# Patient Record
Sex: MALE | Race: WHITE
[De-identification: names, ages, dates, MRNs, and addresses within clinical notes are randomized per-mention and may not be internally consistent; named-entity substitution may affect disease eponyms.]

---

## 2017-01-21 ENCOUNTER — TRANSCRIPTION ENCOUNTER (OUTPATIENT)
Age: 77
End: 2017-01-21

## 2018-04-10 ENCOUNTER — TRANSCRIPTION ENCOUNTER (OUTPATIENT)
Age: 78
End: 2018-04-10

## 2018-08-22 ENCOUNTER — TRANSCRIPTION ENCOUNTER (OUTPATIENT)
Age: 78
End: 2018-08-22

## 2019-04-11 ENCOUNTER — OUTPATIENT (OUTPATIENT)
Dept: OUTPATIENT SERVICES | Facility: HOSPITAL | Age: 79
LOS: 1 days | Discharge: HOME | End: 2019-04-11
Payer: MEDICARE

## 2019-04-11 DIAGNOSIS — I67.1 CEREBRAL ANEURYSM, NONRUPTURED: ICD-10-CM

## 2019-04-11 PROCEDURE — 70544 MR ANGIOGRAPHY HEAD W/O DYE: CPT | Mod: 26

## 2020-09-07 ENCOUNTER — EMERGENCY (EMERGENCY)
Facility: HOSPITAL | Age: 80
LOS: 0 days | Discharge: LEFT BEFORE DISCHARGE | End: 2020-09-07
Attending: EMERGENCY MEDICINE | Admitting: EMERGENCY MEDICINE
Payer: MEDICARE

## 2020-09-07 VITALS
RESPIRATION RATE: 20 BRPM | DIASTOLIC BLOOD PRESSURE: 93 MMHG | HEART RATE: 82 BPM | OXYGEN SATURATION: 100 % | SYSTOLIC BLOOD PRESSURE: 216 MMHG | TEMPERATURE: 97 F

## 2020-09-07 VITALS
DIASTOLIC BLOOD PRESSURE: 94 MMHG | OXYGEN SATURATION: 100 % | TEMPERATURE: 97 F | RESPIRATION RATE: 20 BRPM | HEART RATE: 64 BPM | SYSTOLIC BLOOD PRESSURE: 186 MMHG

## 2020-09-07 DIAGNOSIS — S09.90XA UNSPECIFIED INJURY OF HEAD, INITIAL ENCOUNTER: ICD-10-CM

## 2020-09-07 DIAGNOSIS — I48.91 UNSPECIFIED ATRIAL FIBRILLATION: ICD-10-CM

## 2020-09-07 DIAGNOSIS — S60.419A ABRASION OF UNSPECIFIED FINGER, INITIAL ENCOUNTER: ICD-10-CM
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
## 2020-09-07 DIAGNOSIS — I10 ESSENTIAL (PRIMARY) HYPERTENSION: ICD-10-CM

## 2020-09-07 DIAGNOSIS — S81.812A LACERATION WITHOUT FOREIGN BODY, LEFT LOWER LEG, INITIAL ENCOUNTER: ICD-10-CM

## 2020-09-07 DIAGNOSIS — M79.662 PAIN IN LEFT LOWER LEG: ICD-10-CM

## 2020-09-07 DIAGNOSIS — S80.212A ABRASION, LEFT KNEE, INITIAL ENCOUNTER: ICD-10-CM

## 2020-09-07 DIAGNOSIS — S50.312A ABRASION OF LEFT ELBOW, INITIAL ENCOUNTER: ICD-10-CM

## 2020-09-07 DIAGNOSIS — S80.211A ABRASION, RIGHT KNEE, INITIAL ENCOUNTER: ICD-10-CM

## 2020-09-07 DIAGNOSIS — Z95.5 PRESENCE OF CORONARY ANGIOPLASTY IMPLANT AND GRAFT: Chronic | ICD-10-CM
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
## 2020-09-07 DIAGNOSIS — S22.42XA MULTIPLE FRACTURES OF RIBS, LEFT SIDE, INITIAL ENCOUNTER FOR CLOSED FRACTURE: ICD-10-CM
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
## 2020-09-07 LAB
ALBUMIN SERPL ELPH-MCNC: 4.6 G/DL — SIGNIFICANT CHANGE UP (ref 3.5–5.2)
ALP SERPL-CCNC: 35 U/L — SIGNIFICANT CHANGE UP (ref 30–115)
ALT FLD-CCNC: 15 U/L — SIGNIFICANT CHANGE UP (ref 0–41)
ANION GAP SERPL CALC-SCNC: 13 MMOL/L — SIGNIFICANT CHANGE UP (ref 7–14)
APTT BLD: 41.2 SEC — HIGH (ref 27–39.2)
AST SERPL-CCNC: 26 U/L — SIGNIFICANT CHANGE UP (ref 0–41)
BASOPHILS # BLD AUTO: 0.06 K/UL — SIGNIFICANT CHANGE UP (ref 0–0.2)
BASOPHILS NFR BLD AUTO: 0.9 % — SIGNIFICANT CHANGE UP (ref 0–1)
BILIRUB SERPL-MCNC: 0.8 MG/DL — SIGNIFICANT CHANGE UP (ref 0.2–1.2)
BLD GP AB SCN SERPL QL: SIGNIFICANT CHANGE UP
BUN SERPL-MCNC: 18 MG/DL — SIGNIFICANT CHANGE UP (ref 10–20)
CALCIUM SERPL-MCNC: 9.7 MG/DL — SIGNIFICANT CHANGE UP (ref 8.5–10.1)
CHLORIDE SERPL-SCNC: 103 MMOL/L — SIGNIFICANT CHANGE UP (ref 98–110)
CO2 SERPL-SCNC: 23 MMOL/L — SIGNIFICANT CHANGE UP (ref 17–32)
CREAT SERPL-MCNC: 1.4 MG/DL — SIGNIFICANT CHANGE UP (ref 0.7–1.5)
EOSINOPHIL # BLD AUTO: 0.15 K/UL — SIGNIFICANT CHANGE UP (ref 0–0.7)
EOSINOPHIL NFR BLD AUTO: 2.1 % — SIGNIFICANT CHANGE UP (ref 0–8)
ETHANOL SERPL-MCNC: <10 MG/DL — SIGNIFICANT CHANGE UP
GLUCOSE SERPL-MCNC: 151 MG/DL — HIGH (ref 70–99)
HCT VFR BLD CALC: 44.6 % — SIGNIFICANT CHANGE UP (ref 42–52)
HGB BLD-MCNC: 14.7 G/DL — SIGNIFICANT CHANGE UP (ref 14–18)
IMM GRANULOCYTES NFR BLD AUTO: 0.6 % — HIGH (ref 0.1–0.3)
INR BLD: 1.72 RATIO — HIGH (ref 0.65–1.3)
LACTATE SERPL-SCNC: 2 MMOL/L — SIGNIFICANT CHANGE UP (ref 0.7–2)
LIDOCAIN IGE QN: 34 U/L — SIGNIFICANT CHANGE UP (ref 7–60)
LYMPHOCYTES # BLD AUTO: 1.96 K/UL — SIGNIFICANT CHANGE UP (ref 1.2–3.4)
LYMPHOCYTES # BLD AUTO: 27.8 % — SIGNIFICANT CHANGE UP (ref 20.5–51.1)
MCHC RBC-ENTMCNC: 32 PG — HIGH (ref 27–31)
MCHC RBC-ENTMCNC: 33 G/DL — SIGNIFICANT CHANGE UP (ref 32–37)
MCV RBC AUTO: 97 FL — HIGH (ref 80–94)
MONOCYTES # BLD AUTO: 0.6 K/UL — SIGNIFICANT CHANGE UP (ref 0.1–0.6)
MONOCYTES NFR BLD AUTO: 8.5 % — SIGNIFICANT CHANGE UP (ref 1.7–9.3)
NEUTROPHILS # BLD AUTO: 4.24 K/UL — SIGNIFICANT CHANGE UP (ref 1.4–6.5)
NEUTROPHILS NFR BLD AUTO: 60.1 % — SIGNIFICANT CHANGE UP (ref 42.2–75.2)
NRBC # BLD: 0 /100 WBCS — SIGNIFICANT CHANGE UP (ref 0–0)
PLATELET # BLD AUTO: 236 K/UL — SIGNIFICANT CHANGE UP (ref 130–400)
POTASSIUM SERPL-MCNC: 4 MMOL/L — SIGNIFICANT CHANGE UP (ref 3.5–5)
POTASSIUM SERPL-SCNC: 4 MMOL/L — SIGNIFICANT CHANGE UP (ref 3.5–5)
PROT SERPL-MCNC: 7.2 G/DL — SIGNIFICANT CHANGE UP (ref 6–8)
PROTHROM AB SERPL-ACNC: 19.8 SEC — HIGH (ref 9.95–12.87)
RBC # BLD: 4.6 M/UL — LOW (ref 4.7–6.1)
RBC # FLD: 14.5 % — SIGNIFICANT CHANGE UP (ref 11.5–14.5)
SODIUM SERPL-SCNC: 139 MMOL/L — SIGNIFICANT CHANGE UP (ref 135–146)
WBC # BLD: 7.05 K/UL — SIGNIFICANT CHANGE UP (ref 4.8–10.8)
WBC # FLD AUTO: 7.05 K/UL — SIGNIFICANT CHANGE UP (ref 4.8–10.8)

## 2020-09-07 PROCEDURE — 73590 X-RAY EXAM OF LOWER LEG: CPT | Mod: 26,50

## 2020-09-07 PROCEDURE — 99285 EMERGENCY DEPT VISIT HI MDM: CPT | Mod: 25

## 2020-09-07 PROCEDURE — 70450 CT HEAD/BRAIN W/O DYE: CPT | Mod: 26

## 2020-09-07 PROCEDURE — 72125 CT NECK SPINE W/O DYE: CPT | Mod: 26

## 2020-09-07 PROCEDURE — 12035 INTMD RPR S/A/T/EXT 12.6-20: CPT

## 2020-09-07 PROCEDURE — 71260 CT THORAX DX C+: CPT | Mod: 26

## 2020-09-07 PROCEDURE — 71045 X-RAY EXAM CHEST 1 VIEW: CPT | Mod: 26

## 2020-09-07 PROCEDURE — 72170 X-RAY EXAM OF PELVIS: CPT | Mod: 26

## 2020-09-07 PROCEDURE — 93010 ELECTROCARDIOGRAM REPORT: CPT

## 2020-09-07 PROCEDURE — 74177 CT ABD & PELVIS W/CONTRAST: CPT | Mod: 26

## 2020-09-07 PROCEDURE — 73562 X-RAY EXAM OF KNEE 3: CPT | Mod: 26,50

## 2020-09-07 RX ORDER — CEFAZOLIN SODIUM 1 G
1000 VIAL (EA) INJECTION ONCE
Refills: 0 | Status: COMPLETED | OUTPATIENT
Start: 2020-09-07 | End: 2020-09-07

## 2020-09-07 RX ADMIN — Medication 100 MILLIGRAM(S): at 14:22

## 2020-09-07 NOTE — ED PROVIDER NOTE - PATIENT PORTAL LINK FT
You can access the FollowMyHealth Patient Portal offered by [facility] by registering at the following website: [email or web address]. By joining [facility]’s FollowMyHealth portal, you will also be able to view your health information using other applications (apps) compatible with our system.

## 2020-09-07 NOTE — ED PROVIDER NOTE - CLINICAL SUMMARY MEDICAL DECISION MAKING FREE TEXT BOX
evaluated for fall on xarelto. suffered large laceration to left leg. ct scan was done of head neck chest abd and pelvis which found age indeterminate fractures concerning for possible fracture due to most recent fall. trauma team was consulted and recommendation was for patient to stay in hospital however patient refused. The patient wishes to leave against medical advice.  I have discussed the risks, benefits and alternatives (including the possibility of worsening of disease, pain, permanent disability, and/or death) with the patient and his/her family (if available).  The patient voices understanding of these risks, benefits, and alternatives and still wishes to sign out against medical advice.  The patient is awake, alert, oriented  x 3 and has demonstrated capacity to refuse/direct care.  I have advised the patient that they can and should return immediately should they develop any worse/different/additional symptoms, or if they change their mind and want to continue their care.

## 2020-09-07 NOTE — CONSULT NOTE ADULT - SUBJECTIVE AND OBJECTIVE BOX
TRAUMA SERVICE CONSULT NOTE  --------------------------------------------------------------------------------------------    TRAUMA ACTIVATION LEVEL: ALERT    MECHANISM OF INJURY:      [x] Blunt  	[] MVC	[x] Fall	[] Pedestrian Struck	[] Motorcycle accident      [] Penetrating  	[] Gun Shot Wound 		[] Stab Wound    GCS: 15	E: 4	V: 5	M: 6    HPI: 79M PMH HTN, DM, HLD, gout, CAD s/p PCI on ASA, currently on xarelto presenting to the ED s/p mechanical trip and fall, +HT on cement, -LOC, on xarelto. Per the patient he had been working in his garden pulling weeds when he tripped, falling forward and hitting his head. External signs of trauma significant for an approximately 10 x 6 cm skin avulsion exposing fat on his left lateral calf, b/l knee abrasions, L elbow abrasion, b/l hand superficial abrasion. He only reports L lateral calf pain.    Primary Survey:    A - airway intact  B - bilateral breath sounds and good chest rise  C - initial BP  BP: 186/94 (09-07-20 @ 14:13) , HR HR: 64 (09-07-20 @ 14:13), palpable pulses in all extremities  D - GCS 15 on arrival  Exposure obtained      General: NAD  HEENT: Normocephalic, atraumatic, EOMI, PEERLA.  Neck: Soft, midline trachea.  Chest: No chest wall tenderness.   Cardiac: S1, S2, RRR  Respiratory: Bilateral breath sounds, clear and equal bilaterally  Abdomen: Soft, non-distended, non-tender, no rebound, no guarding, no masses palpated  Groin: Normal appearing  Ext: palp radial b/l UE, b/l DP palp in Lower Extrem, motor and sensory grossly intact in all 4 extremities, approximately 10 x 6cm skin avulsion exposing fat, no active bleed from skin, but with some bleed from fat, b/l knee abrasion, b/l hand abrasion, L elbow abrasion  Back: no TTP, no palpable runoff/stepoff/deformity      Patient denies fevers/chills, denies lightheadedness/dizziness, denies SOB/chest pain, denies nausea/vomiting, denies constipation/diarrhea.   ROS: 10-system review is otherwise negative except HPI above.      PAST MEDICAL & SURGICAL HISTORY:    FAMILY HISTORY:    [] Family history not pertinent as reviewed with the patient and family    SOCIAL HISTORY:  ***    ALLERGIES: No Known Allergies      HOME MEDICATIONS: ***    CURRENT MEDICATIONS  MEDICATIONS (STANDING):   MEDICATIONS (PRN):  --------------------------------------------------------------------------------------------    Vitals:   T(C): 36.3 (09-07-20 @ 14:13), Max: 36.3 (09-07-20 @ 13:50)  HR: 64 (09-07-20 @ 14:13) (64 - 91)  BP: 186/94 (09-07-20 @ 14:13) (182/89 - 219/76)  RR: 20 (09-07-20 @ 14:13) (20 - 20)  SpO2: 100% (09-07-20 @ 14:13) (100% - 100%)  CAPILLARY BLOOD GLUCOSE      POCT Blood Glucose.: 144 mg/dL (07 Sep 2020 13:52)    CAPILLARY BLOOD GLUCOSE      POCT Blood Glucose.: 144 mg/dL (07 Sep 2020 13:52)        Weight (kg): 119 (09-07 @ 14:00)        --------------------------------------------------------------------------------------------    LABS  CBC (09-07 @ 14:10)                              14.7                           7.05    )----------------(  236        60.1  % Neutrophils, 27.8  % Lymphocytes, ANC: 4.24                                44.6      BMP (09-07 @ 14:10)             139     |  103     |  18    		Ca++ --      Ca 9.7                ---------------------------------( 151<H>		Mg --                 4.0     |  23      |  1.4   			Ph --        LFTs (09-07 @ 14:10)      TPro 7.2 / Alb 4.6 / TBili 0.8 / DBili -- / AST 26 / ALT 15 / AlkPhos 35    Coags (09-07 @ 14:10)  aPTT 41.2<H> / INR 1.72<H> / PT 19.80<H>      ABG (09-07 @ 14:10)      /  /  /  /  / %     Lactate:  2.0        --------------------------------------------------------------------------------------------    IMAGING  PENDING    -------------------------------------------------------------------------------------------- TRAUMA SERVICE CONSULT NOTE  --------------------------------------------------------------------------------------------    TRAUMA ACTIVATION LEVEL: ALERT    MECHANISM OF INJURY:      [x] Blunt  	[] MVC	[x] Fall	[] Pedestrian Struck	[] Motorcycle accident      [] Penetrating  	[] Gun Shot Wound 		[] Stab Wound    GCS: 15	E: 4	V: 5	M: 6    HPI: 79M PMH HTN, DM, HLD, gout, CAD s/p PCI on ASA, currently on xarelto presenting to the ED s/p mechanical trip and fall, +HT on cement, -LOC, on xarelto. Per the patient he had been working in his garden pulling weeds when he tripped, falling forward and hitting his head. External signs of trauma significant for an approximately 10 x 6 cm skin avulsion exposing fat on his left lateral calf, b/l knee abrasions, L elbow abrasion, b/l hand superficial abrasion. He only reports L lateral calf pain.    Primary Survey:    A - airway intact  B - bilateral breath sounds and good chest rise  C - initial BP  BP: 186/94 (09-07-20 @ 14:13) , HR HR: 64 (09-07-20 @ 14:13), palpable pulses in all extremities  D - GCS 15 on arrival  Exposure obtained      General: NAD  HEENT: Normocephalic, atraumatic, EOMI, PEERLA.  Neck: Soft, midline trachea.  Chest: Minimal left chest tenderness  Cardiac: S1, S2, RRR  Respiratory: Bilateral breath sounds, clear and equal bilaterally  Abdomen: Soft, non-distended, non-tender, no rebound, no guarding, no masses palpated  Groin: Normal appearing  Ext: palp radial b/l UE, b/l DP palp in Lower Extrem, motor and sensory grossly intact in all 4 extremities, approximately 10 x 6cm skin avulsion exposing fat, no active bleed from skin, but with some bleed from fat, b/l knee abrasion, b/l hand abrasion, L elbow abrasion  Back: no TTP, no palpable runoff/stepoff/deformity      Patient denies fevers/chills, denies lightheadedness/dizziness, denies SOB/chest pain, denies nausea/vomiting, denies constipation/diarrhea.   ROS: 10-system review is otherwise negative except HPI above.      PAST MEDICAL & SURGICAL HISTORY:    FAMILY HISTORY:    [] Family history not pertinent as reviewed with the patient and family    SOCIAL HISTORY:  ***    ALLERGIES: No Known Allergies      HOME MEDICATIONS: ***    CURRENT MEDICATIONS  MEDICATIONS (STANDING):   MEDICATIONS (PRN):  --------------------------------------------------------------------------------------------    Vitals:   T(C): 36.3 (09-07-20 @ 14:13), Max: 36.3 (09-07-20 @ 13:50)  HR: 64 (09-07-20 @ 14:13) (64 - 91)  BP: 186/94 (09-07-20 @ 14:13) (182/89 - 219/76)  RR: 20 (09-07-20 @ 14:13) (20 - 20)  SpO2: 100% (09-07-20 @ 14:13) (100% - 100%)  CAPILLARY BLOOD GLUCOSE      POCT Blood Glucose.: 144 mg/dL (07 Sep 2020 13:52)    CAPILLARY BLOOD GLUCOSE      POCT Blood Glucose.: 144 mg/dL (07 Sep 2020 13:52)        Weight (kg): 119 (09-07 @ 14:00)        --------------------------------------------------------------------------------------------    LABS  CBC (09-07 @ 14:10)                              14.7                           7.05    )----------------(  236        60.1  % Neutrophils, 27.8  % Lymphocytes, ANC: 4.24                                44.6      BMP (09-07 @ 14:10)             139     |  103     |  18    		Ca++ --      Ca 9.7                ---------------------------------( 151<H>		Mg --                 4.0     |  23      |  1.4   			Ph --        LFTs (09-07 @ 14:10)      TPro 7.2 / Alb 4.6 / TBili 0.8 / DBili -- / AST 26 / ALT 15 / AlkPhos 35    Coags (09-07 @ 14:10)  aPTT 41.2<H> / INR 1.72<H> / PT 19.80<H>      ABG (09-07 @ 14:10)      /  /  /  /  / %     Lactate:  2.0        --------------------------------------------------------------------------------------------    IMAGING  < from: CT Head No Cont (09.07.20 @ 15:32) >  IMPRESSION:    No acute intracranial hemorrhage, midline shift, or mass effect.    < end of copied text >  < from: CT Cervical Spine No Cont (09.07.20 @ 15:32) >  IMPRESSION:    No acute cervical spine fracture.    < end of copied text >  < from: CT Chest w/ IV Cont (09.07.20 @ 20:37) >  IMPRESSION:    1. Subcutaneous soft tissue stranding along the superior aspect of left chest wall with suspicion of an acute nondisplaced fracture involving the left 6th anterolateral rib; several additional age-indeterminate left-sided rib fractures, specifically involving the left 3rd, 4th, and 7th anterolateral ribs.    2. Otherwise, no definite evidence of acute traumatic injury within the chest, abdomen or pelvis.    3. Indeterminate 2.4 cm cystic lesion along the pancreatic uncinate process; correlation with prior imaging, if available, may be of use. If prior imaging is not available, and further evaluation is clinically warranted, a contrast-enhanced pancreatic protocol MRI may be of use.    < end of copied text >      --------------------------------------------------------------------------------------------

## 2020-09-07 NOTE — ED PROCEDURE NOTE - PROCEDURE ADDITIONAL DETAILS
Total of 34 sutures applied (4.0 Vicryl subcutaneous and 4.0 Ethicon to skin). Deep wound into subcutaneous tissue. 2 skin flaps aligned.

## 2020-09-07 NOTE — ED PROVIDER NOTE - NSFOLLOWUPCLINICS_GEN_ALL_ED_FT
[facility]  Trauma Surgery  [address] [place] [address]  Phone: [phone]  Fax:   Follow Up Time:

## 2020-09-07 NOTE — ED PROVIDER NOTE - NSFOLLOWUPINSTRUCTIONS_ED_ALL_ED_FT
Sutured Wound Care    Sutures are stitches that can be used to close wounds. Taking care of your wound properly can help to prevent pain and infection. It can also help your wound to heal more quickly. Follow instructions from your health care provider about how to care for your sutured wound.    Supplies needed:    - Soap and water.  - A clean bandage (dressing), if needed.  - Antibiotic ointment.  - A clean towel.    How to care for your sutured wound    - Keep the wound completely dry for the first 24 hours, or for as long as directed by your health care provider. After 24–48 hours, you may shower or bathe as directed by your health care provider. Do not soak or submerge the wound in water until the sutures have been removed.  - After the first 24 hours, clean the wound once a day, or as often as directed by your health care provider, using the following steps:    - Wash the wound with soap and water.    - Rinse the wound with water to remove all soap.    - Pat the wound dry with a clean towel. Do not rub the wound.  - After cleaning the wound, apply a thin layer of antibiotic ointment as directed by your health care provider. This will prevent infection and keep the dressing from sticking to the wound.    Follow instructions from your health care provider about how to change your dressing:    - Wash your hands with soap and water. If soap and water are not available, use hand .  - Change your dressing at least once a day, or as often as told by your health care provider. If your dressing gets wet or dirty, change it.  - Leave sutures and other skin closures, such as adhesive tape or skin glue, in place. These skin closures may need to stay in place for 2 weeks or longer. If adhesive strip edges start to loosen and curl up, you may trim the loose edges. Do not remove adhesive strips completely unless your health care provider tells you to do that.    Check your wound every day for signs of infection. Watch for:    - Redness, swelling, or pain.  - Fluid or blood.  - Warmth.  - Pus or a bad smell.    Have the sutures removed as directed by your health care provider.    Follow these instructions at home:    Medicines     - Take or apply over-the-counter and prescription medicines only as told by your health care provider.  - If you were prescribed an antibiotic medicine or ointment, take or apply it as told by your health care provider. Do not stop using the antibiotic even if your condition improves.    General instructions     - To help reduce scarring after your wound heals, cover your wound with clothing or apply sunscreen of at least 30 SPF whenever you are outside.  - Do not scratch or pick at your wound.  - Avoid stretching your wound.  - Raise (elevate) the injured area above the level of your heart while you are sitting or lying down, if possible.  - Drink enough fluids to keep your urine clear or pale yellow.  - Keep all follow-up visits as told by your health care provider. This is important.    Contact a health care provider if:    - You received a tetanus shot and you have swelling, severe pain, redness, or bleeding at the injection site.  - Your wound breaks open.  - You have redness, swelling, or pain around your wound.  - You have fluid or blood coming from your wound.  - Your wound feels warm to the touch.  - You have a fever.  - You notice something coming out of your wound, such as wood or glass.  - You have pain that does not get better with medicine.  - The skin near your wound changes color.  - You need to change your dressing very frequently due to a lot of fluid, blood, or pus draining from the wound.  - You develop a new rash.  - You develop numbness around the wound.    Get help right away if:    - You develop severe swelling around your wound.  - You have pus or a bad smell coming from your wound.  - Your pain suddenly gets worse and is severe.  - You develop painful lumps near your wound or anywhere on your body.  - You have a red streak going away from your wound.  - The wound is on your hand or foot and:    - You cannot properly move a finger or toe.    - Your fingers or toes look pale or bluish.    - You have numbness that is spreading down your hand, foot, fingers, or toes.    Summary    - Sutures are stitches that can be used to close wounds.  - Taking care of your wound properly can help to prevent pain and infection.  - Keep the wound completely dry for the first 24 hours, or for as long as directed by your health care provider. After 24–48 hours, you may shower or bathe as directed by your health care provider.    This information is not intended to replace advice given to you by your health care provider. Make sure you discuss any questions you have with your health care provider.    Closed Head Injury    A closed head injury is an injury to your head that may or may not involve a traumatic brain injury (TBI). Symptoms of TBI can be short or long lasting and include headache, dizziness, interference with memory or speech, fatigue, confusion, changes in sleep, mood changes, nausea, depression/anxiety, and dulling of senses. Make sure to obtain proper rest which includes getting plenty of sleep, avoiding excessive visual stimulation, and avoiding activities that may cause physical or mental stress. Avoid any situation where there is potential for another head injury, including sports.    SEEK IMMEDIATE MEDICAL CARE IF YOU HAVE ANY OF THE FOLLOWING SYMPTOMS: unusual drowsiness, vomiting, severe dizziness, seizures, lightheadedness, muscular weakness, different pupil sizes, visual changes, or clear or bloody discharge from your ears or nose. Sutured Wound Care    Sutures are stitches that can be used to close wounds. Taking care of your wound properly can help to prevent pain and infection. It can also help your wound to heal more quickly. Follow instructions from your health care provider about how to care for your sutured wound.    Supplies needed:    - Soap and water.  - A clean bandage (dressing), if needed.  - Antibiotic ointment.  - A clean towel.    How to care for your sutured wound    - Keep the wound completely dry for the first 24 hours, or for as long as directed by your health care provider. After 24–48 hours, you may shower or bathe as directed by your health care provider. Do not soak or submerge the wound in water until the sutures have been removed.  - After the first 24 hours, clean the wound once a day, or as often as directed by your health care provider, using the following steps:    - Wash the wound with soap and water.    - Rinse the wound with water to remove all soap.    - Pat the wound dry with a clean towel. Do not rub the wound.  - After cleaning the wound, apply a thin layer of antibiotic ointment as directed by your health care provider. This will prevent infection and keep the dressing from sticking to the wound.    Follow instructions from your health care provider about how to change your dressing:    - Wash your hands with soap and water. If soap and water are not available, use hand .  - Change your dressing at least once a day, or as often as told by your health care provider. If your dressing gets wet or dirty, change it.  - Leave sutures and other skin closures, such as adhesive tape or skin glue, in place. These skin closures may need to stay in place for 2 weeks or longer. If adhesive strip edges start to loosen and curl up, you may trim the loose edges. Do not remove adhesive strips completely unless your health care provider tells you to do that.    Check your wound every day for signs of infection. Watch for:    - Redness, swelling, or pain.  - Fluid or blood.  - Warmth.  - Pus or a bad smell.    Have the sutures removed as directed by your health care provider.    Follow these instructions at home:    Medicines     - Take or apply over-the-counter and prescription medicines only as told by your health care provider.  - If you were prescribed an antibiotic medicine or ointment, take or apply it as told by your health care provider. Do not stop using the antibiotic even if your condition improves.    General instructions     - To help reduce scarring after your wound heals, cover your wound with clothing or apply sunscreen of at least 30 SPF whenever you are outside.  - Do not scratch or pick at your wound.  - Avoid stretching your wound.  - Raise (elevate) the injured area above the level of your heart while you are sitting or lying down, if possible.  - Drink enough fluids to keep your urine clear or pale yellow.  - Keep all follow-up visits as told by your health care provider. This is important.    Contact a health care provider if:    - You received a tetanus shot and you have swelling, severe pain, redness, or bleeding at the injection site.  - Your wound breaks open.  - You have redness, swelling, or pain around your wound.  - You have fluid or blood coming from your wound.  - Your wound feels warm to the touch.  - You have a fever.  - You notice something coming out of your wound, such as wood or glass.  - You have pain that does not get better with medicine.  - The skin near your wound changes color.  - You need to change your dressing very frequently due to a lot of fluid, blood, or pus draining from the wound.  - You develop a new rash.  - You develop numbness around the wound.    Get help right away if:    - You develop severe swelling around your wound.  - You have pus or a bad smell coming from your wound.  - Your pain suddenly gets worse and is severe.  - You develop painful lumps near your wound or anywhere on your body.  - You have a red streak going away from your wound.  - The wound is on your hand or foot and:    - You cannot properly move a finger or toe.    - Your fingers or toes look pale or bluish.    - You have numbness that is spreading down your hand, foot, fingers, or toes.    Summary    - Sutures are stitches that can be used to close wounds.  - Taking care of your wound properly can help to prevent pain and infection.  - Keep the wound completely dry for the first 24 hours, or for as long as directed by your health care provider. After 24–48 hours, you may shower or bathe as directed by your health care provider.    This information is not intended to replace advice given to you by your health care provider. Make sure you discuss any questions you have with your health care provider.    Closed Head Injury    A closed head injury is an injury to your head that may or may not involve a traumatic brain injury (TBI). Symptoms of TBI can be short or long lasting and include headache, dizziness, interference with memory or speech, fatigue, confusion, changes in sleep, mood changes, nausea, depression/anxiety, and dulling of senses. Make sure to obtain proper rest which includes getting plenty of sleep, avoiding excessive visual stimulation, and avoiding activities that may cause physical or mental stress. Avoid any situation where there is potential for another head injury, including sports.    SEEK IMMEDIATE MEDICAL CARE IF YOU HAVE ANY OF THE FOLLOWING SYMPTOMS: unusual drowsiness, vomiting, severe dizziness, seizures, lightheadedness, muscular weakness, different pupil sizes, visual changes, or clear or bloody discharge from your ears or nose.    Rib Fracture(s)    A rib fracture is a break or crack in one of the bones of the ribs. The ribs are a group of long, curved bones that wrap around your chest and attach to your spine. A broken or cracked rib is often painful, but most do not cause other problems and heal on their own over time. Symptoms include pain when you breath or cough or pain when pressing over the area. Breathing exercises will help keep your lungs inflated and prevent lung collapse or infection.    SEEK IMMEDIATE MEDICAL CARE IF YOU HAVE ANY OF THE FOLLOWING SYMPTOMS: fever, shortness of breath, coughing up blood, abdominal pain, nausea or vomiting, pain not controlled with medications.

## 2020-09-07 NOTE — ED PROVIDER NOTE - CARE PROVIDER_API CALL
[id number] [place] [address]  Phone: [phone]  Fax: [phone]  Follow Up Time:

## 2020-09-07 NOTE — ED PROVIDER NOTE - PMH
Atrial fibrillation    Coronary artery disease    Diabetes mellitus    Gout    Hyperlipidemia    Hypertension

## 2020-09-07 NOTE — CONSULT NOTE ADULT - ASSESSMENT
ASSESSMENT: 79M PMH HTN, DM, HLD, gout, CAD s/p PCI on ASA, currently on xarelto presenting to the ED s/p mechanical trip and fall, +HT on cement, -LOC, on xarelto    PLAN:  ***  - panscan  - L tibfib, ankle, b/l knee, b/le elbow, b/l hand XR  - will follow  - plan d/w Dr. Hinkle, ED, patient and wife ASSESSMENT: 79M PMH HTN, DM, HLD, gout, CAD s/p PCI on ASA, currently on xarelto presenting to the ED s/p mechanical trip and fall, +HT on cement, -LOC, on xarelto    PLAN:    - panscan performed demonstrating subcutaneous soft tissue stranding along the superior aspect of left chest wall with suspicion of an acute nondisplaced fracture involving the left 6th anterolateral rib; several additional age-indeterminate left-sided rib fractures, specifically involving the left 3rd, 4th, and 7th anterolateral ribs  - recommended patient admission for respiratory monitoring, pain control  - patient currently refusing admission, denies significant chest pain, discussed w/ patient risk of respiratory compromise in first 24h after sustaining rib fractures, patient and wife report understanding risks and will leave AMA  - plan d/w Dr. Hinkle, ED, patient and wife

## 2020-09-07 NOTE — ED ADULT NURSE NOTE - NSIMPLEMENTINTERV_GEN_ALL_ED
Implemented All Fall with Harm Risk Interventions:  [place] to call system. Call bell, personal items and telephone within reach. Instruct patient to call for assistance. Room bathroom lighting operational. Non-slip footwear when patient is off stretcher. Physically safe environment: no spills, clutter or unnecessary equipment. Stretcher in lowest position, wheels locked, appropriate side rails in place. Provide visual cue, wrist band, yellow gown, etc. Monitor gait and stability. Monitor for mental status changes and reorient to person, place, and time. Review medications for side effects contributing to fall risk. Reinforce activity limits and safety measures with patient and family. Provide visual clues: red socks.

## 2020-09-07 NOTE — ED PROVIDER NOTE - PROGRESS NOTE DETAILS
TC: Pt signed out to me by Dr. Easton. 80 yo M with PMHx of afib on xarelto, CAD s/p PCI on ASA, gout, HLD, HTN, DM who presents with mechanical fall while gardening. +head trauma, no preceding cardiac events. Ambulatory after and able to bear weight on BLE. Here in ED, vitals wnl. Trauma alert activated. ABC intact. GCS 15. LLE skin avulsion repaired. Labs wnl. No acute fx or dislocation seen on xrays. CT head/c-spine negative. Pt taken back to CT for CT chest/abd. Pt declined any pain meds at this time. Tdap utd 2 mo ago. TC: CT chest/abd shows acute L 6th rib fx with age indeterminate L rib fx's of 3,4,7th ribs. Pt has minimal tenderness on exam. Also noted to have pancreatic cystic lesion which was discussed with pt and wife and need for nonemergent f/u. Pt would like to leave AMA instead of stay for multiple rib fx's. Trauma team aware. Provided with incentive spirometer. Pt is awake, alert, interactive with normal mental status and neuro function. Pt denies SI/HI, demonstrates normal thought processes without evidence of intoxication, delirium, delusions, hallucinations. Pt requesting to leave against medical advice. Advised pt of potential risks of leaving AMA, including potential disability or death. Attempted to convince pt to stay and continue work up/tx and pt refused. Pt has capacity to make medical decisions at this time and will be signed out AMA. Instructed to f/u with PMD and trauma clinic as outpt and is aware can return to the ED at any time for eval.

## 2020-09-07 NOTE — ED PROVIDER NOTE - ATTENDING CONTRIBUTION TO CARE
I personally evaluated patient. I agree with the findings and plan with all documentation on chart except as documented  in my note.    80 y/o M PMH HTN, DM, HLD, gout, CAD s/p PCI on ASA, currently on Xarelto presenting to the ED s/p mechanical trip and fall, +HT on cement, -LOC, on Xarelto. Trauma Alert called upon arrival. Per the patient he had been working in his garden pulling weeds when he tripped, falling forward and hitting his head. + large laceration to shin with active bleeding and was bandaged by EMS.    Primary Survey:    A - airway intact  B - bilateral breath sounds and good chest rise  C - initial BP  BP: 186/94 (09-07-20 @ 14:13) , HR HR: 64 (09-07-20 @ 14:13), palpable pulses in all extremities  D - GCS 15 on arrival  Exposure obtained. External signs of trauma significant for an approximately 10 x 6 cm skin avulsion exposing fat on his left lateral calf, b/l knee abrasions, L elbow abrasion, b/l hand superficial abrasion. He only reports L lateral calf pain.    Patient seen and evaluated.  Primary survey intact. GCS 15. VS reviewed. Bedside FAST exam done and negative.  Large bore IV placed. Portable CXR and pelvis x-rays ordered with leg films.  Will send appropriate labs and getting appropriate trauma imaging.  Will follow up work up and recommendations from trauma.

## 2020-09-07 NOTE — ED ADULT TRIAGE NOTE - CHIEF COMPLAINT QUOTE
patient was pulling weeds when he tripped and fell over trailer hitch hitting head on xaralto no loc. full thickness skin flap laceration to left leg

## 2020-09-07 NOTE — ED PROVIDER NOTE - PHYSICAL EXAMINATION
Vital Signs: I have reviewed the initial vital signs.  Constitutional: well-nourished, appears stated age, no acute distress.  HEENT: Airway patent, protected.   CV: regular rate, regular rhythm, well-perfused extremities, 2+ b/l DP and radial pulses equal.  Lungs: BCTA, no increased WOB.  ABD: soft, NTND, no guarding or rebound, no pulsatile mass, no hernias.   MSK: Neck supple, nontender, nl ROM, no stepoff. Chest nontender. Back nontender in TLS spine or to b/l bony structures or flanks. Left tibia demonstrates 10 x 4 cm square skin flap without pulsatile bleed.   INTEG: Skin warm, dry, no rash.  NEURO: A&Ox3, moving all extremities, normal speech  PSYCH: Calm, cooperative, normal affect and interaction. Airway: Intact, Protecting airway  Breathing: Present b/l  Circulation: Strong pulses present in all four extremities    VITAL SIGNS: I have reviewed nursing notes and confirm.  CONSTITUTIONAL: no acute distress, GCS 15  SKIN: Laceration to Left shin - 10 x 6 cm square skin flap without pulsatile bleed into deep subcutaneous tissue. + abrasions to b/l knees   HEAD/Face: NCAT, no tenderness to mandible, skull bones, or zygoma  EYES: EOMI, PERRLA, no scleral icterus. No orbital rim tenderness or proptosis  ENT: TM's normal b/l with no hemotympanum, no sinus tenderness to percussion, no dental or tongue injury, normal pharynx. No signs of Basilar skull fracture.   NECK: Supple; no midline cervical tenderness  Spine: normal appearing spine with no step-off  CARD: RRR, no murmurs, rubs or gallops  RESP: clear to ausculation b/l.  No rales, rhonchi, or wheezing.  Chest: goods chest rise with breathing, no rib tenderness or crepitus  ABD: soft, + BS, non-tender, non-distended, no rebound or guarding. No CVA tenderness  Pelvis: Pelvis stable, no tenderness to pelvic bones, sacrum  EXT: Full ROM, + b/l leg edema. + tenderness to left shin. Mild tenderness to b/l knees.  NEURO: normal motor. normal sensory. CN II-XII intact. Cerebellar testing normal. Normal gait.  PSYCH: Alert, cooperative, and appropriate.

## 2020-09-07 NOTE — ED ADULT NURSE NOTE - OBJECTIVE STATEMENT
patient tripped and fell over boat hitch. hitting head no loc. patient on xaralto - pupils 3mm b/l reactive. lung sounds clear chest rise equal abdomen soft non tender non distended no deformities to extremities + pulses and sensation to all extremities. abrasions noted to b/l knees right hand - left leg full thickness skin flap laceration. no other complaints at this time

## 2020-09-07 NOTE — ED PROVIDER NOTE - OBJECTIVE STATEMENT
79M hx afib on xarelto presents with fall. patient was gardening when he tripped and cut his leg on a boat hitch, denies LOC/head trauma, tetanus utd. Denies vomiting/chest pain/diarrhea. 79M hx afib on xarelto presents with fall. patient was gardening when he tripped and cut his leg on a boat hitch, denies LOC/head trauma, tetanus utd. Denies vomiting/chest pain/diarrhea. Patient has mild pain to left lower leg - 3/10, worse with movement, dull in nature, associated with laceration and bleeding.

## 2020-09-07 NOTE — ED PROVIDER NOTE - CARE PLAN
Principal Discharge DX:	[facility] (closed head injury)  Secondary Diagnosis:	Laceration of leg Principal Discharge DX:	Multiple rib fractures involving four or more ribs  Secondary Diagnosis:	Laceration of leg  Secondary Diagnosis:	[facility] (closed head injury)

## 2020-09-10 ENCOUNTER — APPOINTMENT (OUTPATIENT)
Dept: SURGERY | Facility: CLINIC | Age: 80
End: 2020-09-10
Payer: MEDICARE

## 2020-09-10 VITALS
HEIGHT: 71 IN | WEIGHT: 259 LBS | DIASTOLIC BLOOD PRESSURE: 70 MMHG | HEART RATE: 83 BPM | SYSTOLIC BLOOD PRESSURE: 130 MMHG | BODY MASS INDEX: 36.26 KG/M2 | TEMPERATURE: 95.8 F

## 2020-09-10 PROBLEM — E11.9 TYPE 2 DIABETES MELLITUS WITHOUT COMPLICATIONS: Chronic | Status: ACTIVE | Noted: 2020-09-07

## 2020-09-10 PROBLEM — I25.10 ATHEROSCLEROTIC HEART DISEASE OF NATIVE CORONARY ARTERY WITHOUT ANGINA PECTORIS: Chronic | Status: ACTIVE | Noted: 2020-09-07

## 2020-09-10 PROBLEM — I48.91 UNSPECIFIED ATRIAL FIBRILLATION: Chronic | Status: ACTIVE | Noted: 2020-09-07

## 2020-09-10 PROBLEM — M10.9 GOUT, UNSPECIFIED: Chronic | Status: ACTIVE | Noted: 2020-09-07

## 2020-09-10 PROBLEM — I10 ESSENTIAL (PRIMARY) HYPERTENSION: Chronic | Status: ACTIVE | Noted: 2020-09-07

## 2020-09-10 PROBLEM — E78.5 HYPERLIPIDEMIA, UNSPECIFIED: Chronic | Status: ACTIVE | Noted: 2020-09-07

## 2020-09-10 PROBLEM — Z00.00 ENCOUNTER FOR PREVENTIVE HEALTH EXAMINATION: Status: ACTIVE | Noted: 2020-09-10

## 2020-09-10 PROCEDURE — 99202 OFFICE O/P NEW SF 15 MIN: CPT

## 2020-09-10 NOTE — HISTORY OF PRESENT ILLNESS
[de-identified] : 79 m cut left lateral calf on boat trailer Monday (4d ago) and ended up with angular flap.  flap was sutured in ed.  pt asked to see today for concerns related to color and drainage.\par \par the patient has lle chronic lymphedema and also reports hx vein stripping on that side.  Wound is approx 15cm flap rhomboid closed with interrupted 3-0 nylon.  there is ss drainage active from the wound edges.  the flap is dusky but warm, threatened but will need to fully declare.  Flap may fail, and defect wound be large and difficult in setting of chronic lymphedema.\par \par Will refer pt to wound service Dr Guerra for possible debridement and chronic wound mgmt.

## 2020-09-22 ENCOUNTER — APPOINTMENT (OUTPATIENT)
Dept: BURN CARE | Facility: CLINIC | Age: 80
End: 2020-09-22
Payer: MEDICARE

## 2020-09-22 ENCOUNTER — OUTPATIENT (OUTPATIENT)
Dept: OUTPATIENT SERVICES | Facility: HOSPITAL | Age: 80
LOS: 1 days | Discharge: HOME | End: 2020-09-22

## 2020-09-22 ENCOUNTER — INPATIENT (INPATIENT)
Facility: HOSPITAL | Age: 80
LOS: 2 days | Discharge: HOME HEALTH | End: 2020-09-25
Attending: PLASTIC SURGERY | Admitting: PLASTIC SURGERY
Payer: MEDICARE

## 2020-09-22 VITALS
RESPIRATION RATE: 18 BRPM | DIASTOLIC BLOOD PRESSURE: 67 MMHG | OXYGEN SATURATION: 99 % | HEART RATE: 100 BPM | SYSTOLIC BLOOD PRESSURE: 134 MMHG | TEMPERATURE: 98 F

## 2020-09-22 DIAGNOSIS — Z95.5 PRESENCE OF CORONARY ANGIOPLASTY IMPLANT AND GRAFT: Chronic | ICD-10-CM
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
## 2020-09-22 LAB
ALBUMIN SERPL ELPH-MCNC: 3.9 G/DL — SIGNIFICANT CHANGE UP (ref 3.5–5.2)
ALP SERPL-CCNC: 64 U/L — SIGNIFICANT CHANGE UP (ref 30–115)
ALT FLD-CCNC: 28 U/L — SIGNIFICANT CHANGE UP (ref 0–41)
ANION GAP SERPL CALC-SCNC: 12 MMOL/L — SIGNIFICANT CHANGE UP (ref 7–14)
APTT BLD: 38.4 SEC — SIGNIFICANT CHANGE UP (ref 27–39.2)
AST SERPL-CCNC: 21 U/L — SIGNIFICANT CHANGE UP (ref 0–41)
BASOPHILS # BLD AUTO: 0.05 K/UL — SIGNIFICANT CHANGE UP (ref 0–0.2)
BASOPHILS NFR BLD AUTO: 0.6 % — SIGNIFICANT CHANGE UP (ref 0–1)
BILIRUB SERPL-MCNC: 0.5 MG/DL — SIGNIFICANT CHANGE UP (ref 0.2–1.2)
BLD GP AB SCN SERPL QL: SIGNIFICANT CHANGE UP
BUN SERPL-MCNC: 26 MG/DL — HIGH (ref 10–20)
CALCIUM SERPL-MCNC: 9.5 MG/DL — SIGNIFICANT CHANGE UP (ref 8.5–10.1)
CHLORIDE SERPL-SCNC: 100 MMOL/L — SIGNIFICANT CHANGE UP (ref 98–110)
CO2 SERPL-SCNC: 27 MMOL/L — SIGNIFICANT CHANGE UP (ref 17–32)
CREAT SERPL-MCNC: 1.6 MG/DL — HIGH (ref 0.7–1.5)
EOSINOPHIL # BLD AUTO: 0.19 K/UL — SIGNIFICANT CHANGE UP (ref 0–0.7)
EOSINOPHIL NFR BLD AUTO: 2.3 % — SIGNIFICANT CHANGE UP (ref 0–8)
GLUCOSE BLDC GLUCOMTR-MCNC: 263 MG/DL — HIGH (ref 70–99)
GLUCOSE SERPL-MCNC: 228 MG/DL — HIGH (ref 70–99)
HCT VFR BLD CALC: 39.3 % — LOW (ref 42–52)
HGB BLD-MCNC: 12.7 G/DL — LOW (ref 14–18)
IMM GRANULOCYTES NFR BLD AUTO: 0.8 % — HIGH (ref 0.1–0.3)
INR BLD: 1.37 RATIO — HIGH (ref 0.65–1.3)
LYMPHOCYTES # BLD AUTO: 1.48 K/UL — SIGNIFICANT CHANGE UP (ref 1.2–3.4)
LYMPHOCYTES # BLD AUTO: 17.7 % — LOW (ref 20.5–51.1)
MAGNESIUM SERPL-MCNC: 1.9 MG/DL — SIGNIFICANT CHANGE UP (ref 1.8–2.4)
MCHC RBC-ENTMCNC: 31.4 PG — HIGH (ref 27–31)
MCHC RBC-ENTMCNC: 32.3 G/DL — SIGNIFICANT CHANGE UP (ref 32–37)
MCV RBC AUTO: 97 FL — HIGH (ref 80–94)
MONOCYTES # BLD AUTO: 0.7 K/UL — HIGH (ref 0.1–0.6)
MONOCYTES NFR BLD AUTO: 8.4 % — SIGNIFICANT CHANGE UP (ref 1.7–9.3)
NEUTROPHILS # BLD AUTO: 5.86 K/UL — SIGNIFICANT CHANGE UP (ref 1.4–6.5)
NEUTROPHILS NFR BLD AUTO: 70.2 % — SIGNIFICANT CHANGE UP (ref 42.2–75.2)
NRBC # BLD: 0 /100 WBCS — SIGNIFICANT CHANGE UP (ref 0–0)
PHOSPHATE SERPL-MCNC: 3 MG/DL — SIGNIFICANT CHANGE UP (ref 2.1–4.9)
PLATELET # BLD AUTO: 585 K/UL — HIGH (ref 130–400)
POTASSIUM SERPL-MCNC: 4 MMOL/L — SIGNIFICANT CHANGE UP (ref 3.5–5)
POTASSIUM SERPL-SCNC: 4 MMOL/L — SIGNIFICANT CHANGE UP (ref 3.5–5)
PROT SERPL-MCNC: 6.7 G/DL — SIGNIFICANT CHANGE UP (ref 6–8)
PROTHROM AB SERPL-ACNC: 15.8 SEC — HIGH (ref 9.95–12.87)
RAPID RVP RESULT: SIGNIFICANT CHANGE UP
RBC # BLD: 4.05 M/UL — LOW (ref 4.7–6.1)
RBC # FLD: 14 % — SIGNIFICANT CHANGE UP (ref 11.5–14.5)
SARS-COV-2 RNA SPEC QL NAA+PROBE: SIGNIFICANT CHANGE UP
SODIUM SERPL-SCNC: 139 MMOL/L — SIGNIFICANT CHANGE UP (ref 135–146)
WBC # BLD: 8.35 K/UL — SIGNIFICANT CHANGE UP (ref 4.8–10.8)
WBC # FLD AUTO: 8.35 K/UL — SIGNIFICANT CHANGE UP (ref 4.8–10.8)

## 2020-09-22 PROCEDURE — 99202 OFFICE O/P NEW SF 15 MIN: CPT

## 2020-09-22 PROCEDURE — 16025 DRESS/DEBRID P-THICK BURN M: CPT

## 2020-09-22 PROCEDURE — 71046 X-RAY EXAM CHEST 2 VIEWS: CPT | Mod: 26

## 2020-09-22 PROCEDURE — 93010 ELECTROCARDIOGRAM REPORT: CPT

## 2020-09-22 PROCEDURE — 93970 EXTREMITY STUDY: CPT | Mod: 26

## 2020-09-22 PROCEDURE — 99285 EMERGENCY DEPT VISIT HI MDM: CPT

## 2020-09-22 PROCEDURE — 93925 LOWER EXTREMITY STUDY: CPT | Mod: 26

## 2020-09-22 RX ORDER — SODIUM CHLORIDE 9 MG/ML
1000 INJECTION, SOLUTION INTRAVENOUS
Refills: 0 | Status: DISCONTINUED | OUTPATIENT
Start: 2020-09-22 | End: 2020-09-23

## 2020-09-22 RX ORDER — ISOSORBIDE MONONITRATE 60 MG/1
120 TABLET, EXTENDED RELEASE ORAL DAILY
Refills: 0 | Status: DISCONTINUED | OUTPATIENT
Start: 2020-09-22 | End: 2020-09-23

## 2020-09-22 RX ORDER — CHLORHEXIDINE GLUCONATE 213 G/1000ML
1 SOLUTION TOPICAL
Refills: 0 | Status: DISCONTINUED | OUTPATIENT
Start: 2020-09-22 | End: 2020-09-23

## 2020-09-22 RX ORDER — DEXTROSE 50 % IN WATER 50 %
25 SYRINGE (ML) INTRAVENOUS ONCE
Refills: 0 | Status: DISCONTINUED | OUTPATIENT
Start: 2020-09-22 | End: 2020-09-23

## 2020-09-22 RX ORDER — CIPROFLOXACIN LACTATE 400MG/40ML
400 VIAL (ML) INTRAVENOUS ONCE
Refills: 0 | Status: COMPLETED | OUTPATIENT
Start: 2020-09-22 | End: 2020-09-22

## 2020-09-22 RX ORDER — CIPROFLOXACIN LACTATE 400MG/40ML
400 VIAL (ML) INTRAVENOUS EVERY 12 HOURS
Refills: 0 | Status: DISCONTINUED | OUTPATIENT
Start: 2020-09-22 | End: 2020-09-23

## 2020-09-22 RX ORDER — DEXTROSE 50 % IN WATER 50 %
15 SYRINGE (ML) INTRAVENOUS ONCE
Refills: 0 | Status: DISCONTINUED | OUTPATIENT
Start: 2020-09-22 | End: 2020-09-23

## 2020-09-22 RX ORDER — ASPIRIN/CALCIUM CARB/MAGNESIUM 324 MG
81 TABLET ORAL DAILY
Refills: 0 | Status: DISCONTINUED | OUTPATIENT
Start: 2020-09-22 | End: 2020-09-23

## 2020-09-22 RX ORDER — ATORVASTATIN CALCIUM 80 MG/1
40 TABLET, FILM COATED ORAL DAILY
Refills: 0 | Status: DISCONTINUED | OUTPATIENT
Start: 2020-09-22 | End: 2020-09-23

## 2020-09-22 RX ORDER — INSULIN LISPRO 100/ML
VIAL (ML) SUBCUTANEOUS
Refills: 0 | Status: DISCONTINUED | OUTPATIENT
Start: 2020-09-22 | End: 2020-09-23

## 2020-09-22 RX ORDER — OXYCODONE AND ACETAMINOPHEN 5; 325 MG/1; MG/1
1 TABLET ORAL EVERY 6 HOURS
Refills: 0 | Status: DISCONTINUED | OUTPATIENT
Start: 2020-09-22 | End: 2020-09-23

## 2020-09-22 RX ORDER — SODIUM CHLORIDE 9 MG/ML
1000 INJECTION INTRAMUSCULAR; INTRAVENOUS; SUBCUTANEOUS
Refills: 0 | Status: DISCONTINUED | OUTPATIENT
Start: 2020-09-22 | End: 2020-09-23

## 2020-09-22 RX ORDER — DEXTROSE 50 % IN WATER 50 %
12.5 SYRINGE (ML) INTRAVENOUS ONCE
Refills: 0 | Status: DISCONTINUED | OUTPATIENT
Start: 2020-09-22 | End: 2020-09-23

## 2020-09-22 RX ORDER — AMLODIPINE BESYLATE 2.5 MG/1
10 TABLET ORAL DAILY
Refills: 0 | Status: DISCONTINUED | OUTPATIENT
Start: 2020-09-22 | End: 2020-09-23

## 2020-09-22 RX ORDER — HEPARIN SODIUM 5000 [USP'U]/ML
5000 INJECTION INTRAVENOUS; SUBCUTANEOUS EVERY 8 HOURS
Refills: 0 | Status: DISCONTINUED | OUTPATIENT
Start: 2020-09-22 | End: 2020-09-23

## 2020-09-22 RX ORDER — ALLOPURINOL 300 MG
150 TABLET ORAL DAILY
Refills: 0 | Status: DISCONTINUED | OUTPATIENT
Start: 2020-09-22 | End: 2020-09-23

## 2020-09-22 RX ORDER — AMPICILLIN SODIUM AND SULBACTAM SODIUM 250; 125 MG/ML; MG/ML
3 INJECTION, POWDER, FOR SUSPENSION INTRAMUSCULAR; INTRAVENOUS EVERY 6 HOURS
Refills: 0 | Status: DISCONTINUED | OUTPATIENT
Start: 2020-09-22 | End: 2020-09-23

## 2020-09-22 RX ORDER — ALLOPURINOL 300 MG
1 TABLET ORAL
Qty: 0 | Refills: 0 | DISCHARGE

## 2020-09-22 RX ORDER — PANTOPRAZOLE SODIUM 20 MG/1
40 TABLET, DELAYED RELEASE ORAL
Refills: 0 | Status: DISCONTINUED | OUTPATIENT
Start: 2020-09-22 | End: 2020-09-23

## 2020-09-22 RX ORDER — GLUCAGON INJECTION, SOLUTION 0.5 MG/.1ML
1 INJECTION, SOLUTION SUBCUTANEOUS ONCE
Refills: 0 | Status: DISCONTINUED | OUTPATIENT
Start: 2020-09-22 | End: 2020-09-23

## 2020-09-22 RX ORDER — LISINOPRIL 2.5 MG/1
40 TABLET ORAL DAILY
Refills: 0 | Status: DISCONTINUED | OUTPATIENT
Start: 2020-09-22 | End: 2020-09-23

## 2020-09-22 RX ORDER — FENOFIBRATE,MICRONIZED 130 MG
145 CAPSULE ORAL DAILY
Refills: 0 | Status: DISCONTINUED | OUTPATIENT
Start: 2020-09-22 | End: 2020-09-23

## 2020-09-22 RX ORDER — ACETAMINOPHEN 500 MG
650 TABLET ORAL EVERY 6 HOURS
Refills: 0 | Status: DISCONTINUED | OUTPATIENT
Start: 2020-09-22 | End: 2020-09-23

## 2020-09-22 RX ORDER — AMPICILLIN SODIUM AND SULBACTAM SODIUM 250; 125 MG/ML; MG/ML
3 INJECTION, POWDER, FOR SUSPENSION INTRAMUSCULAR; INTRAVENOUS ONCE
Refills: 0 | Status: COMPLETED | OUTPATIENT
Start: 2020-09-22 | End: 2020-09-22

## 2020-09-22 RX ADMIN — HEPARIN SODIUM 5000 UNIT(S): 5000 INJECTION INTRAVENOUS; SUBCUTANEOUS at 22:33

## 2020-09-22 RX ADMIN — AMPICILLIN SODIUM AND SULBACTAM SODIUM 200 GRAM(S): 250; 125 INJECTION, POWDER, FOR SUSPENSION INTRAMUSCULAR; INTRAVENOUS at 18:41

## 2020-09-22 RX ADMIN — SODIUM CHLORIDE 50 MILLILITER(S): 9 INJECTION INTRAMUSCULAR; INTRAVENOUS; SUBCUTANEOUS at 20:31

## 2020-09-22 RX ADMIN — Medication 200 MILLIGRAM(S): at 18:42

## 2020-09-22 NOTE — H&P ADULT - NSHPLABSRESULTS_GEN_ALL_CORE
78 y/o male with pmh of HTN, HLD, DM type 2, Afib (on xarelto), CAD with stent placement (2014), gout, VINI (wears a CPAP at night), presents to the ED for left lower extremity traumatic wound.     #Left lower extremity traumatic wound  -Admit to burn service  -wound care: please wash wound with soap and water, cover with xeroform, wrap with kerlix and ACE twice a day.  -Plan for OR tomorrow for debridement  -pre-op: CBC, BMP, Mg, Phos, T&S, PT/INR, PTT, CXR, EKG  -IVF  -IV abx  -Pain management  -PT consult  -GI/DVT ppx  -Vascular studies  -ambulate as tolerated with cane    #Cards  -patient has a hx of HTN, HLD, CAD with stents  -will continue his home meds-isosorbide, atorvastatin, amlodipine, HCTZ   -will hold xarelto for OR, and restart after surgery  -will need cards clearance, cardiologist is Dr. Erazo  -Monitor BP  -f/u EKG  -will order Echo  -DASH/TLC diet    #Resp  -Patient has a hx of VINI (wears CPAP at night)  -monitor pox as needed  -f/u CXR  -incentive spirometer     #Endo  -Patient has hx of DM type 2  -will hold home med (actos/metformin)  -will start insulin inpatient  -monitor FS, adjust insulin accordingly   -Carb consistency    -will obtain hgA1c    -MSK  -Hx of Gout  -Will continue home med allopurinol   -ambulate as horacio  -pain control 78 y/o male with pmh of HTN, HLD, DM type 2, Afib (on xarelto), CAD with stent placement (2014), gout, VINI (wears a CPAP at night), presents to the ED for left lower extremity traumatic wound.     #Left lower extremity traumatic wound  -Admit to burn service  -wound care: please wash wound with soap and water, cover with xeroform, wrap with Kerlix and ACE twice a day.  -Plan for OR tomorrow for debridement  -pre-op: CBC, BMP, Mg, Phos, T&S, PT/INR, PTT, CXR, EKG  -IVF  -IV abx  -Pain management  -PT consult  -GI/DVT ppx  -Vascular studies  -ambulate as tolerated with cane    #Cards  -patient has a hx of HTN, HLD, CAD with stents  -will continue his home meds-isosorbide, atorvastatin, amlodipine, HCTZ   -will hold xarelto for OR, and restart after surgery  -will need cards clearance, cardiologist is Dr. Erazo  -Monitor BP  -f/u EKG  -will order Echo  -DASH/TLC diet    #Resp  -Patient has a hx of VINI (wears CPAP at night)  -monitor pox as needed  -f/u CXR  -incentive spirometer     #Endo  -Patient has hx of DM type 2  -will hold home med (actos/metformin)  -will start insulin inpatient  -monitor FS, adjust insulin accordingly   -Carb consistency    -will obtain hgA1c    -MSK  -Hx of Gout  -Will continue home med allopurinol   -ambulate as horacio  -pain control

## 2020-09-22 NOTE — H&P ADULT - HISTORY OF PRESENT ILLNESS
Patient is a 78 y/o male with pmh of HTN, HLD, DM type 2, Afib (on xarelto), CAD with stent placement (2014), gout, VINI (wears a CPAP at night), presents to the ED for left lower extremity wound. Patient reports that on 9/7/20, he was gardening in his backyard when he tripped, fell, and cut his left leg on a boat hitch that was in his backyard. Patient states immediately after the incident, he noticed a lot of bleeding from his left leg. he placed a towel to leg and called 911. Patient was brought to [facility] ED where he was a trauma work up and had a panscan performed. Patient had a CT of the head, chest, cervical spine, abdomen and pelvis which showed subcutaneous soft tissue stranding along the superior aspect of left chest wall with suspicion of an acute nondisplaced fracture involving the left 6th anterolateral rib; several additional age-indeterminate left-sided rib fractures, specifically involving the left 3rd, 4th, and 7th anterolateral ribs. Trauma service recommended at the time, admission to hospital for respiratory monitoring, pain control but patient refused.  Patient also had 34 sutures placed to LLE laceration while in the ED. Patient signed AMA and was told to follow up with PMD and trauma clinic. Patient states he followed up with trauma clinic and saw Dr. Singh whom recommenced to Dr. Guerra for LLE wound. Patient was seen in burn clinic by Dr. Guerra and recommended admission to the hospital for debridement of LLE. Patient states that his spouse has been doing daily dressing changes to LLE wound with hydrogen peroxide and applying bacitracin. Patient c/o of moderate pain, describes as throbbing pain, 5/10 on pain scale, worse with ambulation and relieved with rest. Patient denies any fever, chills, chest pain, shortness or breath, nausea, vomiting paresthesia abdominal pain or sick contacts. Patient is a 78 y/o male with pmh of HTN, HLD, DM type 2, Afib (on xarelto), CAD with stent placement (2014), gout, VINI (wears a CPAP at night), presents to the ED for left lower extremity wound. Patient reports that on 9/7/20, he was gardening in his backyard when he tripped, fell, and cut his left leg on a boat hitch that was in his backyard. Patient states immediately after the incident, he noticed a lot of bleeding from his left leg. he placed a towel to leg and called 911. Patient was brought to [facility] ED where he was a trauma work up and had a panscan performed. Patient had a CT of the head, chest, cervical spine, abdomen and pelvis which showed subcutaneous soft tissue stranding along the superior aspect of left chest wall with suspicion of an acute nondisplaced fracture involving the left 6th anterolateral rib; several additional age-indeterminate left-sided rib fractures, specifically involving the left 3rd, 4th, and 7th anterolateral ribs. Trauma service recommended at the time, admission to hospital for respiratory monitoring, pain control but patient refused.  Patient also had 34 sutures placed to LLE laceration while in the ED. Patient signed AMA and was told to follow up with PMD and trauma clinic. Patient states he followed up with trauma clinic and saw Dr. Singh whom recommened Dr. Guerra for LLE wound. Patient was seen in burn clinic by Dr. Guerra and recommended admission to the hospital for debridement of LLE. Patient states that his spouse has been doing daily dressing changes to LLE wound with hydrogen peroxide and applying bacitracin. Patient c/o of moderate pain to LLE wound, describes as throbbing pain, 5/10 on pain scale, worse with ambulation and relieved with rest. Patient denies any fever, chills, chest pain, shortness or breath, nausea, vomiting paresthesia abdominal pain or sick contacts. Patient is a 78 y/o male with pmh of HTN, HLD, DM type 2, Afib (on xarelto), CAD with stent placement (2014), gout, VINI (wears a CPAP at night), presents to the ED for left lower extremity wound. Patient reports that on 9/7/20, he was gardening in his backyard when he tripped, fell, and cut his left leg on a boat hitch that was in his backyard. Patient states immediately after the incident, he noticed a lot of bleeding from his left leg. he placed a towel to leg and called 911. Patient was brought to [facility] ED where he was a trauma work up and had a panscan performed. Patient had a CT of the head, chest, cervical spine, abdomen and pelvis which showed subcutaneous soft tissue stranding along the superior aspect of left chest wall with suspicion of an acute nondisplaced fracture involving the left 6th anterolateral rib; several additional age-indeterminate left-sided rib fractures, specifically involving the left 3rd, 4th, and 7th anterolateral ribs. X-ray of bilateral tib/fib showed No acute osseous abnormality. Diffuse soft tissue swelling bilateral casts .Left lateral mid calf 10 cm soft tissue defect. Trauma service recommended at the time, admission to hospital for respiratory monitoring, pain control but patient refused.  Patient also had 34 sutures placed to LLE laceration while in the ED. Patient signed AMA and was told to follow up with PMD and trauma clinic. Patient states he followed up with trauma clinic and saw Dr. Singh whom recommened Dr. Guerra for LLE wound. Patient was seen in burn clinic by Dr. Guerra and recommended admission to the hospital for debridement of LLE. Patient states that his spouse has been doing daily dressing changes to LLE wound with hydrogen peroxide and applying bacitracin. Patient c/o of moderate pain to LLE wound, describes as throbbing pain, 5/10 on pain scale, worse with ambulation and relieved with rest. Patient denies any fever, chills, chest pain, shortness or breath, nausea, vomiting paresthesia abdominal pain or sick contacts.

## 2020-09-22 NOTE — ED ADULT NURSE NOTE - OBJECTIVE STATEMENT
78 y/o male sent in by Dr. Guerra for Debridement of Left Leg wound. pt denies any pain at this time.

## 2020-09-22 NOTE — ED PROVIDER NOTE - ATTENDING CONTRIBUTION TO CARE
80yo man h/o HTN, DM, HLD, afib on xarelto, CAD s/p stent, gout, sent in by Dr Guerra (burn) for admission due to nonhealing laceration to LLE which will require OR for debridement. Pt otherwise feeling well, exam as noted. Labs, burn eval, admit.

## 2020-09-22 NOTE — ED PROVIDER NOTE - NS ED ROS FT
Review of Systems:  	•	CONSTITUTIONAL - no fever, no diaphoresis, no chills  	•	SKIN - +LLE wound.   	•	HEMATOLOGIC - no bleeding, no bruising  	•	EYES - no eye pain, no blurry vision  	•	ENT - no congestion  	•	RESPIRATORY - no shortness of breath, no cough  	•	CARDIAC - no chest pain, no palpitations  	•	GI - no abd pain, no nausea, no vomiting, no diarrhea, no constipation  	•	GENITO-URINARY - no dysuria; no hematuria, no increased urinary frequency  	•	MUSCULOSKELETAL - no joint paint, no swelling, no redness  	•	NEUROLOGIC - no weakness, no headache, no paresthesias, no LOC  	•	PSYCH - no anxiety, no depression  	All other ROS are negative except as documented in HPI.

## 2020-09-22 NOTE — H&P ADULT - NSICDXPASTMEDICALHX_GEN_ALL_CORE_FT
PAST MEDICAL HISTORY:  Atrial fibrillation     Coronary artery disease     Diabetes mellitus     Gout     Hyperlipidemia     Hypertension     VINI on CPAP

## 2020-09-22 NOTE — H&P ADULT - NSHPPHYSICALEXAM_GEN_ALL_CORE
PHYSICAL EXAM:  GENERAL: NAD, sitting on Stretcher comfortably   HEAD:  Atraumatic, Normocephalic  CHEST/LUNG: speaking in full sentences, equal chest rise and fall, breathing on room air comfortably   HEART: in no acute cardiopulmonary distress.   EXTREMITIES:  2+ Peripheral Pulses, No clubbing, cyanosis.  PSYCH: AAOx3, pleasant, cooperative   SKIN: LLE: lateral aspect with ~10cm x 5cm hematoma with adherent black necrotic tissue with surrounding erythema to wound edges. mild serosanguineous bleeding noted. +1 pitting edema noted. tenderness to palpation, no purulent drainage noted. no malodor noted.

## 2020-09-22 NOTE — ED PROVIDER NOTE - OBJECTIVE STATEMENT
78 yo M with pmhx of HTN, HLD, DM type 2, Afib (on xarelto), CAD with stent placement (2014), gout, VINI sent in by Dr. Guerra for admission for LLE debridement. Patient had a fall a few weeks ago and sustained a laceration to lle that has been non-healing.

## 2020-09-23 ENCOUNTER — RESULT REVIEW (OUTPATIENT)
Age: 80
End: 2020-09-23

## 2020-09-23 DIAGNOSIS — Z02.9 ENCOUNTER FOR ADMINISTRATIVE EXAMINATIONS, UNSPECIFIED: ICD-10-CM

## 2020-09-23 LAB
A1C WITH ESTIMATED AVERAGE GLUCOSE RESULT: 7 % — HIGH (ref 4–5.6)
ANION GAP SERPL CALC-SCNC: 11 MMOL/L — SIGNIFICANT CHANGE UP (ref 7–14)
BASOPHILS # BLD AUTO: 0.01 K/UL — SIGNIFICANT CHANGE UP (ref 0–0.2)
BASOPHILS NFR BLD AUTO: 0.1 % — SIGNIFICANT CHANGE UP (ref 0–1)
BUN SERPL-MCNC: 29 MG/DL — HIGH (ref 10–20)
CALCIUM SERPL-MCNC: 8.6 MG/DL — SIGNIFICANT CHANGE UP (ref 8.5–10.1)
CHLORIDE SERPL-SCNC: 101 MMOL/L — SIGNIFICANT CHANGE UP (ref 98–110)
CO2 SERPL-SCNC: 24 MMOL/L — SIGNIFICANT CHANGE UP (ref 17–32)
CREAT SERPL-MCNC: 1.8 MG/DL — HIGH (ref 0.7–1.5)
EOSINOPHIL # BLD AUTO: 0 K/UL — SIGNIFICANT CHANGE UP (ref 0–0.7)
EOSINOPHIL NFR BLD AUTO: 0 % — SIGNIFICANT CHANGE UP (ref 0–8)
ESTIMATED AVERAGE GLUCOSE: 154 MG/DL — HIGH (ref 68–114)
GLUCOSE BLDC GLUCOMTR-MCNC: 179 MG/DL — HIGH (ref 70–99)
GLUCOSE BLDC GLUCOMTR-MCNC: 184 MG/DL — HIGH (ref 70–99)
GLUCOSE BLDC GLUCOMTR-MCNC: 259 MG/DL — HIGH (ref 70–99)
GLUCOSE BLDC GLUCOMTR-MCNC: 262 MG/DL — HIGH (ref 70–99)
GLUCOSE SERPL-MCNC: 254 MG/DL — HIGH (ref 70–99)
HCT VFR BLD CALC: 32.5 % — LOW (ref 42–52)
HGB BLD-MCNC: 10.3 G/DL — LOW (ref 14–18)
IMM GRANULOCYTES NFR BLD AUTO: 0.6 % — HIGH (ref 0.1–0.3)
LYMPHOCYTES # BLD AUTO: 0.6 K/UL — LOW (ref 1.2–3.4)
LYMPHOCYTES # BLD AUTO: 6.3 % — LOW (ref 20.5–51.1)
MAGNESIUM SERPL-MCNC: 1.6 MG/DL — LOW (ref 1.8–2.4)
MCHC RBC-ENTMCNC: 30.6 PG — SIGNIFICANT CHANGE UP (ref 27–31)
MCHC RBC-ENTMCNC: 31.7 G/DL — LOW (ref 32–37)
MCV RBC AUTO: 96.4 FL — HIGH (ref 80–94)
MONOCYTES # BLD AUTO: 0.41 K/UL — SIGNIFICANT CHANGE UP (ref 0.1–0.6)
MONOCYTES NFR BLD AUTO: 4.3 % — SIGNIFICANT CHANGE UP (ref 1.7–9.3)
NEUTROPHILS # BLD AUTO: 8.51 K/UL — HIGH (ref 1.4–6.5)
NEUTROPHILS NFR BLD AUTO: 88.7 % — HIGH (ref 42.2–75.2)
NRBC # BLD: 0 /100 WBCS — SIGNIFICANT CHANGE UP (ref 0–0)
PHOSPHATE SERPL-MCNC: 3 MG/DL — SIGNIFICANT CHANGE UP (ref 2.1–4.9)
PLATELET # BLD AUTO: 517 K/UL — HIGH (ref 130–400)
POTASSIUM SERPL-MCNC: 4.3 MMOL/L — SIGNIFICANT CHANGE UP (ref 3.5–5)
POTASSIUM SERPL-SCNC: 4.3 MMOL/L — SIGNIFICANT CHANGE UP (ref 3.5–5)
RBC # BLD: 3.37 M/UL — LOW (ref 4.7–6.1)
RBC # FLD: 13.9 % — SIGNIFICANT CHANGE UP (ref 11.5–14.5)
SODIUM SERPL-SCNC: 136 MMOL/L — SIGNIFICANT CHANGE UP (ref 135–146)
WBC # BLD: 9.59 K/UL — SIGNIFICANT CHANGE UP (ref 4.8–10.8)
WBC # FLD AUTO: 9.59 K/UL — SIGNIFICANT CHANGE UP (ref 4.8–10.8)

## 2020-09-23 PROCEDURE — 93306 TTE W/DOPPLER COMPLETE: CPT | Mod: 26

## 2020-09-23 PROCEDURE — 99223 1ST HOSP IP/OBS HIGH 75: CPT

## 2020-09-23 PROCEDURE — 88304 TISSUE EXAM BY PATHOLOGIST: CPT | Mod: 26

## 2020-09-23 RX ORDER — ALLOPURINOL 300 MG
150 TABLET ORAL DAILY
Refills: 0 | Status: DISCONTINUED | OUTPATIENT
Start: 2020-09-23 | End: 2020-09-24

## 2020-09-23 RX ORDER — FENOFIBRATE,MICRONIZED 130 MG
145 CAPSULE ORAL DAILY
Refills: 0 | Status: DISCONTINUED | OUTPATIENT
Start: 2020-09-23 | End: 2020-09-24

## 2020-09-23 RX ORDER — LISINOPRIL 2.5 MG/1
40 TABLET ORAL DAILY
Refills: 0 | Status: DISCONTINUED | OUTPATIENT
Start: 2020-09-23 | End: 2020-09-24

## 2020-09-23 RX ORDER — SODIUM CHLORIDE 9 MG/ML
1000 INJECTION, SOLUTION INTRAVENOUS
Refills: 0 | Status: DISCONTINUED | OUTPATIENT
Start: 2020-09-23 | End: 2020-09-24

## 2020-09-23 RX ORDER — ASPIRIN/CALCIUM CARB/MAGNESIUM 324 MG
81 TABLET ORAL DAILY
Refills: 0 | Status: DISCONTINUED | OUTPATIENT
Start: 2020-09-23 | End: 2020-09-24

## 2020-09-23 RX ORDER — INSULIN LISPRO 100/ML
VIAL (ML) SUBCUTANEOUS
Refills: 0 | Status: DISCONTINUED | OUTPATIENT
Start: 2020-09-23 | End: 2020-09-24

## 2020-09-23 RX ORDER — ISOSORBIDE MONONITRATE 60 MG/1
120 TABLET, EXTENDED RELEASE ORAL DAILY
Refills: 0 | Status: DISCONTINUED | OUTPATIENT
Start: 2020-09-23 | End: 2020-09-24

## 2020-09-23 RX ORDER — GLUCAGON INJECTION, SOLUTION 0.5 MG/.1ML
1 INJECTION, SOLUTION SUBCUTANEOUS ONCE
Refills: 0 | Status: DISCONTINUED | OUTPATIENT
Start: 2020-09-23 | End: 2020-09-24

## 2020-09-23 RX ORDER — PANTOPRAZOLE SODIUM 20 MG/1
40 TABLET, DELAYED RELEASE ORAL
Refills: 0 | Status: DISCONTINUED | OUTPATIENT
Start: 2020-09-23 | End: 2020-09-24

## 2020-09-23 RX ORDER — ATORVASTATIN CALCIUM 80 MG/1
40 TABLET, FILM COATED ORAL DAILY
Refills: 0 | Status: DISCONTINUED | OUTPATIENT
Start: 2020-09-23 | End: 2020-09-24

## 2020-09-23 RX ORDER — OXYCODONE AND ACETAMINOPHEN 5; 325 MG/1; MG/1
1 TABLET ORAL EVERY 6 HOURS
Refills: 0 | Status: DISCONTINUED | OUTPATIENT
Start: 2020-09-23 | End: 2020-09-24

## 2020-09-23 RX ORDER — SODIUM CHLORIDE 9 MG/ML
1000 INJECTION, SOLUTION INTRAVENOUS
Refills: 0 | Status: DISCONTINUED | OUTPATIENT
Start: 2020-09-23 | End: 2020-09-23

## 2020-09-23 RX ORDER — AMLODIPINE BESYLATE 2.5 MG/1
10 TABLET ORAL DAILY
Refills: 0 | Status: DISCONTINUED | OUTPATIENT
Start: 2020-09-23 | End: 2020-09-24

## 2020-09-23 RX ORDER — MAGNESIUM SULFATE 500 MG/ML
2 VIAL (ML) INJECTION ONCE
Refills: 0 | Status: COMPLETED | OUTPATIENT
Start: 2020-09-23 | End: 2020-09-24

## 2020-09-23 RX ORDER — HYDROMORPHONE HYDROCHLORIDE 2 MG/ML
0.5 INJECTION INTRAMUSCULAR; INTRAVENOUS; SUBCUTANEOUS
Refills: 0 | Status: DISCONTINUED | OUTPATIENT
Start: 2020-09-23 | End: 2020-09-23

## 2020-09-23 RX ORDER — ONDANSETRON 8 MG/1
4 TABLET, FILM COATED ORAL ONCE
Refills: 0 | Status: DISCONTINUED | OUTPATIENT
Start: 2020-09-23 | End: 2020-09-23

## 2020-09-23 RX ORDER — CIPROFLOXACIN LACTATE 400MG/40ML
400 VIAL (ML) INTRAVENOUS EVERY 12 HOURS
Refills: 0 | Status: DISCONTINUED | OUTPATIENT
Start: 2020-09-23 | End: 2020-09-24

## 2020-09-23 RX ORDER — DEXTROSE 50 % IN WATER 50 %
25 SYRINGE (ML) INTRAVENOUS ONCE
Refills: 0 | Status: DISCONTINUED | OUTPATIENT
Start: 2020-09-23 | End: 2020-09-24

## 2020-09-23 RX ORDER — DEXTROSE 50 % IN WATER 50 %
15 SYRINGE (ML) INTRAVENOUS ONCE
Refills: 0 | Status: DISCONTINUED | OUTPATIENT
Start: 2020-09-23 | End: 2020-09-24

## 2020-09-23 RX ORDER — CHLORHEXIDINE GLUCONATE 213 G/1000ML
1 SOLUTION TOPICAL
Refills: 0 | Status: DISCONTINUED | OUTPATIENT
Start: 2020-09-23 | End: 2020-09-24

## 2020-09-23 RX ORDER — DEXTROSE 50 % IN WATER 50 %
12.5 SYRINGE (ML) INTRAVENOUS ONCE
Refills: 0 | Status: DISCONTINUED | OUTPATIENT
Start: 2020-09-23 | End: 2020-09-24

## 2020-09-23 RX ORDER — SODIUM CHLORIDE 9 MG/ML
1000 INJECTION INTRAMUSCULAR; INTRAVENOUS; SUBCUTANEOUS
Refills: 0 | Status: DISCONTINUED | OUTPATIENT
Start: 2020-09-23 | End: 2020-09-23

## 2020-09-23 RX ORDER — HYDROMORPHONE HYDROCHLORIDE 2 MG/ML
1 INJECTION INTRAMUSCULAR; INTRAVENOUS; SUBCUTANEOUS
Refills: 0 | Status: DISCONTINUED | OUTPATIENT
Start: 2020-09-23 | End: 2020-09-23

## 2020-09-23 RX ORDER — HEPARIN SODIUM 5000 [USP'U]/ML
5000 INJECTION INTRAVENOUS; SUBCUTANEOUS EVERY 8 HOURS
Refills: 0 | Status: DISCONTINUED | OUTPATIENT
Start: 2020-09-23 | End: 2020-09-23

## 2020-09-23 RX ORDER — AMPICILLIN SODIUM AND SULBACTAM SODIUM 250; 125 MG/ML; MG/ML
3 INJECTION, POWDER, FOR SUSPENSION INTRAMUSCULAR; INTRAVENOUS EVERY 6 HOURS
Refills: 0 | Status: DISCONTINUED | OUTPATIENT
Start: 2020-09-23 | End: 2020-09-24

## 2020-09-23 RX ORDER — HEPARIN SODIUM 5000 [USP'U]/ML
1900 INJECTION INTRAVENOUS; SUBCUTANEOUS
Qty: 25000 | Refills: 0 | Status: DISCONTINUED | OUTPATIENT
Start: 2020-09-23 | End: 2020-09-23

## 2020-09-23 RX ORDER — ACETAMINOPHEN 500 MG
650 TABLET ORAL EVERY 6 HOURS
Refills: 0 | Status: DISCONTINUED | OUTPATIENT
Start: 2020-09-23 | End: 2020-09-24

## 2020-09-23 RX ORDER — MAGNESIUM SULFATE 500 MG/ML
2 VIAL (ML) INJECTION ONCE
Refills: 0 | Status: COMPLETED | OUTPATIENT
Start: 2020-09-23 | End: 2020-09-23

## 2020-09-23 RX ORDER — HEPARIN SODIUM 5000 [USP'U]/ML
5000 INJECTION INTRAVENOUS; SUBCUTANEOUS EVERY 8 HOURS
Refills: 0 | Status: DISCONTINUED | OUTPATIENT
Start: 2020-09-23 | End: 2020-09-24

## 2020-09-23 RX ORDER — SODIUM CHLORIDE 9 MG/ML
1000 INJECTION INTRAMUSCULAR; INTRAVENOUS; SUBCUTANEOUS
Refills: 0 | Status: DISCONTINUED | OUTPATIENT
Start: 2020-09-23 | End: 2020-09-24

## 2020-09-23 RX ADMIN — LISINOPRIL 40 MILLIGRAM(S): 2.5 TABLET ORAL at 06:30

## 2020-09-23 RX ADMIN — SODIUM CHLORIDE 50 MILLILITER(S): 9 INJECTION INTRAMUSCULAR; INTRAVENOUS; SUBCUTANEOUS at 23:56

## 2020-09-23 RX ADMIN — Medication 200 MILLIGRAM(S): at 06:30

## 2020-09-23 RX ADMIN — Medication 81 MILLIGRAM(S): at 13:20

## 2020-09-23 RX ADMIN — AMPICILLIN SODIUM AND SULBACTAM SODIUM 200 GRAM(S): 250; 125 INJECTION, POWDER, FOR SUSPENSION INTRAMUSCULAR; INTRAVENOUS at 23:03

## 2020-09-23 RX ADMIN — HYDROMORPHONE HYDROCHLORIDE 0.5 MILLIGRAM(S): 2 INJECTION INTRAMUSCULAR; INTRAVENOUS; SUBCUTANEOUS at 11:15

## 2020-09-23 RX ADMIN — AMPICILLIN SODIUM AND SULBACTAM SODIUM 200 GRAM(S): 250; 125 INJECTION, POWDER, FOR SUSPENSION INTRAMUSCULAR; INTRAVENOUS at 01:34

## 2020-09-23 RX ADMIN — CHLORHEXIDINE GLUCONATE 1 APPLICATION(S): 213 SOLUTION TOPICAL at 06:58

## 2020-09-23 RX ADMIN — ATORVASTATIN CALCIUM 40 MILLIGRAM(S): 80 TABLET, FILM COATED ORAL at 13:20

## 2020-09-23 RX ADMIN — AMPICILLIN SODIUM AND SULBACTAM SODIUM 200 GRAM(S): 250; 125 INJECTION, POWDER, FOR SUSPENSION INTRAMUSCULAR; INTRAVENOUS at 17:06

## 2020-09-23 RX ADMIN — Medication 650 MILLIGRAM(S): at 17:40

## 2020-09-23 RX ADMIN — HEPARIN SODIUM 5000 UNIT(S): 5000 INJECTION INTRAVENOUS; SUBCUTANEOUS at 23:03

## 2020-09-23 RX ADMIN — ISOSORBIDE MONONITRATE 120 MILLIGRAM(S): 60 TABLET, EXTENDED RELEASE ORAL at 13:20

## 2020-09-23 RX ADMIN — SODIUM CHLORIDE 50 MILLILITER(S): 9 INJECTION INTRAMUSCULAR; INTRAVENOUS; SUBCUTANEOUS at 01:35

## 2020-09-23 RX ADMIN — SODIUM CHLORIDE 50 MILLILITER(S): 9 INJECTION INTRAMUSCULAR; INTRAVENOUS; SUBCUTANEOUS at 11:31

## 2020-09-23 RX ADMIN — Medication 50 GRAM(S): at 23:37

## 2020-09-23 RX ADMIN — AMPICILLIN SODIUM AND SULBACTAM SODIUM 200 GRAM(S): 250; 125 INJECTION, POWDER, FOR SUSPENSION INTRAMUSCULAR; INTRAVENOUS at 08:16

## 2020-09-23 RX ADMIN — AMLODIPINE BESYLATE 10 MILLIGRAM(S): 2.5 TABLET ORAL at 06:28

## 2020-09-23 RX ADMIN — Medication 650 MILLIGRAM(S): at 17:08

## 2020-09-23 RX ADMIN — Medication 1: at 08:15

## 2020-09-23 RX ADMIN — HYDROMORPHONE HYDROCHLORIDE 0.5 MILLIGRAM(S): 2 INJECTION INTRAMUSCULAR; INTRAVENOUS; SUBCUTANEOUS at 10:55

## 2020-09-23 RX ADMIN — Medication 200 MILLIGRAM(S): at 17:03

## 2020-09-23 RX ADMIN — Medication 3: at 17:04

## 2020-09-23 RX ADMIN — AMPICILLIN SODIUM AND SULBACTAM SODIUM 200 GRAM(S): 250; 125 INJECTION, POWDER, FOR SUSPENSION INTRAMUSCULAR; INTRAVENOUS at 13:22

## 2020-09-23 RX ADMIN — SODIUM CHLORIDE 75 MILLILITER(S): 9 INJECTION, SOLUTION INTRAVENOUS at 10:18

## 2020-09-23 RX ADMIN — Medication 150 MILLIGRAM(S): at 13:21

## 2020-09-23 RX ADMIN — Medication 145 MILLIGRAM(S): at 13:21

## 2020-09-23 RX ADMIN — HEPARIN SODIUM 5000 UNIT(S): 5000 INJECTION INTRAVENOUS; SUBCUTANEOUS at 06:29

## 2020-09-23 RX ADMIN — Medication 1: at 11:20

## 2020-09-23 NOTE — CHART NOTE - NSCHARTNOTEFT_GEN_A_CORE
PACU ANESTHESIA ADMISSION NOTE      Procedure: left lower extremity debridement  Post op diagnosis:  traumatic hematoma of left lower extremity      __x__  Patent Airway    __x__  Full return of protective reflexes    __x__  Full recovery from anesthesia / back to baseline status    Vitals:  T(C): 98.3 F  HR: 98  BP: 136/65  RR: 22  SpO2: 94%    Mental Status:  __x__ Awake   __x___ Alert   _____ Drowsy   _____ Sedated    Nausea/Vomiting:  __x__ NO  ______Yes,   See Post - Op Orders          Pain Scale (0-10):  _____    Treatment: ____ None    _x___ See Post - Op/PCA Orders    Post - Operative Fluids:   ____ Oral   __x__ See Post - Op Orders    Plan: Discharge:   ____Home       __x___Floor     _____Critical Care    _____  Other:_________________    Comments: uneventful anesthesia course no complications. Vitals stable. Pt transferred to PACU. Transfer to the floor when criteria is met.

## 2020-09-23 NOTE — CHART NOTE - NSCHARTNOTEFT_GEN_A_CORE
I came by this morning around 0930 HR to see patient for cardiology consult.  Patient was in Main OR    Murphy Erazo MD

## 2020-09-23 NOTE — CHART NOTE - NSCHARTNOTEFT_GEN_A_CORE
R1 Event Note    Patient seen and examined at bedside for POD 0 saturated sanguinous dressing    Dressing taken down and examined, no arterial bleeding although there were several spots of venous oozing. Silver nitrate and surgicel as well as pressure dressing were used to achieve hemostasis.    T(C): 36.5 (09-23-20 @ 13:07), Max: 37.1 (09-23-20 @ 06:20)  HR: 78 (09-23-20 @ 13:07) (71 - 100)  BP: 146/71 (09-23-20 @ 13:07) (130/69 - 171/74)  RR: 18 (09-23-20 @ 13:07) (11 - 21)  SpO2: 100% (09-23-20 @ 11:30) (98% - 100%)    A/P:   Will continue to follow, please alert burn team if dressing is saturated again.  FU pm CBC  Heparin drip held for now      Thomas, PGY-1

## 2020-09-23 NOTE — CONSULT NOTE ADULT - ASSESSMENT
Patient is a 80 y/o male with pmh of HTN, HLD, DM type 2, Afib (on xarelto), CAD with stent placement (2014), gout, VINI (wears a CPAP at night), initially few weeks ago had trauma s/p fall admitted for Left Lower extremity wound started on Abx s/p debridement today. GI consulted for Pancreatic lesion seen on CT abd done on september 7 2020. Pt denies prior h/o pancreatic lesion, pancreatitis.  No family h/o pancreatic malignancy, no weight loss. Pt reports significant alcohol use.   Pt denies abd pain, nausea, vomiting, diarrhea .     #) Incidental pancreatic cystic lesion - indeterminate , 2.4 cm size at uncinate process   No prior imaging available to compare   No prior h/o pancreatitis , family h/o pancreatic malignancy, weight loss   Liver enzymes and Lipase - unremarkable   Rec   MRI pancreatic protocol with MRCP when possible , non urgent   Based on MRI results, if any worrisome features will plan for elective outpatient EUS with possible biopsy   Follow up with  as an outpatient  in 2-4 weeks   Rec Alcohol cessation

## 2020-09-23 NOTE — PHYSICAL THERAPY INITIAL EVALUATION ADULT - SPECIFY REASON(S)
Pt is currently without activity orders. PT IE on hold pending OOB orders as appropriate and WB status if applicable. Will follow up.

## 2020-09-23 NOTE — PATIENT PROFILE ADULT - NSPROMUTPARTICIPCAREFT_GEN_A_NUR
"Date of Service: 08/25/2019    SUBSEQUENT HOSPITAL CARE:  Level 2. CODE:  76100. The patient is seen for psychosis, chart reviewed, case discussed. The patient denies any acute side effects to medication, though maybe just some constipation. The patient says that she is on these medications outpatient. Asked how she is feeling today, she said Shearon  and she proceeded to start back in 11/2012 to talk about her health. Sleep she said ""like crap,\"" talking about timing of medications. The patient is afebrile, pulse 93, blood pressure 112/80. MENTAL STATUS EXAMINATION:  The patient is alert, oriented to person, place and date, though did temporarily make a mistake talking about the [address] [place] [address] Drive is rapid and a bit pressured. Psychomotor activity free of psychomotor agitation. Mood, anxiety, affect:  Some degree of anxiety with a blunted element. Thought content appears to be delusional.  Thought process is quite tangential.  Concentration quite difficult through the course of conversation. DISCUSSION:  Lesley Simeon is a 80-year-old woman who is acutely psychotic and continues to require and benefit from inpatient hospitalization. PLAN:  Continue inpatient hospitalization. Continue psychotropics as noted including antipsychotics. The patient's delusions continue in regards to things that have been done to her teeth and a everywhere in her body. Insight impaired. DIAGNOSIS:  Psychosis. TREATMENT PLAN:  Continue inpatient treatment. Dictated By: Demi Mckenzie MD  Signing Provider: Demi Khalil.  Jean Claude Mckenzie MD    RL/ps ([id number])  DD: 08/25/2019 13:39:22 TD: 08/25/2019 13:53:50    Copy Sent To:     " n/a

## 2020-09-23 NOTE — CHART NOTE - NSCHARTNOTEFT_GEN_A_CORE
I came by to see patient earlier this morning for cardiology consult.  Patient was in [facility] OR for surgery      Murphy Erazo MD

## 2020-09-23 NOTE — CONSULT NOTE ADULT - SUBJECTIVE AND OBJECTIVE BOX
GI HPI:  Patient is a 79y old  Male who presents with a chief complaint of LLE wound (22 Sep 2020 18:37)  Patient is a 78 y/o male with pmh of HTN, HLD, DM type 2, Afib (on xarelto), CAD with stent placement (2014), gout, VINI (wears a CPAP at night), initially few weeks ago had trauma s/p fall admitted for Left Lower extremity wound started on Abx s/p debridement today. Gi consulted for Pancreatic lesion seen on CT abd done on september 7 2020. Pt denies prior h/o pancreatic lesion, pancreatitis.  No family h/o pancreatic malignancy, no weight loss.   Pt denies abd pain, nausea, vomiting, diarrhea .     PAST MEDICAL & SURGICAL HISTORY  VINI on CPAP    Diabetes mellitus    Gout    Hypertension    Hyperlipidemia    Coronary artery disease    Atrial fibrillation    History of heart artery stent        FAMILY HISTORY:  FAMILY HISTORY: No family h/o pancreatic malignancy       SOCIAL HISTORY:  smoker: denies   Alcohol: + alcohol use   Drug: no drug abuse     ALLERGIES:  No Known Allergies      MEDICATIONS:  MEDICATIONS  (STANDING):  allopurinol 150 milliGRAM(s) Oral daily  amLODIPine   Tablet 10 milliGRAM(s) Oral daily  ampicillin/sulbactam  IVPB 3 Gram(s) IV Intermittent every 6 hours  aspirin  chewable 81 milliGRAM(s) Oral daily  atorvastatin Oral Tab/Cap - Peds 40 milliGRAM(s) Oral daily  chlorhexidine 4% Liquid 1 Application(s) Topical <User Schedule>  ciprofloxacin   IVPB 400 milliGRAM(s) IV Intermittent every 12 hours  dextrose 5%. 1000 milliLiter(s) (50 mL/Hr) IV Continuous <Continuous>  dextrose 50% Injectable 25 Gram(s) IV Push once  dextrose 50% Injectable 12.5 Gram(s) IV Push once  fenofibrate Tablet 145 milliGRAM(s) Oral daily  hydrochlorothiazide Oral Tab/Cap - Peds 12.5 milliGRAM(s) Oral daily  insulin lispro (HumaLOG) corrective regimen sliding scale   SubCutaneous three times a day before meals  isosorbide   mononitrate ER Tablet (IMDUR) 120 milliGRAM(s) Oral daily  lisinopril 40 milliGRAM(s) Oral daily  pantoprazole    Tablet 40 milliGRAM(s) Oral before breakfast  sodium chloride 0.9%. 1000 milliLiter(s) (50 mL/Hr) IV Continuous <Continuous>    MEDICATIONS  (PRN):  acetaminophen   Tablet .. 650 milliGRAM(s) Oral every 6 hours PRN Mild Pain (1 - 3)  dextrose 40% Gel 15 Gram(s) Oral once PRN Blood Glucose LESS THAN 70 milliGRAM(s)/deciliter  glucagon  Injectable 1 milliGRAM(s) IntraMuscular once PRN Glucose LESS THAN 70 milligrams/deciliter  oxycodone    5 mG/acetaminophen 325 mG 1 Tablet(s) Oral every 6 hours PRN Moderate Pain (4 - 6)      HOME MEDICATIONS:  Home Medications:  Actoplus Met 15 mg-500 mg oral tablet: 1 tab(s) orally 2 times a day (22 Sep 2020 19:18)  allopurinol: 150 milligram(s) orally once a day (22 Sep 2020 19:18)  amLODIPine 10 mg oral tablet: 1 tab(s) orally once a day (in the evening) (22 Sep 2020 19:18)  aspirin 81 mg oral tablet, chewable: 1 tab(s) orally once a day (22 Sep 2020 19:18)  atorvastatin 40 mg oral tablet: 1 tab(s) orally once a day (22 Sep 2020 19:18)  fenofibrate 160 mg oral tablet: 1 tab(s) orally once a day (22 Sep 2020 19:18)  fosinopril 40 mg oral tablet: 1 tab(s) orally once a day (in the evening) (22 Sep 2020 19:18)  fosinopril 40 mg oral tablet: 1 tab(s) orally once a day (22 Sep 2020 19:18)  hydroCHLOROthiazide 12.5 mg oral tablet: 1 tab(s) orally once a day (22 Sep 2020 19:18)  isosorbide: 1 tab(s) orally once a day (22 Sep 2020 19:18)  isosorbide mononitrate 120 mg oral tablet, extended release: 1 tab(s) orally once a day (in the morning) (22 Sep 2020 19:18)  Xarelto 20 mg oral tablet: 1 tab(s) orally once a day (in the evening) (22 Sep 2020 19:18)      ROS:     REVIEW OF SYSTEMS  General:  No fevers  Eyes:  No reported pain   ENT:  No sore throat   NECK: No stiffness   CV:  No chest pain   Resp:  No shortness of breath  GI:  See HPI  :  No dysuria  Muscle:  No weakness  Neuro:  No tingling  Endocrine:  No polyuria  Heme:  No ecchymosis          VITALS:   T(F): 97.7 (09-23 @ 13:07), Max: 98.7 (09-23 @ 06:20)  HR: 78 (09-23 @ 13:07) (71 - 100)  BP: 146/71 (09-23 @ 13:07) (130/69 - 171/74)  BP(mean): --  RR: 18 (09-23 @ 13:07) (11 - 21)  SpO2: 100% (09-23 @ 11:30) (98% - 100%)    I&O's Summary    23 Sep 2020 07:01  -  23 Sep 2020 15:08  --------------------------------------------------------  IN: 275 mL / OUT: 200 mL / NET: 75 mL        PHYSICAL EXAM:  Gen: comfortable   EYES: No scleral icterus   LUNG: Clear to auscultation bilaterally  HEART: s1 and S2 heard   ABDOMEN: Soft, +BS, No abd distension, no Abdominal Tenderness, No guarding, No Navarro Sign   Neuro: AAO x 3    LABS:                        12.7   8.35  )-----------( 585      ( 22 Sep 2020 22:20 )             39.3     PT/INR - ( 22 Sep 2020 23:35 )  INR: 1.37          PTT - ( 22 Sep 2020 23:35 )  PTT:38.4   LIVER FUNCTIONS - ( 22 Sep 2020 22:20 )  Alb: 3.9 g/dL / Pro: 6.7 g/dL / ALK PHOS: 64 U/L / ALT: 28 U/L / AST: 21 U/L / GGT: x           09-22    139  |  100  |  26<H>  ----------------------------<  228<H>  4.0   |  27  |  1.6<H>    Ca    9.5      22 Sep 2020 22:20  Phos  3.0     09-22  Mg     1.9     09-22      RADIOLOGY:    < from: CT Abdomen and Pelvis w/ IV Cont (09.07.20 @ 20:38) >  FINDINGS:    CHEST:    LUNGS/PLEURA/AIRWAYS: Mild bibasilar dependent atelectasis. No lobar consolidation, pleural effusion, or pneumothorax. Tracheobronchial tree is grossly patent.    MEDIASTINUM/THORACIC NODES: Multiple calcified mediastinal and hilar lymph nodes.    HEART/GREAT VESSELS: Heart size is within normal limits. Atherosclerotic calcification of the thoracic aorta and coronary arteries.    Pleural effusion.      ABDOMEN/PELVIS:    HEPATOBILIARY: Unremarkable.    SPLEEN: Unremarkable.    PANCREAS: Indeterminate 2.4 x 2.3 x 2.1 cm cystic lesion along the pancreatic uncinate process (5/326.)    ADRENAL GLANDS: Unremarkable.    KIDNEYS: A 2.5 cm exophytic right renal interpolar cyst is noted. Additional subcentimeter left renal lower pole hypodensities are too small to further characterize. No hydronephrosis.    ABDOMINOPELVIC NODES: Unremarkable.    PELVIC ORGANS: Unremarkable.    PERITONEUM/MESENTERY/BOWEL: Unremarkable.    BONES/SOFT TISSUES: Subcutaneous soft tissue stranding along the superior aspect of left chestwall, with suspicion of an acute nondisplaced fracture involving the left sixth anterolateral rib. Several additional age-indeterminate left-sided rib fractures, specifically involving the left 3rd, 4th, and 7th anterolateral ribs with suspicion of an acute fracture involving the left sixth anterolateral rib. A chronic right seventh anterolateral rib fracture is noted. A vertebral body hemangioma is noted at the T10 level. Subcutaneous soft tissue stranding along the superior aspect of the left chest wall (5/19.)    OTHER: Atherosclerotic vascular calcification.      IMPRESSION:    1. Subcutaneous soft tissue stranding along the superior aspect of left chest wall with suspicion of an acute nondisplaced fracture involving the left 6th anterolateral rib; several additional age-indeterminate left-sided rib fractures, specifically involving the left 3rd, 4th, and 7th anterolateral ribs.    2. Otherwise, no definite evidence of acute traumatic injury within the chest, abdomen or pelvis.    3. Indeterminate 2.4 cm cystic lesion along the pancreatic uncinate process; correlation with prior imaging, if available, may be of use. If prior imaging is not available, and further evaluation is clinically warranted, a contrast-enhanced pancreatic protocol MRI may be of use.        < end of copied text >

## 2020-09-24 LAB
ANION GAP SERPL CALC-SCNC: 9 MMOL/L — SIGNIFICANT CHANGE UP (ref 7–14)
BASOPHILS # BLD AUTO: 0.01 K/UL — SIGNIFICANT CHANGE UP (ref 0–0.2)
BASOPHILS NFR BLD AUTO: 0.1 % — SIGNIFICANT CHANGE UP (ref 0–1)
BLD GP AB SCN SERPL QL: SIGNIFICANT CHANGE UP
BUN SERPL-MCNC: 34 MG/DL — HIGH (ref 10–20)
CALCIUM SERPL-MCNC: 9.3 MG/DL — SIGNIFICANT CHANGE UP (ref 8.5–10.1)
CHLORIDE SERPL-SCNC: 99 MMOL/L — SIGNIFICANT CHANGE UP (ref 98–110)
CO2 SERPL-SCNC: 28 MMOL/L — SIGNIFICANT CHANGE UP (ref 17–32)
CREAT SERPL-MCNC: 1.9 MG/DL — HIGH (ref 0.7–1.5)
EOSINOPHIL # BLD AUTO: 0 K/UL — SIGNIFICANT CHANGE UP (ref 0–0.7)
EOSINOPHIL NFR BLD AUTO: 0 % — SIGNIFICANT CHANGE UP (ref 0–8)
GLUCOSE BLDC GLUCOMTR-MCNC: 186 MG/DL — HIGH (ref 70–99)
GLUCOSE BLDC GLUCOMTR-MCNC: 208 MG/DL — HIGH (ref 70–99)
GLUCOSE BLDC GLUCOMTR-MCNC: 217 MG/DL — HIGH (ref 70–99)
GLUCOSE BLDC GLUCOMTR-MCNC: 234 MG/DL — HIGH (ref 70–99)
GLUCOSE SERPL-MCNC: 241 MG/DL — HIGH (ref 70–99)
HCT VFR BLD CALC: 34.2 % — LOW (ref 42–52)
HGB BLD-MCNC: 11 G/DL — LOW (ref 14–18)
IMM GRANULOCYTES NFR BLD AUTO: 0.6 % — HIGH (ref 0.1–0.3)
LYMPHOCYTES # BLD AUTO: 0.63 K/UL — LOW (ref 1.2–3.4)
LYMPHOCYTES # BLD AUTO: 5.8 % — LOW (ref 20.5–51.1)
MAGNESIUM SERPL-MCNC: 2.2 MG/DL — SIGNIFICANT CHANGE UP (ref 1.8–2.4)
MCHC RBC-ENTMCNC: 31.2 PG — HIGH (ref 27–31)
MCHC RBC-ENTMCNC: 32.2 G/DL — SIGNIFICANT CHANGE UP (ref 32–37)
MCV RBC AUTO: 96.9 FL — HIGH (ref 80–94)
MONOCYTES # BLD AUTO: 0.52 K/UL — SIGNIFICANT CHANGE UP (ref 0.1–0.6)
MONOCYTES NFR BLD AUTO: 4.8 % — SIGNIFICANT CHANGE UP (ref 1.7–9.3)
NEUTROPHILS # BLD AUTO: 9.64 K/UL — HIGH (ref 1.4–6.5)
NEUTROPHILS NFR BLD AUTO: 88.7 % — HIGH (ref 42.2–75.2)
NRBC # BLD: 0 /100 WBCS — SIGNIFICANT CHANGE UP (ref 0–0)
PHOSPHATE SERPL-MCNC: 3.5 MG/DL — SIGNIFICANT CHANGE UP (ref 2.1–4.9)
PLATELET # BLD AUTO: 578 K/UL — HIGH (ref 130–400)
POTASSIUM SERPL-MCNC: 4.9 MMOL/L — SIGNIFICANT CHANGE UP (ref 3.5–5)
POTASSIUM SERPL-SCNC: 4.9 MMOL/L — SIGNIFICANT CHANGE UP (ref 3.5–5)
RBC # BLD: 3.53 M/UL — LOW (ref 4.7–6.1)
RBC # FLD: 14 % — SIGNIFICANT CHANGE UP (ref 11.5–14.5)
SARS-COV-2 IGG SERPL QL IA: NEGATIVE — SIGNIFICANT CHANGE UP
SARS-COV-2 IGM SERPL IA-ACNC: <0.1 INDEX — SIGNIFICANT CHANGE UP
SODIUM SERPL-SCNC: 136 MMOL/L — SIGNIFICANT CHANGE UP (ref 135–146)
WBC # BLD: 10.86 K/UL — HIGH (ref 4.8–10.8)
WBC # FLD AUTO: 10.86 K/UL — HIGH (ref 4.8–10.8)

## 2020-09-24 PROCEDURE — 11045 DBRDMT SUBQ TISS EACH ADDL: CPT

## 2020-09-24 PROCEDURE — 11042 DBRDMT SUBQ TIS 1ST 20SQCM/<: CPT

## 2020-09-24 RX ORDER — HEPARIN SODIUM 5000 [USP'U]/ML
5000 INJECTION INTRAVENOUS; SUBCUTANEOUS EVERY 8 HOURS
Refills: 0 | Status: DISCONTINUED | OUTPATIENT
Start: 2020-09-24 | End: 2020-09-25

## 2020-09-24 RX ORDER — ONDANSETRON 8 MG/1
4 TABLET, FILM COATED ORAL ONCE
Refills: 0 | Status: DISCONTINUED | OUTPATIENT
Start: 2020-09-24 | End: 2020-09-24

## 2020-09-24 RX ORDER — LISINOPRIL 2.5 MG/1
40 TABLET ORAL DAILY
Refills: 0 | Status: DISCONTINUED | OUTPATIENT
Start: 2020-09-24 | End: 2020-09-25

## 2020-09-24 RX ORDER — DEXTROSE 50 % IN WATER 50 %
12.5 SYRINGE (ML) INTRAVENOUS ONCE
Refills: 0 | Status: DISCONTINUED | OUTPATIENT
Start: 2020-09-24 | End: 2020-09-25

## 2020-09-24 RX ORDER — DEXTROSE 50 % IN WATER 50 %
25 SYRINGE (ML) INTRAVENOUS ONCE
Refills: 0 | Status: DISCONTINUED | OUTPATIENT
Start: 2020-09-24 | End: 2020-09-25

## 2020-09-24 RX ORDER — ATORVASTATIN CALCIUM 80 MG/1
40 TABLET, FILM COATED ORAL DAILY
Refills: 0 | Status: DISCONTINUED | OUTPATIENT
Start: 2020-09-24 | End: 2020-09-25

## 2020-09-24 RX ORDER — GLUCAGON INJECTION, SOLUTION 0.5 MG/.1ML
1 INJECTION, SOLUTION SUBCUTANEOUS ONCE
Refills: 0 | Status: DISCONTINUED | OUTPATIENT
Start: 2020-09-24 | End: 2020-09-25

## 2020-09-24 RX ORDER — PANTOPRAZOLE SODIUM 20 MG/1
40 TABLET, DELAYED RELEASE ORAL
Refills: 0 | Status: DISCONTINUED | OUTPATIENT
Start: 2020-09-24 | End: 2020-09-25

## 2020-09-24 RX ORDER — BACITRACIN ZINC 500 UNIT/G
1 OINTMENT IN PACKET (EA) TOPICAL
Refills: 0 | Status: DISCONTINUED | OUTPATIENT
Start: 2020-09-24 | End: 2020-09-25

## 2020-09-24 RX ORDER — FENOFIBRATE,MICRONIZED 130 MG
145 CAPSULE ORAL DAILY
Refills: 0 | Status: DISCONTINUED | OUTPATIENT
Start: 2020-09-24 | End: 2020-09-25

## 2020-09-24 RX ORDER — OXYCODONE AND ACETAMINOPHEN 5; 325 MG/1; MG/1
1 TABLET ORAL EVERY 6 HOURS
Refills: 0 | Status: DISCONTINUED | OUTPATIENT
Start: 2020-09-24 | End: 2020-09-25

## 2020-09-24 RX ORDER — AMLODIPINE BESYLATE 2.5 MG/1
10 TABLET ORAL DAILY
Refills: 0 | Status: DISCONTINUED | OUTPATIENT
Start: 2020-09-24 | End: 2020-09-25

## 2020-09-24 RX ORDER — ACETAMINOPHEN 500 MG
650 TABLET ORAL EVERY 6 HOURS
Refills: 0 | Status: DISCONTINUED | OUTPATIENT
Start: 2020-09-24 | End: 2020-09-25

## 2020-09-24 RX ORDER — HYDROMORPHONE HYDROCHLORIDE 2 MG/ML
1 INJECTION INTRAMUSCULAR; INTRAVENOUS; SUBCUTANEOUS
Refills: 0 | Status: DISCONTINUED | OUTPATIENT
Start: 2020-09-24 | End: 2020-09-24

## 2020-09-24 RX ORDER — DEXTROSE 50 % IN WATER 50 %
15 SYRINGE (ML) INTRAVENOUS ONCE
Refills: 0 | Status: DISCONTINUED | OUTPATIENT
Start: 2020-09-24 | End: 2020-09-25

## 2020-09-24 RX ORDER — SODIUM CHLORIDE 9 MG/ML
1000 INJECTION, SOLUTION INTRAVENOUS
Refills: 0 | Status: DISCONTINUED | OUTPATIENT
Start: 2020-09-24 | End: 2020-09-25

## 2020-09-24 RX ORDER — INSULIN GLARGINE 100 [IU]/ML
25 INJECTION, SOLUTION SUBCUTANEOUS AT BEDTIME
Refills: 0 | Status: DISCONTINUED | OUTPATIENT
Start: 2020-09-24 | End: 2020-09-25

## 2020-09-24 RX ORDER — CHLORHEXIDINE GLUCONATE 213 G/1000ML
1 SOLUTION TOPICAL
Refills: 0 | Status: DISCONTINUED | OUTPATIENT
Start: 2020-09-24 | End: 2020-09-25

## 2020-09-24 RX ORDER — SODIUM CHLORIDE 9 MG/ML
1000 INJECTION INTRAMUSCULAR; INTRAVENOUS; SUBCUTANEOUS
Refills: 0 | Status: DISCONTINUED | OUTPATIENT
Start: 2020-09-24 | End: 2020-09-25

## 2020-09-24 RX ORDER — INSULIN LISPRO 100/ML
VIAL (ML) SUBCUTANEOUS
Refills: 0 | Status: DISCONTINUED | OUTPATIENT
Start: 2020-09-24 | End: 2020-09-25

## 2020-09-24 RX ORDER — SODIUM CHLORIDE 9 MG/ML
1000 INJECTION, SOLUTION INTRAVENOUS
Refills: 0 | Status: DISCONTINUED | OUTPATIENT
Start: 2020-09-24 | End: 2020-09-24

## 2020-09-24 RX ORDER — ISOSORBIDE MONONITRATE 60 MG/1
120 TABLET, EXTENDED RELEASE ORAL DAILY
Refills: 0 | Status: DISCONTINUED | OUTPATIENT
Start: 2020-09-24 | End: 2020-09-25

## 2020-09-24 RX ORDER — INSULIN LISPRO 100/ML
8 VIAL (ML) SUBCUTANEOUS
Refills: 0 | Status: DISCONTINUED | OUTPATIENT
Start: 2020-09-24 | End: 2020-09-25

## 2020-09-24 RX ORDER — ASPIRIN/CALCIUM CARB/MAGNESIUM 324 MG
81 TABLET ORAL DAILY
Refills: 0 | Status: DISCONTINUED | OUTPATIENT
Start: 2020-09-24 | End: 2020-09-25

## 2020-09-24 RX ORDER — HYDROMORPHONE HYDROCHLORIDE 2 MG/ML
0.5 INJECTION INTRAMUSCULAR; INTRAVENOUS; SUBCUTANEOUS
Refills: 0 | Status: DISCONTINUED | OUTPATIENT
Start: 2020-09-24 | End: 2020-09-24

## 2020-09-24 RX ORDER — AMPICILLIN SODIUM AND SULBACTAM SODIUM 250; 125 MG/ML; MG/ML
3 INJECTION, POWDER, FOR SUSPENSION INTRAMUSCULAR; INTRAVENOUS EVERY 6 HOURS
Refills: 0 | Status: DISCONTINUED | OUTPATIENT
Start: 2020-09-24 | End: 2020-09-25

## 2020-09-24 RX ORDER — ALLOPURINOL 300 MG
150 TABLET ORAL DAILY
Refills: 0 | Status: DISCONTINUED | OUTPATIENT
Start: 2020-09-24 | End: 2020-09-25

## 2020-09-24 RX ORDER — CIPROFLOXACIN LACTATE 400MG/40ML
400 VIAL (ML) INTRAVENOUS EVERY 12 HOURS
Refills: 0 | Status: DISCONTINUED | OUTPATIENT
Start: 2020-09-24 | End: 2020-09-25

## 2020-09-24 RX ADMIN — CHLORHEXIDINE GLUCONATE 1 APPLICATION(S): 213 SOLUTION TOPICAL at 05:09

## 2020-09-24 RX ADMIN — Medication 2: at 12:00

## 2020-09-24 RX ADMIN — HEPARIN SODIUM 5000 UNIT(S): 5000 INJECTION INTRAVENOUS; SUBCUTANEOUS at 21:47

## 2020-09-24 RX ADMIN — HEPARIN SODIUM 5000 UNIT(S): 5000 INJECTION INTRAVENOUS; SUBCUTANEOUS at 05:08

## 2020-09-24 RX ADMIN — Medication 150 MILLIGRAM(S): at 12:01

## 2020-09-24 RX ADMIN — Medication 81 MILLIGRAM(S): at 12:00

## 2020-09-24 RX ADMIN — INSULIN GLARGINE 25 UNIT(S): 100 INJECTION, SOLUTION SUBCUTANEOUS at 21:47

## 2020-09-24 RX ADMIN — AMPICILLIN SODIUM AND SULBACTAM SODIUM 200 GRAM(S): 250; 125 INJECTION, POWDER, FOR SUSPENSION INTRAMUSCULAR; INTRAVENOUS at 12:01

## 2020-09-24 RX ADMIN — Medication 2: at 16:45

## 2020-09-24 RX ADMIN — SODIUM CHLORIDE 75 MILLILITER(S): 9 INJECTION, SOLUTION INTRAVENOUS at 08:55

## 2020-09-24 RX ADMIN — Medication 50 GRAM(S): at 00:34

## 2020-09-24 RX ADMIN — Medication 8 UNIT(S): at 16:45

## 2020-09-24 RX ADMIN — Medication 200 MILLIGRAM(S): at 05:08

## 2020-09-24 RX ADMIN — LISINOPRIL 40 MILLIGRAM(S): 2.5 TABLET ORAL at 05:08

## 2020-09-24 RX ADMIN — AMPICILLIN SODIUM AND SULBACTAM SODIUM 200 GRAM(S): 250; 125 INJECTION, POWDER, FOR SUSPENSION INTRAMUSCULAR; INTRAVENOUS at 18:29

## 2020-09-24 RX ADMIN — PANTOPRAZOLE SODIUM 40 MILLIGRAM(S): 20 TABLET, DELAYED RELEASE ORAL at 06:40

## 2020-09-24 RX ADMIN — ISOSORBIDE MONONITRATE 120 MILLIGRAM(S): 60 TABLET, EXTENDED RELEASE ORAL at 12:00

## 2020-09-24 RX ADMIN — AMLODIPINE BESYLATE 10 MILLIGRAM(S): 2.5 TABLET ORAL at 05:08

## 2020-09-24 RX ADMIN — ATORVASTATIN CALCIUM 40 MILLIGRAM(S): 80 TABLET, FILM COATED ORAL at 12:00

## 2020-09-24 RX ADMIN — Medication 200 MILLIGRAM(S): at 18:28

## 2020-09-24 RX ADMIN — Medication 8 UNIT(S): at 12:00

## 2020-09-24 RX ADMIN — HEPARIN SODIUM 5000 UNIT(S): 5000 INJECTION INTRAVENOUS; SUBCUTANEOUS at 14:09

## 2020-09-24 RX ADMIN — AMPICILLIN SODIUM AND SULBACTAM SODIUM 200 GRAM(S): 250; 125 INJECTION, POWDER, FOR SUSPENSION INTRAMUSCULAR; INTRAVENOUS at 05:07

## 2020-09-24 RX ADMIN — Medication 145 MILLIGRAM(S): at 12:00

## 2020-09-24 NOTE — BRIEF OPERATIVE NOTE - NSICDXBRIEFPOSTOP_GEN_ALL_CORE_FT
POST-OP DIAGNOSIS:  Traumatic hematoma 23-Sep-2020 10:29:59 left lower leg Murphy Guerra  
POST-OP DIAGNOSIS:  Traumatic hematoma 23-Sep-2020 10:29:59 left lower leg Murphy Guerra

## 2020-09-24 NOTE — PRE-OP CHECKLIST - SELECT TESTS ORDERED
CXR/EKG/POCT Blood Glucose/COVID/BMP/CBC/CMP/PT/PTT/INR/Type and Screen POCT Blood Glucose/217 mg/dl

## 2020-09-24 NOTE — BRIEF OPERATIVE NOTE - OPERATION/FINDINGS
necrotic tissue, hematoma
small areas of fat necrosis, minimal residual clot, cauterized tissue , suture material at skin

## 2020-09-24 NOTE — BRIEF OPERATIVE NOTE - NSICDXBRIEFPROCEDURE_GEN_ALL_CORE_FT
This patient  sees Dr David.   
PROCEDURES:  Selective debridement of wound 23-Sep-2020 10:26:14 left lower leg, evac hematoma Murphy Guerra  
PROCEDURES:  Negative pressure wound therapy, greater than 50 sq cm 24-Sep-2020 09:03:54  Andrey Castnaon  Debridement of soft tissue of lower extremity 24-Sep-2020 09:01:29 excisional debridement and pulsatile irrigation left lower extremity 12 x 7 cm to fascia Andrey Castanon

## 2020-09-24 NOTE — BRIEF OPERATIVE NOTE - NSICDXBRIEFPREOP_GEN_ALL_CORE_FT
PRE-OP DIAGNOSIS:  Traumatic hematoma 23-Sep-2020 10:29:32 left lower leg Murphy Guerra  
PRE-OP DIAGNOSIS:  Traumatic hematoma 23-Sep-2020 10:29:32 left lower leg Murphy Guerra

## 2020-09-24 NOTE — PHYSICAL THERAPY INITIAL EVALUATION ADULT - GENERAL OBSERVATIONS, REHAB EVAL
Patient seen from 14:05-14:50. Pt encountered in bed upon PT arrival in no apparent distress. + IV lock + LLE ace wrap + wound vac. Patient agreeable to evaluation. BP in supine 105/60, HR 72, SPO2 on RA 96%.

## 2020-09-24 NOTE — PHYSICAL THERAPY INITIAL EVALUATION ADULT - SPECIFY REASON(S)
Pt remains without activity orders or WB status. Pt is currently in OR. PT IE pending OOB orders and WB status as appropriate s/p OR. Will f/u.

## 2020-09-24 NOTE — CHART NOTE - NSCHARTNOTEFT_GEN_A_CORE
PACU ANESTHESIA ADMISSION NOTE      Procedure: Selective debridement of wound left lower leg, evac hematoma      Post op diagnosis:  Traumatic hematoma      __x__  Patent Airway    __x__  Full return of protective reflexes    _x___  Full recovery from anesthesia / back to baseline status    Vitals:  T(C): 98.4 F  HR: 92  BP: 150/63  RR: 20  SpO2: 97%    Mental Status:  __x__ Awake   ___x__ Alert   _____ Drowsy   _____ Sedated    Nausea/Vomiting:  __x__ NO  ______Yes,   See Post - Op Orders          Pain Scale (0-10):  _____    Treatment: ____ None    _x___ See Post - Op/PCA Orders    Post - Operative Fluids:   ____ Oral   __x__ See Post - Op Orders    Plan: Discharge:   ____Home       _x____Floor     _____Critical Care    _____  Other:_________________    Comments: uneventful anesthesia course no complications. Vitals stable. Pt transferred to PACU. Transfer to floor when criteria is met

## 2020-09-24 NOTE — PRE-ANESTHESIA EVALUATION ADULT - NSANTHADDINFOFT_GEN_ALL_CORE
risks, benefits, alternatives discussed with the patient and he is agreeable to the plan
risks, benefits, alternatives discussed with the patient and he is agreeable to proceed as planned

## 2020-09-24 NOTE — PHYSICAL THERAPY INITIAL EVALUATION ADULT - DID THE PATIENT HAVE SURGERY?
yes/Negative pressure wound therapy, greater than 50 sq cm; Debridement of soft tissue of lower extremity; excisional debridement and pulsatile irrigation left lower extremity

## 2020-09-24 NOTE — PHYSICAL THERAPY INITIAL EVALUATION ADULT - ADDITIONAL COMMENTS
Patient reports he ambulates independently at baseline. However, pt states  he has been using a straight cane recently since the injury on 9/7.

## 2020-09-24 NOTE — PHYSICAL THERAPY INITIAL EVALUATION ADULT - PERTINENT HX OF CURRENT PROBLEM, REHAB EVAL
Pt is a 79 year old male admitted for left leg wound. Patient reports that on 9/7/20, he was gardening in his backyard when he tripped, fell, and cut his left leg on a boat hitch that was in his backyard. Patient states immediately after the incident, he noticed a lot of bleeding from his left leg.

## 2020-09-24 NOTE — SBIRT NOTE ADULT - NSSBIRTALCPOSREINDET_GEN_A_CORE
SW provided positive reinforcement and psychoeducation was provided to Pt. regarding the impact of his current usage has on his overall health and functioning.  Pt. refused resources and stated that he only drinks beers on the weekends.  Pt. minimized his drinking.  Psychoeducation was provided to Pt.

## 2020-09-25 ENCOUNTER — TRANSCRIPTION ENCOUNTER (OUTPATIENT)
Age: 80
End: 2020-09-25

## 2020-09-25 VITALS
SYSTOLIC BLOOD PRESSURE: 114 MMHG | OXYGEN SATURATION: 98 % | DIASTOLIC BLOOD PRESSURE: 58 MMHG | RESPIRATION RATE: 18 BRPM | HEART RATE: 65 BPM | TEMPERATURE: 97 F

## 2020-09-25 LAB
GLUCOSE BLDC GLUCOMTR-MCNC: 131 MG/DL — HIGH (ref 70–99)
GLUCOSE BLDC GLUCOMTR-MCNC: 140 MG/DL — HIGH (ref 70–99)
SURGICAL PATHOLOGY STUDY: SIGNIFICANT CHANGE UP

## 2020-09-25 RX ORDER — ISOSORBIDE DINITRATE 5 MG/1
1 TABLET ORAL
Qty: 0 | Refills: 0 | DISCHARGE

## 2020-09-25 RX ORDER — RIVAROXABAN 15 MG-20MG
20 KIT ORAL
Refills: 0 | Status: DISCONTINUED | OUTPATIENT
Start: 2020-09-25 | End: 2020-09-25

## 2020-09-25 RX ORDER — GENTAMICIN SULFATE 0.1 %
1 OINTMENT (GRAM) TOPICAL
Qty: 2 | Refills: 2
Start: 2020-09-25 | End: 2020-10-24

## 2020-09-25 RX ORDER — OXYCODONE AND ACETAMINOPHEN 5; 325 MG/1; MG/1
1 TABLET ORAL
Qty: 21 | Refills: 0
Start: 2020-09-25 | End: 2020-10-01

## 2020-09-25 RX ORDER — ACETAMINOPHEN 500 MG
2 TABLET ORAL
Qty: 0 | Refills: 0 | DISCHARGE
Start: 2020-09-25

## 2020-09-25 RX ORDER — MORPHINE SULFATE 50 MG/1
2 CAPSULE, EXTENDED RELEASE ORAL ONCE
Refills: 0 | Status: DISCONTINUED | OUTPATIENT
Start: 2020-09-25 | End: 2020-09-25

## 2020-09-25 RX ORDER — PANTOPRAZOLE SODIUM 20 MG/1
1 TABLET, DELAYED RELEASE ORAL
Qty: 0 | Refills: 0 | DISCHARGE
Start: 2020-09-25

## 2020-09-25 RX ORDER — FOSINOPRIL SODIUM 10 MG/1
1 TABLET ORAL
Qty: 0 | Refills: 0 | DISCHARGE

## 2020-09-25 RX ADMIN — Medication 200 MILLIGRAM(S): at 05:03

## 2020-09-25 RX ADMIN — AMPICILLIN SODIUM AND SULBACTAM SODIUM 200 GRAM(S): 250; 125 INJECTION, POWDER, FOR SUSPENSION INTRAMUSCULAR; INTRAVENOUS at 05:02

## 2020-09-25 RX ADMIN — Medication 8 UNIT(S): at 12:11

## 2020-09-25 RX ADMIN — MORPHINE SULFATE 2 MILLIGRAM(S): 50 CAPSULE, EXTENDED RELEASE ORAL at 11:05

## 2020-09-25 RX ADMIN — AMPICILLIN SODIUM AND SULBACTAM SODIUM 200 GRAM(S): 250; 125 INJECTION, POWDER, FOR SUSPENSION INTRAMUSCULAR; INTRAVENOUS at 00:41

## 2020-09-25 RX ADMIN — Medication 150 MILLIGRAM(S): at 12:12

## 2020-09-25 RX ADMIN — Medication 8 UNIT(S): at 08:15

## 2020-09-25 RX ADMIN — AMPICILLIN SODIUM AND SULBACTAM SODIUM 200 GRAM(S): 250; 125 INJECTION, POWDER, FOR SUSPENSION INTRAMUSCULAR; INTRAVENOUS at 12:12

## 2020-09-25 RX ADMIN — ATORVASTATIN CALCIUM 40 MILLIGRAM(S): 80 TABLET, FILM COATED ORAL at 12:13

## 2020-09-25 RX ADMIN — SODIUM CHLORIDE 50 MILLILITER(S): 9 INJECTION INTRAMUSCULAR; INTRAVENOUS; SUBCUTANEOUS at 00:01

## 2020-09-25 RX ADMIN — CHLORHEXIDINE GLUCONATE 1 APPLICATION(S): 213 SOLUTION TOPICAL at 05:03

## 2020-09-25 RX ADMIN — AMLODIPINE BESYLATE 10 MILLIGRAM(S): 2.5 TABLET ORAL at 05:05

## 2020-09-25 RX ADMIN — Medication 145 MILLIGRAM(S): at 12:12

## 2020-09-25 RX ADMIN — PANTOPRAZOLE SODIUM 40 MILLIGRAM(S): 20 TABLET, DELAYED RELEASE ORAL at 08:15

## 2020-09-25 RX ADMIN — LISINOPRIL 40 MILLIGRAM(S): 2.5 TABLET ORAL at 05:05

## 2020-09-25 RX ADMIN — MORPHINE SULFATE 2 MILLIGRAM(S): 50 CAPSULE, EXTENDED RELEASE ORAL at 11:20

## 2020-09-25 RX ADMIN — Medication 81 MILLIGRAM(S): at 12:13

## 2020-09-25 RX ADMIN — HEPARIN SODIUM 5000 UNIT(S): 5000 INJECTION INTRAVENOUS; SUBCUTANEOUS at 05:03

## 2020-09-25 RX ADMIN — ISOSORBIDE MONONITRATE 120 MILLIGRAM(S): 60 TABLET, EXTENDED RELEASE ORAL at 12:12

## 2020-09-25 NOTE — PROGRESS NOTE ADULT - SUBJECTIVE AND OBJECTIVE BOX
80 y/o male with pmh of HTN, HLD, DM type 2, Afib (on xarelto), CAD with stent placement (2014), gout, VINI (wears a CPAP at night), presents with left lower extremity traumatic wound.     AM Rounds  INTERVAL HISTORY:  S/P     Vital Signs Last 24 Hrs  T(C): 36.2 (25 Sep 2020 13:00), Max: 36.7 (24 Sep 2020 16:41)  T(F): 97.1 (25 Sep 2020 13:00), Max: 98.1 (24 Sep 2020 16:41)  HR: 65 (25 Sep 2020 13:00) (65 - 81)  BP: 114/58 (25 Sep 2020 13:00) (109/57 - 138/70)  RR: 18 (25 Sep 2020 13:00) (18 - 18)  SpO2: 98% (25 Sep 2020 13:00) (98% - 98%)      I&O's Detail    24 Sep 2020 07:01  -  25 Sep 2020 07:00  --------------------------------------------------------  IN:  Total IN: 0 mL    OUT:    Voided (mL): 1620 mL  Total OUT: 1620 mL    Total NET: -1620 mL      25 Sep 2020 07:01  -  25 Sep 2020 16:05  --------------------------------------------------------  IN:  Total IN: 0 mL    OUT:    Voided (mL): 900 mL  Total OUT: 900 mL    Total NET: -900 mL            MEDICATIONS  (STANDING):  allopurinol 150 milliGRAM(s) Oral daily  amLODIPine   Tablet 10 milliGRAM(s) Oral daily  ampicillin/sulbactam  IVPB 3 Gram(s) IV Intermittent every 6 hours  aspirin  chewable 81 milliGRAM(s) Oral daily  atorvastatin Oral Tab/Cap - Peds 40 milliGRAM(s) Oral daily  BACItracin   Ointment 1 Application(s) Topical two times a day  chlorhexidine 4% Liquid 1 Application(s) Topical <User Schedule>  ciprofloxacin   IVPB 400 milliGRAM(s) IV Intermittent every 12 hours  dextrose 5%. 1000 milliLiter(s) (50 mL/Hr) IV Continuous <Continuous>  dextrose 50% Injectable 25 Gram(s) IV Push once  dextrose 50% Injectable 25 Gram(s) IV Push once  dextrose 50% Injectable 12.5 Gram(s) IV Push once  fenofibrate Tablet 145 milliGRAM(s) Oral daily  hydrochlorothiazide Oral Tab/Cap - Peds 12.5 milliGRAM(s) Oral daily  insulin glargine Injectable (LANTUS) 25 Unit(s) SubCutaneous at bedtime  insulin lispro (HumaLOG) corrective regimen sliding scale   SubCutaneous three times a day before meals  insulin lispro Injectable (HumaLOG) 8 Unit(s) SubCutaneous three times a day before meals  isosorbide   mononitrate ER Tablet (IMDUR) 120 milliGRAM(s) Oral daily  lisinopril 40 milliGRAM(s) Oral daily  pantoprazole    Tablet 40 milliGRAM(s) Oral before breakfast  rivaroxaban 20 milliGRAM(s) Oral with dinner  sodium chloride 0.9%. 1000 milliLiter(s) (50 mL/Hr) IV Continuous <Continuous>    MEDICATIONS  (PRN):  acetaminophen   Tablet .. 650 milliGRAM(s) Oral every 6 hours PRN Mild Pain (1 - 3)  dextrose 40% Gel 15 Gram(s) Oral once PRN Blood Glucose LESS THAN 70 milliGRAM(s)/deciliter  glucagon  Injectable 1 milliGRAM(s) IntraMuscular once PRN Glucose LESS THAN 70 milligrams/deciliter  oxycodone    5 mG/acetaminophen 325 mG 1 Tablet(s) Oral every 6 hours PRN Moderate Pain (4 - 6)    Allergies    No Known Allergies    Intolerances        Lab Results:                        11.0   10.86 )-----------( 578      ( 24 Sep 2020 16:55 )             34.2     09-24    136  |  99  |  34<H>  ----------------------------<  241<H>  4.9   |  28  |  1.9<H>    Ca    9.3      24 Sep 2020 16:55  Phos  3.5     09-24  Mg     2.2     09-24            CAPILLARY BLOOD GLUCOSE      POCT Blood Glucose.: 140 mg/dL (25 Sep 2020 11:51)  POCT Blood Glucose.: 131 mg/dL (25 Sep 2020 07:43)  POCT Blood Glucose.: 186 mg/dL (24 Sep 2020 21:35)  POCT Blood Glucose.: 234 mg/dL (24 Sep 2020 16:41)              IMAGING STUDIES:       EXAM:                     80 y/o male with pmh of HTN, HLD, DM type 2, Afib (on xarelto), CAD with stent placement (2014), gout, VINI (wears a CPAP at night), presents with left lower extremity traumatic wound.     AM Rounds  INTERVAL HISTORY:  POD #1: S/P debridement and wound vac placement to left lower extremity. no acute events overnight.     Vital Signs Last 24 Hrs  T(C): 36.2 (25 Sep 2020 13:00), Max: 36.7 (24 Sep 2020 16:41)  T(F): 97.1 (25 Sep 2020 13:00), Max: 98.1 (24 Sep 2020 16:41)  HR: 65 (25 Sep 2020 13:00) (65 - 81)  BP: 114/58 (25 Sep 2020 13:00) (109/57 - 138/70)  RR: 18 (25 Sep 2020 13:00) (18 - 18)  SpO2: 98% (25 Sep 2020 13:00) (98% - 98%)      I&O's Detail    24 Sep 2020 07:01  -  25 Sep 2020 07:00  --------------------------------------------------------  IN:  Total IN: 0 mL    OUT:    Voided (mL): 1620 mL  Total OUT: 1620 mL    Total NET: -1620 mL      25 Sep 2020 07:01  -  25 Sep 2020 16:05  --------------------------------------------------------  IN:  Total IN: 0 mL    OUT:    Voided (mL): 900 mL  Total OUT: 900 mL    Total NET: -900 mL            MEDICATIONS  (STANDING):  allopurinol 150 milliGRAM(s) Oral daily  amLODIPine   Tablet 10 milliGRAM(s) Oral daily  ampicillin/sulbactam  IVPB 3 Gram(s) IV Intermittent every 6 hours  aspirin  chewable 81 milliGRAM(s) Oral daily  atorvastatin Oral Tab/Cap - Peds 40 milliGRAM(s) Oral daily  BACItracin   Ointment 1 Application(s) Topical two times a day  chlorhexidine 4% Liquid 1 Application(s) Topical <User Schedule>  ciprofloxacin   IVPB 400 milliGRAM(s) IV Intermittent every 12 hours  dextrose 5%. 1000 milliLiter(s) (50 mL/Hr) IV Continuous <Continuous>  dextrose 50% Injectable 25 Gram(s) IV Push once  dextrose 50% Injectable 25 Gram(s) IV Push once  dextrose 50% Injectable 12.5 Gram(s) IV Push once  fenofibrate Tablet 145 milliGRAM(s) Oral daily  hydrochlorothiazide Oral Tab/Cap - Peds 12.5 milliGRAM(s) Oral daily  insulin glargine Injectable (LANTUS) 25 Unit(s) SubCutaneous at bedtime  insulin lispro (HumaLOG) corrective regimen sliding scale   SubCutaneous three times a day before meals  insulin lispro Injectable (HumaLOG) 8 Unit(s) SubCutaneous three times a day before meals  isosorbide   mononitrate ER Tablet (IMDUR) 120 milliGRAM(s) Oral daily  lisinopril 40 milliGRAM(s) Oral daily  pantoprazole    Tablet 40 milliGRAM(s) Oral before breakfast  rivaroxaban 20 milliGRAM(s) Oral with dinner  sodium chloride 0.9%. 1000 milliLiter(s) (50 mL/Hr) IV Continuous <Continuous>    MEDICATIONS  (PRN):  acetaminophen   Tablet .. 650 milliGRAM(s) Oral every 6 hours PRN Mild Pain (1 - 3)  dextrose 40% Gel 15 Gram(s) Oral once PRN Blood Glucose LESS THAN 70 milliGRAM(s)/deciliter  glucagon  Injectable 1 milliGRAM(s) IntraMuscular once PRN Glucose LESS THAN 70 milligrams/deciliter  oxycodone    5 mG/acetaminophen 325 mG 1 Tablet(s) Oral every 6 hours PRN Moderate Pain (4 - 6)    Allergies    No Known Allergies    Intolerances        Lab Results:                        11.0   10.86 )-----------( 578      ( 24 Sep 2020 16:55 )             34.2     09-24    136  |  99  |  34<H>  ----------------------------<  241<H>  4.9   |  28  |  1.9<H>    Ca    9.3      24 Sep 2020 16:55  Phos  3.5     09-24  Mg     2.2     09-24            CAPILLARY BLOOD GLUCOSE      POCT Blood Glucose.: 140 mg/dL (25 Sep 2020 11:51)  POCT Blood Glucose.: 131 mg/dL (25 Sep 2020 07:43)  POCT Blood Glucose.: 186 mg/dL (24 Sep 2020 21:35)  POCT Blood Glucose.: 234 mg/dL (24 Sep 2020 16:41)              IMAGING STUDIES:   < from: [facility] Duplex Lower Ext Vein Scan, Bilat (09.22.20 @ 21:43) >  Impression:    No evidence of deep venous thrombosis or superficial thrombophlebitis in the bilateral lower extremities.  Popliteal fossa Baker's cyst measuring 4.1 x 3.95 x 2.49 cm    < end of copied text >  < from: VA Duplex Lower Extrem Arterial, Bilat (09.22.20 @ 21:44) >  Impression:    Normal arterial flow in the bilateral lower extremities.    < end of copied text >      EXAM:  PHYSICAL EXAM:  GENERAL: NAD, lying comfortably in bed,   HEAD:  Atraumatic, Normocephalic  CHEST/LUNG: speaking in full sentences, equal chest rise and fall, breathing comfortably on room air  HEART: in no acute cardiopulmonary distress.   SKIN: LLE: ~12cm x 7cm full thickness wound, pink and moist post debridement. no active bleeding noted. no purulent drainage noted. no malodor noted.

## 2020-09-25 NOTE — DISCHARGE NOTE PROVIDER - CARE PROVIDER_API CALL
Murphy Guerra  PLASTIC SURGERY  [address] [place] [address]  Phone: [phone]  Fax: [phone]  Follow Up Time:     Nika Long)  Gastroenterology; Internal Medicine  [address] [place] [address]  Phone: [phone]  Fax: [phone]  Follow Up Time:

## 2020-09-25 NOTE — DISCHARGE NOTE PROVIDER - HOSPITAL COURSE
Patient is a 80 y/o male with PMHx of HTN, HLD, DM type 2, Afib (on xarelto), CAD s/p PCI with stent placement (2014), gout, VINI (on CPAP at night), presents to the ED for infected left lower extremity wound. Patient reports that on 9/7/20, he was gardening in his backyard when he tripped, fell, and cut his left leg on a boat hitch that was in his backyard.     Pt was seen in ED, had a CT of the head, chest, cervical spine, abdomen and pelvis which showed subcutaneous soft tissue stranding along the superior aspect of left chest wall with suspicion of an acute nondisplaced fracture involving the left 6th anterolateral rib; several additional age-indeterminate left-sided rib fractures, specifically involving the left 3rd, 4th, and 7th anterolateral ribs. X-ray of bilateral tib/fib showed no acute osseous abnormality. Diffuse soft tissue swelling bilateral casts .Left lateral mid calf 10 cm soft tissue defect. Trauma service recommended at the time, admission to hospital for respiratory monitoring, pain control but patient refused.  Patient also had 34 sutures placed to LLE laceration while in the ED. Patient signed AMA and was told to follow up with PMD and trauma clinic. Patient states he followed up with trauma clinic and saw Dr. Singh whom recommended to see Dr. Guerra for LLE wound. Patient was seen in burn clinic by Dr. Guerra and recommended admission to the hospital for debridement of LLE.    On admission pt started on IV fluids, IV antibiotics (Unasyn and Cipro IV), an pain medications. LE vascular Duplex done 9/22/2020 and was negative for DVT, c/w Popliteal fossa Baker's cyst measuring 4.1 x 3.95 x 2.49 cm.  Arterial Duplex 9/22: normal arterial flow. Echo 9/22: EF of 62 %.  COVID 9/22: negative. Pt known to have pancreatic cyst incidental find on last admission. Pt was supposed to get MRI outpatient, but never did. Pt consulted by GI on 9/23 and recommended to follow up with Dr. Long in 2-4weeks for MRI w/ MRCP. Pt underwent debridement of LLE on 9/23, and 9/24. Wound vac was placed in OR on 9/24.      Pt is stable to be discharge home with recommendations to continue wound vac therapy at home, continue Augmentin PO, and follow up in BURN Clinic in one week.

## 2020-09-25 NOTE — PROGRESS NOTE ADULT - ASSESSMENT
Labs, Radiology, Cardiology, and Other Results: 80 y/o male with pmh of HTN, HLD, DM type 2, Afib (on xarelto), CAD with stent placement (2014), gout, VINI (wears a CPAP at night), presents with left lower extremity traumatic wound.     #Left lower extremity traumatic wound  #POD #1 s/p debridement and wound vac placement to LLE  -wound care: wound vac to be remove today, apply gent/xeroform on discharge, wound vac to be reapplied at home once home wound vac is delivered.   -IVF  -IV abx  -Pain management  -PT consult  -GI/DVT ppx  -Venous duplex of BLE 9/22- negative for DVT, popiteal fossa baker's cyst  -Arterial duplex of BLE 9/22- showed normal flow  -ambulate as tolerated with cane    #Cards  -patient has a hx of HTN, HLD, CAD with stents  -continue his home meds-isosorbide, atorvastatin, amlodipine, HCTZ   - xarelto held for OR, and can restart today  -Monitor BP   Echo 9/23 LV Ejection Fraction by Yoo's Method with a biplane EF of 62 %.  -DASH/TLC diet    #Resp  -Patient has a hx of VINI (wears CPAP at night)  -monitor pox as needed  -incentive spirometer     #GI  Incidental pancreatic cystic lesion - indeterminate , 2.4 cm size at uncinate process on CT of A&P 9/7/20  GI- consulted: recs MRI pancreatic protocol with MRCP when possible , non urgent   Based on MRI results, if any worrisome features will plan for elective outpatient EUS with possible biopsy   Follow up with  as an outpatient  in 2-4 weeks       #Endo  - hgA1c 7.0-9/22  -Patient has hx of DM type 2  -home med (actos/metformin) held while inpatient  -continue 8,8,8,25 and ISS  -monitor FS, adjust insulin accordingly   -Carb consistency        -MSK  -Hx of Gout  -Will continue home med allopurinol   -ambulate as horacio  -pain control    D/C planning today  Discussed with patient plan of care and in agreement.

## 2020-09-25 NOTE — DISCHARGE NOTE PROVIDER - NSDCFUADDINST_GEN_ALL_CORE_FT
Please call [phone] to make a follow up appointment within 1 week with Dr. Guerra or Dr. Castanon. Clinic is located at [address] [place] [address] on Tuesdays (2-4pm) or Thursdays (9am-1pm).

## 2020-09-25 NOTE — DISCHARGE NOTE PROVIDER - NSDCMRMEDTOKEN_GEN_ALL_CORE_FT
acetaminophen 325 mg oral tablet: 2 tab(s) orally every 6 hours, As needed, Mild Pain (1 - 3)  Actoplus Met 15 mg-500 mg oral tablet: 1 tab(s) orally 2 times a day  allopurinol: 150 milligram(s) orally once a day  amLODIPine 10 mg oral tablet: 1 tab(s) orally once a day (in the evening)  aspirin 81 mg oral tablet, chewable: 1 tab(s) orally once a day  atorvastatin 40 mg oral tablet: 1 tab(s) orally once a day  Augmentin 875 mg-125 mg oral tablet: 1 tab(s) orally 2 times a day   fenofibrate 160 mg oral tablet: 1 tab(s) orally once a day  fosinopril 40 mg oral tablet: 1 tab(s) orally once a day (in the evening)  gentamicin 0.1% topical cream: Apply topically to affected area once a day   hydroCHLOROthiazide 12.5 mg oral tablet: 1 tab(s) orally once a day  isosorbide mononitrate 120 mg oral tablet, extended release: 1 tab(s) orally once a day (in the morning)  pantoprazole 40 mg oral delayed release tablet: 1 tab(s) orally once a day (before a meal)  Percocet 5 mg-325 mg oral tablet: 1 tab(s) orally 1 to 3 times a day, As Needed -Moderate Pain (4 - 6) MDD:3 tabs  Xarelto 20 mg oral tablet: 1 tab(s) orally once a day (in the evening)

## 2020-09-25 NOTE — DISCHARGE NOTE PROVIDER - NSDCCPCAREPLAN_GEN_ALL_CORE_FT
PRINCIPAL DISCHARGE DIAGNOSIS  Diagnosis: Wound of left leg  Assessment and Plan of Treatment: Pt had LE vascular Duplex done 9/22/2020 and was negative for DVT, c/w Popliteal fossa Baker's cyst measuring 4.1 x 3.95 x 2.49 cm.  Arterial Duplex 9/22/20 showed normal arterial flow. Pt underwent debridement of LLE on 9/23, and 9/24 with Wound vac placement. Please continue wound vac therapy at home. Wound Vac will be delivered to your home address, and Vositing nurse wiill come to place it. Currently, continue wound care daily: wash wound with soap and water, apply gantamicin ointment to wound, cover with xerfoorm, and wrap with kerlix daily.   Continue Augmentin PO, and follow up in BURN Clinic in one week.      SECONDARY DISCHARGE DIAGNOSES  Diagnosis: Pancreatic cyst  Assessment and Plan of Treatment: Known to have pancreatic cyst incidental find on last admission. Pt consulted by GI on 9/23 and recommended to follow up with Dr. Long in 2-4weeks for MRI w/ MRCP.    Diagnosis: Gout  Assessment and Plan of Treatment: continue home medications and follow up with PMD    Diagnosis: Diabetes mellitus  Assessment and Plan of Treatment: Hb A1C 9/22/2020: 7.0. Continue to monitor blood glucose level. Continue home medications, and follow up with Endocinologist and PMD as outpatient.    Diagnosis: CAD (coronary artery disease)  Assessment and Plan of Treatment: s/p stent placement in 2014. Continue home medciations and follow up with your cardiologist and PMD.    Diagnosis: Atrial fibrillation  Assessment and Plan of Treatment: Continue Xarelto 20mg daily, and follow up with cardiologist as outpatient.    Diagnosis: Hypertension  Assessment and Plan of Treatment: continue home medications. monitor your blood pressure, Follow up with PMD.

## 2020-09-25 NOTE — DISCHARGE NOTE PROVIDER - NSFOLLOWUPCLINICS_GEN_ALL_ED_FT
[facility]-[address]  Burn  [address], [address]  [place] [address]  Phone: [phone]  Fax:   Follow Up Time: 1 week

## 2020-09-25 NOTE — DISCHARGE NOTE NURSING/CASE MANAGEMENT/SOCIAL WORK - PATIENT PORTAL LINK FT
You can access the FollowMyHealth Patient Portal offered by [facility] by registering at the following website: [email or web address]. By joining [facility]’s FollowMyHealth portal, you will also be able to view your health information using other applications (apps) compatible with our system.

## 2020-09-25 NOTE — DISCHARGE NOTE PROVIDER - CARE PROVIDERS DIRECT ADDRESSES
[email or web address]

## 2020-09-27 NOTE — CDI QUERY NOTE - NSCDIOTHERTXTBX_GEN_ALL_CORE_HH
DOCUMENTATION CLARIFICATION FORM     Encounter #: [id number]                                        Patient’s Name: Panchito Bond  Medical Record #: [id number]                              Admit Date: 9-  CDI Specialist/: Temitope                              Contact #: [phone]    Dear Dr. Castanon,                       Date: 9-                   The Physician’s or Provider’s documentation of the patient’s presentation, evaluation and  medical management, as identified below, may support a diagnosis that is not documented in the medical record.  In order to accurately capture all diagnoses to the greatest degree of specificity reflecting the patient’s actual severity of illness, the documentation in this patient’s medical record requires additional clarification.  Please include more specific documentation, either known or suspected, of a corresponding diagnosis associated with the clinical information described below in your Progress Note and/or Discharge Summary.    Clinical Indicators  •	9-24 Operative Report states, “Open wound with exposed adipose tissue, patchy discoloration, areas of devitalized tissue…Excisional debridement of devitalized tissue using Metzbaum scissors was performed.”      Based on the clinical indicators and your professional judgment, please clarify the deepest level of debridement.  ( ) Level of debridement was down to and including the skin  ( ) Level of debridement was down to and including the subcutaneous tissue and fascia  ( ) Level of debridement was down to and including muscle  ( ) Other (please specify) ________                 Documentation clarification is required for compliance, accuracy in coding and billing, and reporting severity of illness, quality data and risk of mortality.  --------------------------------------------------------------------------------------------------------------------------------------------  DO NOT REMOVE THIS RECORD WITHOUT FIRST NOTIFYING THE CDI SPECIALIS  This form is NOT a part of the permanent Medical Record.

## 2020-09-28 PROBLEM — G47.33 OBSTRUCTIVE SLEEP APNEA (ADULT) (PEDIATRIC): Chronic | Status: ACTIVE | Noted: 2020-09-22

## 2020-10-06 ENCOUNTER — APPOINTMENT (OUTPATIENT)
Dept: BURN CARE | Facility: CLINIC | Age: 80
End: 2020-10-06
Payer: MEDICARE

## 2020-10-06 ENCOUNTER — OUTPATIENT (OUTPATIENT)
Dept: OUTPATIENT SERVICES | Facility: HOSPITAL | Age: 80
LOS: 1 days | Discharge: HOME | End: 2020-10-06

## 2020-10-06 DIAGNOSIS — Z95.5 PRESENCE OF CORONARY ANGIOPLASTY IMPLANT AND GRAFT: Chronic | ICD-10-CM

## 2020-10-06 DIAGNOSIS — S80.12XA CONTUSION OF LEFT LOWER LEG, INITIAL ENCOUNTER: ICD-10-CM
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
## 2020-10-06 PROCEDURE — 16030 DRESS/DEBRID P-THICK BURN L: CPT

## 2020-10-06 PROCEDURE — 99213 OFFICE O/P EST LOW 20 MIN: CPT | Mod: 25

## 2020-10-09 DIAGNOSIS — G47.33 OBSTRUCTIVE SLEEP APNEA (ADULT) (PEDIATRIC): ICD-10-CM

## 2020-10-09 DIAGNOSIS — M10.9 GOUT, UNSPECIFIED: ICD-10-CM

## 2020-10-09 DIAGNOSIS — E11.9 TYPE 2 DIABETES MELLITUS WITHOUT COMPLICATIONS: ICD-10-CM

## 2020-10-09 DIAGNOSIS — Z95.5 PRESENCE OF CORONARY ANGIOPLASTY IMPLANT AND GRAFT: ICD-10-CM

## 2020-10-09 DIAGNOSIS — Z99.89 DEPENDENCE ON OTHER ENABLING MACHINES AND DEVICES: ICD-10-CM

## 2020-10-09 DIAGNOSIS — I48.91 UNSPECIFIED ATRIAL FIBRILLATION: ICD-10-CM

## 2020-10-09 DIAGNOSIS — K86.2 CYST OF PANCREAS: ICD-10-CM

## 2020-10-09 DIAGNOSIS — I25.10 ATHEROSCLEROTIC HEART DISEASE OF NATIVE CORONARY ARTERY WITHOUT ANGINA PECTORIS: ICD-10-CM

## 2020-10-09 DIAGNOSIS — Z79.01 LONG TERM (CURRENT) USE OF ANTICOAGULANTS: ICD-10-CM

## 2020-10-09 DIAGNOSIS — I10 ESSENTIAL (PRIMARY) HYPERTENSION: ICD-10-CM

## 2020-10-09 DIAGNOSIS — S80.12XA CONTUSION OF LEFT LOWER LEG, INITIAL ENCOUNTER: ICD-10-CM
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
## 2020-10-09 DIAGNOSIS — E78.5 HYPERLIPIDEMIA, UNSPECIFIED: ICD-10-CM
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
## 2020-10-09 DIAGNOSIS — S81.812A LACERATION WITHOUT FOREIGN BODY, LEFT LOWER LEG, INITIAL ENCOUNTER: ICD-10-CM

## 2020-10-20 ENCOUNTER — OUTPATIENT (OUTPATIENT)
Dept: OUTPATIENT SERVICES | Facility: HOSPITAL | Age: 80
LOS: 1 days | Discharge: HOME | End: 2020-10-20

## 2020-10-20 ENCOUNTER — APPOINTMENT (OUTPATIENT)
Dept: BURN CARE | Facility: CLINIC | Age: 80
End: 2020-10-20
Payer: MEDICARE
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
## 2020-10-20 DIAGNOSIS — S80.11XD CONTUSION OF RIGHT LOWER LEG, SUBSEQUENT ENCOUNTER: ICD-10-CM

## 2020-10-20 DIAGNOSIS — Z95.5 PRESENCE OF CORONARY ANGIOPLASTY IMPLANT AND GRAFT: Chronic | ICD-10-CM

## 2020-10-20 PROCEDURE — 99213 OFFICE O/P EST LOW 20 MIN: CPT | Mod: 25

## 2020-10-20 PROCEDURE — 16030 DRESS/DEBRID P-THICK BURN L: CPT

## 2020-11-03 ENCOUNTER — APPOINTMENT (OUTPATIENT)
Dept: BURN CARE | Facility: CLINIC | Age: 80
End: 2020-11-03
Payer: MEDICARE

## 2020-11-03 ENCOUNTER — OUTPATIENT (OUTPATIENT)
Dept: OUTPATIENT SERVICES | Facility: HOSPITAL | Age: 80
LOS: 1 days | Discharge: HOME | End: 2020-11-03

## 2020-11-03 DIAGNOSIS — Z95.5 PRESENCE OF CORONARY ANGIOPLASTY IMPLANT AND GRAFT: Chronic | ICD-10-CM

## 2020-11-03 PROCEDURE — 99213 OFFICE O/P EST LOW 20 MIN: CPT | Mod: 25

## 2020-11-03 PROCEDURE — 16025 DRESS/DEBRID P-THICK BURN M: CPT
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
## 2020-11-11 DIAGNOSIS — S80.11XD CONTUSION OF RIGHT LOWER LEG, SUBSEQUENT ENCOUNTER: ICD-10-CM
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
## 2020-12-01 ENCOUNTER — APPOINTMENT (OUTPATIENT)
Dept: BURN CARE | Facility: CLINIC | Age: 80
End: 2020-12-01

## 2020-12-10 ENCOUNTER — OUTPATIENT (OUTPATIENT)
Dept: OUTPATIENT SERVICES | Facility: HOSPITAL | Age: 80
LOS: 1 days | Discharge: HOME | End: 2020-12-10

## 2020-12-10 ENCOUNTER — APPOINTMENT (OUTPATIENT)
Dept: BURN CARE | Facility: CLINIC | Age: 80
End: 2020-12-10
Payer: MEDICARE

## 2020-12-10 DIAGNOSIS — Z95.5 PRESENCE OF CORONARY ANGIOPLASTY IMPLANT AND GRAFT: Chronic | ICD-10-CM

## 2020-12-10 PROCEDURE — 97597 DBRDMT OPN WND 1ST 20 CM/<: CPT

## 2020-12-10 PROCEDURE — 99213 OFFICE O/P EST LOW 20 MIN: CPT | Mod: 25

## 2020-12-13 NOTE — PHYSICAL EXAM
[Healing] : healing [Size%: ______] : Size: [unfilled]% [Infected?] : Infected: No [3] : 3 out of 10 [Abnormal] : abnormal [Large] : medium [] : no [de-identified] : traumatic hematoma right lower leg--> healing --> local wound care --> rx jay [TWNoteComboBox1] : xeroform

## 2020-12-13 NOTE — HISTORY OF PRESENT ILLNESS
[Did you have an operation on your burn/wound injury?] : Did you have an operation on your burn/wound injury? No [Did this injury occur on the job?] : Did this injury occur on the job? No [de-identified] : traumatic wound and hematoma right leg --> blood thinners [de-identified] : traumatic wound and hematoma right leg

## 2020-12-13 NOTE — ASSESSMENT
[FreeTextEntry1] : traumatic hematoma right lower leg--> healing --> local wound care --> rx jay [Wound Care] : wound care

## 2020-12-13 NOTE — REASON FOR VISIT
[Revisit] : revisit [Were you seen in the Emergency Room?] : seen in the emergency room [Were you admitted to the burn center at [facility]?] : not admitted to the burn center at [facility] [Spouse] : spouse

## 2020-12-15 RX ORDER — DOXYCYCLINE HYCLATE 100 MG/1
100 CAPSULE ORAL DAILY
Qty: 7 | Refills: 0 | Status: ACTIVE | COMMUNITY
Start: 2020-12-15 | End: 1900-01-01

## 2020-12-16 LAB — BACTERIA SPEC CULT: ABNORMAL
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
## 2020-12-21 DIAGNOSIS — S80.11XA CONTUSION OF RIGHT LOWER LEG, INITIAL ENCOUNTER: ICD-10-CM

## 2021-01-07 ENCOUNTER — OUTPATIENT (OUTPATIENT)
Dept: OUTPATIENT SERVICES | Facility: HOSPITAL | Age: 81
LOS: 1 days | Discharge: HOME | End: 2021-01-07

## 2021-01-07 ENCOUNTER — APPOINTMENT (OUTPATIENT)
Dept: BURN CARE | Facility: CLINIC | Age: 81
End: 2021-01-07
Payer: MEDICARE

## 2021-01-07 DIAGNOSIS — Z95.5 PRESENCE OF CORONARY ANGIOPLASTY IMPLANT AND GRAFT: Chronic | ICD-10-CM

## 2021-01-07 PROCEDURE — 97597 DBRDMT OPN WND 1ST 20 CM/<: CPT

## 2021-01-07 PROCEDURE — 99213 OFFICE O/P EST LOW 20 MIN: CPT | Mod: 25

## 2021-01-12 RX ORDER — SULFAMETHOXAZOLE AND TRIMETHOPRIM 800; 160 MG/1; MG/1
800-160 TABLET ORAL
Qty: 14 | Refills: 0 | Status: ACTIVE | COMMUNITY
Start: 2021-01-12 | End: 1900-01-01
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
## 2021-01-20 DIAGNOSIS — S80.11XA CONTUSION OF RIGHT LOWER LEG, INITIAL ENCOUNTER: ICD-10-CM
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
## 2021-01-26 LAB — BACTERIA SPEC CULT: ABNORMAL

## 2021-02-04 ENCOUNTER — OUTPATIENT (OUTPATIENT)
Dept: OUTPATIENT SERVICES | Facility: HOSPITAL | Age: 81
LOS: 1 days | Discharge: HOME | End: 2021-02-04

## 2021-02-04 ENCOUNTER — APPOINTMENT (OUTPATIENT)
Dept: BURN CARE | Facility: CLINIC | Age: 81
End: 2021-02-04
Payer: MEDICARE

## 2021-02-04 DIAGNOSIS — Z95.5 PRESENCE OF CORONARY ANGIOPLASTY IMPLANT AND GRAFT: Chronic | ICD-10-CM

## 2021-02-04 PROCEDURE — 97597 DBRDMT OPN WND 1ST 20 CM/<: CPT

## 2021-02-04 PROCEDURE — 99213 OFFICE O/P EST LOW 20 MIN: CPT | Mod: 25

## 2021-02-08 LAB — BACTERIA SPEC CULT: ABNORMAL

## 2021-02-09 RX ORDER — SULFAMETHOXAZOLE AND TRIMETHOPRIM 800; 160 MG/1; MG/1
800-160 TABLET ORAL TWICE DAILY
Qty: 14 | Refills: 0 | Status: ACTIVE | COMMUNITY
Start: 2021-02-09 | End: 1900-01-01
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
## 2021-02-11 DIAGNOSIS — S80.11XA CONTUSION OF RIGHT LOWER LEG, INITIAL ENCOUNTER: ICD-10-CM

## 2021-03-04 ENCOUNTER — OUTPATIENT (OUTPATIENT)
Dept: OUTPATIENT SERVICES | Facility: HOSPITAL | Age: 81
LOS: 1 days | Discharge: HOME | End: 2021-03-04

## 2021-03-04 ENCOUNTER — APPOINTMENT (OUTPATIENT)
Dept: BURN CARE | Facility: CLINIC | Age: 81
End: 2021-03-04
Payer: MEDICARE

## 2021-03-04 DIAGNOSIS — Z95.5 PRESENCE OF CORONARY ANGIOPLASTY IMPLANT AND GRAFT: Chronic | ICD-10-CM

## 2021-03-04 PROCEDURE — 99213 OFFICE O/P EST LOW 20 MIN: CPT | Mod: 25

## 2021-03-04 PROCEDURE — 97597 DBRDMT OPN WND 1ST 20 CM/<: CPT

## 2021-03-08 LAB — BACTERIA SPEC CULT: ABNORMAL

## 2021-03-09 RX ORDER — SULFAMETHOXAZOLE AND TRIMETHOPRIM 800; 160 MG/1; MG/1
800-160 TABLET ORAL
Qty: 14 | Refills: 0 | Status: ACTIVE | COMMUNITY
Start: 2021-03-09 | End: 1900-01-01
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
## 2021-03-15 DIAGNOSIS — S80.11XA CONTUSION OF RIGHT LOWER LEG, INITIAL ENCOUNTER: ICD-10-CM
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
## 2021-04-01 ENCOUNTER — OUTPATIENT (OUTPATIENT)
Dept: OUTPATIENT SERVICES | Facility: HOSPITAL | Age: 81
LOS: 1 days | Discharge: HOME | End: 2021-04-01

## 2021-04-01 ENCOUNTER — APPOINTMENT (OUTPATIENT)
Dept: BURN CARE | Facility: CLINIC | Age: 81
End: 2021-04-01
Payer: MEDICARE

## 2021-04-01 DIAGNOSIS — Z95.5 PRESENCE OF CORONARY ANGIOPLASTY IMPLANT AND GRAFT: Chronic | ICD-10-CM

## 2021-04-01 PROCEDURE — 99213 OFFICE O/P EST LOW 20 MIN: CPT

## 2021-04-06 LAB — BACTERIA SPEC CULT: ABNORMAL

## 2021-04-06 RX ORDER — CIPROFLOXACIN HYDROCHLORIDE 500 MG/1
500 TABLET, FILM COATED ORAL
Qty: 14 | Refills: 0 | Status: ACTIVE | COMMUNITY
Start: 2021-04-06 | End: 1900-01-01

## 2021-04-06 RX ORDER — DOXYCYCLINE HYCLATE 100 MG/1
100 CAPSULE ORAL
Qty: 14 | Refills: 0 | Status: ACTIVE | COMMUNITY
Start: 2021-04-06 | End: 1900-01-01
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
## 2021-04-07 DIAGNOSIS — S80.11XA CONTUSION OF RIGHT LOWER LEG, INITIAL ENCOUNTER: ICD-10-CM

## 2021-04-29 ENCOUNTER — OUTPATIENT (OUTPATIENT)
Dept: OUTPATIENT SERVICES | Facility: HOSPITAL | Age: 81
LOS: 1 days | Discharge: HOME | End: 2021-04-29

## 2021-04-29 ENCOUNTER — APPOINTMENT (OUTPATIENT)
Dept: BURN CARE | Facility: CLINIC | Age: 81
End: 2021-04-29
Payer: MEDICARE

## 2021-04-29 DIAGNOSIS — Z95.5 PRESENCE OF CORONARY ANGIOPLASTY IMPLANT AND GRAFT: Chronic | ICD-10-CM

## 2021-04-29 PROCEDURE — 99213 OFFICE O/P EST LOW 20 MIN: CPT
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
## 2021-05-06 DIAGNOSIS — S80.11XA CONTUSION OF RIGHT LOWER LEG, INITIAL ENCOUNTER: ICD-10-CM
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
## 2021-05-19 ENCOUNTER — OUTPATIENT (OUTPATIENT)
Dept: OUTPATIENT SERVICES | Facility: HOSPITAL | Age: 81
LOS: 1 days | Discharge: HOME | End: 2021-05-19
Payer: MEDICARE

## 2021-05-19 DIAGNOSIS — R22.9 LOCALIZED SWELLING, MASS AND LUMP, UNSPECIFIED: ICD-10-CM

## 2021-05-19 DIAGNOSIS — Z95.5 PRESENCE OF CORONARY ANGIOPLASTY IMPLANT AND GRAFT: Chronic | ICD-10-CM

## 2021-05-19 PROCEDURE — 74181 MRI ABDOMEN W/O CONTRAST: CPT | Mod: 26

## 2021-05-27 ENCOUNTER — APPOINTMENT (OUTPATIENT)
Dept: BURN CARE | Facility: CLINIC | Age: 81
End: 2021-05-27
Payer: MEDICARE

## 2021-05-27 ENCOUNTER — OUTPATIENT (OUTPATIENT)
Dept: OUTPATIENT SERVICES | Facility: HOSPITAL | Age: 81
LOS: 1 days | Discharge: HOME | End: 2021-05-27

## 2021-05-27 DIAGNOSIS — Z95.5 PRESENCE OF CORONARY ANGIOPLASTY IMPLANT AND GRAFT: Chronic | ICD-10-CM

## 2021-05-27 PROCEDURE — 99213 OFFICE O/P EST LOW 20 MIN: CPT

## 2021-05-27 NOTE — PHYSICAL EXAM
[Healing] : healing [Size%: ______] : Size: [unfilled]% [Infected?] : Infected: No [3] : 3 out of 10 [Abnormal] : abnormal [Large] : medium [] : no [de-identified] : Hydrogel [de-identified] : Left leg - edema ; \par               - open lateral wound with hypertrophic granulation and overlying adherent yellow exudate / slough\par  [TWNoteComboBox1] : adaptic

## 2021-05-27 NOTE — ED ADULT NURSE NOTE - NSIMPLEMENTINTERV_GEN_ALL_ED
Pt returned from CT via cart. Given warm blanket. Implemented All Universal Safety Interventions:  [place] to call system. Call bell, personal items and telephone within reach. Instruct patient to call for assistance. Room bathroom lighting operational. Non-slip footwear when patient is off stretcher. Physically safe environment: no spills, clutter or unnecessary equipment. Stretcher in lowest position, wheels locked, appropriate side rails in place.

## 2021-05-27 NOTE — ASSESSMENT
[FreeTextEntry1] : Left leg wound s/p traumatic hematoma \par granulation \par \par PROC ; chemical cauterization of hypertrophic tissue with silver nitrate performed ; loose  exudate mechanically  debrided \par \par \par Rec ; Hydrogel with gauze a/w Gentamicin ointment dressing qd  after washing \par  [Wound Care] : wound care

## 2021-05-27 NOTE — REASON FOR VISIT
[Revisit] : revisit [Were you seen in the Emergency Room?] : seen in the emergency room [Were you admitted to the burn center at [facility]?] : not admitted to the burn center at [facility] [Spouse] : spouse

## 2021-05-27 NOTE — HISTORY OF PRESENT ILLNESS
[Did you have an operation on your burn/wound injury?] : Did you have an operation on your burn/wound injury? No [Did this injury occur on the job?] : Did this injury occur on the job? No [de-identified] : traumatic wound and hematoma right leg --> blood thinners [de-identified] : Continuing dressing changes to left leg wound site - Gentamicin ointment, Xeroform dressing qod

## 2021-06-10 ENCOUNTER — OUTPATIENT (OUTPATIENT)
Dept: OUTPATIENT SERVICES | Facility: HOSPITAL | Age: 81
LOS: 1 days | Discharge: HOME | End: 2021-06-10

## 2021-06-10 ENCOUNTER — APPOINTMENT (OUTPATIENT)
Dept: BURN CARE | Facility: CLINIC | Age: 81
End: 2021-06-10
Payer: MEDICARE
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
## 2021-06-10 DIAGNOSIS — S81.802A UNSPECIFIED OPEN WOUND, LEFT LOWER LEG, INITIAL ENCOUNTER: ICD-10-CM

## 2021-06-10 DIAGNOSIS — S81.809A UNSPECIFIED OPEN WOUND, UNSPECIFIED LOWER LEG, INITIAL ENCOUNTER: ICD-10-CM

## 2021-06-10 DIAGNOSIS — Z95.5 PRESENCE OF CORONARY ANGIOPLASTY IMPLANT AND GRAFT: Chronic | ICD-10-CM
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
## 2021-06-10 PROCEDURE — 99213 OFFICE O/P EST LOW 20 MIN: CPT

## 2021-07-08 ENCOUNTER — APPOINTMENT (OUTPATIENT)
Dept: BURN CARE | Facility: CLINIC | Age: 81
End: 2021-07-08
Payer: MEDICARE

## 2021-07-08 ENCOUNTER — OUTPATIENT (OUTPATIENT)
Dept: OUTPATIENT SERVICES | Facility: HOSPITAL | Age: 81
LOS: 1 days | Discharge: HOME | End: 2021-07-08

## 2021-07-08 DIAGNOSIS — T14.90XA INJURY, UNSPECIFIED, INITIAL ENCOUNTER: ICD-10-CM

## 2021-07-08 DIAGNOSIS — S81.802A UNSPECIFIED OPEN WOUND, LEFT LOWER LEG, INITIAL ENCOUNTER: ICD-10-CM
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
## 2021-07-08 DIAGNOSIS — Z95.5 PRESENCE OF CORONARY ANGIOPLASTY IMPLANT AND GRAFT: Chronic | ICD-10-CM

## 2021-07-08 DIAGNOSIS — S81.809A UNSPECIFIED OPEN WOUND, UNSPECIFIED LOWER LEG, INITIAL ENCOUNTER: ICD-10-CM

## 2021-07-08 PROCEDURE — 99213 OFFICE O/P EST LOW 20 MIN: CPT

## 2021-07-12 LAB — BACTERIA SPEC CULT: ABNORMAL

## 2021-08-19 ENCOUNTER — OUTPATIENT (OUTPATIENT)
Dept: OUTPATIENT SERVICES | Facility: HOSPITAL | Age: 81
LOS: 1 days | Discharge: HOME | End: 2021-08-19

## 2021-08-19 ENCOUNTER — APPOINTMENT (OUTPATIENT)
Dept: BURN CARE | Facility: CLINIC | Age: 81
End: 2021-08-19
Payer: MEDICARE

## 2021-08-19 DIAGNOSIS — Z95.5 PRESENCE OF CORONARY ANGIOPLASTY IMPLANT AND GRAFT: Chronic | ICD-10-CM

## 2021-08-19 PROCEDURE — 99213 OFFICE O/P EST LOW 20 MIN: CPT

## 2021-08-31 ENCOUNTER — INPATIENT (INPATIENT)
Facility: HOSPITAL | Age: 81
LOS: 17 days | Discharge: HOME HEALTH | End: 2021-09-18
Attending: INTERNAL MEDICINE | Admitting: INTERNAL MEDICINE
Payer: MEDICARE

## 2021-08-31 VITALS — WEIGHT: 257.94 LBS | HEIGHT: 71 IN

## 2021-08-31 DIAGNOSIS — Z95.5 PRESENCE OF CORONARY ANGIOPLASTY IMPLANT AND GRAFT: Chronic | ICD-10-CM

## 2021-08-31 LAB
ALBUMIN SERPL ELPH-MCNC: 3.6 G/DL — SIGNIFICANT CHANGE UP (ref 3.5–5.2)
ALP SERPL-CCNC: 53 U/L — SIGNIFICANT CHANGE UP (ref 30–115)
ALT FLD-CCNC: 88 U/L — HIGH (ref 0–41)
ANION GAP SERPL CALC-SCNC: 19 MMOL/L — HIGH (ref 7–14)
APPEARANCE UR: ABNORMAL
APTT BLD: 34.2 SEC — SIGNIFICANT CHANGE UP (ref 27–39.2)
AST SERPL-CCNC: 253 U/L — HIGH (ref 0–41)
BACTERIA # UR AUTO: NEGATIVE — SIGNIFICANT CHANGE UP
BASE EXCESS BLDV CALC-SCNC: 0.6 MMOL/L — SIGNIFICANT CHANGE UP (ref -2–3)
BASE EXCESS BLDV CALC-SCNC: 0.6 MMOL/L — SIGNIFICANT CHANGE UP (ref -2–3)
BASOPHILS # BLD AUTO: 0 K/UL — SIGNIFICANT CHANGE UP (ref 0–0.2)
BASOPHILS NFR BLD AUTO: 0 % — SIGNIFICANT CHANGE UP (ref 0–1)
BILIRUB SERPL-MCNC: 1.6 MG/DL — HIGH (ref 0.2–1.2)
BILIRUB UR-MCNC: NEGATIVE — SIGNIFICANT CHANGE UP
BLD GP AB SCN SERPL QL: SIGNIFICANT CHANGE UP
BUN SERPL-MCNC: 42 MG/DL — HIGH (ref 10–20)
CA-I SERPL-SCNC: 1.19 MMOL/L — SIGNIFICANT CHANGE UP (ref 1.15–1.33)
CA-I SERPL-SCNC: 1.2 MMOL/L — SIGNIFICANT CHANGE UP (ref 1.15–1.33)
CALCIUM SERPL-MCNC: 9.3 MG/DL — SIGNIFICANT CHANGE UP (ref 8.5–10.1)
CHLORIDE SERPL-SCNC: 96 MMOL/L — LOW (ref 98–110)
CO2 SERPL-SCNC: 23 MMOL/L — SIGNIFICANT CHANGE UP (ref 17–32)
COLOR SPEC: YELLOW — SIGNIFICANT CHANGE UP
CREAT SERPL-MCNC: 1.9 MG/DL — HIGH (ref 0.7–1.5)
DIFF PNL FLD: ABNORMAL
EOSINOPHIL # BLD AUTO: 0 K/UL — SIGNIFICANT CHANGE UP (ref 0–0.7)
EOSINOPHIL NFR BLD AUTO: 0 % — SIGNIFICANT CHANGE UP (ref 0–8)
EPI CELLS # UR: 2 /HPF — SIGNIFICANT CHANGE UP (ref 0–5)
GAS PNL BLDV: 131 MMOL/L — LOW (ref 136–145)
GAS PNL BLDV: 134 MMOL/L — LOW (ref 136–145)
GAS PNL BLDV: SIGNIFICANT CHANGE UP
GAS PNL BLDV: SIGNIFICANT CHANGE UP
GIANT PLATELETS BLD QL SMEAR: PRESENT — SIGNIFICANT CHANGE UP
GLUCOSE SERPL-MCNC: 186 MG/DL — HIGH (ref 70–99)
GLUCOSE UR QL: SIGNIFICANT CHANGE UP
GRAN CASTS # UR COMP ASSIST: 3 /LPF — HIGH
HCO3 BLDV-SCNC: 27 MMOL/L — SIGNIFICANT CHANGE UP (ref 22–29)
HCO3 BLDV-SCNC: 27 MMOL/L — SIGNIFICANT CHANGE UP (ref 22–29)
HCT VFR BLD CALC: 41.2 % — LOW (ref 42–52)
HCT VFR BLDA CALC: 37 % — LOW (ref 39–51)
HCT VFR BLDA CALC: 40 % — SIGNIFICANT CHANGE UP (ref 39–51)
HGB BLD CALC-MCNC: 12.4 G/DL — LOW (ref 12.6–17.4)
HGB BLD CALC-MCNC: 13.2 G/DL — SIGNIFICANT CHANGE UP (ref 12.6–17.4)
HGB BLD-MCNC: 13.5 G/DL — LOW (ref 14–18)
HYALINE CASTS # UR AUTO: 20 /LPF — HIGH (ref 0–7)
INR BLD: 1.33 RATIO — HIGH (ref 0.65–1.3)
KETONES UR-MCNC: NEGATIVE — SIGNIFICANT CHANGE UP
LACTATE BLDV-MCNC: 2.1 MMOL/L — HIGH (ref 0.5–2)
LEUKOCYTE ESTERASE UR-ACNC: NEGATIVE — SIGNIFICANT CHANGE UP
LYMPHOCYTES # BLD AUTO: 0.13 K/UL — LOW (ref 1.2–3.4)
LYMPHOCYTES # BLD AUTO: 0.9 % — LOW (ref 20.5–51.1)
MANUAL SMEAR VERIFICATION: SIGNIFICANT CHANGE UP
MCHC RBC-ENTMCNC: 29.7 PG — SIGNIFICANT CHANGE UP (ref 27–31)
MCHC RBC-ENTMCNC: 32.8 G/DL — SIGNIFICANT CHANGE UP (ref 32–37)
MCV RBC AUTO: 90.5 FL — SIGNIFICANT CHANGE UP (ref 80–94)
METAMYELOCYTES # FLD: 0.9 % — HIGH (ref 0–0)
MONOCYTES # BLD AUTO: 0 K/UL — LOW (ref 0.1–0.6)
MONOCYTES NFR BLD AUTO: 0 % — LOW (ref 1.7–9.3)
NEUTROPHILS # BLD AUTO: 13.56 K/UL — HIGH (ref 1.4–6.5)
NEUTROPHILS NFR BLD AUTO: 90.4 % — HIGH (ref 42.2–75.2)
NEUTS BAND # BLD: 6.9 % — HIGH (ref 0–6)
NITRITE UR-MCNC: NEGATIVE — SIGNIFICANT CHANGE UP
NT-PROBNP SERPL-SCNC: HIGH PG/ML (ref 0–300)
PCO2 BLDV: 47 MMHG — SIGNIFICANT CHANGE UP (ref 42–55)
PCO2 BLDV: 47 MMHG — SIGNIFICANT CHANGE UP (ref 42–55)
PH BLDV: 7.36 — SIGNIFICANT CHANGE UP (ref 7.32–7.43)
PH BLDV: 7.36 — SIGNIFICANT CHANGE UP (ref 7.32–7.43)
PH UR: 6 — SIGNIFICANT CHANGE UP (ref 5–8)
PLAT MORPH BLD: NORMAL — SIGNIFICANT CHANGE UP
PLATELET # BLD AUTO: 215 K/UL — SIGNIFICANT CHANGE UP (ref 130–400)
PO2 BLDV: 27 MMHG — SIGNIFICANT CHANGE UP
PO2 BLDV: 28 MMHG — SIGNIFICANT CHANGE UP
POLYCHROMASIA BLD QL SMEAR: SLIGHT — SIGNIFICANT CHANGE UP
POTASSIUM BLDV-SCNC: 3.2 MMOL/L — LOW (ref 3.5–5.1)
POTASSIUM BLDV-SCNC: 3.2 MMOL/L — LOW (ref 3.5–5.1)
POTASSIUM SERPL-MCNC: 3.6 MMOL/L — SIGNIFICANT CHANGE UP (ref 3.5–5)
POTASSIUM SERPL-SCNC: 3.6 MMOL/L — SIGNIFICANT CHANGE UP (ref 3.5–5)
PROT SERPL-MCNC: 6.8 G/DL — SIGNIFICANT CHANGE UP (ref 6–8)
PROT UR-MCNC: ABNORMAL
PROTHROM AB SERPL-ACNC: 15.2 SEC — HIGH (ref 9.95–12.87)
RBC # BLD: 4.55 M/UL — LOW (ref 4.7–6.1)
RBC # FLD: 15.3 % — HIGH (ref 11.5–14.5)
RBC BLD AUTO: NORMAL — SIGNIFICANT CHANGE UP
RBC CASTS # UR COMP ASSIST: 9 /HPF — HIGH (ref 0–4)
SAO2 % BLDV: 42.9 % — SIGNIFICANT CHANGE UP
SAO2 % BLDV: 46.5 % — SIGNIFICANT CHANGE UP
SARS-COV-2 RNA SPEC QL NAA+PROBE: SIGNIFICANT CHANGE UP
SODIUM SERPL-SCNC: 138 MMOL/L — SIGNIFICANT CHANGE UP (ref 135–146)
SP GR SPEC: >1.05 (ref 1.01–1.03)
TROPONIN T SERPL-MCNC: 0.04 NG/ML — CRITICAL HIGH
UROBILINOGEN FLD QL: SIGNIFICANT CHANGE UP
VARIANT LYMPHS # BLD: 0.9 % — SIGNIFICANT CHANGE UP (ref 0–5)
WBC # BLD: 13.94 K/UL — HIGH (ref 4.8–10.8)
WBC # FLD AUTO: 13.94 K/UL — HIGH (ref 4.8–10.8)
WBC UR QL: 32 /HPF — HIGH (ref 0–5)

## 2021-08-31 PROCEDURE — 93308 TTE F-UP OR LMTD: CPT | Mod: 26

## 2021-08-31 PROCEDURE — 93010 ELECTROCARDIOGRAM REPORT: CPT

## 2021-08-31 PROCEDURE — 71275 CT ANGIOGRAPHY CHEST: CPT | Mod: 26,MA

## 2021-08-31 PROCEDURE — 72170 X-RAY EXAM OF PELVIS: CPT | Mod: 26

## 2021-08-31 PROCEDURE — 99291 CRITICAL CARE FIRST HOUR: CPT | Mod: 25

## 2021-08-31 PROCEDURE — 72125 CT NECK SPINE W/O DYE: CPT | Mod: 26,MA

## 2021-08-31 PROCEDURE — 71045 X-RAY EXAM CHEST 1 VIEW: CPT | Mod: 26

## 2021-08-31 PROCEDURE — 70450 CT HEAD/BRAIN W/O DYE: CPT | Mod: 26,MA

## 2021-08-31 PROCEDURE — 74177 CT ABD & PELVIS W/CONTRAST: CPT | Mod: 26,MA

## 2021-08-31 RX ORDER — VANCOMYCIN HCL 1 G
1000 VIAL (EA) INTRAVENOUS ONCE
Refills: 0 | Status: COMPLETED | OUTPATIENT
Start: 2021-08-31 | End: 2021-08-31

## 2021-08-31 RX ORDER — SODIUM CHLORIDE 9 MG/ML
1000 INJECTION, SOLUTION INTRAVENOUS
Refills: 0 | Status: DISCONTINUED | OUTPATIENT
Start: 2021-08-31 | End: 2021-09-03

## 2021-08-31 RX ORDER — ACETAMINOPHEN 500 MG
650 TABLET ORAL EVERY 6 HOURS
Refills: 0 | Status: DISCONTINUED | OUTPATIENT
Start: 2021-08-31 | End: 2021-09-18

## 2021-08-31 RX ORDER — ALLOPURINOL 300 MG
150 TABLET ORAL DAILY
Refills: 0 | Status: DISCONTINUED | OUTPATIENT
Start: 2021-08-31 | End: 2021-09-02

## 2021-08-31 RX ORDER — CHLORHEXIDINE GLUCONATE 213 G/1000ML
1 SOLUTION TOPICAL
Refills: 0 | Status: DISCONTINUED | OUTPATIENT
Start: 2021-08-31 | End: 2021-09-18

## 2021-08-31 RX ORDER — CEFTRIAXONE 500 MG/1
1000 INJECTION, POWDER, FOR SOLUTION INTRAMUSCULAR; INTRAVENOUS EVERY 24 HOURS
Refills: 0 | Status: COMPLETED | OUTPATIENT
Start: 2021-08-31 | End: 2021-09-04

## 2021-08-31 RX ORDER — GLUCAGON INJECTION, SOLUTION 0.5 MG/.1ML
1 INJECTION, SOLUTION SUBCUTANEOUS ONCE
Refills: 0 | Status: DISCONTINUED | OUTPATIENT
Start: 2021-08-31 | End: 2021-09-03

## 2021-08-31 RX ORDER — SODIUM CHLORIDE 9 MG/ML
1000 INJECTION, SOLUTION INTRAVENOUS ONCE
Refills: 0 | Status: DISCONTINUED | OUTPATIENT
Start: 2021-08-31 | End: 2021-08-31

## 2021-08-31 RX ORDER — ISOSORBIDE MONONITRATE 60 MG/1
120 TABLET, EXTENDED RELEASE ORAL DAILY
Refills: 0 | Status: DISCONTINUED | OUTPATIENT
Start: 2021-08-31 | End: 2021-09-06

## 2021-08-31 RX ORDER — DEXTROSE 50 % IN WATER 50 %
25 SYRINGE (ML) INTRAVENOUS ONCE
Refills: 0 | Status: DISCONTINUED | OUTPATIENT
Start: 2021-08-31 | End: 2021-09-03

## 2021-08-31 RX ORDER — DEXTROSE 50 % IN WATER 50 %
12.5 SYRINGE (ML) INTRAVENOUS ONCE
Refills: 0 | Status: DISCONTINUED | OUTPATIENT
Start: 2021-08-31 | End: 2021-09-03

## 2021-08-31 RX ORDER — AZITHROMYCIN 500 MG/1
500 TABLET, FILM COATED ORAL EVERY 24 HOURS
Refills: 0 | Status: DISCONTINUED | OUTPATIENT
Start: 2021-08-31 | End: 2021-09-01

## 2021-08-31 RX ORDER — ATORVASTATIN CALCIUM 80 MG/1
40 TABLET, FILM COATED ORAL AT BEDTIME
Refills: 0 | Status: DISCONTINUED | OUTPATIENT
Start: 2021-08-31 | End: 2021-09-18

## 2021-08-31 RX ORDER — ASPIRIN/CALCIUM CARB/MAGNESIUM 324 MG
81 TABLET ORAL DAILY
Refills: 0 | Status: DISCONTINUED | OUTPATIENT
Start: 2021-08-31 | End: 2021-09-18

## 2021-08-31 RX ORDER — FENOFIBRATE,MICRONIZED 130 MG
1 CAPSULE ORAL
Qty: 0 | Refills: 0 | DISCHARGE

## 2021-08-31 RX ORDER — RIVAROXABAN 15 MG-20MG
15 KIT ORAL
Refills: 0 | Status: DISCONTINUED | OUTPATIENT
Start: 2021-08-31 | End: 2021-09-02

## 2021-08-31 RX ORDER — CEFEPIME 1 G/1
2000 INJECTION, POWDER, FOR SOLUTION INTRAMUSCULAR; INTRAVENOUS ONCE
Refills: 0 | Status: COMPLETED | OUTPATIENT
Start: 2021-08-31 | End: 2021-08-31

## 2021-08-31 RX ORDER — RIVAROXABAN 15 MG-20MG
1 KIT ORAL
Qty: 0 | Refills: 0 | DISCHARGE

## 2021-08-31 RX ORDER — DEXTROSE 50 % IN WATER 50 %
15 SYRINGE (ML) INTRAVENOUS ONCE
Refills: 0 | Status: DISCONTINUED | OUTPATIENT
Start: 2021-08-31 | End: 2021-09-03

## 2021-08-31 RX ORDER — ACETAMINOPHEN 500 MG
650 TABLET ORAL ONCE
Refills: 0 | Status: COMPLETED | OUTPATIENT
Start: 2021-08-31 | End: 2021-08-31

## 2021-08-31 RX ORDER — SODIUM CHLORIDE 9 MG/ML
500 INJECTION, SOLUTION INTRAVENOUS ONCE
Refills: 0 | Status: COMPLETED | OUTPATIENT
Start: 2021-08-31 | End: 2021-08-31

## 2021-08-31 RX ORDER — ACETAMINOPHEN 500 MG
650 TABLET ORAL ONCE
Refills: 0 | Status: COMPLETED | OUTPATIENT
Start: 2021-08-31 | End: 2021-09-01

## 2021-08-31 RX ADMIN — CEFEPIME 2000 MILLIGRAM(S): 1 INJECTION, POWDER, FOR SOLUTION INTRAMUSCULAR; INTRAVENOUS at 14:30

## 2021-08-31 RX ADMIN — Medication 650 MILLIGRAM(S): at 14:15

## 2021-08-31 RX ADMIN — Medication 250 MILLIGRAM(S): at 14:10

## 2021-08-31 RX ADMIN — Medication 650 MILLIGRAM(S): at 13:15

## 2021-08-31 RX ADMIN — SODIUM CHLORIDE 500 MILLILITER(S): 9 INJECTION, SOLUTION INTRAVENOUS at 17:14

## 2021-08-31 RX ADMIN — CEFEPIME 100 MILLIGRAM(S): 1 INJECTION, POWDER, FOR SOLUTION INTRAMUSCULAR; INTRAVENOUS at 13:56

## 2021-08-31 NOTE — ED PROVIDER NOTE - CLINICAL SUMMARY MEDICAL DECISION MAKING FREE TEXT BOX
I personally evaluated the patient. I reviewed the Resident’s or Physician Assistant’s note (as assigned above), and agree with the findings and plan except as documented in my note. Patient evaluated for fall.  Patient noted to be tachypneic and hypoxic upon initial evaluation.  Placed on BiPAP for respiratory support with improvement of vital signs.  Labs, EKG, chest x-ray, pelvis x-ray, CT pan scan performed in the ED.  No acute traumatic injury, pneumonia noted on CT scan.  Patient placed on antibiotics.  Patient admitted to stepdown unit for further evaluation and treatment.

## 2021-08-31 NOTE — ED PROVIDER NOTE - OBJECTIVE STATEMENT
79 yo M with PMH of HTN, HLD, DM type 2, Afib on xarelto, CAD s/p stents placement (2014), gout, VINI on CPAP BIBEMS for fall. Patient says he felt dizzy yesterday causing him to fall on . Denies HT, LOC, numbness, weakness, tingling, headache, vision changes, dizziness now, fever, cold/flu symptoms, CP, SOB, NVD, dysuria, hematuria, melena, hematochezia. 81 yo M with PMH of HTN, HLD, DM type 2, Afib on xarelto, CAD s/p stents placement (2014), gout, VINI on CPAP BIBEMS for fall. Patient says he felt dizzy yesterday causing him to fall on . Denies HT, LOC, numbness, weakness, tingling, headache, vision changes, dizziness now, fever, cold/flu symptoms, CP, SOB, NVD, dysuria, hematuria, melena, hematochezia. Received both moderna vaccines.

## 2021-08-31 NOTE — ED ADULT TRIAGE NOTE - CHIEF COMPLAINT QUOTE
S/p unwitnessed fall at home at 2 am because of dizziness. Pt states he also fell yesterday. denies head injury. Pt takes xarelto.

## 2021-08-31 NOTE — ED ADULT NURSE NOTE - NSFALLRSKHRMRISKTYPE_ED_ALL_ED
Anesthesia Pre Eval Note    Anesthesia ROS/Med Hx        Anesthetic Complication History:  Patient does not have a history of anesthetic complications      Pulmonary Review:  Patient does not have a pulmonary history      Neuro/Psych Review:  Patient does not have a neuro/psych history       Cardiovascular Review:  Patient does not have a cardiovascular history   Exercise tolerance: good (>4 METS)  Negative for angina  Negative for LOVE/SOB    GI/HEPATIC/RENAL Review:  Patient does not have a GI/hepatic/renalhistory     Negative for GERD    End/Other Review:  Patient does not have an endo/other history      Overall Review of Systems Comments:  Fibroadenoma of Right Breast       Relevant Problems   No relevant active problems       Physical Exam     Airway   Mallampati: II  TM Distance: >3 FB    Cardiovascular  Cardiovascular exam normal    Head Assessment  Head assessment: Normocephalic and Atraumatic    General Assessment  General Assessment: No acute distress and Alert and oriented    Dental Exam  Dental exam normal    Pulmonary Exam  Pulmonary exam normal    Abdominal Exam  Abdominal exam normal      Anesthesia Plan    ASA Status: 2    Anesthesia Type: General    Induction: Intravenous  Preferred Airway Type: LMA  Maintenance: Inhalational  Premedication: IV      Risks Discussed: Nausea, Vomiting, Sore Throat, Dental Injury, Emergence Delirium, Need for Blood Transfusion, Intra-operative Awareness, Allergic Reaction, Aspiration, Hypotension and Nerve Injury    Post-op Pain Management: Per Surgeon      Checklist  Reviewed: Lab Results, Consultations, Outside Records, NPO Status, Problem list, Beta Blocker Status, Medications, Nursing Notes, Patient Summary, Care Everywhere, Allergies and Past Med History    Informed Consent  The proposed anesthetic plan, including its risks and benefits, have been discussed with the Patient  - along with the risks and benefits of alternatives.  Questions were encouraged and  answered and the patient and/or representative understands and agrees to proceed.     Blood Products: Not Anticipated     other

## 2021-08-31 NOTE — ED ADULT NURSE NOTE - INTERVENTIONS DEFINITIONS
[place] to call system/Call bell, personal items and telephone within reach/Instruct patient to call for assistance/Room bathroom lighting operational/Non-slip footwear when patient is off stretcher/Physically safe environment: no spills, clutter or unnecessary equipment/Stretcher in lowest position, wheels locked, appropriate side rails in place/Provide visual cue, wrist band, yellow gown, etc./Monitor gait and stability/Monitor for mental status changes and reorient to person, place, and time/Review medications for side effects contributing to fall risk/Reinforce activity limits and safety measures with patient and family/Provide visual clues: red socks

## 2021-08-31 NOTE — H&P ADULT - HISTORY OF PRESENT ILLNESS
PC:  Fall    PMHx: HTN, HLD, T2DM, Afib on Xarelto, CAD s/p stents placement (2014), gout, VINI on CPAP    HPI: 80M w/ PMHx as above BIBEMS for fall. Patient says he felt dizzy yesterday causing him to fall on to his bed. Denies any room spinning or pre-syncope. Unable to describe the dfizziness to me. No HT, LOC, numbness, weakness, tingling, headache, vision changes, fever, cold/flu symptoms, CP. Patient endorses that he has been feeling SOB for the past 2 days and has been coughing for the past 1 week per daughter at bedside. Also states that he had diarrhea for the past 2 days, but not today (Of note Pt S/p Moderna Vaccine 2 doses).    ED Course: Febrile, tachycardic and hypoxic in ED. Labs revealed leukocytosis, HAGMA, elevated Trop 0.04, BNP 13k. UA positve for WBC. Trauma w/up neg. CXR and CT chest revealed confluent airspace consolidation in the RUL and to a lesser degree the RLL consistent with pneumonia. PC:  Fall    PMHx: HTN, HLD, T2DM, Afib on Xarelto, CAD s/p stents placement (2014), gout, VINI on CPAP    HPI: 80M w/ PMHx as above BIBEMS for fall. Patient says he felt dizzy yesterday causing him to fall on to his bed. Denies any room spinning or pre-syncope. Unable to describe the dizziness to me. No HT, LOC, numbness, weakness, tingling, headache, vision changes, fever, cold/flu symptoms, CP. Patient endorses that he has been feeling SOB for the past 2 days and has been coughing for the past 1 week per daughter at bedside. Also states that he had diarrhea for the past 2 days, but not today (Of note Pt S/p Moderna Vaccine 2 doses).    ED Course: Febrile, tachycardic and hypoxic in ED. Labs revealed leukocytosis, HAGMA, elevated Trop 0.04, BNP 13k. UA positve for WBC. Trauma w/up neg. CXR and CT chest revealed confluent airspace consolidation in the RUL and to a lesser degree the RLL consistent with pneumonia.

## 2021-08-31 NOTE — H&P ADULT - NSHPLABSRESULTS_GEN_ALL_CORE
Labs:                         13.5   13.94 )-----------( 215      ( 31 Aug 2021 13:24 )             41.2     Neutophil% 90.4, Lymphocyte% 0.9, Monocyte% 0.0, Bands% -- 08-31-21 @ 13:24    31 Aug 2021 13:24    138    |  96     |  42     ----------------------------<  186    3.6     |  23     |  1.9      Ca    9.3        31 Aug 2021 13:24    TPro  6.8    /  Alb  3.6    /  TBili  1.6    /  DBili  x      /  AST  253    /  ALT  88     /  AlkPhos  53     31 Aug 2021 13:24       pTT    34.2             ----< 1.33 INR  (08-31-21 @ 13:24)    15.20        PT        Serum Pro-Brain Natriuretic Peptide: 59101 pg/mL (08-31-21 @ 14:35)    Troponin 0.04, CKMB --, CK -- 08-31-21 @ 14:35        Urinalysis Basic - ( 31 Aug 2021 18:38 )    Color: Yellow / Appearance: Slightly Turbid / SG: >1.050 / pH: x  Gluc: x / Ketone: Negative  / Bili: Negative / Urobili: <2 mg/dL   Blood: x / Protein: 300 mg/dL / Nitrite: Negative   Leuk Esterase: Negative / RBC: 9 /HPF / WBC 32 /HPF   Sq Epi: x / Non Sq Epi: 2 /HPF / Bacteria: Negative            COVID-19 PCR: NotDetec (08-31)              Troponin T, Serum: 0.04 ng/mL (08-31)      Radiology:         < from: CT Angio Chest PE Protocol w/ IV Cont (08.31.21 @ 14:34) >    Confluent airspace consolidation in the right upper lobe and to a lesser degree the right lower lobe, consistent with pneumonia    < end of copied text >

## 2021-08-31 NOTE — ED PROVIDER NOTE - CARE PLAN
1 Principal Discharge DX:	Pneumonia  Secondary Diagnosis:	Sepsis  Secondary Diagnosis:	Elevated brain natriuretic peptide (BNP) level  Secondary Diagnosis:	Elevated troponin  Secondary Diagnosis:	DIETER (acute kidney injury)

## 2021-08-31 NOTE — H&P ADULT - ATTENDING COMMENTS
Pt seen and examined independently today and case discussed with significant other and daughter at bedside today.  HPI and PMH as above. He felt dizzy and fell down - no LOC or head trauma  septic on presentation and admitted to stepdown unit.  He was on bipap 12/6 with FiO2 at 60% and was comfortable. When he was placed on NRB and then HFNC, he was tachycardic and tachypneic.  he was placed back on bipap with improvement  denies chest or abdominal pain    T(F): 98.9 (09-01-21 @ 14:30), Max: 104 (09-01-21 @ 00:00)  HR: 114 (09-01-21 @ 13:02) (100 - 115)  BP: 123/60 (09-01-21 @ 13:02) (123/60 - 173/79)  RR: 18 (09-01-21 @ 13:02) (18 - 35)  SpO2: 98% (09-01-21 @ 14:01) (92% - 98%) on bipap  I&O's Summary    31 Aug 2021 07:01  -  01 Sep 2021 07:00  --------------------------------------------------------  IN: 1040 mL / OUT: 0 mL / NET: 1040 mL      POCT Blood Glucose.: 143 mg/dL (01 Sep 2021 16:10)  POCT Blood Glucose.: 152 mg/dL (01 Sep 2021 11:37)    PHYSICAL EXAM:  GENERAL: NAD on bipap  HEAD:  Normocephalic  EYES:  conjunctiva and sclera clear  ENMT: Moist mucous membranes  NERVOUS SYSTEM:  Alert, awake, Good concentration  CHEST/LUNG:  rhonchi on the right  HEART: tachy  ABDOMEN: Soft, Nontender, Nondistended  EXTREMITIES:   No edema  SKIN: dry    LABS:                        12.9   13.73 )-----------( 214      ( 01 Sep 2021 08:16 )             39.8     09-01    138  |  100  |  61<HH>  ----------------------------<  150<H>  4.2   |  20  |  3.9<H>    Ca    8.7      01 Sep 2021 16:49  Mg     2.3     09-01    TPro  5.9<L>  /  Alb  2.9<L>  /  TBili  1.4<H>  /  DBili  x   /  AST  455<H>  /  ALT  152<H>  /  AlkPhos  56  09-01    PT/INR - ( 31 Aug 2021 13:24 )   PT: 15.20 sec;   INR: 1.33 ratio         PTT - ( 31 Aug 2021 13:24 )  PTT:34.2 sec    Impression/Plan:  1. Sepsis present on admission and acute hypoxemic respiratory failure due to CAP  - critical care eval appreciated  - continue ceftriaxone and levaquin (renal dose) and f/u RVP, sputum culture, urine for Strep and legionella, blood cultures  - continue bipap for now  - cooling blanket prn fever  - stepdown monitoring - upgrade to unit if not improving  - very guarded prognosis  - full code status    2. DIETER over CKD 4 - likely prerenal and could be due to dehydration, sepsis  continue IV hydration and check renal/bladder US  I's and O's  renal consult  avoid nephrotoxic meds and hypotension    3. Rhabdomyolysis - IVF, monitor CK level and BMP and urine output    4. Elevated troponin - likely due to demand ischemia - 0.04 ->0.07  trend troponins( pt also with DIETER)  check CKMB  cardio eval   EKGs reviewed: new RBBB and chronic LAHB    5. DM on insulin and controlled    6. Afib on Xarelto - start metoprolol if HR >100  switch to IV heparin if renal function is not improving    7. CAD s/p stents - on ASA/atorvastatin/imdur    8. s/p fall - may have been due to sepsis - check orthostatics and get PT eval when stable    PROGRESS NOTE HANDOFF    Pending: labs in AM, blood culture, RVP, renal US, cardio and renal consults    Family discussion: with significant other and daughter at bedside    Disposition: to be determined

## 2021-08-31 NOTE — ED ADULT NURSE NOTE - ED COMFORT CARE
Family informed/Explanation of wait/Warm blanket/Mouth care/Incontinence care/Pillow/Darkened room/TV

## 2021-08-31 NOTE — ED PROVIDER NOTE - PHYSICAL EXAMINATION
Occupational Therapy   Evaluation and Discharge Note    Name: Naty St  MRN: [id number]  Admitting Diagnosis:  Sepsis      Recommendations:     Discharge Recommendations: home with home health  Discharge Equipment Recommendations:  none  Barriers to discharge:  None    Assessment:     Naty St is a 75 y.o. female with a medical diagnosis of Sepsis. At this time, patient is functioning at their prior level of function and does not require further acute OT services.     Plan:     During this hospitalization, patient does not require further acute OT services.  Please re-consult if situation changes.    · Plan of Care Reviewed with: patient    Subjective     Chief Complaint: No complaints   Patient/Family Comments/goals: return home    Occupational Profile:  Living Environment: Pt lives in a ssh w/ spouse w/ 8 steps to enter and B/L HR.    Previous level of function: Indep w/ ADLs and MOD I (RW) for mobility.  Roles and Routines: N/A  Equipment Used at home:  walker, rolling, shower chair, bedside commode  Assistance upon Discharge: Pt has assistance upon D/C.     Pain/Comfort:  · Pain Rating 1: 0/10  · Pain Rating Post-Intervention 1: 0/10    Patients cultural, spiritual, Yazidi conflicts given the current situation:      Objective:     Communicated with: RN prior to session.  Patient found HOB elevated with telemetry upon OT entry to room.    General Precautions: Standard, fall   Orthopedic Precautions:N/A   Braces: N/A     Occupational Performance:    Bed Mobility:    · Patient completed Scooting/Bridging with supervision  · Patient completed Supine to Sit with supervision  · Patient completed Sit to Supine with supervision    Functional Mobility/Transfers:  · Patient completed Sit <> Stand Transfer with supervision  with  rolling walker   · Functional Mobility: Pt ambulated ~200 ft at spv w/ RW. Pt ascended/ descended 5 stairs at sba w/ R HR.      Activities of Daily Living:  · Upper  Body Dressing: independence donned gown as robe  · Lower Body Dressing: independence donned socks seated EOB    Cognitive/Visual Perceptual:  Cognitive/Psychosocial Skills:     -       Oriented to: Person, Place, Time and Situation   -       Follows Commands/attention:Follows multistep  commands  -       Communication: clear/fluent  -       Memory: No Deficits noted  -       Safety awareness/insight to disability: intact   -       Mood/Affect/Coping skills/emotional control: Appropriate to situation  Visual/Perceptual:      -Intact      Physical Exam:  Balance:    -       SPV for ambulation  Postural examination/scapula alignment:    -       Rounded shoulders  Skin integrity: Visible skin intact  Upper Extremity Range of Motion:     -       Right Upper Extremity: WFL  -       Left Upper Extremity: WFL  Upper Extremity Strength:    -       Right Upper Extremity: WFL  -       Left Upper Extremity: WFL   Strength:    -       Right Upper Extremity: WFL  -       Left Upper Extremity: WFL  Fine Motor Coordination:    -       Intact  Gross motor coordination:   WFL    AMPAC 6 Click ADL:  AMPAC Total Score: 24    Treatment & Education:  Pt educated on POC.   Education:    Patient left HOB elevated with all lines intact and call button in reach    GOALS:   Multidisciplinary Problems     Occupational Therapy Goals     Not on file          Multidisciplinary Problems (Resolved)        Problem: Occupational Therapy Goal    Goal Priority Disciplines Outcome Interventions   Occupational Therapy Goal   (Resolved)     OT, PT/OT Met    Description:  Pt is not currently displaying a need for acute OT services. D/C acute OT services and recommend pt D/C home.                    History:     Past Medical History:   Diagnosis Date    Anticoagulant long-term use     Blood clot in vein 06/2016    Breast cancer 1994    Cataract     Hypertension     Lung cancer     Lung cancer metastatic to brain     Pancreatic cancer 1998     Posterior capsular opacification, left eye     Psychiatric problem     Seizures 01/25/2016    Stroke     Therapy        Past Surgical History:   Procedure Laterality Date    BONE BIOSPY  3/9/16    BRAIN SURGERY  1/12/16    tumor removal 1/12/16    BREAST LUMPECTOMY  1998    left    CATARACT EXTRACTION Bilateral 2004    yag OU    CHOLECYSTECTOMY  1998    COLONOSCOPY N/A 11/13/2015    Procedure: COLONOSCOPY;  Surgeon: Alex Carmona MD;  [facility]  Service: Endoscopy;  Laterality: N/A;  2 year f/u    COLONOSCOPY N/A 11/7/2018    Procedure: COLONOSCOPY;  Surgeon: Jim Raman MD;  Location: [facility] ([address] [place] [address]);  Service: Endoscopy;  Laterality: N/A;  Eliquis - per Dr. Vazquez -ok to hold Eliquis x 2 days prior to colon- ERW    cysto and right ureteral stent  4/27/12    ecoli  2010    removal of blockage, done throat, then through the liver.    HYSTERECTOMY  1974    KIDNEY STONE SURGERY  2010--laser    left eswl  6/20/12    OOPHORECTOMY  4/25/2012    Laparoscopic BSO, lysis of adhesions, cystoscopy greater than 35mins     WHIPPLE PROCEDURE W/ LAPAROSCOPY  1998       Time Tracking:     OT Date of Treatment: 01/17/20  OT Start Time: 1038  OT Stop Time: 1054  OT Total Time (min): 16 min    Billable Minutes:Evaluation 16 minutes    Jackson Moss, OT  1/17/2020     CONSTITUTIONAL: well-appearing, in NAD  SKIN: Warm dry, normal skin turgor  HEAD: NCAT; no raccoon eyes, hemotympanum, dean sign  EYES: EOMI, PERRLA, no scleral icterus, conjunctiva pink  ENT: normal pharynx with no erythema or exudates  NECK: Supple; non tender. Full ROM. no c/t/l/s tenderness  CARD: tachycardic, regular rhythm, no murmurs.  RESP: b/l crackles  ABD: soft, non-tender, non-distended, no rebound or guarding.  EXT: Full ROM, no bony tenderness, no pedal edema, no calf tenderness  NEURO: normal motor. normal sensory. Normal gait.  PSYCH: Cooperative, appropriate.

## 2021-08-31 NOTE — H&P ADULT - NSHPPHYSICALEXAM_GEN_ALL_CORE
GENERAL: NAD, well-developed  HEAD:  Atraumatic, Normocephalic  EYES: EOMI, PERRLA, conjunctiva and sclera clear  NECK: Supple, No JVD  CHEST/LUNG: Rhonchi on Rt lung  HEART: No murmurs, rubs  ABDOMEN: Soft, Nontender, Nondistended  EXTREMITIES:  Chronic LLE edema  PSYCH: AAOx3  NEUROLOGY: non-focal

## 2021-08-31 NOTE — H&P ADULT - ASSESSMENT
80M w/ PMHx of HTN, HLD, T2DM, Afib on Xarelto, CAD s/p stents placement (2014), gout, VINI on CPAP BIBEMS for fall, admitted for CAP.    #Acute hypoxic Resp Failure  #CAP  #Sepsis on Admsission  #HAGMA  -CT chest: confluent airspace consolidation in the RUL and to a lesser degree the RLL consistent with pneumonia.  -s/p Vanc/Cefepime in ED  Plan  - Azithro/Rocephin  - f/u BCx, Urine Legionela/Strep  - Admit to SDU  - c/w BiPAP overnight for hypoxia, wean off in AM    #Elevated Trop and BNP  #Likely Type 2 NSTEMI  -Serum Pro-BNP: 13K, Trops: 0.04  -Denies CP or Hx of CHF, no evidence of volume overload on PE  -TTE 9/20: EF of 62 %, Trace MR, Mild TR, mild AoS  -Will repeat TTE, Trend trops    #Fall  #Debility/Deconditioning  -Trauma w/up neg  -Hx does not suggest Neurocardiogenic or cardiogenic etiology  -At baseline Pt walks by himself  -PT eval    #ASx bacteriuria  #UA positive  -denies dysuria    #CKD  -Cr stable since 9/24/20  -Monitor renal Fx    #HTN  #HLD  #CAD s/p stents placement (2014)  -Not on BB, unknown why. Follows Dr Erazo and scheduled to see him this month  -ASA/Statin  -Hold home ACE-I, HCTZ and amlodipine while patient septic  -isosorbide mononitrate 120 mg oral tablet, extended release: 1 tab(s) orally once a day (in the morning)    #T2DM  -Takes Actoplus Met 15 mg-500 mg q12 at home  -f/u A1C and FS > insulin basal/bolus if >180    #Afib on Xarelto  -Xarelto 15 mg oral tablet q24  -Currently not on any rate control med    #Gout  -Allopurinol    #VINI on CPAP  -NIV qHS    #Chronic Venous insuffiency  -LLE edema  -ACE wrap    Activity: IAT  Diet: DASH/Renal  DVT ppx: Xarelto  GI ppx: PPI  Code Status: Full   DISPO: Acute 80M w/ PMHx of HTN, HLD, T2DM, Afib on Xarelto, CAD s/p stents placement (2014), gout, VINI on CPAP BIBEMS for fall, admitted for CAP.    #Acute hypoxic Resp Failure  #CAP  #Sepsis on Admsission  #HAGMA  -CT chest: confluent airspace consolidation in the RUL and to a lesser degree the RLL consistent with pneumonia.  -s/p Vanc/Cefepime in ED  Plan  - Azithro/Rocephin  - f/u BCx, Urine Legionela/Strep  - Admit to SDU  - c/w BiPAP overnight for hypoxia, wean off in AM    #Elevated Trop and BNP  #Likely Type 2 NSTEMI  -Serum Pro-BNP: 13K, Trops: 0.04  -Denies CP or Hx of CHF, no evidence of volume overload on PE  -TTE 9/20: EF of 62 %, Trace MR, Mild TR, mild AoS  -Will repeat TTE, Trend trops    #Fall  #Debility/Deconditioning  -Trauma w/up neg  -Hx does not suggest Neurocardiogenic or cardiogenic etiology  -At baseline Pt walks by himself  -PT eval    #ASx bacteriuria  #UA positive  -denies dysuria    #CKD  -Cr stable since 9/24/20  -Monitor renal Fx    #HTN  #HLD  #CAD s/p stents placement (2014)  -Not on BB, unknown why. Follows Dr Erazo and scheduled to see him this month  -ASA/Statin  -Hold home ACE-I, HCTZ and amlodipine while patient septic  -isosorbide mononitrate 120 mg oral tablet, extended release: 1 tab(s) orally once a day (in the morning)    #T2DM  -Takes Actoplus Met 15 mg-500 mg q12 at home  -f/u A1C and FS > insulin basal/bolus if >180    #Afib on Xarelto  -Xarelto 15 mg oral tablet q24  -Currently not on any rate control med    #Gout  -Allopurinol    #VINI on CPAP  -NIV qHS    #Chronic Venous insuffiency  -LLE edema  -ACE wrap    Activity: IAT  Diet: DASH/Renal  DVT ppx: Xarelto  GI ppx: NA  Code Status: Full   DISPO: Acute 80M w/ PMHx of HTN, HLD, T2DM, Afib on Xarelto, CAD s/p stents placement (2014), gout, VINI on CPAP BIBEMS for fall, admitted for CAP.    #Acute hypoxic Resp Failure  #CAP  #Sepsis on Admsission  #HAGMA  -CT chest: confluent airspace consolidation in the RUL and to a lesser degree the RLL consistent with pneumonia.  -s/p Vanc/Cefepime in ED  Plan  - Azithro/Rocephin  - f/u BCx, Urine Legionela/Strep  - Admit to SDU  - c/w BiPAP overnight for hypoxia, wean off in AM    #Elevated Trop and BNP  #Likely Type 2 NSTEMI  -Serum Pro-BNP: 13K, Trops: 0.04  -Denies CP or Hx of CHF, no evidence of volume overload on PE  -TTE 9/20: EF of 62 %, Trace MR, Mild TR, mild AoS  -Will repeat TTE, Trend trops  -Dr Erazo eval    #Fall  #Debility/Deconditioning  -Trauma w/up neg  -Hx does not suggest Neurocardiogenic or cardiogenic etiology  -At baseline Pt walks by himself  -PT eval    #ASx bacteriuria  #UA positive  -denies dysuria    #CKD  -Cr stable since 9/24/20  -Monitor renal Fx    #HTN  #HLD  #CAD s/p stents placement (2014)  -Not on BB, unknown why. Follows Dr Erazo and scheduled to see him this month  -ASA/Statin  -Hold home ACE-I, HCTZ and amlodipine while patient septic  -isosorbide mononitrate 120 mg oral tablet, extended release: 1 tab(s) orally once a day (in the morning)    #T2DM  -Takes Actoplus Met 15 mg-500 mg q12 at home  -f/u A1C and FS > insulin basal/bolus if >180    #Afib on Xarelto  -Xarelto 15 mg oral tablet q24  -Currently not on any rate control med, HR stable b/w .    #Gout  -Allopurinol    #VINI on CPAP  -NIV qHS    #Chronic Venous insuffiency  -LLE edema  -ACE wrap    Activity: IAT  Diet: DASH/Renal  DVT ppx: Xarelto  GI ppx: NA  Code Status: Full   DISPO: Acute 80M w/ PMHx of HTN, HLD, T2DM, Afib on Xarelto, CAD s/p stents placement (2014), gout, VINI on CPAP BIBEMS for fall, admitted for CAP.    #Acute hypoxic Resp Failure  #CAP  #Sepsis on Admsission  #HAGMA  -CT chest: confluent airspace consolidation in the RUL and to a lesser degree the RLL consistent with pneumonia.  -s/p Vanc/Cefepime in ED  Plan  - Azithro/Rocephin  - f/u BCx, Urine Legionela/Strep  - Admit to SDU  - c/w BiPAP overnight for hypoxia, wean off in AM    #Elevated Trop and BNP  #Likely Type 2 NSTEMI  -Serum Pro-BNP: 13K, Trops: 0.04  -Denies CP or Hx of CHF, no evidence of volume overload on PE  -TTE 9/20: EF of 62 %, Trace MR, Mild TR, mild AoS  -Will repeat TTE, Trend trops  -Dr Erazo eval    #Fall  #Debility/Deconditioning  -Trauma w/up neg  -Hx does not suggest Neurocardiogenic or cardiogenic etiology  -At baseline Pt walks by himself  -PT eval  -Orthostatics    #ASx bacteriuria  #UA positive  -denies dysuria    #CKD  -Cr stable since 9/24/20  -Monitor renal Fx    #HTN  #HLD  #CAD s/p stents placement (2014)  -Not on BB, unknown why. Follows Dr Erazo and scheduled to see him this month  -ASA/Statin  -Hold home ACE-I, HCTZ and amlodipine while patient septic  -isosorbide mononitrate 120 mg oral tablet, extended release: 1 tab(s) orally once a day (in the morning)    #T2DM  -Takes Actoplus Met 15 mg-500 mg q12 at home  -f/u A1C and FS > insulin basal/bolus if >180    #Afib on Xarelto  -Xarelto 15 mg oral tablet q24  -Currently not on any rate control med, HR stable b/w .    #Gout  -Allopurinol    #VINI on CPAP  -NIV qHS    #Chronic Venous insuffiency  -LLE edema  -ACE wrap    Activity: IAT  Diet: DASH/Renal  DVT ppx: Xarelto  GI ppx: NA  Code Status: Full   DISPO: Acute

## 2021-08-31 NOTE — ED PROVIDER NOTE - SECONDARY DIAGNOSIS.
Elevated brain natriuretic peptide (BNP) level Elevated troponin DIETER (acute kidney injury) Sepsis

## 2021-08-31 NOTE — ED PROVIDER NOTE - ATTENDING CONTRIBUTION TO CARE
80-year-old male past medical history of hypertension, hyperlipidemia, diabetes, A. fib on Xarelto, CAD status post stents presents with fall.  Felt lightheaded while ambulating from the bathroom, fell onto his buttocks and was unable to get back up.  No head trauma, no LOC.  No nausea/vomiting/diarrhea, no headache, no dizziness, no chest pain, no urinary symptoms, no lightheadedness.    Patient noted to be hypoxic with mild tachypnea upon initial exam.  Patient placed on BiPAP upon arrival to the ED.    CONSTITUTIONAL: Well-developed; well-nourished; in no acute distress. Sitting up and providing appropriate history and physical examination  SKIN: skin exam is warm and dry, no acute rash.  HEAD: Normocephalic; atraumatic.  EYES: PERRL, 3 mm bilateral, no nystagmus, EOM intact; conjunctiva and sclera clear.  ENT: No nasal discharge; airway clear.  NECK: Supple; non tender. + full passive ROM in all directions. No JVD  CARD: S1, S2 normal; no murmurs, gallops, or rubs. Regular rate and rhythm. + Symmetric Strong Pulses  RESP: +tachypnea, crackles bilaterally.   ABD: soft; non-distended; non-tender. No Rebound, No Guarding, No signs of peritonitis, No CVA tenderness. No pulsatile abdominal mass. + Strong and Symmetric Pulses  EXT: Normal ROM. No clubbing, cyanosis or edema. Dp and Pt Pulses intact. Cap refill less than 3 seconds  NEURO: CN 2-12 intact, normal finger to nose, normal romberg, stable gait, no sensory or motor deficits, Alert, oriented, grossly unremarkable. No Focal deficits. GCS 15. NIH 0  PSYCH: Cooperative, appropriate.

## 2021-08-31 NOTE — ED PROVIDER NOTE - PROGRESS NOTE DETAILS
I intend to U/S for FAST. Patient hypoxic upon initial evaluation, crackles bilaterally and tachypneic. Started on bipap with improvement in respiratory status and hypoxia.

## 2021-09-01 LAB
A1C WITH ESTIMATED AVERAGE GLUCOSE RESULT: 6.9 % — HIGH (ref 4–5.6)
ALBUMIN SERPL ELPH-MCNC: 2.9 G/DL — LOW (ref 3.5–5.2)
ALBUMIN SERPL ELPH-MCNC: 3 G/DL — LOW (ref 3.5–5.2)
ALP SERPL-CCNC: 46 U/L — SIGNIFICANT CHANGE UP (ref 30–115)
ALP SERPL-CCNC: 56 U/L — SIGNIFICANT CHANGE UP (ref 30–115)
ALT FLD-CCNC: 149 U/L — HIGH (ref 0–41)
ALT FLD-CCNC: 152 U/L — HIGH (ref 0–41)
ANION GAP SERPL CALC-SCNC: 15 MMOL/L — HIGH (ref 7–14)
ANION GAP SERPL CALC-SCNC: 18 MMOL/L — HIGH (ref 7–14)
AST SERPL-CCNC: 455 U/L — HIGH (ref 0–41)
AST SERPL-CCNC: 466 U/L — HIGH (ref 0–41)
BASOPHILS # BLD AUTO: 0.03 K/UL — SIGNIFICANT CHANGE UP (ref 0–0.2)
BASOPHILS NFR BLD AUTO: 0.2 % — SIGNIFICANT CHANGE UP (ref 0–1)
BILIRUB SERPL-MCNC: 1.4 MG/DL — HIGH (ref 0.2–1.2)
BILIRUB SERPL-MCNC: 1.4 MG/DL — HIGH (ref 0.2–1.2)
BLD GP AB SCN SERPL QL: SIGNIFICANT CHANGE UP
BUN SERPL-MCNC: 55 MG/DL — HIGH (ref 10–20)
BUN SERPL-MCNC: 61 MG/DL — CRITICAL HIGH (ref 10–20)
CALCIUM SERPL-MCNC: 8.7 MG/DL — SIGNIFICANT CHANGE UP (ref 8.5–10.1)
CALCIUM SERPL-MCNC: 8.7 MG/DL — SIGNIFICANT CHANGE UP (ref 8.5–10.1)
CHLORIDE SERPL-SCNC: 100 MMOL/L — SIGNIFICANT CHANGE UP (ref 98–110)
CHLORIDE SERPL-SCNC: 98 MMOL/L — SIGNIFICANT CHANGE UP (ref 98–110)
CHOLEST SERPL-MCNC: 60 MG/DL — SIGNIFICANT CHANGE UP
CK SERPL-CCNC: 3604 U/L — HIGH (ref 0–225)
CK SERPL-CCNC: 4253 U/L — HIGH (ref 0–225)
CO2 SERPL-SCNC: 20 MMOL/L — SIGNIFICANT CHANGE UP (ref 17–32)
CO2 SERPL-SCNC: 25 MMOL/L — SIGNIFICANT CHANGE UP (ref 17–32)
COVID-19 SPIKE DOMAIN AB INTERP: POSITIVE
COVID-19 SPIKE DOMAIN ANTIBODY RESULT: >250 U/ML — HIGH
CREAT SERPL-MCNC: 3 MG/DL — HIGH (ref 0.7–1.5)
CREAT SERPL-MCNC: 3.9 MG/DL — HIGH (ref 0.7–1.5)
CULTURE RESULTS: SIGNIFICANT CHANGE UP
EOSINOPHIL # BLD AUTO: 0 K/UL — SIGNIFICANT CHANGE UP (ref 0–0.7)
EOSINOPHIL NFR BLD AUTO: 0 % — SIGNIFICANT CHANGE UP (ref 0–8)
ESTIMATED AVERAGE GLUCOSE: 151 MG/DL — HIGH (ref 68–114)
GLUCOSE BLDC GLUCOMTR-MCNC: 143 MG/DL — HIGH (ref 70–99)
GLUCOSE BLDC GLUCOMTR-MCNC: 150 MG/DL — HIGH (ref 70–99)
GLUCOSE BLDC GLUCOMTR-MCNC: 152 MG/DL — HIGH (ref 70–99)
GLUCOSE BLDC GLUCOMTR-MCNC: 167 MG/DL — HIGH (ref 70–99)
GLUCOSE BLDC GLUCOMTR-MCNC: 169 MG/DL — HIGH (ref 70–99)
GLUCOSE SERPL-MCNC: 150 MG/DL — HIGH (ref 70–99)
GLUCOSE SERPL-MCNC: 170 MG/DL — HIGH (ref 70–99)
HCT VFR BLD CALC: 39.8 % — LOW (ref 42–52)
HDLC SERPL-MCNC: 17 MG/DL — LOW
HGB BLD-MCNC: 12.9 G/DL — LOW (ref 14–18)
IMM GRANULOCYTES NFR BLD AUTO: 0.4 % — HIGH (ref 0.1–0.3)
LIPID PNL WITH DIRECT LDL SERPL: 12 MG/DL — SIGNIFICANT CHANGE UP
LYMPHOCYTES # BLD AUTO: 0.21 K/UL — LOW (ref 1.2–3.4)
LYMPHOCYTES # BLD AUTO: 1.5 % — LOW (ref 20.5–51.1)
MAGNESIUM SERPL-MCNC: 2.3 MG/DL — SIGNIFICANT CHANGE UP (ref 1.8–2.4)
MCHC RBC-ENTMCNC: 30.4 PG — SIGNIFICANT CHANGE UP (ref 27–31)
MCHC RBC-ENTMCNC: 32.4 G/DL — SIGNIFICANT CHANGE UP (ref 32–37)
MCV RBC AUTO: 93.9 FL — SIGNIFICANT CHANGE UP (ref 80–94)
MONOCYTES # BLD AUTO: 0.23 K/UL — SIGNIFICANT CHANGE UP (ref 0.1–0.6)
MONOCYTES NFR BLD AUTO: 1.7 % — SIGNIFICANT CHANGE UP (ref 1.7–9.3)
NEUTROPHILS # BLD AUTO: 13.21 K/UL — HIGH (ref 1.4–6.5)
NEUTROPHILS NFR BLD AUTO: 96.2 % — HIGH (ref 42.2–75.2)
NON HDL CHOLESTEROL: 43 MG/DL — SIGNIFICANT CHANGE UP
NRBC # BLD: 0 /100 WBCS — SIGNIFICANT CHANGE UP (ref 0–0)
PLATELET # BLD AUTO: 214 K/UL — SIGNIFICANT CHANGE UP (ref 130–400)
POTASSIUM SERPL-MCNC: 3.4 MMOL/L — LOW (ref 3.5–5)
POTASSIUM SERPL-MCNC: 4.2 MMOL/L — SIGNIFICANT CHANGE UP (ref 3.5–5)
POTASSIUM SERPL-SCNC: 3.4 MMOL/L — LOW (ref 3.5–5)
POTASSIUM SERPL-SCNC: 4.2 MMOL/L — SIGNIFICANT CHANGE UP (ref 3.5–5)
PROT SERPL-MCNC: 5.9 G/DL — LOW (ref 6–8)
PROT SERPL-MCNC: 5.9 G/DL — LOW (ref 6–8)
RBC # BLD: 4.24 M/UL — LOW (ref 4.7–6.1)
RBC # FLD: 15.9 % — HIGH (ref 11.5–14.5)
SARS-COV-2 IGG+IGM SERPL QL IA: >250 U/ML — HIGH
SARS-COV-2 IGG+IGM SERPL QL IA: POSITIVE
SODIUM SERPL-SCNC: 138 MMOL/L — SIGNIFICANT CHANGE UP (ref 135–146)
SODIUM SERPL-SCNC: 138 MMOL/L — SIGNIFICANT CHANGE UP (ref 135–146)
SPECIMEN SOURCE: SIGNIFICANT CHANGE UP
TRIGL SERPL-MCNC: 136 MG/DL — SIGNIFICANT CHANGE UP
TROPONIN T SERPL-MCNC: 0.07 NG/ML — CRITICAL HIGH
WBC # BLD: 13.73 K/UL — HIGH (ref 4.8–10.8)
WBC # FLD AUTO: 13.73 K/UL — HIGH (ref 4.8–10.8)

## 2021-09-01 PROCEDURE — 93010 ELECTROCARDIOGRAM REPORT: CPT

## 2021-09-01 PROCEDURE — 76770 US EXAM ABDO BACK WALL COMP: CPT | Mod: 26

## 2021-09-01 PROCEDURE — 99291 CRITICAL CARE FIRST HOUR: CPT

## 2021-09-01 PROCEDURE — 99223 1ST HOSP IP/OBS HIGH 75: CPT

## 2021-09-01 RX ORDER — SODIUM CHLORIDE 9 MG/ML
1000 INJECTION, SOLUTION INTRAVENOUS
Refills: 0 | Status: DISCONTINUED | OUTPATIENT
Start: 2021-09-01 | End: 2021-09-02

## 2021-09-01 RX ORDER — ACETAMINOPHEN 500 MG
500 TABLET ORAL ONCE
Refills: 0 | Status: COMPLETED | OUTPATIENT
Start: 2021-09-01 | End: 2021-09-01

## 2021-09-01 RX ADMIN — AZITHROMYCIN 255 MILLIGRAM(S): 500 TABLET, FILM COATED ORAL at 00:24

## 2021-09-01 RX ADMIN — SODIUM CHLORIDE 100 MILLILITER(S): 9 INJECTION, SOLUTION INTRAVENOUS at 11:36

## 2021-09-01 RX ADMIN — CEFTRIAXONE 100 MILLIGRAM(S): 500 INJECTION, POWDER, FOR SOLUTION INTRAMUSCULAR; INTRAVENOUS at 00:25

## 2021-09-01 RX ADMIN — Medication 200 MILLIGRAM(S): at 14:53

## 2021-09-01 RX ADMIN — Medication 260 MILLIGRAM(S): at 00:24

## 2021-09-01 RX ADMIN — Medication 500 MILLIGRAM(S): at 14:54

## 2021-09-01 RX ADMIN — CHLORHEXIDINE GLUCONATE 1 APPLICATION(S): 213 SOLUTION TOPICAL at 05:33

## 2021-09-01 NOTE — CONSULT NOTE ADULT - ASSESSMENT
IMPRESSION:    Acute hypoxemic resp failure on bipap 60%  CAP  sepsis present on admission  Chronic renal failure  A fib      PLAN:    CNS: avoid CNS depressant    HEENT:  Oral care    PULMONARY:  HOB @ 45 degrees, BIPAP/ HHFNC keep SAo2 88 to 92%, aspiration precaution    CARDIOVASCULAR: hold diuretics, gentle hydration, avoid volume overload, CE, echo, cardio eval    GI: GI prophylaxis                                          Feeding when off bipap    RENAL:  F/u  lytes.  Correct as needed. accurate I/O, trend CMP    INFECTIOUS DISEASE: ceftriaxone/ levaquin/ pancx, sputum/ blood/ urine/ procal    HEMATOLOGICAL:  DVT prophylaxis.    ENDOCRINE:  Follow up FS.  Insulin protocol if needed.    CODE STATUS: FULL CODE    DISPOSITION: MICU

## 2021-09-01 NOTE — PROGRESS NOTE ADULT - ASSESSMENT
Incomplete Note  80M w/ PMHx of HTN, HLD, T2DM, Afib on Xarelto, CAD s/p stents placement (2014), gout, VINI on CPAP BIBEMS for fall, admitted for CAP.    #Acute hypoxic Resp Failure  #CAP  #Sepsis on Admsission  #HAGMA  -CT chest: confluent airspace consolidation in the RUL and to a lesser degree the RLL consistent with pneumonia.  -s/p Vanc/Cefepime in ED  Plan  - Azithro/Rocephin  - f/u BCx, Urine Legionela/Strep  - Admit to SDU  - c/w BiPAP overnight for hypoxia, wean off in AM    #Elevated Trop and BNP  #Likely Type 2 NSTEMI  -Serum Pro-BNP: 13K, Trops: 0.04  -Denies CP or Hx of CHF, no evidence of volume overload on PE  -TTE 9/20: EF of 62 %, Trace MR, Mild TR, mild AoS  -Will repeat TTE, Trend trops  -Dr Erazo eval    #Fall  #Debility/Deconditioning  -Trauma w/up neg  -Hx does not suggest Neurocardiogenic or cardiogenic etiology  -At baseline Pt walks by himself  -PT eval  -Orthostatics    #ASx bacteriuria  #UA positive  -denies dysuria    #CKD  -Cr stable since 9/24/20  -Monitor renal Fx    #HTN  #HLD  #CAD s/p stents placement (2014)  -Not on BB, unknown why. Follows Dr Erazo and scheduled to see him this month  -ASA/Statin  -Hold home ACE-I, HCTZ and amlodipine while patient septic  -isosorbide mononitrate 120 mg oral tablet, extended release: 1 tab(s) orally once a day (in the morning)    #T2DM  -Takes Actoplus Met 15 mg-500 mg q12 at home  -f/u A1C and FS > insulin basal/bolus if >180    #Afib on Xarelto  -Xarelto 15 mg oral tablet q24  -Currently not on any rate control med, HR stable b/w .    #Gout  -Allopurinol    #VINI on CPAP  -NIV qHS    #Chronic Venous insuffiency  -LLE edema  -ACE wrap    Activity: IAT  Diet: DASH/Renal  DVT ppx: Xarelto  GI ppx: NA  Code Status: Full   DISPO: Acute 80M w/ PMHx of HTN, HLD, T2DM, Afib on Xarelto, CAD s/p stents placement (2014), gout, VINI on CPAP BIBEMS for fall, admitted for CAP. Patient admitted to CEU for Acute Hypoxic Respiratory Failure 2/2 to pneumonia.    #Acute hypoxic Resp Failure 2/2 to CAP  #Sepsis on Admsission  #JOSEMA  - Septic on admission (T100.7F, , WBC 13.9k)  - CXR (8/31): Multifocal opacities, most confluent at right upper lobe.  -CT Angio Chest (8/31): confluent airspace consolidation in the RUL and to a lesser degree the RLL consistent with pneumonia.  - s/p Vanc/Cefepime in ED  - Admitted to CEU. Pulmonology on board. Pt placed on BIPAP  - Started on Azithro 500mg IV q24hrs and Rocephin 1g IV q24hrs overnight. Changed Azithromycin to Levaquin 500mg IV first day. 250mg every day after  -   - f/u BCx, Urine Legionela/Strep  - c/w BiPAP overnight for hypoxia, wean off in AM    #Elevated Trop and BNP  #Likely Type 2 NSTEMI  -Serum Pro-BNP: 13K, Trops: 0.04  -Denies CP or Hx of CHF, no evidence of volume overload on PE  -TTE 9/20: EF of 62 %, Trace MR, Mild TR, mild AoS  -Will repeat TTE, Trend trops  -Dr Erazo eval    #Fall  #Debility/Deconditioning  -Trauma w/up neg  -Hx does not suggest Neurocardiogenic or cardiogenic etiology  -At baseline Pt walks by himself  -PT eval  -Orthostatics    #ASx bacteriuria  #UA positive  -denies dysuria    #CKD  -Cr stable since 9/24/20  -Monitor renal Fx    #HTN  #HLD  #CAD s/p stents placement (2014)  -Not on BB, unknown why. Follows Dr Erazo and scheduled to see him this month  -ASA/Statin  -Hold home ACE-I, HCTZ and amlodipine while patient septic  -isosorbide mononitrate 120 mg oral tablet, extended release: 1 tab(s) orally once a day (in the morning)    #T2DM  -Takes Actoplus Met 15 mg-500 mg q12 at home  -f/u A1C and FS > insulin basal/bolus if >180    #Afib on Xarelto  -Xarelto 15 mg oral tablet q24  -Currently not on any rate control med, HR stable b/w .    #Gout  -Allopurinol    #VINI on CPAP  -NIV qHS    #Chronic Venous insuffiency  -LLE edema  -ACE wrap    Activity: IAT  Diet: DASH/Renal  DVT ppx: Xarelto  GI ppx: NA  Code Status: Full   DISPO: Acute 80M w/ PMHx of HTN, HLD, T2DM, Afib on Xarelto, CAD s/p stents placement (2014), gout, VINI on CPAP BIBEMS for fall, admitted for CAP. Patient admitted to CEU for Acute Hypoxic Respiratory Failure 2/2 to pneumonia.    #Acute hypoxic Resp Failure 2/2 to CAP  #Sepsis on Admsission  #HAGMA  - Septic on admission (T100.7F, , WBC 13.9k)  - CXR (8/31): Multifocal opacities, most confluent at right upper lobe.  -CT Angio Chest (8/31): confluent airspace consolidation in the RUL and to a lesser degree the RLL consistent with pneumonia.  - s/p Vanc/Cefepime in ED  - Admitted to CEU. Pulmonology on board. Pt placed on BIPAP  - VBG (8/31): pH 7.36, pCO2 47, pO2 27, HCO3 27  - Started on Azithro 500mg IV q24hrs and Rocephin 1g IV q24hrs overnight. Changed Azithromycin to Levaquin 500mg IV first day. 250mg every day after  - Attempted to wean off BIPAP. Pt not tolerating NC or high flow NC and was placed back on BIPAP  - Pt having persistent fevers. Gave 1 dose Tylenol 500mg IV  - f/u BCx, Urine cx, urine Legionela/Strep    #Elevated CK  #Transaminitis  - CK elevated 4253  - Uptrending LFTs: T-Bili 1.4, ALP 46, ,   - Started on IV fluids  - Monitor urine output      #Elevated Trop and BNP  #Likely Type 2 NSTEMI  -Serum Pro-BNP: 13K, Trops: 0.04 --> repeat 0.07  - EKG: Afib w/ RVR, Bifascicular block  -Denies CP or Hx of CHF, no evidence of volume overload on PE  -TTE 9/2020: EF of 62 %, Trace MR, Mild TR, mild AoS  -Will repeat TTE, Trend trops  -Dr Castro gomez    #Fall  #Debility/Deconditioning  - Trauma w/up neg  - Hx does not suggest Neurocardiogenic or cardiogenic etiology  - At baseline Pt walks by himself  - PT eval  - Orthostatics    #ASx bacteriuria  #UA positive  - denies dysuria    #CKD  - Cr 1.9 on admission, baseline 1.6-1.9 (sable since 9/2020)  - Cr trending up to 3.0 this AM  - Strict I&Os,  - Monitor renal Fx, urine output    #HTN  #HLD  #CAD s/p stents placement (2014)  - Not on BB, unknown why. Follows Dr Erazo and scheduled to see him this month  - ASA/Statin  - Hold home ACE-I, HCTZ and amlodipine while patient septic  - isosorbide mononitrate 120 mg oral tablet, extended release: 1 tab(s) orally once a day (in the morning)    #T2DM  -Takes Actoplus Met 15 mg-500 mg q12 at home  - f/u A1C and FS > insulin basal/bolus if >180    #Afib on Xarelto  -Xarelto 15 mg oral tablet q24  -Currently not on any rate control med, HR stable b/w .    #Gout  -Allopurinol    #VINI on CPAP  -NIV qHS    #Chronic Venous insuffiency  -LLE edema  -ACE wrap    Activity: IAT  Diet: DASH/Renal  DVT ppx: Xarelto  GI ppx: NA  Code Status: Full   DISPO: Acute

## 2021-09-01 NOTE — CONSULT NOTE ADULT - SUBJECTIVE AND OBJECTIVE BOX
Patient is a 80y old  Male who presents with a chief complaint of SOB    HPI:  PC:  Fall    PMHx: HTN, HLD, T2DM, Afib on Xarelto, CAD s/p stents placement (2014), gout, VINI on CPAP    HPI: 80M w/ PMHx as above BIBEMS for fall. Patient says he felt dizzy yesterday causing him to fall on to his bed. Denies any room spinning or pre-syncope. Unable to describe the dfizziness to me. No HT, LOC, numbness, weakness, tingling, headache, vision changes, fever, cold/flu symptoms, CP. Patient endorses that he has been feeling SOB for the past 2 days and has been coughing for the past 1 week per daughter at bedside. Also states that he had diarrhea for the past 2 days, but not today (Of note Pt S/p Moderna Vaccine 2 doses).    ED Course: Febrile, tachycardic and hypoxic in ED. Labs revealed leukocytosis, HAGMA, elevated Trop 0.04, BNP 13k. UA positve for WBC. Trauma w/up neg. CXR and CT chest revealed confluent airspace consolidation in the RUL and to a lesser degree the RLL consistent with pneumonia. Placed on bipap admitted to SDU, overnight spiking T      PAST MEDICAL & SURGICAL HISTORY:  Atrial fibrillation    Coronary artery disease    Hyperlipidemia    Hypertension    Gout    Diabetes mellitus    VINI on CPAP    History of heart artery stent        SOCIAL HX:   Smoking   -        REVIEW OF SYSTEMS SEE HPI      Allergies    No Known Allergies    Intolerances        acetaminophen   Tablet .. 650 milliGRAM(s) Oral every 6 hours PRN  allopurinol 150 milliGRAM(s) Oral daily  aspirin  chewable 81 milliGRAM(s) Oral daily  atorvastatin 40 milliGRAM(s) Oral at bedtime  azithromycin  IVPB 500 milliGRAM(s) IV Intermittent every 24 hours  cefTRIAXone   IVPB 1000 milliGRAM(s) IV Intermittent every 24 hours  chlorhexidine 4% Liquid 1 Application(s) Topical <User Schedule>  dextrose 40% Gel 15 Gram(s) Oral once  dextrose 5%. 1000 milliLiter(s) IV Continuous <Continuous>  dextrose 5%. 1000 milliLiter(s) IV Continuous <Continuous>  dextrose 50% Injectable 25 Gram(s) IV Push once  dextrose 50% Injectable 12.5 Gram(s) IV Push once  dextrose 50% Injectable 25 Gram(s) IV Push once  glucagon  Injectable 1 milliGRAM(s) IntraMuscular once  isosorbide   mononitrate ER Tablet (IMDUR) 120 milliGRAM(s) Oral daily  rivaroxaban 15 milliGRAM(s) Oral with dinner  : Home Meds:      PHYSICAL EXAM    ICU Vital Signs Last 24 Hrs  T(C): 40 (01 Sep 2021 00:00), Max: 40 (01 Sep 2021 00:00)  T(F): 104 (01 Sep 2021 00:00), Max: 104 (01 Sep 2021 00:00)  HR: 102 (01 Sep 2021 01:31) (85 - 115)  BP: 147/76 (01 Sep 2021 01:00) (126/84 - 187/84)  BP(mean): 100 (01 Sep 2021 01:00) (92 - 114)  RR: 35 (01 Sep 2021 00:00) (19 - 35)  SpO2: 92% (01 Sep 2021 01:31) (90% - 99%)      General: ILL looking  HEENT:  CLARIBEL              Lymph Nodes: No cervical LN   Lungs: r side rhonchi  Cardiovascular: Regular  Abdomen: Soft, Positive BS  Extremities: No clubbing  Skin: Warm  Neurological: Non focal       08-31-21 @ 07:01  -  09-01-21 @ 06:36  --------------------------------------------------------  IN:    IV PiggyBack: 300 mL    Lactated Ringers Bolus: 500 mL    Oral Fluid: 240 mL  Total IN: 1040 mL    OUT:  Total OUT: 0 mL    Total NET: 1040 mL          LABS:                          13.5   13.94 )-----------( 215      ( 31 Aug 2021 13:24 )             41.2                                               08-31    138  |  96<L>  |  42<H>  ----------------------------<  186<H>  3.6   |  23  |  1.9<H>    Ca    9.3      31 Aug 2021 13:24    TPro  6.8  /  Alb  3.6  /  TBili  1.6<H>  /  DBili  x   /  AST  253<H>  /  ALT  88<H>  /  AlkPhos  53  08-31      PT/INR - ( 31 Aug 2021 13:24 )   PT: 15.20 sec;   INR: 1.33 ratio         PTT - ( 31 Aug 2021 13:24 )  PTT:34.2 sec                                       Urinalysis Basic - ( 31 Aug 2021 18:38 )    Color: Yellow / Appearance: Slightly Turbid / SG: >1.050 / pH: x  Gluc: x / Ketone: Negative  / Bili: Negative / Urobili: <2 mg/dL   Blood: x / Protein: 300 mg/dL / Nitrite: Negative   Leuk Esterase: Negative / RBC: 9 /HPF / WBC 32 /HPF   Sq Epi: x / Non Sq Epi: 2 /HPF / Bacteria: Negative        CARDIAC MARKERS ( 31 Aug 2021 14:35 )  x     / 0.04 ng/mL / x     / x     / x                                                LIVER FUNCTIONS - ( 31 Aug 2021 13:24 )  Alb: 3.6 g/dL / Pro: 6.8 g/dL / ALK PHOS: 53 U/L / ALT: 88 U/L / AST: 253 U/L / GGT: x                                                                                                                                       cxr. ct reviewed    MEDICATIONS  (STANDING):  allopurinol 150 milliGRAM(s) Oral daily  aspirin  chewable 81 milliGRAM(s) Oral daily  atorvastatin 40 milliGRAM(s) Oral at bedtime  azithromycin  IVPB 500 milliGRAM(s) IV Intermittent every 24 hours  cefTRIAXone   IVPB 1000 milliGRAM(s) IV Intermittent every 24 hours  chlorhexidine 4% Liquid 1 Application(s) Topical <User Schedule>  dextrose 40% Gel 15 Gram(s) Oral once  dextrose 5%. 1000 milliLiter(s) (50 mL/Hr) IV Continuous <Continuous>  dextrose 5%. 1000 milliLiter(s) (100 mL/Hr) IV Continuous <Continuous>  dextrose 50% Injectable 25 Gram(s) IV Push once  dextrose 50% Injectable 12.5 Gram(s) IV Push once  dextrose 50% Injectable 25 Gram(s) IV Push once  glucagon  Injectable 1 milliGRAM(s) IntraMuscular once  isosorbide   mononitrate ER Tablet (IMDUR) 120 milliGRAM(s) Oral daily  rivaroxaban 15 milliGRAM(s) Oral with dinner    MEDICATIONS  (PRN):  acetaminophen   Tablet .. 650 milliGRAM(s) Oral every 6 hours PRN Temp greater or equal to 38C (100.4F), Mild Pain (1 - 3)

## 2021-09-01 NOTE — PROGRESS NOTE ADULT - SUBJECTIVE AND OBJECTIVE BOX
MISTY BURLESON 80y Male  MRN#: [id number]     SUBJECTIVE  Patient is a 80y old Male who presents with a chief complaint of     Today is hospital day 1d, and this morning he is lying in bed without distress.   No acute overnight events.     OBJECTIVE  PAST MEDICAL & SURGICAL HISTORY  Atrial fibrillation    Coronary artery disease    Hyperlipidemia    Hypertension    Gout    Diabetes mellitus    VINI on CPAP    History of heart artery stent      ALLERGIES:  No Known Allergies    MEDICATIONS:  STANDING MEDICATIONS  allopurinol 150 milliGRAM(s) Oral daily  aspirin  chewable 81 milliGRAM(s) Oral daily  atorvastatin 40 milliGRAM(s) Oral at bedtime  azithromycin  IVPB 500 milliGRAM(s) IV Intermittent every 24 hours  cefTRIAXone   IVPB 1000 milliGRAM(s) IV Intermittent every 24 hours  chlorhexidine 4% Liquid 1 Application(s) Topical <User Schedule>  dextrose 40% Gel 15 Gram(s) Oral once  dextrose 5%. 1000 milliLiter(s) IV Continuous <Continuous>  dextrose 5%. 1000 milliLiter(s) IV Continuous <Continuous>  dextrose 50% Injectable 25 Gram(s) IV Push once  dextrose 50% Injectable 12.5 Gram(s) IV Push once  dextrose 50% Injectable 25 Gram(s) IV Push once  glucagon  Injectable 1 milliGRAM(s) IntraMuscular once  isosorbide   mononitrate ER Tablet (IMDUR) 120 milliGRAM(s) Oral daily  rivaroxaban 15 milliGRAM(s) Oral with dinner    PRN MEDICATIONS  acetaminophen   Tablet .. 650 milliGRAM(s) Oral every 6 hours PRN    HOME MEDICATIONS  Home Medications:  Actoplus Met 15 mg-500 mg oral tablet: 1 tab(s) orally 2 times a day (31 Aug 2021 22:08)  allopurinol: 150 milligram(s) orally once a day (31 Aug 2021 22:08)  amLODIPine 10 mg oral tablet: 1 tab(s) orally once a day (in the evening) (31 Aug 2021 22:08)  aspirin 81 mg oral tablet, chewable: 1 tab(s) orally once a day (31 Aug 2021 22:08)  atorvastatin 40 mg oral tablet: 1 tab(s) orally once a day (31 Aug 2021 22:08)  fosinopril 40 mg oral tablet: 1 tab(s) orally once a day (in the evening) (31 Aug 2021 22:08)  hydroCHLOROthiazide 12.5 mg oral tablet: 1 tab(s) orally once a day (31 Aug 2021 22:08)  isosorbide mononitrate 120 mg oral tablet, extended release: 1 tab(s) orally once a day (in the morning) (31 Aug 2021 22:08)  Xarelto 15 mg oral tablet: 1 tab(s) orally once a day (in the evening) (31 Aug 2021 22:08)      LABS:                        13.5   13.94 )-----------( 215      ( 31 Aug 2021 13:24 )             41.2     08-31    138  |  96<L>  |  42<H>  ----------------------------<  186<H>  3.6   |  23  |  1.9<H>    Ca    9.3      31 Aug 2021 13:24    TPro  6.8  /  Alb  3.6  /  TBili  1.6<H>  /  DBili  x   /  AST  253<H>  /  ALT  88<H>  /  AlkPhos  53  08-31    LIVER FUNCTIONS - ( 31 Aug 2021 13:24 )  Alb: 3.6 g/dL / Pro: 6.8 g/dL / ALK PHOS: 53 U/L / ALT: 88 U/L / AST: 253 U/L / GGT: x           PT/INR - ( 31 Aug 2021 13:24 )   PT: 15.20 sec;   INR: 1.33 ratio         PTT - ( 31 Aug 2021 13:24 )  PTT:34.2 sec  Urinalysis Basic - ( 31 Aug 2021 18:38 )    Color: Yellow / Appearance: Slightly Turbid / SG: >1.050 / pH: x  Gluc: x / Ketone: Negative  / Bili: Negative / Urobili: <2 mg/dL   Blood: x / Protein: 300 mg/dL / Nitrite: Negative   Leuk Esterase: Negative / RBC: 9 /HPF / WBC 32 /HPF   Sq Epi: x / Non Sq Epi: 2 /HPF / Bacteria: Negative        Troponin T, Serum: 0.04 ng/mL *HH* (08-31-21 @ 14:35)      CARDIAC MARKERS ( 31 Aug 2021 14:35 )  x     / 0.04 ng/mL / x     / x     / x          CAPILLARY BLOOD GLUCOSE      POCT Blood Glucose.: 169 mg/dL (01 Sep 2021 00:11)      PHYSICAL EXAM:  T(C): 39.1 (09-01-21 @ 06:00), Max: 40 (09-01-21 @ 00:00)  HR: 104 (09-01-21 @ 06:00) (85 - 115)  BP: 170/73 (09-01-21 @ 06:00) (126/84 - 187/84)  RR: 35 (09-01-21 @ 06:00) (19 - 35)  SpO2: 95% (09-01-21 @ 06:00) (90% - 99%)    GENERAL: NAD, well-developed, 80y  EENT: EOMI, conjunctiva and sclera clear, No nasal obstruction or discharge  RESPIRATORY: Clear to auscultation bilaterally; No wheeze or crackles  CARDIOVASCULAR: Regular rate and rhythm; No murmurs, rubs, or gallops, no pitting edema  GASTROINTESTINAL: Abdomen Soft, Nontender, Nondistended  MUSCULOSKELETAL:  No cyanosis, extremities grossly symmetrical  PSYCH: AAOx3, affect appropriate  NEUROLOGY: non-focal, cognition grossly intact, MAEE    ADMISSION SUMMARY  Patient is a 80y old Male who presents with a chief complaint of   MISTY BURLESON 80y Male  MRN#: [id number]     SUBJECTIVE  Patient is a 80y old Male who presents with a chief complaint of Fall.    HPI:  PC:  Fall    PMHx: HTN, HLD, T2DM, Afib on Xarelto, CAD s/p stents placement (2014), gout, VINI on CPAP    HPI: 80M w/ PMHx as above BIBEMS for fall. Patient says he felt dizzy yesterday causing him to fall on to his bed. Denies any room spinning or pre-syncope. Unable to describe the dfizziness to me. No HT, LOC, numbness, weakness, tingling, headache, vision changes, fever, cold/flu symptoms, CP. Patient endorses that he has been feeling SOB for the past 2 days and has been coughing for the past 1 week per daughter at bedside. Also states that he had diarrhea for the past 2 days, but not today (Of note Pt S/p Moderna Vaccine 2 doses).    ED Course: Febrile, tachycardic and hypoxic in ED. Labs revealed leukocytosis, HAGMA, elevated Trop 0.04, BNP 13k. UA positve for WBC. Trauma w/up neg. CXR and CT chest revealed confluent airspace consolidation in the RUL and to a lesser degree the RLL consistent with pneumonia. (31 Aug 2021 22:29)      Interval and Overnight Events:  Today is hospital day 1d, and this morning he is lying in bed without distress. Patient is on the BIPAP machine. Patient taken off the machine and placed on NC in during the morning. Patient desatted to 85% with labored breathing. Patient had trial of high flow NC but was not tolerating well, so placed back on continuous BIPAP.     No acute overnight events.     OBJECTIVE  PAST MEDICAL & SURGICAL HISTORY  Atrial fibrillation    Coronary artery disease    Hyperlipidemia    Hypertension    Gout    Diabetes mellitus    VINI on CPAP    History of heart artery stent      ALLERGIES:  No Known Allergies    MEDICATIONS:  STANDING MEDICATIONS  allopurinol 150 milliGRAM(s) Oral daily  aspirin  chewable 81 milliGRAM(s) Oral daily  atorvastatin 40 milliGRAM(s) Oral at bedtime  azithromycin  IVPB 500 milliGRAM(s) IV Intermittent every 24 hours  cefTRIAXone   IVPB 1000 milliGRAM(s) IV Intermittent every 24 hours  chlorhexidine 4% Liquid 1 Application(s) Topical <User Schedule>  dextrose 40% Gel 15 Gram(s) Oral once  dextrose 5%. 1000 milliLiter(s) IV Continuous <Continuous>  dextrose 5%. 1000 milliLiter(s) IV Continuous <Continuous>  dextrose 50% Injectable 25 Gram(s) IV Push once  dextrose 50% Injectable 12.5 Gram(s) IV Push once  dextrose 50% Injectable 25 Gram(s) IV Push once  glucagon  Injectable 1 milliGRAM(s) IntraMuscular once  isosorbide   mononitrate ER Tablet (IMDUR) 120 milliGRAM(s) Oral daily  rivaroxaban 15 milliGRAM(s) Oral with dinner    PRN MEDICATIONS  acetaminophen   Tablet .. 650 milliGRAM(s) Oral every 6 hours PRN    HOME MEDICATIONS  Home Medications:  Actoplus Met 15 mg-500 mg oral tablet: 1 tab(s) orally 2 times a day (31 Aug 2021 22:08)  allopurinol: 150 milligram(s) orally once a day (31 Aug 2021 22:08)  amLODIPine 10 mg oral tablet: 1 tab(s) orally once a day (in the evening) (31 Aug 2021 22:08)  aspirin 81 mg oral tablet, chewable: 1 tab(s) orally once a day (31 Aug 2021 22:08)  atorvastatin 40 mg oral tablet: 1 tab(s) orally once a day (31 Aug 2021 22:08)  fosinopril 40 mg oral tablet: 1 tab(s) orally once a day (in the evening) (31 Aug 2021 22:08)  hydroCHLOROthiazide 12.5 mg oral tablet: 1 tab(s) orally once a day (31 Aug 2021 22:08)  isosorbide mononitrate 120 mg oral tablet, extended release: 1 tab(s) orally once a day (in the morning) (31 Aug 2021 22:08)  Xarelto 15 mg oral tablet: 1 tab(s) orally once a day (in the evening) (31 Aug 2021 22:08)      LABS:                        13.5   13.94 )-----------( 215      ( 31 Aug 2021 13:24 )             41.2     08-31    138  |  96<L>  |  42<H>  ----------------------------<  186<H>  3.6   |  23  |  1.9<H>    Ca    9.3      31 Aug 2021 13:24    TPro  6.8  /  Alb  3.6  /  TBili  1.6<H>  /  DBili  x   /  AST  253<H>  /  ALT  88<H>  /  AlkPhos  53  08-31    LIVER FUNCTIONS - ( 31 Aug 2021 13:24 )  Alb: 3.6 g/dL / Pro: 6.8 g/dL / ALK PHOS: 53 U/L / ALT: 88 U/L / AST: 253 U/L / GGT: x           PT/INR - ( 31 Aug 2021 13:24 )   PT: 15.20 sec;   INR: 1.33 ratio         PTT - ( 31 Aug 2021 13:24 )  PTT:34.2 sec  Urinalysis Basic - ( 31 Aug 2021 18:38 )    Color: Yellow / Appearance: Slightly Turbid / SG: >1.050 / pH: x  Gluc: x / Ketone: Negative  / Bili: Negative / Urobili: <2 mg/dL   Blood: x / Protein: 300 mg/dL / Nitrite: Negative   Leuk Esterase: Negative / RBC: 9 /HPF / WBC 32 /HPF   Sq Epi: x / Non Sq Epi: 2 /HPF / Bacteria: Negative        Troponin T, Serum: 0.04 ng/mL *HH* (08-31-21 @ 14:35)      CARDIAC MARKERS ( 31 Aug 2021 14:35 )  x     / 0.04 ng/mL / x     / x     / x          CAPILLARY BLOOD GLUCOSE      POCT Blood Glucose.: 169 mg/dL (01 Sep 2021 00:11)    Imaging:    CT Angio Chest w/ Contrast:  < from: CT Angio Chest PE Protocol w/ IV Cont (08.31.21 @ 14:34) >  IMPRESSION:    Confluent airspace consolidation in the right upper lobe and to a lesser degree the right lower lobe, consistent with pneumonia    No evidence of acute intrathoracic or intra-abdominal traumatic injury.    No evidence of pulmonary embolism    --- End of Report ---    < end of copied text >      Chest X-ray:  < from: Xray Chest 1 View-PORTABLE IMMEDIATE (08.31.21 @ 16:56) >  Impression:    Multifocal opacities, most confluent at right upper lobe.        --- End of Report ---    < end of copied text >      PHYSICAL EXAM:  T(C): 39.1 (09-01-21 @ 06:00), Max: 40 (09-01-21 @ 00:00)  HR: 104 (09-01-21 @ 06:00) (85 - 115)  BP: 170/73 (09-01-21 @ 06:00) (126/84 - 187/84)  RR: 35 (09-01-21 @ 06:00) (19 - 35)  SpO2: 95% (09-01-21 @ 06:00) (90% - 99%)    GENERAL: NAD, very poor condition, 80y  EENT: EOMI, conjunctiva and sclera clear, No nasal obstruction or discharge  RESPIRATORY: Rhonchi noted on the right side  CARDIOVASCULAR: Regular rate and rhythm; No murmurs, rubs, or gallops, no pitting edema  GASTROINTESTINAL: Abdomen Soft, Nontender, + distended, +BS  MUSCULOSKELETAL:  LLE very dry appearing, extremities grossly symmetrical  PSYCH: AAOx3, affect appropriate  NEUROLOGY: non-focal, cognition grossly intact, MAEE    ADMISSION SUMMARY  Patient is a 80y old Male who presents with a chief complaint of

## 2021-09-02 LAB
ALBUMIN SERPL ELPH-MCNC: 2.7 G/DL — LOW (ref 3.5–5.2)
ALP SERPL-CCNC: 59 U/L — SIGNIFICANT CHANGE UP (ref 30–115)
ALT FLD-CCNC: 140 U/L — HIGH (ref 0–41)
ANION GAP SERPL CALC-SCNC: 18 MMOL/L — HIGH (ref 7–14)
APTT BLD: 37.7 SEC — SIGNIFICANT CHANGE UP (ref 27–39.2)
AST SERPL-CCNC: 342 U/L — HIGH (ref 0–41)
BASE EXCESS BLDA CALC-SCNC: -2.1 MMOL/L — LOW (ref -2–3)
BASE EXCESS BLDA CALC-SCNC: -3.2 MMOL/L — LOW (ref -2–3)
BASOPHILS # BLD AUTO: 0.04 K/UL — SIGNIFICANT CHANGE UP (ref 0–0.2)
BASOPHILS NFR BLD AUTO: 0.2 % — SIGNIFICANT CHANGE UP (ref 0–1)
BILIRUB SERPL-MCNC: 1.3 MG/DL — HIGH (ref 0.2–1.2)
BUN SERPL-MCNC: 80 MG/DL — CRITICAL HIGH (ref 10–20)
CALCIUM SERPL-MCNC: 8.5 MG/DL — SIGNIFICANT CHANGE UP (ref 8.5–10.1)
CHLORIDE SERPL-SCNC: 99 MMOL/L — SIGNIFICANT CHANGE UP (ref 98–110)
CHLORIDE UR-SCNC: 20 — SIGNIFICANT CHANGE UP
CK SERPL-CCNC: 2687 U/L — HIGH (ref 0–225)
CO2 SERPL-SCNC: 22 MMOL/L — SIGNIFICANT CHANGE UP (ref 17–32)
CREAT SERPL-MCNC: 5.1 MG/DL — CRITICAL HIGH (ref 0.7–1.5)
EOSINOPHIL # BLD AUTO: 0 K/UL — SIGNIFICANT CHANGE UP (ref 0–0.7)
EOSINOPHIL NFR BLD AUTO: 0 % — SIGNIFICANT CHANGE UP (ref 0–8)
GLUCOSE BLDC GLUCOMTR-MCNC: 129 MG/DL — HIGH (ref 70–99)
GLUCOSE BLDC GLUCOMTR-MCNC: 137 MG/DL — HIGH (ref 70–99)
GLUCOSE BLDC GLUCOMTR-MCNC: 143 MG/DL — HIGH (ref 70–99)
GLUCOSE BLDC GLUCOMTR-MCNC: 175 MG/DL — HIGH (ref 70–99)
GLUCOSE SERPL-MCNC: 148 MG/DL — HIGH (ref 70–99)
HCO3 BLDA-SCNC: 24 MMOL/L — SIGNIFICANT CHANGE UP (ref 21–28)
HCO3 BLDA-SCNC: 26 MMOL/L — SIGNIFICANT CHANGE UP (ref 21–28)
HCT VFR BLD CALC: 37.4 % — LOW (ref 42–52)
HGB BLD-MCNC: 12 G/DL — LOW (ref 14–18)
IMM GRANULOCYTES NFR BLD AUTO: 0.8 % — HIGH (ref 0.1–0.3)
LEGIONELLA AG UR QL: NEGATIVE — SIGNIFICANT CHANGE UP
LYMPHOCYTES # BLD AUTO: 0.35 K/UL — LOW (ref 1.2–3.4)
LYMPHOCYTES # BLD AUTO: 2.1 % — LOW (ref 20.5–51.1)
MAGNESIUM SERPL-MCNC: 2.4 MG/DL — SIGNIFICANT CHANGE UP (ref 1.8–2.4)
MCHC RBC-ENTMCNC: 30.1 PG — SIGNIFICANT CHANGE UP (ref 27–31)
MCHC RBC-ENTMCNC: 32.1 G/DL — SIGNIFICANT CHANGE UP (ref 32–37)
MCV RBC AUTO: 93.7 FL — SIGNIFICANT CHANGE UP (ref 80–94)
MONOCYTES # BLD AUTO: 0.33 K/UL — SIGNIFICANT CHANGE UP (ref 0.1–0.6)
MONOCYTES NFR BLD AUTO: 2 % — SIGNIFICANT CHANGE UP (ref 1.7–9.3)
MRSA PCR RESULT.: NEGATIVE — SIGNIFICANT CHANGE UP
NEUTROPHILS # BLD AUTO: 15.95 K/UL — HIGH (ref 1.4–6.5)
NEUTROPHILS NFR BLD AUTO: 94.9 % — HIGH (ref 42.2–75.2)
NRBC # BLD: 0 /100 WBCS — SIGNIFICANT CHANGE UP (ref 0–0)
PCO2 BLDA: 48 MMHG — SIGNIFICANT CHANGE UP (ref 35–48)
PCO2 BLDA: 60 MMHG — HIGH (ref 35–48)
PH BLDA: 7.25 — LOW (ref 7.35–7.45)
PH BLDA: 7.3 — LOW (ref 7.35–7.45)
PLATELET # BLD AUTO: 230 K/UL — SIGNIFICANT CHANGE UP (ref 130–400)
PO2 BLDA: 59 MMHG — LOW (ref 83–108)
PO2 BLDA: 62 MMHG — LOW (ref 83–108)
POTASSIUM SERPL-MCNC: 4.1 MMOL/L — SIGNIFICANT CHANGE UP (ref 3.5–5)
POTASSIUM SERPL-SCNC: 4.1 MMOL/L — SIGNIFICANT CHANGE UP (ref 3.5–5)
PROT SERPL-MCNC: 5.5 G/DL — LOW (ref 6–8)
RBC # BLD: 3.99 M/UL — LOW (ref 4.7–6.1)
RBC # FLD: 16.2 % — HIGH (ref 11.5–14.5)
SAO2 % BLDA: 89.3 % — LOW (ref 94–98)
SAO2 % BLDA: 91.9 % — LOW (ref 94–98)
SODIUM SERPL-SCNC: 139 MMOL/L — SIGNIFICANT CHANGE UP (ref 135–146)
SODIUM UR-SCNC: <20 MMOL/L — SIGNIFICANT CHANGE UP
WBC # BLD: 16.81 K/UL — HIGH (ref 4.8–10.8)
WBC # FLD AUTO: 16.81 K/UL — HIGH (ref 4.8–10.8)

## 2021-09-02 PROCEDURE — 71045 X-RAY EXAM CHEST 1 VIEW: CPT | Mod: 26,77

## 2021-09-02 PROCEDURE — 99233 SBSQ HOSP IP/OBS HIGH 50: CPT

## 2021-09-02 PROCEDURE — 99291 CRITICAL CARE FIRST HOUR: CPT

## 2021-09-02 PROCEDURE — 71045 X-RAY EXAM CHEST 1 VIEW: CPT | Mod: 26

## 2021-09-02 RX ORDER — VANCOMYCIN HCL 1 G
1500 VIAL (EA) INTRAVENOUS ONCE
Refills: 0 | Status: COMPLETED | OUTPATIENT
Start: 2021-09-02 | End: 2021-09-02

## 2021-09-02 RX ORDER — BUMETANIDE 0.25 MG/ML
2 INJECTION INTRAMUSCULAR; INTRAVENOUS ONCE
Refills: 0 | Status: COMPLETED | OUTPATIENT
Start: 2021-09-02 | End: 2021-09-02

## 2021-09-02 RX ORDER — HEPARIN SODIUM 5000 [USP'U]/ML
1300 INJECTION INTRAVENOUS; SUBCUTANEOUS
Qty: 25000 | Refills: 0 | Status: DISCONTINUED | OUTPATIENT
Start: 2021-09-02 | End: 2021-09-02

## 2021-09-02 RX ORDER — HEPARIN SODIUM 5000 [USP'U]/ML
4500 INJECTION INTRAVENOUS; SUBCUTANEOUS EVERY 6 HOURS
Refills: 0 | Status: DISCONTINUED | OUTPATIENT
Start: 2021-09-02 | End: 2021-09-03

## 2021-09-02 RX ORDER — ALLOPURINOL 300 MG
50 TABLET ORAL DAILY
Refills: 0 | Status: DISCONTINUED | OUTPATIENT
Start: 2021-09-02 | End: 2021-09-18

## 2021-09-02 RX ORDER — HEPARIN SODIUM 5000 [USP'U]/ML
9000 INJECTION INTRAVENOUS; SUBCUTANEOUS EVERY 6 HOURS
Refills: 0 | Status: DISCONTINUED | OUTPATIENT
Start: 2021-09-02 | End: 2021-09-03

## 2021-09-02 RX ORDER — HEPARIN SODIUM 5000 [USP'U]/ML
1700 INJECTION INTRAVENOUS; SUBCUTANEOUS
Qty: 25000 | Refills: 0 | Status: DISCONTINUED | OUTPATIENT
Start: 2021-09-02 | End: 2021-09-03

## 2021-09-02 RX ADMIN — Medication 300 MILLIGRAM(S): at 12:03

## 2021-09-02 RX ADMIN — CHLORHEXIDINE GLUCONATE 1 APPLICATION(S): 213 SOLUTION TOPICAL at 05:07

## 2021-09-02 RX ADMIN — BUMETANIDE 2 MILLIGRAM(S): 0.25 INJECTION INTRAMUSCULAR; INTRAVENOUS at 15:46

## 2021-09-02 RX ADMIN — CEFTRIAXONE 100 MILLIGRAM(S): 500 INJECTION, POWDER, FOR SOLUTION INTRAMUSCULAR; INTRAVENOUS at 00:56

## 2021-09-02 RX ADMIN — SODIUM CHLORIDE 100 MILLILITER(S): 9 INJECTION, SOLUTION INTRAVENOUS at 07:55

## 2021-09-02 RX ADMIN — HEPARIN SODIUM 1300 UNIT(S)/HR: 5000 INJECTION INTRAVENOUS; SUBCUTANEOUS at 11:45

## 2021-09-02 NOTE — CONSULT NOTE ADULT - SUBJECTIVE AND OBJECTIVE BOX
Full consult to follow.  Will review office notes    Patient was seen and examined by me in CEU.  EMR reviewed.  Girl friend is in the room at bedside.    Patient is a 80y old  Male who presents with a chief complaint of Fall (02 Sep 2021 11:21)      REASON FOR CONSULT     HPI:  PC:  Fall    PMHx: HTN, HLD, T2DM, Afib on Xarelto, CAD s/p stents placement (2014), gout, VINI on CPAP    HPI: 80M w/ PMHx as above BIBEMS for fall. Patient says he felt dizzy yesterday causing him to fall on to his bed. Denies any room spinning or pre-syncope. Unable to describe the dizziness to me. No HT, LOC, numbness, weakness, tingling, headache, vision changes, fever, cold/flu symptoms, CP. Patient endorses that he has been feeling SOB for the past 2 days and has been coughing for the past 1 week per daughter at bedside. Also states that he had diarrhea for the past 2 days, but not today (Of note Pt S/p Moderna Vaccine 2 doses).    ED Course: Febrile, tachycardic and hypoxic in ED. Labs revealed leukocytosis, HAGMA, elevated Trop 0.04, BNP 13k. UA positve for WBC. Trauma w/up neg. CXR and CT chest revealed confluent airspace consolidation in the RUL and to a lesser degree the RLL consistent with pneumonia. (31 Aug 2021 22:29)      PAST MEDICAL & SURGICAL HISTORY:  Atrial fibrillation    Coronary artery disease    Hyperlipidemia    Hypertension    Gout    Diabetes mellitus    VINI on CPAP    History of heart artery stent            SOCIAL HISTORY:     FAMILY HISTORY:    No Known Allergies      MEDICATIONS  (STANDING):  allopurinol 50 milliGRAM(s) Oral daily  aspirin  chewable 81 milliGRAM(s) Oral daily  atorvastatin 40 milliGRAM(s) Oral at bedtime  cefTRIAXone   IVPB 1000 milliGRAM(s) IV Intermittent every 24 hours  chlorhexidine 4% Liquid 1 Application(s) Topical <User Schedule>  dextrose 40% Gel 15 Gram(s) Oral once  dextrose 5%. 1000 milliLiter(s) (50 mL/Hr) IV Continuous <Continuous>  dextrose 5%. 1000 milliLiter(s) (100 mL/Hr) IV Continuous <Continuous>  dextrose 50% Injectable 25 Gram(s) IV Push once  dextrose 50% Injectable 12.5 Gram(s) IV Push once  dextrose 50% Injectable 25 Gram(s) IV Push once  glucagon  Injectable 1 milliGRAM(s) IntraMuscular once  heparin  Infusion. 1700 Unit(s)/Hr (17 mL/Hr) IV Continuous <Continuous>  isosorbide   mononitrate ER Tablet (IMDUR) 120 milliGRAM(s) Oral daily  levoFLOXacin IVPB      levoFLOXacin IVPB 250 milliGRAM(s) IV Intermittent every 24 hours    MEDICATIONS  (PRN):  acetaminophen   Tablet .. 650 milliGRAM(s) Oral every 6 hours PRN Temp greater or equal to 38C (100.4F), Mild Pain (1 - 3)  heparin   Injectable 9000 Unit(s) IV Push every 6 hours PRN For aPTT less than 40  heparin   Injectable 4500 Unit(s) IV Push every 6 hours PRN For aPTT between 40 - 57      Vital Signs Last 24 Hrs  T(C): 36.4 (02 Sep 2021 16:00), Max: 39 (01 Sep 2021 20:00)  T(F): 97.6 (02 Sep 2021 16:00), Max: 102.2 (01 Sep 2021 20:00)  HR: 100 (02 Sep 2021 16:00) (99 - 114)  BP: 142/71 (02 Sep 2021 16:00) (140/73 - 177/78)  BP(mean): 97 (02 Sep 2021 16:00) (91 - 100)  RR: 29 (02 Sep 2021 16:00) (28 - 32)  SpO2: 93% (02 Sep 2021 16:00) (88% - 99%) I&O's Detail    01 Sep 2021 07:01  -  02 Sep 2021 07:00  --------------------------------------------------------  IN:    Lactated Ringers: 1100 mL  Total IN: 1100 mL    OUT:  Total OUT: 0 mL    Total NET: 1100 mL      02 Sep 2021 07:01  -  02 Sep 2021 19:49  --------------------------------------------------------  IN:    Heparin Infusion: 104 mL    IV PiggyBack: 600 mL    Lactated Ringers: 800 mL  Total IN: 1504 mL    OUT:    Indwelling Catheter - Urethral (mL): 35 mL  Total OUT: 35 mL    Total NET: 1469 mL        PHYSICAL EXAM:      REVIEW OF SYSTEMS        ECG:  ECHOCARDIOGRAM:  RADIOLOGY & ADDITIONAL STUDIES:      LABS:                        12.0   16.81 )-----------( 230      ( 02 Sep 2021 08:52 )             37.4     09-02    139  |  99  |  80<HH>  ----------------------------<  148<H>  4.1   |  22  |  5.1<HH>    Ca    8.5      02 Sep 2021 08:52  Mg     2.4     09-02    TPro  5.5<L>  /  Alb  2.7<L>  /  TBili  1.3<H>  /  DBili  x   /  AST  342<H>  /  ALT  140<H>  /  AlkPhos  59  09-02    CARDIAC MARKERS ( 02 Sep 2021 08:52 )  x     / x     / 2687 U/L / x     / x      CARDIAC MARKERS ( 01 Sep 2021 16:49 )  x     / x     / 3604 U/L / x     / x      CARDIAC MARKERS ( 01 Sep 2021 08:16 )  x     / 0.07 ng/mL / 4253 U/L / x     / x      CARDIAC MARKERS ( 31 Aug 2021 14:35 )  x     / 0.04 ng/mL / x     / x     / x          PTT - ( 02 Sep 2021 17:46 )  PTT:37.7 sec  I&O's Summary    01 Sep 2021 07:01  -  02 Sep 2021 07:00  --------------------------------------------------------  IN: 1100 mL / OUT: 0 mL / NET: 1100 mL    02 Sep 2021 07:01  -  02 Sep 2021 19:49  --------------------------------------------------------  IN: 1504 mL / OUT: 35 mL / NET: 1469 mL         Patient was seen and examined by me in CEU.  EMR reviewed.  Girl friend is in the room at bedside.    Patient is a 80y old  Male who presents with a chief complaint of Fall (02 Sep 2021 11:21)      REASON FOR CONSULT: Type 2 MI    HPI:  Mr. Panchito Bond is an 80-year-old  male with a past CAD S/P PTCA/Stent, Hypertension, AS with AI, Atrial Fibrillation, Hyperlipidemia, VINI, Type II DM and Obesity.    He presented at [facility] because of S/P Fall.  He had diarrhea for 2 days prior to his presentation at [facility] ER.  He was found on the floor by his girl friend.  He is unsure of length of time he was on the floor.  He has been diagnosed to have CAP.  His course in the hospital has been complicated with worsening kidney function.  He is scheduled to have urgent HD tonight.  He has been upgraded to ICU.  _____________________________________________________________________________    House Staff Admission Note  PC:  Fall    PMHx: HTN, HLD, T2DM, Afib on Xarelto, CAD s/p stents placement (2014), gout, VINI on CPAP    HPI: 80M w/ PMHx as above BIBEMS for fall. Patient says he felt dizzy yesterday causing him to fall on to his bed. Denies any room spinning or pre-syncope. Unable to describe the dizziness to me. No HT, LOC, numbness, weakness, tingling, headache, vision changes, fever, cold/flu symptoms, CP. Patient endorses that he has been feeling SOB for the past 2 days and has been coughing for the past 1 week per daughter at bedside. Also states that he had diarrhea for the past 2 days, but not today (Of note Pt S/p Moderna Vaccine 2 doses).    ED Course: Febrile, tachycardic and hypoxic in ED. Labs revealed leukocytosis, HAGMA, elevated Trop 0.04, BNP 13k. UA positve for WBC. Trauma w/up neg. CXR and CT chest revealed confluent airspace consolidation in the RUL and to a lesser degree the RLL consistent with pneumonia. (31 Aug 2021 22:29)      PAST MEDICAL & SURGICAL HISTORY:  Atrial fibrillation    Coronary artery disease    Hyperlipidemia    Hypertension    Gout    Diabetes mellitus    VINI on CPAP    History of heart artery stent            SOCIAL HISTORY:     FAMILY HISTORY:    No Known Allergies      MEDICATIONS  (STANDING):  allopurinol 50 milliGRAM(s) Oral daily  aspirin  chewable 81 milliGRAM(s) Oral daily  atorvastatin 40 milliGRAM(s) Oral at bedtime  cefTRIAXone   IVPB 1000 milliGRAM(s) IV Intermittent every 24 hours  chlorhexidine 4% Liquid 1 Application(s) Topical <User Schedule>  dextrose 40% Gel 15 Gram(s) Oral once  dextrose 5%. 1000 milliLiter(s) (50 mL/Hr) IV Continuous <Continuous>  dextrose 5%. 1000 milliLiter(s) (100 mL/Hr) IV Continuous <Continuous>  dextrose 50% Injectable 25 Gram(s) IV Push once  dextrose 50% Injectable 12.5 Gram(s) IV Push once  dextrose 50% Injectable 25 Gram(s) IV Push once  glucagon  Injectable 1 milliGRAM(s) IntraMuscular once  heparin  Infusion. 1700 Unit(s)/Hr (17 mL/Hr) IV Continuous <Continuous>  isosorbide   mononitrate ER Tablet (IMDUR) 120 milliGRAM(s) Oral daily  levoFLOXacin IVPB      levoFLOXacin IVPB 250 milliGRAM(s) IV Intermittent every 24 hours    MEDICATIONS  (PRN):  acetaminophen   Tablet .. 650 milliGRAM(s) Oral every 6 hours PRN Temp greater or equal to 38C (100.4F), Mild Pain (1 - 3)  heparin   Injectable 9000 Unit(s) IV Push every 6 hours PRN For aPTT less than 40  heparin   Injectable 4500 Unit(s) IV Push every 6 hours PRN For aPTT between 40 - 57      Vital Signs Last 24 Hrs  T(C): 36.4 (02 Sep 2021 16:00), Max: 39 (01 Sep 2021 20:00)  T(F): 97.6 (02 Sep 2021 16:00), Max: 102.2 (01 Sep 2021 20:00)  HR: 100 (02 Sep 2021 16:00) (99 - 114)  BP: 142/71 (02 Sep 2021 16:00) (140/73 - 177/78)  BP(mean): 97 (02 Sep 2021 16:00) (91 - 100)  RR: 29 (02 Sep 2021 16:00) (28 - 32)  SpO2: 93% (02 Sep 2021 16:00) (88% - 99%) I&O's Detail    01 Sep 2021 07:01  -  02 Sep 2021 07:00  --------------------------------------------------------  IN:    Lactated Ringers: 1100 mL  Total IN: 1100 mL    OUT:  Total OUT: 0 mL    Total NET: 1100 mL      02 Sep 2021 07:01  -  02 Sep 2021 19:49  --------------------------------------------------------  IN:    Heparin Infusion: 104 mL    IV PiggyBack: 600 mL    Lactated Ringers: 800 mL  Total IN: 1504 mL    OUT:    Indwelling Catheter - Urethral (mL): 35 mL  Total OUT: 35 mL    Total NET: 1469 mL        PHYSICAL EXAM:  On Bipap  Alert; following simple commands  Tachycardic; normal S1 S2; JUAN II/VI base  Bilateral breath sounds  Abdomen soft  Edema in both legs.  Moving all extremities    REVIEW OF SYSTEMS: Negative except as stated in HPI    Telemetry: Atrial Fib    ECHOCARDIOGRAM:  < from: TTE Echo Complete w/o Contrast w/ Doppler (09.23.20 @ 15:57) >  Summary:   1. LV Ejection Fraction by Yoo's Method with a biplane EF of 62 %.   2. Mildly enlarged left atrium.   3. Normal right atrial size.   4. Mitral annular calcification.   5. Thickening and calcification of the anterior and posterior mitral valve leaflets.   6. Trace mitral valve regurgitation.   7. Mild tricuspid regurgitation.   8. Aortic valve thickening with decreased leaflet opening.   9. Peak transaortic gradient equals 23.7 mmHg, mean transaortic gradient equals 11.0 mmHg, the calculated aortic valve area equals 2.08 cm² by the continuity equation consistent with mild aortic stenosis.    < end of copied text >    RADIOLOGY & ADDITIONAL STUDIES:    < from: CT Angio Chest PE Protocol w/ IV Cont (08.31.21 @ 14:34) >  FINDINGS:    CHEST:      PULMONARY ARTERIES: No evidence of acute pulmonary embolism.    LUNGS, PLEURA, AIRWAYS: Right upper lobe confluent airspace consolidation. Scattered areas of consolidation in the right lower lobe as well. No evidence of pleural effusion or pneumothorax. No evidence of central endobronchial obstruction. No bronchiectasis or honeycombing.    THORACIC NODES: Calcified mediastinal lymph nodes, correlation for prior granulomatous disease.    MEDIASTINUM/GREAT VESSELS: No pericardial effusion. Heart size is within normal limits. The aorta and main pulmonary artery are of normal caliber.    ABDOMEN/PELVIS:    HEPATOBILIARY: Unremarkable.    SPLEEN: Unremarkable.    PANCREAS: Stable 2.2 cm pancreatic uncinate process cystic lesion.    ADRENAL GLANDS: Unremarkable.    KIDNEYS: Symmetric pattern of renal enhancement. No hydronephrosis bilaterally. Right renal cyst and other bilateral renal hypodensities too small to characterize.    ABDOMINOPELVIC NODES: No lymphadenopathy.    PELVIC ORGANS: Unremarkable.    PERITONEUM/MESENTERY/BOWEL: No bowel obstruction. No ascites or pneumoperitoneum.    BONES/SOFT TISSUES: Old bilateral rib fractures. No definite acute abnormality.    OTHER: Vascular calcifications.      IMPRESSION:    Confluent airspace consolidation in the right upper lobe and to a lesser degree the right lower lobe, consistent with pneumonia    No evidence of acute intrathoracic or intra-abdominal traumatic injury.    No evidence of pulmonary embolism    < end of copied text >    LABS:                        12.0   16.81 )-----------( 230      ( 02 Sep 2021 08:52 )             37.4     09-02    139  |  99  |  80<HH>  ----------------------------<  148<H>  4.1   |  22  |  5.1<HH>    Ca    8.5      02 Sep 2021 08:52  Mg     2.4     09-02    TPro  5.5<L>  /  Alb  2.7<L>  /  TBili  1.3<H>  /  DBili  x   /  AST  342<H>  /  ALT  140<H>  /  AlkPhos  59  09-02    CARDIAC MARKERS ( 02 Sep 2021 08:52 )  x     / x     / 2687 U/L / x     / x      CARDIAC MARKERS ( 01 Sep 2021 16:49 )  x     / x     / 3604 U/L / x     / x      CARDIAC MARKERS ( 01 Sep 2021 08:16 )  x     / 0.07 ng/mL / 4253 U/L / x     / x      CARDIAC MARKERS ( 31 Aug 2021 14:35 )  x     / 0.04 ng/mL / x     / x     / x          PTT - ( 02 Sep 2021 17:46 )  PTT:37.7 sec  I&O's Summary    01 Sep 2021 07:01  -  02 Sep 2021 07:00  --------------------------------------------------------  IN: 1100 mL / OUT: 0 mL / NET: 1100 mL    02 Sep 2021 07:01  -  02 Sep 2021 19:49  --------------------------------------------------------  IN: 1504 mL / OUT: 35 mL / NET: 1469 mL

## 2021-09-02 NOTE — PROGRESS NOTE ADULT - ASSESSMENT
81 y/o man with PMH of HTN, HLD, T2DM, Afib on Xarelto, CAD s/p stents placement (2014), gout and VINI on CPAP admitted for s/p fall and acute hypoxemic respiratory failure, sepsis, pneumonia, DIETER and rhabdomyolysis.    1. Sepsis present on admission and acute hypoxemic respiratory failure due to CAP  - pt's condition has worsened and he is upgraded to critical care unit  - remains on bipap with high FiO2  - cheetah monitoring  - continue ceftriaxone and levaquin (renal dose) and f/u RVP, sputum culture, urine for Strep and legionella, MRSA nasal swab  - s/p Vanco x 1 dose today  - blood cultures negative  - cooling blanket prn fever  - very guarded prognosis  - full code status    2. DIETER over CKD 4 - was initially thought to be prerenal but is not improving after IV hydration  more likely due to ATN  renal consult appreciated  trial of bumex IV  IVF stopped  renal US - no hydronephrosis  quintana for accurate output  avoid nephrotoxic meds and hypotension    3. Rhabdomyolysis - CK level improving and continue to monitor    4. Elevated troponin - likely due to demand ischemia - 0.04 ->0.07  trend troponins( pt also with DIETER)  check CKMB  cardio eval   EKGs reviewed: new RBBB and chronic LAHB    5. DM on insulin and controlled    6. Afib on Xarelto at home  start metoprolol if HR >100 and BP is stable  switched to IV heparin due to DIETER and monitor PTT    7. CAD s/p stents - on ASA/atorvastatin/imdur    8. VINI - on CPAP at qhs at home    9. s/p fall - may have been due to sepsis - check orthostatics and get PT eval when stable      PROGRESS NOTE HANDOFF    Pending: labs in AM, PTT monitoring while on IV heparin, cardio consult    Family discussion: with significant other at bedside    Disposition: to be determined      81 y/o man with PMH of HTN, HLD, T2DM, Afib on Xarelto, CAD s/p stents placement (2014), gout and VINI on CPAP admitted for s/p fall and acute hypoxemic respiratory failure, sepsis, pneumonia, DIETER and rhabdomyolysis.    1. Sepsis present on admission and acute hypoxemic respiratory failure due to CAP  - pt's condition has worsened and he is upgraded to critical care unit  - remains on bipap with high FiO2  - cheetah monitoring  - continue ceftriaxone and levaquin (renal dose) and f/u RVP, sputum culture, urine for Strep and legionella, MRSA nasal swab  - s/p Vanco x 1 dose today  - blood cultures negative  - cooling blanket prn fever  - very guarded prognosis  - full code status    2. DIETER over CKD 4 - was initially thought to be prerenal but is not improving after IV hydration  more likely due to ATN  renal consult appreciated  trial of bumex IV  IVF stopped  renal US - no hydronephrosis  quintana for accurate output  avoid nephrotoxic meds and hypotension    3. Rhabdomyolysis - CK level improving and continue to monitor  transaminitis may be related to rhabdomyolysis - also improving - continue to trend    4. Elevated troponin - likely due to demand ischemia - 0.04 ->0.07  trend troponins( pt also with DIETER)  check CKMB  cardio eval   EKGs reviewed: new RBBB and chronic LAHB    5. DM on insulin and controlled    6. Afib on Xarelto at home  start metoprolol if HR >100 and BP is stable  switched to IV heparin due to DIETER and monitor PTT    7. CAD s/p stents - on ASA/atorvastatin/imdur    8. VINI - on CPAP at qhs at home    9. s/p fall - may have been due to sepsis - check orthostatics and get PT eval when stable      PROGRESS NOTE HANDOFF    Pending: labs in AM, PTT monitoring while on IV heparin, cardio consult    Family discussion: with significant other at bedside    Disposition: to be determined

## 2021-09-02 NOTE — CONSULT NOTE ADULT - SUBJECTIVE AND OBJECTIVE BOX
NEPHROLOGY CONSULTATION NOTE    MISTY BURLESON  80y  Male  MRN-[id number]    CC:   Patient is a 80y old  Male who presents with a chief complaint of Fall (02 Sep 2021 11:21)      HPI:  PC:  Fall    PMHx: HTN, HLD, T2DM, Afib on Xarelto, CAD s/p stents placement (2014), gout, VINI on CPAP    HPI: 80M w/ PMHx as above BIBEMS for fall. Patient says he felt dizzy yesterday causing him to fall on to his bed. Denies any room spinning or pre-syncope. Unable to describe the dizziness to me. No HT, LOC, numbness, weakness, tingling, headache, vision changes, fever, cold/flu symptoms, CP. Patient endorses that he has been feeling SOB for the past 2 days and has been coughing for the past 1 week per daughter at bedside. Also states that he had diarrhea for the past 2 days, but not today (Of note Pt S/p Moderna Vaccine 2 doses).    ED Course: Febrile, tachycardic and hypoxic in ED. Labs revealed leukocytosis, HAGMA, elevated Trop 0.04, BNP 13k. UA positve for WBC. Trauma w/up neg. CXR and CT chest revealed confluent airspace consolidation in the RUL and to a lesser degree the RLL consistent with pneumonia. (31 Aug 2021 22:29)      PAST MEDICAL & SURGICAL HISTORY:  Atrial fibrillation    Coronary artery disease    Hyperlipidemia    Hypertension    Gout    Diabetes mellitus    VINI on CPAP    History of heart artery stent      Allergies:  No Known Allergies    Home Medications Reviewed  Hospital Medications:   MEDICATIONS  (STANDING):  allopurinol 50 milliGRAM(s) Oral daily  aspirin  chewable 81 milliGRAM(s) Oral daily  atorvastatin 40 milliGRAM(s) Oral at bedtime  buMETAnide Injectable 2 milliGRAM(s) IV Push once  cefTRIAXone   IVPB 1000 milliGRAM(s) IV Intermittent every 24 hours  heparin  Infusion. 1300 Unit(s)/Hr (13 mL/Hr) IV Continuous <Continuous>  isosorbide   mononitrate ER Tablet (IMDUR) 120 milliGRAM(s) Oral daily  lactated ringers. 1000 milliLiter(s) (100 mL/Hr) IV Continuous <Continuous>  levoFLOXacin IVPB 250 milliGRAM(s) IV Intermittent every 24 hours    MEDICATIONS  (PRN):  acetaminophen   Tablet .. 650 milliGRAM(s) Oral every 6 hours PRN Temp greater or equal to 38C (100.4F), Mild Pain (1 - 3)  heparin   Injectable 9000 Unit(s) IV Push every 6 hours PRN For aPTT less than 40  heparin   Injectable 4500 Unit(s) IV Push every 6 hours PRN For aPTT between 40 - 57    Home medications:  Home Medications:  Actoplus Met 15 mg-500 mg oral tablet: 1 tab(s) orally 2 times a day (31 Aug 2021 22:08)  allopurinol: 150 milligram(s) orally once a day (31 Aug 2021 22:08)  amLODIPine 10 mg oral tablet: 1 tab(s) orally once a day (in the evening) (31 Aug 2021 22:08)  aspirin 81 mg oral tablet, chewable: 1 tab(s) orally once a day (31 Aug 2021 22:08)  atorvastatin 40 mg oral tablet: 1 tab(s) orally once a day (31 Aug 2021 22:08)  fosinopril 40 mg oral tablet: 1 tab(s) orally once a day (in the evening) (31 Aug 2021 22:08)  hydroCHLOROthiazide 12.5 mg oral tablet: 1 tab(s) orally once a day (31 Aug 2021 22:08)  isosorbide mononitrate 120 mg oral tablet, extended release: 1 tab(s) orally once a day (in the morning) (31 Aug 2021 22:08)  Xarelto 15 mg oral tablet: 1 tab(s) orally once a day (in the evening) (31 Aug 2021 22:08)      SOCIAL HISTORY:  Social History:  Former smoker, Quit at age 25  Uses [employer] over weekends (31 Aug 2021 22:29)      FAMILY HISTORY:      REVIEW OF SYSTEMS:   All other review of systems is negative unless indicated above.    VITALS:  T(F): 99.4 (09-02-21 @ 12:00), Max: 102.2 (09-01-21 @ 20:00)  HR: 99 (09-02-21 @ 12:00)  BP: 143/68 (09-02-21 @ 12:00)  RR: 28 (09-02-21 @ 12:00)  SpO2: 95% (09-02-21 @ 12:00)      I&O's Detail    01 Sep 2021 07:01  -  02 Sep 2021 07:00  --------------------------------------------------------  IN:    Lactated Ringers: 1100 mL  Total IN: 1100 mL    OUT:  Total OUT: 0 mL    Total NET: 1100 mL      02 Sep 2021 07:01  -  02 Sep 2021 15:07  --------------------------------------------------------  IN:    Heparin Infusion: 26 mL    IV PiggyBack: 600 mL    Lactated Ringers: 600 mL  Total IN: 1226 mL    OUT:    Indwelling Catheter - Urethral (mL): 20 mL  Total OUT: 20 mL    Total NET: 1206 mL          Creatine Kinase, Serum: 2687 U/L (09-02-21 @ 08:52)  Creatine Kinase, Serum: 3604 U/L (09-01-21 @ 16:49)    I&O's Summary    01 Sep 2021 07:01  -  02 Sep 2021 07:00  --------------------------------------------------------  IN: 1100 mL / OUT: 0 mL / NET: 1100 mL    02 Sep 2021 07:01  -  02 Sep 2021 15:07  --------------------------------------------------------  IN: 1226 mL / OUT: 20 mL / NET: 1206 mL        PHYSICAL EXAM:  Gen: on bipap  resp: b/l jose  card: S1/S2  abd: soft  ext: no edema    LABS:  ABG - ( 02 Sep 2021 05:05 )  pH, Arterial: 7.30  pH, Blood: x     /  pCO2: 48    /  pO2: 62    / HCO3: 24    / Base Excess: -3.2  /  SaO2: 91.9      Blood Gas Arterial, Lactate: 1.70 mmol/L (09-02-21 @ 05:05)  Blood Gas Arterial, Lactate: 1.80 mmol/L (09-02-21 @ 01:19)  Blood Gas Profile w/Lytes - Venous: Performed in Lab (08-31-21 @ 16:32)    09-02    139  |  99  |  80<HH>  ----------------------------<  148<H>  4.1   |  22  |  5.1<HH>    Ca    8.5      02 Sep 2021 08:52  Mg     2.4     09-02    TPro  5.5<L>  /  Alb  2.7<L>  /  TBili  1.3<H>  /  DBili      /  AST  342<H>  /  ALT  140<H>  /  AlkPhos  59  09-02    eGFR if Non African American: 10 mL/min/1.73M2 (09-02-21 @ 08:52)  eGFR if : 11 mL/min/1.73M2 (09-02-21 @ 08:52)  eGFR if Non African American: 14 mL/min/1.73M2 (09-01-21 @ 16:49)  eGFR if : 16 mL/min/1.73M2 (09-01-21 @ 16:49)    Creatinine Trend:   Creatinine, Serum: 5.1 mg/dL (09-02-21 @ 08:52)  Creatinine, Serum: 3.9 mg/dL (09-01-21 @ 16:49)  Creatinine, Serum: 3.0 mg/dL (09-01-21 @ 08:16)  Creatinine, Serum: 1.9 mg/dL (08-31-21 @ 13:24)  Creatinine, Serum: 1.9 mg/dL (09-24-20 @ 16:55)  Creatinine, Serum: 1.8 mg/dL (09-23-20 @ 21:06)  Creatinine, Serum: 1.6 mg/dL (09-22-20 @ 22:20)  Creatinine, Serum: 1.4 mg/dL (09-07-20 @ 14:10)                            12.0   16.81 )-----------( 230      ( 02 Sep 2021 08:52 )             37.4     Mean Cell Volume: 93.7 fL (09-02-21 @ 08:52)    Urine Studies:  Urinalysis Basic - ( 31 Aug 2021 18:38 )    Color: Yellow / Appearance: Slightly Turbid / SG: >1.050 / pH:   Gluc:  / Ketone: Negative  / Bili: Negative / Urobili: <2 mg/dL   Blood:  / Protein: 300 mg/dL / Nitrite: Negative   Leuk Esterase: Negative / RBC: 9 /HPF / WBC 32 /HPF   Sq Epi:  / Non Sq Epi: 2 /HPF / Bacteria: Negative      Sodium, Random Urine: <20.0 mmoL/L (09-02 @ 12:10)  Chloride, Random Urine: 20 (09-02 @ 12:10)            RADIOLOGY & ADDITIONAL STUDIES:    US Kidney and Bladder:   EXAM:  US KIDNEYS AND BLADDER            PROCEDURE DATE:  09/01/2021            INTERPRETATION:  CLINICAL INFORMATION: Acute on chronic renal failure..    COMPARISON: CT abdomen and pelvis August 31, 2021.    TECHNIQUE: Sonography of the kidneys and bladder.    FINDINGS:    Right kidney: 12.1 cm. No hydronephrosis or calculi. Right renal 4.7 cm cyst.    Left kidney:  12.6 cm. No hydronephrosis or calculi.    Urinary bladder nondistended. Prostate gland not delineated.    IMPRESSION:    No hydronephrosis.  Urinary bladder nondistended. Prostate gland not delineated.    --- End of Report ---              RANDEE MENDEZ MD; Attending Radiologist  This document has been electronically signed. Sep  1 2021 10:30PM (09-01-21 @ 21:38)      CT Abdomen and Pelvis w/ IV Cont:   EXAM:  CT ABDOMEN AND PELVIS IC        EXAM:  CT ANGIO CHEST PULM ART WAWIC            PROCEDURE DATE:  08/31/2021            INTERPRETATION:  CLINICAL HISTORY/REASON FOR EXAM: Trauma.    TECHNIQUE: CTA chest with IV contrast, CT abdomen and pelviswith IV contrast performed. Initial CTA images of chest obtained with 100 cc Omnipaque 350 IV contrast administration. Subsequently, CT images of abdomen and pelvis obtained with IV contrast administration. Oral contrast was not administered. Reformatted images in the coronal and sagittal planes were acquired. 3D (MIP images) generated.    COMPARISON: 9/7/2020.      FINDINGS:    CHEST:      PULMONARY ARTERIES: No evidence of acute pulmonary embolism.    LUNGS, PLEURA, AIRWAYS: Right upper lobe confluent airspace consolidation. Scattered areas of consolidation in the right lower lobe as well. No evidence of pleural effusion or pneumothorax. No evidence of central endobronchial obstruction. No bronchiectasis or honeycombing.    THORACIC NODES: Calcified mediastinal lymph nodes, correlation for prior granulomatous disease.    MEDIASTINUM/GREAT VESSELS: No pericardial effusion. Heart size is within normal limits. The aorta and main pulmonary artery are of normal caliber.    ABDOMEN/PELVIS:    HEPATOBILIARY: Unremarkable.    SPLEEN: Unremarkable.    PANCREAS: Stable 2.2 cm pancreatic uncinate process cystic lesion.    ADRENAL GLANDS: Unremarkable.    KIDNEYS: Symmetric pattern of renal enhancement. No hydronephrosis bilaterally. Right renal cyst and other bilateral renal hypodensities too small to characterize.    ABDOMINOPELVIC NODES: No lymphadenopathy.    PELVIC ORGANS: Unremarkable.    PERITONEUM/MESENTERY/BOWEL: No bowel obstruction. No ascites or pneumoperitoneum.    BONES/SOFT TISSUES: Old bilateral rib fractures. No definite acute abnormality.    OTHER: Vascular calcifications.      IMPRESSION:    Confluent airspace consolidation in the right upper lobe and to a lesser degree the right lower lobe, consistent with pneumonia    No evidence of acute intrathoracic or intra-abdominal traumatic injury.    No evidence of pulmonary embolism. NEPHROLOGY CONSULTATION NOTE    MISTY BURLESON  80y  Male  MRN-[id number]    CC:   Patient is a 80y old  Male who presents with a chief complaint of Fall (02 Sep 2021 11:21)      HPI:  PC:  Fall    PMHx: HTN, HLD, T2DM, Afib on Xarelto, CAD s/p stents placement (2014), gout, VINI on CPAP    HPI: 80M w/ PMHx as above BIBEMS for fall. Patient says he felt dizzy yesterday causing him to fall on to his bed. Denies any room spinning or pre-syncope. Unable to describe the dizziness to me. No HT, LOC, numbness, weakness, tingling, headache, vision changes, fever, cold/flu symptoms, CP. Patient endorses that he has been feeling SOB for the past 2 days and has been coughing for the past 1 week per daughter at bedside. Also states that he had diarrhea for the past 2 days, but not today (Of note Pt S/p Moderna Vaccine 2 doses).    ED Course: Febrile, tachycardic and hypoxic in ED. Labs revealed leukocytosis, HAGMA, elevated Trop 0.04, BNP 13k. UA positve for WBC. Trauma w/up neg. CXR and CT chest revealed confluent airspace consolidation in the RUL and to a lesser degree the RLL consistent with pneumonia. (31 Aug 2021 22:29)      PAST MEDICAL & SURGICAL HISTORY:  Atrial fibrillation    Coronary artery disease    Hyperlipidemia    Hypertension    Gout    Diabetes mellitus    VINI on CPAP    History of heart artery stent      Allergies:  No Known Allergies    Home Medications Reviewed  Hospital Medications:   MEDICATIONS  (STANDING):  allopurinol 50 milliGRAM(s) Oral daily  aspirin  chewable 81 milliGRAM(s) Oral daily  atorvastatin 40 milliGRAM(s) Oral at bedtime  buMETAnide Injectable 2 milliGRAM(s) IV Push once  cefTRIAXone   IVPB 1000 milliGRAM(s) IV Intermittent every 24 hours  heparin  Infusion. 1300 Unit(s)/Hr (13 mL/Hr) IV Continuous <Continuous>  isosorbide   mononitrate ER Tablet (IMDUR) 120 milliGRAM(s) Oral daily  lactated ringers. 1000 milliLiter(s) (100 mL/Hr) IV Continuous <Continuous>  levoFLOXacin IVPB 250 milliGRAM(s) IV Intermittent every 24 hours    MEDICATIONS  (PRN):  acetaminophen   Tablet .. 650 milliGRAM(s) Oral every 6 hours PRN Temp greater or equal to 38C (100.4F), Mild Pain (1 - 3)  heparin   Injectable 9000 Unit(s) IV Push every 6 hours PRN For aPTT less than 40  heparin   Injectable 4500 Unit(s) IV Push every 6 hours PRN For aPTT between 40 - 57    Home Medications:  Actoplus Met 15 mg-500 mg oral tablet: 1 tab(s) orally 2 times a day (31 Aug 2021 22:08)  allopurinol: 150 milligram(s) orally once a day (31 Aug 2021 22:08)  amLODIPine 10 mg oral tablet: 1 tab(s) orally once a day (in the evening) (31 Aug 2021 22:08)  aspirin 81 mg oral tablet, chewable: 1 tab(s) orally once a day (31 Aug 2021 22:08)  atorvastatin 40 mg oral tablet: 1 tab(s) orally once a day (31 Aug 2021 22:08)  fosinopril 40 mg oral tablet: 1 tab(s) orally once a day (in the evening) (31 Aug 2021 22:08)  hydroCHLOROthiazide 12.5 mg oral tablet: 1 tab(s) orally once a day (31 Aug 2021 22:08)  isosorbide mononitrate 120 mg oral tablet, extended release: 1 tab(s) orally once a day (in the morning) (31 Aug 2021 22:08)  Xarelto 15 mg oral tablet: 1 tab(s) orally once a day (in the evening) (31 Aug 2021 22:08)      SOCIAL HISTORY:  Social History:  Former smoker, Quit at age 25  Uses [employer] over weekends (31 Aug 2021 22:29)      FAMILY HISTORY:      REVIEW OF SYSTEMS:   All other review of systems is negative unless indicated above.    VITALS:  T(F): 99.4 (09-02-21 @ 12:00), Max: 102.2 (09-01-21 @ 20:00)  HR: 99 (09-02-21 @ 12:00)  BP: 143/68 (09-02-21 @ 12:00)  RR: 28 (09-02-21 @ 12:00)  SpO2: 95% (09-02-21 @ 12:00)      I&O's Detail    01 Sep 2021 07:01  -  02 Sep 2021 07:00  --------------------------------------------------------  IN:    Lactated Ringers: 1100 mL  Total IN: 1100 mL    OUT:  Total OUT: 0 mL    Total NET: 1100 mL      02 Sep 2021 07:01  -  02 Sep 2021 15:07  --------------------------------------------------------  IN:    Heparin Infusion: 26 mL    IV PiggyBack: 600 mL    Lactated Ringers: 600 mL  Total IN: 1226 mL    OUT:    Indwelling Catheter - Urethral (mL): 20 mL  Total OUT: 20 mL    Total NET: 1206 mL          Creatine Kinase, Serum: 2687 U/L (09-02-21 @ 08:52)  Creatine Kinase, Serum: 3604 U/L (09-01-21 @ 16:49)    I&O's Summary    01 Sep 2021 07:01  -  02 Sep 2021 07:00  --------------------------------------------------------  IN: 1100 mL / OUT: 0 mL / NET: 1100 mL    02 Sep 2021 07:01  -  02 Sep 2021 15:07  --------------------------------------------------------  IN: 1226 mL / OUT: 20 mL / NET: 1206 mL        PHYSICAL EXAM:  Gen: on bipap  resp: b/l jose  card: S1/S2  abd: soft  ext: no edema    LABS:  ABG - ( 02 Sep 2021 05:05 )  pH, Arterial: 7.30  pH, Blood: x     /  pCO2: 48    /  pO2: 62    / HCO3: 24    / Base Excess: -3.2  /  SaO2: 91.9      Blood Gas Arterial, Lactate: 1.70 mmol/L (09-02-21 @ 05:05)  Blood Gas Arterial, Lactate: 1.80 mmol/L (09-02-21 @ 01:19)  Blood Gas Profile w/Lytes - Venous: Performed in Lab (08-31-21 @ 16:32)    09-02    139  |  99  |  80<HH>  ----------------------------<  148<H>  4.1   |  22  |  5.1<HH>    Ca    8.5      02 Sep 2021 08:52  Mg     2.4     09-02    TPro  5.5<L>  /  Alb  2.7<L>  /  TBili  1.3<H>  /  DBili      /  AST  342<H>  /  ALT  140<H>  /  AlkPhos  59  09-02    eGFR if Non African American: 10 mL/min/1.73M2 (09-02-21 @ 08:52)  eGFR if : 11 mL/min/1.73M2 (09-02-21 @ 08:52)  eGFR if Non African American: 14 mL/min/1.73M2 (09-01-21 @ 16:49)  eGFR if : 16 mL/min/1.73M2 (09-01-21 @ 16:49)    Creatinine Trend:   Creatinine, Serum: 5.1 mg/dL (09-02-21 @ 08:52)  Creatinine, Serum: 3.9 mg/dL (09-01-21 @ 16:49)  Creatinine, Serum: 3.0 mg/dL (09-01-21 @ 08:16)  Creatinine, Serum: 1.9 mg/dL (08-31-21 @ 13:24)  Creatinine, Serum: 1.9 mg/dL (09-24-20 @ 16:55)  Creatinine, Serum: 1.8 mg/dL (09-23-20 @ 21:06)  Creatinine, Serum: 1.6 mg/dL (09-22-20 @ 22:20)  Creatinine, Serum: 1.4 mg/dL (09-07-20 @ 14:10)                            12.0   16.81 )-----------( 230      ( 02 Sep 2021 08:52 )             37.4     Mean Cell Volume: 93.7 fL (09-02-21 @ 08:52)    Urine Studies:  Urinalysis Basic - ( 31 Aug 2021 18:38 )    Color: Yellow / Appearance: Slightly Turbid / SG: >1.050 / pH:   Gluc:  / Ketone: Negative  / Bili: Negative / Urobili: <2 mg/dL   Blood:  / Protein: 300 mg/dL / Nitrite: Negative   Leuk Esterase: Negative / RBC: 9 /HPF / WBC 32 /HPF   Sq Epi:  / Non Sq Epi: 2 /HPF / Bacteria: Negative      Sodium, Random Urine: <20.0 mmoL/L (09-02 @ 12:10)  Chloride, Random Urine: 20 (09-02 @ 12:10)            RADIOLOGY & ADDITIONAL STUDIES:    US Kidney and Bladder:   EXAM:  US KIDNEYS AND BLADDER            PROCEDURE DATE:  09/01/2021            INTERPRETATION:  CLINICAL INFORMATION: Acute on chronic renal failure..    COMPARISON: CT abdomen and pelvis August 31, 2021.    TECHNIQUE: Sonography of the kidneys and bladder.    FINDINGS:    Right kidney: 12.1 cm. No hydronephrosis or calculi. Right renal 4.7 cm cyst.    Left kidney:  12.6 cm. No hydronephrosis or calculi.    Urinary bladder nondistended. Prostate gland not delineated.    IMPRESSION:    No hydronephrosis.  Urinary bladder nondistended. Prostate gland not delineated.    --- End of Report ---              RANDEE MENDEZ MD; Attending Radiologist  This document has been electronically signed. Sep  1 2021 10:30PM (09-01-21 @ 21:38)      CT Abdomen and Pelvis w/ IV Cont:   EXAM:  CT ABDOMEN AND PELVIS IC        EXAM:  CT ANGIO CHEST PULM ART WAWIC            PROCEDURE DATE:  08/31/2021            INTERPRETATION:  CLINICAL HISTORY/REASON FOR EXAM: Trauma.    TECHNIQUE: CTA chest with IV contrast, CT abdomen and pelviswith IV contrast performed. Initial CTA images of chest obtained with 100 cc Omnipaque 350 IV contrast administration. Subsequently, CT images of abdomen and pelvis obtained with IV contrast administration. Oral contrast was not administered. Reformatted images in the coronal and sagittal planes were acquired. 3D (MIP images) generated.    COMPARISON: 9/7/2020.      FINDINGS:    CHEST:      PULMONARY ARTERIES: No evidence of acute pulmonary embolism.    LUNGS, PLEURA, AIRWAYS: Right upper lobe confluent airspace consolidation. Scattered areas of consolidation in the right lower lobe as well. No evidence of pleural effusion or pneumothorax. No evidence of central endobronchial obstruction. No bronchiectasis or honeycombing.    THORACIC NODES: Calcified mediastinal lymph nodes, correlation for prior granulomatous disease.    MEDIASTINUM/GREAT VESSELS: No pericardial effusion. Heart size is within normal limits. The aorta and main pulmonary artery are of normal caliber.    ABDOMEN/PELVIS:    HEPATOBILIARY: Unremarkable.    SPLEEN: Unremarkable.    PANCREAS: Stable 2.2 cm pancreatic uncinate process cystic lesion.    ADRENAL GLANDS: Unremarkable.    KIDNEYS: Symmetric pattern of renal enhancement. No hydronephrosis bilaterally. Right renal cyst and other bilateral renal hypodensities too small to characterize.    ABDOMINOPELVIC NODES: No lymphadenopathy.    PELVIC ORGANS: Unremarkable.    PERITONEUM/MESENTERY/BOWEL: No bowel obstruction. No ascites or pneumoperitoneum.    BONES/SOFT TISSUES: Old bilateral rib fractures. No definite acute abnormality.    OTHER: Vascular calcifications.      IMPRESSION:    Confluent airspace consolidation in the right upper lobe and to a lesser degree the right lower lobe, consistent with pneumonia    No evidence of acute intrathoracic or intra-abdominal traumatic injury.    No evidence of pulmonary embolism.

## 2021-09-02 NOTE — PROCEDURE NOTE - NSCOMPLICATION_GEN_A_CORE
no complications Cyclosporine Counseling:  I discussed with the patient the risks of cyclosporine including but not limited to hypertension, gingival hyperplasia,myelosuppression, immunosuppression, liver damage, kidney damage, neurotoxicity, lymphoma, and serious infections. The patient understands that monitoring is required including baseline blood pressure, CBC, CMP, lipid panel and uric acid, and then 1-2 times monthly CMP and blood pressure.

## 2021-09-02 NOTE — PROGRESS NOTE ADULT - SUBJECTIVE AND OBJECTIVE BOX
MISTY BURLESON 80y Male  MRN#: [id number]     SUBJECTIVE  Patient is a 80y old Male who presents with a chief complaint of sob (02 Sep 2021 06:46)    HPI:  PC:  Fall    PMHx: HTN, HLD, T2DM, Afib on Xarelto, CAD s/p stents placement (2014), gout, VINI on CPAP    HPI: 80M w/ PMHx as above BIBEMS for fall. Patient says he felt dizzy yesterday causing him to fall on to his bed. Denies any room spinning or pre-syncope. Unable to describe the dizziness to me. No HT, LOC, numbness, weakness, tingling, headache, vision changes, fever, cold/flu symptoms, CP. Patient endorses that he has been feeling SOB for the past 2 days and has been coughing for the past 1 week per daughter at bedside. Also states that he had diarrhea for the past 2 days, but not today (Of note Pt S/p Moderna Vaccine 2 doses).    ED Course: Febrile, tachycardic and hypoxic in ED. Labs revealed leukocytosis, HAGMA, elevated Trop 0.04, BNP 13k. UA positve for WBC. Trauma w/up neg. CXR and CT chest revealed confluent airspace consolidation in the RUL and to a lesser degree the RLL consistent with pneumonia. (31 Aug 2021 22:29)        Interval and Overnight Events:  Today is hospital day 2d, and this morning he is lying in bed without distress.   No acute overnight events.     OBJECTIVE  PAST MEDICAL & SURGICAL HISTORY  Atrial fibrillation    Coronary artery disease    Hyperlipidemia    Hypertension    Gout    Diabetes mellitus    VINI on CPAP    History of heart artery stent      ALLERGIES:  No Known Allergies    MEDICATIONS:  STANDING MEDICATIONS  allopurinol 50 milliGRAM(s) Oral daily  aspirin  chewable 81 milliGRAM(s) Oral daily  atorvastatin 40 milliGRAM(s) Oral at bedtime  cefTRIAXone   IVPB 1000 milliGRAM(s) IV Intermittent every 24 hours  chlorhexidine 4% Liquid 1 Application(s) Topical <User Schedule>  dextrose 40% Gel 15 Gram(s) Oral once  dextrose 5%. 1000 milliLiter(s) IV Continuous <Continuous>  dextrose 5%. 1000 milliLiter(s) IV Continuous <Continuous>  dextrose 50% Injectable 25 Gram(s) IV Push once  dextrose 50% Injectable 12.5 Gram(s) IV Push once  dextrose 50% Injectable 25 Gram(s) IV Push once  glucagon  Injectable 1 milliGRAM(s) IntraMuscular once  heparin  Infusion. 1300 Unit(s)/Hr IV Continuous <Continuous>  isosorbide   mononitrate ER Tablet (IMDUR) 120 milliGRAM(s) Oral daily  lactated ringers. 1000 milliLiter(s) IV Continuous <Continuous>  levoFLOXacin IVPB      levoFLOXacin IVPB 250 milliGRAM(s) IV Intermittent every 24 hours  vancomycin  IVPB 1500 milliGRAM(s) IV Intermittent once    PRN MEDICATIONS  acetaminophen   Tablet .. 650 milliGRAM(s) Oral every 6 hours PRN  heparin   Injectable 9000 Unit(s) IV Push every 6 hours PRN  heparin   Injectable 4500 Unit(s) IV Push every 6 hours PRN    HOME MEDICATIONS  Home Medications:  Actoplus Met 15 mg-500 mg oral tablet: 1 tab(s) orally 2 times a day (31 Aug 2021 22:08)  allopurinol: 150 milligram(s) orally once a day (31 Aug 2021 22:08)  amLODIPine 10 mg oral tablet: 1 tab(s) orally once a day (in the evening) (31 Aug 2021 22:08)  aspirin 81 mg oral tablet, chewable: 1 tab(s) orally once a day (31 Aug 2021 22:08)  atorvastatin 40 mg oral tablet: 1 tab(s) orally once a day (31 Aug 2021 22:08)  fosinopril 40 mg oral tablet: 1 tab(s) orally once a day (in the evening) (31 Aug 2021 22:08)  hydroCHLOROthiazide 12.5 mg oral tablet: 1 tab(s) orally once a day (31 Aug 2021 22:08)  isosorbide mononitrate 120 mg oral tablet, extended release: 1 tab(s) orally once a day (in the morning) (31 Aug 2021 22:08)  Xarelto 15 mg oral tablet: 1 tab(s) orally once a day (in the evening) (31 Aug 2021 22:08)      LABS:                        12.0   16.81 )-----------( 230      ( 02 Sep 2021 08:52 )             37.4     09-02    139  |  99  |  80<HH>  ----------------------------<  148<H>  4.1   |  22  |  5.1<HH>    Ca    8.5      02 Sep 2021 08:52  Mg     2.4     09-02    TPro  5.5<L>  /  Alb  2.7<L>  /  TBili  1.3<H>  /  DBili  x   /  AST  342<H>  /  ALT  140<H>  /  AlkPhos  59  09-02    LIVER FUNCTIONS - ( 02 Sep 2021 08:52 )  Alb: 2.7 g/dL / Pro: 5.5 g/dL / ALK PHOS: 59 U/L / ALT: 140 U/L / AST: 342 U/L / GGT: x           PT/INR - ( 31 Aug 2021 13:24 )   PT: 15.20 sec;   INR: 1.33 ratio         PTT - ( 31 Aug 2021 13:24 )  PTT:34.2 sec  Urinalysis Basic - ( 31 Aug 2021 18:38 )    Color: Yellow / Appearance: Slightly Turbid / SG: >1.050 / pH: x  Gluc: x / Ketone: Negative  / Bili: Negative / Urobili: <2 mg/dL   Blood: x / Protein: 300 mg/dL / Nitrite: Negative   Leuk Esterase: Negative / RBC: 9 /HPF / WBC 32 /HPF   Sq Epi: x / Non Sq Epi: 2 /HPF / Bacteria: Negative      ABG - ( 02 Sep 2021 05:05 )  pH, Arterial: 7.30  pH, Blood: x     /  pCO2: 48    /  pO2: 62    / HCO3: 24    / Base Excess: -3.2  /  SaO2: 91.9              Creatine Kinase, Serum: 2687 U/L *H* (09-02-21 @ 08:52)  Creatine Kinase, Serum: 3604 U/L *H* (09-01-21 @ 16:49)      Culture - Urine (collected 31 Aug 2021 18:38)  Source: Clean Catch Clean Catch (Midstream)  Final Report (01 Sep 2021 21:55):    <10,000 CFU/mL Normal Urogenital Skye    Culture - Blood (collected 31 Aug 2021 13:24)  Source: .Blood Blood-Peripheral  Preliminary Report (01 Sep 2021 22:01):    No growth to date.    Culture - Blood (collected 31 Aug 2021 13:24)  Source: .Blood Blood-Peripheral  Preliminary Report (01 Sep 2021 22:01):    No growth to date.      CARDIAC MARKERS ( 02 Sep 2021 08:52 )  x     / x     / 2687 U/L / x     / x      CARDIAC MARKERS ( 01 Sep 2021 16:49 )  x     / x     / 3604 U/L / x     / x      CARDIAC MARKERS ( 01 Sep 2021 08:16 )  x     / 0.07 ng/mL / 4253 U/L / x     / x      CARDIAC MARKERS ( 31 Aug 2021 14:35 )  x     / 0.04 ng/mL / x     / x     / x          CAPILLARY BLOOD GLUCOSE      POCT Blood Glucose.: 137 mg/dL (02 Sep 2021 08:18)      PHYSICAL EXAM:  T(C): 37.8 (09-02-21 @ 08:00), Max: 39 (09-01-21 @ 13:02)  HR: 103 (09-02-21 @ 08:40) (102 - 114)  BP: 140/74 (09-02-21 @ 08:00) (123/60 - 177/78)  RR: 30 (09-02-21 @ 08:00) (18 - 32)  SpO2: 94% (09-02-21 @ 08:40) (88% - 99%)    GENERAL: NAD, well-developed, 80y  EENT: EOMI, conjunctiva and sclera clear, No nasal obstruction or discharge  RESPIRATORY: Clear to auscultation bilaterally; No wheeze or crackles  CARDIOVASCULAR: Regular rate and rhythm; No murmurs, rubs, or gallops, no pitting edema  GASTROINTESTINAL: Abdomen Soft, Nontender, Nondistended  MUSCULOSKELETAL:  No cyanosis, extremities grossly symmetrical  PSYCH: AAOx3, affect appropriate  NEUROLOGY: non-focal, cognition grossly intact, MAEE    ADMISSION SUMMARY  Patient is a 80y old Male who presents with a chief complaint of sob (02 Sep 2021 06:46)    MISTY BURLESON 80y Male  MRN#: [id number]     SUBJECTIVE  Patient is a 80y old Male who presents with a chief complaint of sob (02 Sep 2021 06:46)    HPI:  PC:  Fall    PMHx: HTN, HLD, T2DM, Afib on Xarelto, CAD s/p stents placement (2014), gout, VINI on CPAP    HPI: 80M w/ PMHx as above BIBEMS for fall. Patient says he felt dizzy yesterday causing him to fall on to his bed. Denies any room spinning or pre-syncope. Unable to describe the dizziness to me. No HT, LOC, numbness, weakness, tingling, headache, vision changes, fever, cold/flu symptoms, CP. Patient endorses that he has been feeling SOB for the past 2 days and has been coughing for the past 1 week per daughter at bedside. Also states that he had diarrhea for the past 2 days, but not today (Of note Pt S/p Moderna Vaccine 2 doses).    ED Course: Febrile, tachycardic and hypoxic in ED. Labs revealed leukocytosis, HAGMA, elevated Trop 0.04, BNP 13k. UA positve for WBC. Trauma w/up neg. CXR and CT chest revealed confluent airspace consolidation in the RUL and to a lesser degree the RLL consistent with pneumonia. (31 Aug 2021 22:29)        Interval and Overnight Events:  Today is hospital day 2d, and this morning he is lying in bed on BIPAP in no distress. Patient had episode of confusion overnight. STAT ABG drawn, revealed patient to be acidotic and retaining CO2. BIPAP settings increased. Repeat ABG showed improvement. Events as described      OBJECTIVE  PAST MEDICAL & SURGICAL HISTORY  Atrial fibrillation    Coronary artery disease    Hyperlipidemia    Hypertension    Gout    Diabetes mellitus    VINI on CPAP    History of heart artery stent      ALLERGIES:  No Known Allergies    MEDICATIONS:  STANDING MEDICATIONS  allopurinol 50 milliGRAM(s) Oral daily  aspirin  chewable 81 milliGRAM(s) Oral daily  atorvastatin 40 milliGRAM(s) Oral at bedtime  cefTRIAXone   IVPB 1000 milliGRAM(s) IV Intermittent every 24 hours  chlorhexidine 4% Liquid 1 Application(s) Topical <User Schedule>  dextrose 40% Gel 15 Gram(s) Oral once  dextrose 5%. 1000 milliLiter(s) IV Continuous <Continuous>  dextrose 5%. 1000 milliLiter(s) IV Continuous <Continuous>  dextrose 50% Injectable 25 Gram(s) IV Push once  dextrose 50% Injectable 12.5 Gram(s) IV Push once  dextrose 50% Injectable 25 Gram(s) IV Push once  glucagon  Injectable 1 milliGRAM(s) IntraMuscular once  heparin  Infusion. 1300 Unit(s)/Hr IV Continuous <Continuous>  isosorbide   mononitrate ER Tablet (IMDUR) 120 milliGRAM(s) Oral daily  lactated ringers. 1000 milliLiter(s) IV Continuous <Continuous>  levoFLOXacin IVPB      levoFLOXacin IVPB 250 milliGRAM(s) IV Intermittent every 24 hours  vancomycin  IVPB 1500 milliGRAM(s) IV Intermittent once    PRN MEDICATIONS  acetaminophen   Tablet .. 650 milliGRAM(s) Oral every 6 hours PRN  heparin   Injectable 9000 Unit(s) IV Push every 6 hours PRN  heparin   Injectable 4500 Unit(s) IV Push every 6 hours PRN    HOME MEDICATIONS  Home Medications:  Actoplus Met 15 mg-500 mg oral tablet: 1 tab(s) orally 2 times a day (31 Aug 2021 22:08)  allopurinol: 150 milligram(s) orally once a day (31 Aug 2021 22:08)  amLODIPine 10 mg oral tablet: 1 tab(s) orally once a day (in the evening) (31 Aug 2021 22:08)  aspirin 81 mg oral tablet, chewable: 1 tab(s) orally once a day (31 Aug 2021 22:08)  atorvastatin 40 mg oral tablet: 1 tab(s) orally once a day (31 Aug 2021 22:08)  fosinopril 40 mg oral tablet: 1 tab(s) orally once a day (in the evening) (31 Aug 2021 22:08)  hydroCHLOROthiazide 12.5 mg oral tablet: 1 tab(s) orally once a day (31 Aug 2021 22:08)  isosorbide mononitrate 120 mg oral tablet, extended release: 1 tab(s) orally once a day (in the morning) (31 Aug 2021 22:08)  Xarelto 15 mg oral tablet: 1 tab(s) orally once a day (in the evening) (31 Aug 2021 22:08)      LABS:                        12.0   16.81 )-----------( 230      ( 02 Sep 2021 08:52 )             37.4     09-02    139  |  99  |  80<HH>  ----------------------------<  148<H>  4.1   |  22  |  5.1<HH>    Ca    8.5      02 Sep 2021 08:52  Mg     2.4     09-02    TPro  5.5<L>  /  Alb  2.7<L>  /  TBili  1.3<H>  /  DBili  x   /  AST  342<H>  /  ALT  140<H>  /  AlkPhos  59  09-02    LIVER FUNCTIONS - ( 02 Sep 2021 08:52 )  Alb: 2.7 g/dL / Pro: 5.5 g/dL / ALK PHOS: 59 U/L / ALT: 140 U/L / AST: 342 U/L / GGT: x           PT/INR - ( 31 Aug 2021 13:24 )   PT: 15.20 sec;   INR: 1.33 ratio         PTT - ( 31 Aug 2021 13:24 )  PTT:34.2 sec  Urinalysis Basic - ( 31 Aug 2021 18:38 )    Color: Yellow / Appearance: Slightly Turbid / SG: >1.050 / pH: x  Gluc: x / Ketone: Negative  / Bili: Negative / Urobili: <2 mg/dL   Blood: x / Protein: 300 mg/dL / Nitrite: Negative   Leuk Esterase: Negative / RBC: 9 /HPF / WBC 32 /HPF   Sq Epi: x / Non Sq Epi: 2 /HPF / Bacteria: Negative      ABG - ( 02 Sep 2021 05:05 )  pH, Arterial: 7.30  pH, Blood: x     /  pCO2: 48    /  pO2: 62    / HCO3: 24    / Base Excess: -3.2  /  SaO2: 91.9              Creatine Kinase, Serum: 2687 U/L *H* (09-02-21 @ 08:52)  Creatine Kinase, Serum: 3604 U/L *H* (09-01-21 @ 16:49)      Culture - Urine (collected 31 Aug 2021 18:38)  Source: Clean Catch Clean Catch (Midstream)  Final Report (01 Sep 2021 21:55):    <10,000 CFU/mL Normal Urogenital Skye    Culture - Blood (collected 31 Aug 2021 13:24)  Source: .Blood Blood-Peripheral  Preliminary Report (01 Sep 2021 22:01):    No growth to date.    Culture - Blood (collected 31 Aug 2021 13:24)  Source: .Blood Blood-Peripheral  Preliminary Report (01 Sep 2021 22:01):    No growth to date.      CARDIAC MARKERS ( 02 Sep 2021 08:52 )  x     / x     / 2687 U/L / x     / x      CARDIAC MARKERS ( 01 Sep 2021 16:49 )  x     / x     / 3604 U/L / x     / x      CARDIAC MARKERS ( 01 Sep 2021 08:16 )  x     / 0.07 ng/mL / 4253 U/L / x     / x      CARDIAC MARKERS ( 31 Aug 2021 14:35 )  x     / 0.04 ng/mL / x     / x     / x          CAPILLARY BLOOD GLUCOSE      POCT Blood Glucose.: 137 mg/dL (02 Sep 2021 08:18)      PHYSICAL EXAM:  T(C): 37.8 (09-02-21 @ 08:00), Max: 39 (09-01-21 @ 13:02)  HR: 103 (09-02-21 @ 08:40) (102 - 114)  BP: 140/74 (09-02-21 @ 08:00) (123/60 - 177/78)  RR: 30 (09-02-21 @ 08:00) (18 - 32)  SpO2: 94% (09-02-21 @ 08:40) (88% - 99%)    GENERAL: NAD, well-developed, 80y  EENT: EOMI, conjunctiva and sclera clear, No nasal obstruction or discharge  RESPIRATORY: Clear to auscultation bilaterally; No wheeze or crackles  CARDIOVASCULAR: Regular rate and rhythm; No murmurs, rubs, or gallops, no pitting edema  GASTROINTESTINAL: Abdomen Soft, Nontender, Nondistended  MUSCULOSKELETAL:  No cyanosis, extremities grossly symmetrical  PSYCH: AAOx3, affect appropriate  NEUROLOGY: non-focal, cognition grossly intact, MAEE    ADMISSION SUMMARY  Patient is a 80y old Male who presents with a chief complaint of sob (02 Sep 2021 06:46)    MISTY BURLESON 80y Male  MRN#: [id number]     SUBJECTIVE  Patient is a 80y old Male who presents with a chief complaint of sob (02 Sep 2021 06:46)    HPI:  PC:  Fall    PMHx: HTN, HLD, T2DM, Afib on Xarelto, CAD s/p stents placement (2014), gout, VINI on CPAP    HPI: 80M w/ PMHx as above BIBEMS for fall. Patient says he felt dizzy yesterday causing him to fall on to his bed. Denies any room spinning or pre-syncope. Unable to describe the dizziness to me. No HT, LOC, numbness, weakness, tingling, headache, vision changes, fever, cold/flu symptoms, CP. Patient endorses that he has been feeling SOB for the past 2 days and has been coughing for the past 1 week per daughter at bedside. Also states that he had diarrhea for the past 2 days, but not today (Of note Pt S/p Moderna Vaccine 2 doses).    ED Course: Febrile, tachycardic and hypoxic in ED. Labs revealed leukocytosis, HAGMA, elevated Trop 0.04, BNP 13k. UA positve for WBC. Trauma w/up neg. CXR and CT chest revealed confluent airspace consolidation in the RUL and to a lesser degree the RLL consistent with pneumonia. (31 Aug 2021 22:29)        Interval and Overnight Events:  Today is hospital day 2d, and this morning he is lying in bed on BIPAP in no distress. Patient had episode of confusion overnight. STAT ABG drawn, revealed patient to be acidotic and retaining CO2. BIPAP settings increased. Repeat ABG showed improvement. Overnight events as described in chart note. Patient currently on BiPAP with IPAP/EPAP 16/8 with FiO2 80% satting at 95%. Patient had quintana placed overnight and only made 20mL. Renal ultrasound was normal. Patient has no acute complaints.      OBJECTIVE  PAST MEDICAL & SURGICAL HISTORY  Atrial fibrillation    Coronary artery disease    Hyperlipidemia    Hypertension    Gout    Diabetes mellitus    VINI on CPAP    History of heart artery stent      ALLERGIES:  No Known Allergies    MEDICATIONS:  STANDING MEDICATIONS  allopurinol 50 milliGRAM(s) Oral daily  aspirin  chewable 81 milliGRAM(s) Oral daily  atorvastatin 40 milliGRAM(s) Oral at bedtime  cefTRIAXone   IVPB 1000 milliGRAM(s) IV Intermittent every 24 hours  chlorhexidine 4% Liquid 1 Application(s) Topical <User Schedule>  dextrose 40% Gel 15 Gram(s) Oral once  dextrose 5%. 1000 milliLiter(s) IV Continuous <Continuous>  dextrose 5%. 1000 milliLiter(s) IV Continuous <Continuous>  dextrose 50% Injectable 25 Gram(s) IV Push once  dextrose 50% Injectable 12.5 Gram(s) IV Push once  dextrose 50% Injectable 25 Gram(s) IV Push once  glucagon  Injectable 1 milliGRAM(s) IntraMuscular once  heparin  Infusion. 1300 Unit(s)/Hr IV Continuous <Continuous>  isosorbide   mononitrate ER Tablet (IMDUR) 120 milliGRAM(s) Oral daily  lactated ringers. 1000 milliLiter(s) IV Continuous <Continuous>  levoFLOXacin IVPB      levoFLOXacin IVPB 250 milliGRAM(s) IV Intermittent every 24 hours  vancomycin  IVPB 1500 milliGRAM(s) IV Intermittent once    PRN MEDICATIONS  acetaminophen   Tablet .. 650 milliGRAM(s) Oral every 6 hours PRN  heparin   Injectable 9000 Unit(s) IV Push every 6 hours PRN  heparin   Injectable 4500 Unit(s) IV Push every 6 hours PRN    HOME MEDICATIONS  Home Medications:  Actoplus Met 15 mg-500 mg oral tablet: 1 tab(s) orally 2 times a day (31 Aug 2021 22:08)  allopurinol: 150 milligram(s) orally once a day (31 Aug 2021 22:08)  amLODIPine 10 mg oral tablet: 1 tab(s) orally once a day (in the evening) (31 Aug 2021 22:08)  aspirin 81 mg oral tablet, chewable: 1 tab(s) orally once a day (31 Aug 2021 22:08)  atorvastatin 40 mg oral tablet: 1 tab(s) orally once a day (31 Aug 2021 22:08)  fosinopril 40 mg oral tablet: 1 tab(s) orally once a day (in the evening) (31 Aug 2021 22:08)  hydroCHLOROthiazide 12.5 mg oral tablet: 1 tab(s) orally once a day (31 Aug 2021 22:08)  isosorbide mononitrate 120 mg oral tablet, extended release: 1 tab(s) orally once a day (in the morning) (31 Aug 2021 22:08)  Xarelto 15 mg oral tablet: 1 tab(s) orally once a day (in the evening) (31 Aug 2021 22:08)      LABS:                        12.0   16.81 )-----------( 230      ( 02 Sep 2021 08:52 )             37.4     09-02    139  |  99  |  80<HH>  ----------------------------<  148<H>  4.1   |  22  |  5.1<HH>    Ca    8.5      02 Sep 2021 08:52  Mg     2.4     09-02    TPro  5.5<L>  /  Alb  2.7<L>  /  TBili  1.3<H>  /  DBili  x   /  AST  342<H>  /  ALT  140<H>  /  AlkPhos  59  09-02    LIVER FUNCTIONS - ( 02 Sep 2021 08:52 )  Alb: 2.7 g/dL / Pro: 5.5 g/dL / ALK PHOS: 59 U/L / ALT: 140 U/L / AST: 342 U/L / GGT: x           PT/INR - ( 31 Aug 2021 13:24 )   PT: 15.20 sec;   INR: 1.33 ratio         PTT - ( 31 Aug 2021 13:24 )  PTT:34.2 sec  Urinalysis Basic - ( 31 Aug 2021 18:38 )    Color: Yellow / Appearance: Slightly Turbid / SG: >1.050 / pH: x  Gluc: x / Ketone: Negative  / Bili: Negative / Urobili: <2 mg/dL   Blood: x / Protein: 300 mg/dL / Nitrite: Negative   Leuk Esterase: Negative / RBC: 9 /HPF / WBC 32 /HPF   Sq Epi: x / Non Sq Epi: 2 /HPF / Bacteria: Negative      ABG - ( 02 Sep 2021 05:05 )  pH, Arterial: 7.30  pH, Blood: x     /  pCO2: 48    /  pO2: 62    / HCO3: 24    / Base Excess: -3.2  /  SaO2: 91.9              Creatine Kinase, Serum: 2687 U/L *H* (09-02-21 @ 08:52)  Creatine Kinase, Serum: 3604 U/L *H* (09-01-21 @ 16:49)      Culture - Urine (collected 31 Aug 2021 18:38)  Source: Clean Catch Clean Catch (Midstream)  Final Report (01 Sep 2021 21:55):    <10,000 CFU/mL Normal Urogenital Skye    Culture - Blood (collected 31 Aug 2021 13:24)  Source: .Blood Blood-Peripheral  Preliminary Report (01 Sep 2021 22:01):    No growth to date.    Culture - Blood (collected 31 Aug 2021 13:24)  Source: .Blood Blood-Peripheral  Preliminary Report (01 Sep 2021 22:01):    No growth to date.      CARDIAC MARKERS ( 02 Sep 2021 08:52 )  x     / x     / 2687 U/L / x     / x      CARDIAC MARKERS ( 01 Sep 2021 16:49 )  x     / x     / 3604 U/L / x     / x      CARDIAC MARKERS ( 01 Sep 2021 08:16 )  x     / 0.07 ng/mL / 4253 U/L / x     / x      CARDIAC MARKERS ( 31 Aug 2021 14:35 )  x     / 0.04 ng/mL / x     / x     / x          CAPILLARY BLOOD GLUCOSE      POCT Blood Glucose.: 137 mg/dL (02 Sep 2021 08:18)      PHYSICAL EXAM:  T(C): 37.8 (09-02-21 @ 08:00), Max: 39 (09-01-21 @ 13:02)  HR: 103 (09-02-21 @ 08:40) (102 - 114)  BP: 140/74 (09-02-21 @ 08:00) (123/60 - 177/78)  RR: 30 (09-02-21 @ 08:00) (18 - 32)  SpO2: 94% (09-02-21 @ 08:40) (88% - 99%)    GENERAL: NAD, well-developed, 80y  EENT: EOMI, conjunctiva and sclera clear, No nasal obstruction or discharge  RESPIRATORY: Clear to auscultation bilaterally; No wheeze or crackles  CARDIOVASCULAR: Regular rate and rhythm; No murmurs, rubs, or gallops, no pitting edema  GASTROINTESTINAL: Abdomen Soft, Nontender, Nondistended  MUSCULOSKELETAL:  No cyanosis, extremities grossly symmetrical  PSYCH: AAOx3, affect appropriate  NEUROLOGY: non-focal, cognition grossly intact, MAEE    ADMISSION SUMMARY  Patient is a 80y old Male who presents with a chief complaint of sob (02 Sep 2021 06:46)

## 2021-09-02 NOTE — PROGRESS NOTE ADULT - ASSESSMENT
80M w/ PMHx of HTN, HLD, T2DM, Afib on Xarelto, CAD s/p stents placement (2014), gout, VINI on CPAP, CKD BIBEMS for fall, admitted for multilobular pneumonia. Course complicated by worsening DIETER on CKD, possible rhabdomyolysis and increasing oxygen requirements.     # Hypoxic respiratory failure secondary to extensive multilobular PNA  #Sepsis on Admission with HAGMA  - Septic on admission (T100.7F, , WBC 13.9k)  - CXR (8/31): Multifocal opacities, most confluent at right upper lobe.  - CT Angio Chest (8/31): confluent airspace consolidation in the RUL and to a lesser degree the RLL consistent with pneumonia.  - s/p Vanc/Cefepime in ED  - Admitted to [facility]. Pulmonology on board. Pt placed on BIPAP  - VBG (8/31): pH 7.36, pCO2 47, pO2 27, HCO3 27  - Started on Azithro 500mg IV q24hrs and Rocephin 1g IV q24hrs overnight. Changed Azithromycin to Levaquin 500mg IV first day (9/1). 250mg every day after  - Attempted to wean off BIPAP. Pt not tolerating NC or high flow NC and was placed back on BIPAP  - Pt having persistent fevers. Gave 1 dose Tylenol 500mg IV.   - Had episode of confusion overnight (9/1). Found to be acidotic and retaining CO2 on ABG pH 7.25, pCO2 60, pO2 59, HCO3 26  - Increased BIPAP settings: IPAP/EPAP 16/8, FiO2 80% satting at 95%  - s/p 1 dose vanco 9/2  - f/u Blood cx, urine cx  - f/u MRSA nasal swab   - f/u urine legionella/strep  -Strict I and O, daily weights for fluid status monitoring   -Ramirez to monitor urine output  -CXR daily    # DIETER on CKD- unclear etiology, possibly due to rhabdo??  - baseline cr: 1.7-1.9  - Cr trending up this admission: 1.9 --> 3.0 --> 3.9 --> 5.2  - oliguric  - UA positive for WBC, asymptomatic  - renal US normal  - IV hydration 75cc/hr LR  - Ramirez placed overnight, very little urine output  - monitor Cr  - Nephro consulted  - avoid nephrotoxic agents  - f/u Urine Na/cr  - monitor for signs of volume overload    #Elevated CK  #Transaminitis  - CK elevated 4253 initially, downtrending  - Uptrending LFTs: T-Bili 1.4, ALP 46, ,   - on IV fluids  - Monitor urine output      #Elevated Trop and BNP  #Likely Type 2 NSTEMI  - Serum Pro-BNP: 13K, Trops: 0.04 --> repeat 0.07  - EKG: Afib w/ RVR, Bifascicular block  - Denies CP or Hx of CHF, no evidence of volume overload on PE  - TTE 9/2020: EF of 62 %, Trace MR, Mild TR, mild AoS  - Will repeat TTE, Trend trops  - Dr Erazo eval    #Fall  #Debility/Deconditioning  - Trauma w/up neg  - Hx does not suggest Neurocardiogenic or cardiogenic etiology  - At baseline Pt walks by himself  - PT eval  - Orthostatics    #HTN  #HLD  #CAD s/p stents placement (2014)  - Not on BB, unknown why. Follows Dr Erazo and scheduled to see him this month  - ASA/Statin  - Hold home ACE-I, HCTZ and amlodipine while patient septic  - isosorbide mononitrate 120 mg oral tablet, extended release: 1 tab(s) orally once a day (in the morning)    #T2DM  -Takes Actoplus Met 15 mg-500 mg q12 at home  - f/u A1C and FS > insulin basal/bolus if >180    #Afib on Xarelto  -Xarelto 15 mg oral tablet q24  -Currently not on any rate control med, HR stable b/w .    #Gout  -Allopurinol    #VINI on CPAP  -NIV qHS    #Chronic Venous insuffiency  -LLE edema  -ACE wrap    Misc:  # DVT ppx: Xarelto --> IV heparin  # GI ppx: NA  # Activity: IAT  # Diet: DASH/Renal  # Code Status: Full   DISPO: Upgrade to MICU 80M w/ PMHx of HTN, HLD, T2DM, Afib on Xarelto, CAD s/p stents placement (2014), gout, VINI on CPAP, CKD BIBEMS for fall, admitted for multilobular pneumonia. Course complicated by worsening DIETER on CKD, possible rhabdomyolysis and increasing oxygen requirements.     # Hypoxic respiratory failure secondary to extensive multilobular PNA  #Sepsis on Admission with HAGMA  - Septic on admission (T100.7F, , WBC 13.9k)  - CXR (8/31): Multifocal opacities, most confluent at right upper lobe.  - CT Angio Chest (8/31): confluent airspace consolidation in the RUL and to a lesser degree the RLL consistent with pneumonia.  - s/p Vanc/Cefepime in ED  - Admitted to [facility]. Pulmonology on board. Pt placed on BIPAP  - VBG (8/31): pH 7.36, pCO2 47, pO2 27, HCO3 27  - Started on Azithro 500mg IV q24hrs and Rocephin 1g IV q24hrs overnight. Changed Azithromycin to Levaquin 500mg IV first day (9/1). 250mg every day after  - Attempted to wean off BIPAP. Pt not tolerating NC or high flow NC and was placed back on BIPAP  - Pt having persistent fevers. Gave 1 dose Tylenol 500mg IV.   - Had episode of confusion overnight (9/1). Found to be acidotic and retaining CO2 on ABG pH 7.25, pCO2 60, pO2 59, HCO3 26  - Increased BIPAP settings: IPAP/EPAP 16/8, FiO2 80% satting at 95%  - s/p 1 dose vanco 9/2  - f/u Blood cx, urine cx  - f/u MRSA nasal swab   - f/u urine legionella/strep  -Strict I and O, daily weights for fluid status monitoring   -Ramirez to monitor urine output  -CXR daily    # DIETER on CKD- unclear etiology, possibly due to rhabdo??  - baseline cr: 1.7-1.9  - Cr trending up this admission: 1.9 --> 3.0 --> 3.9 --> 5.2  - oliguric  - UA positive for WBC, asymptomatic  - renal US normal  - IV hydration 75cc/hr LR  - Ramirez placed overnight, very little urine output  - monitor Cr  - Nephro consulted  - trial of 2mg bumex IV and monitor urine output  - avoid nephrotoxic agents  - f/u Urine Na/cr  - monitor urine for signs of volume overload    #Elevated CK  #Transaminitis  - CK elevated 4253 initially, downtrending  - Uptrending LFTs: T-Bili 1.4, ALP 46, ,   - IV fluids d/lopez as per nephro  - Monitor urine output      #Elevated Trop and BNP  #Likely Type 2 NSTEMI  - Serum Pro-BNP: 13K, Trops: 0.04 --> repeat 0.07  - EKG: Afib w/ RVR, Bifascicular block  - Denies CP or Hx of CHF, no evidence of volume overload on PE  - TTE 9/2020: EF of 62 %, Trace MR, Mild TR, mild AoS  - Will repeat TTE, Trend trops  - Dr Erazo eval    #Fall  #Debility/Deconditioning  - Trauma w/up neg  - Hx does not suggest Neurocardiogenic or cardiogenic etiology  - At baseline Pt walks by himself  - PT eval  - Orthostatics    #HTN  #HLD  #CAD s/p stents placement (2014)  - Not on BB, unknown why. Follows Dr Erazo and scheduled to see him this month  - ASA/Statin  - Hold home ACE-I, HCTZ and amlodipine while patient septic  - isosorbide mononitrate 120 mg oral tablet, extended release: 1 tab(s) orally once a day (in the morning)    #T2DM  -Takes Actoplus Met 15 mg-500 mg q12 at home  - f/u A1C and FS > insulin basal/bolus if >180    #Afib on Xarelto  -Xarelto 15 mg oral tablet q24  -Currently not on any rate control med, HR stable b/w .    #Gout  -Allopurinol    #VINI on CPAP  -NIV qHS    #Chronic Venous insuffiency  -LLE edema  -ACE wrap    Misc:  # DVT ppx: Xarelto --> IV heparin  # GI ppx: NA  # Activity: IAT  # Diet: DASH/Renal  # Code Status: Full   DISPO: Upgrade to MICU 80M w/ PMHx of HTN, HLD, T2DM, Afib on Xarelto, CAD s/p stents placement (2014), gout, VINI on CPAP, CKD BIBEMS for fall, admitted for multilobular pneumonia. Course complicated by worsening DIETER on CKD, possible rhabdomyolysis and increasing oxygen requirements.     # Hypoxic respiratory failure secondary to extensive multilobular PNA  #Sepsis on Admission with HAGMA  - Septic on admission (T100.7F, , WBC 13.9k)  - CXR (8/31): Multifocal opacities, most confluent at right upper lobe.  - CT Angio Chest (8/31): confluent airspace consolidation in the RUL and to a lesser degree the RLL consistent with pneumonia.  - s/p Vanc/Cefepime in ED  - Admitted to [facility]. Pulmonology on board. Pt placed on BIPAP  - VBG (8/31): pH 7.36, pCO2 47, pO2 27, HCO3 27  - Started on Azithro 500mg IV q24hrs and Rocephin 1g IV q24hrs overnight. Changed Azithromycin to Levaquin 500mg IV first day (9/1). 250mg every day after  - Attempted to wean off BIPAP. Pt not tolerating NC or high flow NC and was placed back on BIPAP  - Pt having persistent fevers. Gave 1 dose Tylenol 500mg IV.   - Had episode of confusion overnight (9/1). Found to be acidotic and retaining CO2 on ABG pH 7.25, pCO2 60, pO2 59, HCO3 26  - Increased BIPAP settings: IPAP/EPAP 16/8, FiO2 80% satting at 95%  - s/p 1 dose vanco 9/2  - f/u Blood cx, urine cx  - f/u MRSA nasal swab   - f/u urine legionella/strep  - Strict I and O, daily weights for fluid status monitoring   - Ramirez to monitor urine output  - CXR daily    # DIETER on CKD- unclear etiology, possibly due to rhabdo??  - baseline cr: 1.7-1.9  - Cr trending up this admission: 1.9 --> 3.0 --> 3.9 --> 5.2  - oliguric  - UA positive for WBC, asymptomatic  - renal US normal  - IV hydration 75cc/hr LR  - Ramirez placed overnight, very little urine output  - monitor Cr  - Nephro consulted  - trial of 2mg bumex IV and monitor urine output  - avoid nephrotoxic agents  - f/u Urine Na/cr  - monitor urine for signs of volume overload    #Elevated CK  #Transaminitis  - CK elevated 4253 initially, downtrending  - Uptrending LFTs: T-Bili 1.4, ALP 46, ,   - IV fluids d/lopez as per nephro  - Monitor urine output      #Elevated Trop and BNP  #Likely Type 2 NSTEMI  - Serum Pro-BNP: 13K, Trops: 0.04 --> repeat 0.07  - EKG: Afib w/ RVR, Bifascicular block  - Denies CP or Hx of CHF, no evidence of volume overload on PE  - TTE 9/2020: EF of 62 %, Trace MR, Mild TR, mild AoS  - Will repeat TTE, Trend trops  - Dr Erazo eval    #Fall  #Debility/Deconditioning  - Trauma w/up neg  - Hx does not suggest Neurocardiogenic or cardiogenic etiology  - At baseline Pt walks by himself  - PT eval  - Orthostatics    #HTN  #HLD  #CAD s/p stents placement (2014)  - Not on BB, unknown why. Follows Dr Erazo and scheduled to see him this month  - ASA/Statin  - Hold home ACE-I, HCTZ and amlodipine while patient septic  - isosorbide mononitrate 120 mg oral tablet, extended release: 1 tab(s) orally once a day (in the morning)    #T2DM  -Takes Actoplus Met 15 mg-500 mg q12 at home  - f/u A1C and FS > insulin basal/bolus if >180    #Afib on Xarelto  - Xarelto 15 mg oral tablet q24  - Currently not on any rate control med, HR stable b/w .    #Gout  -Allopurinol    #VINI on CPAP  -NIV qHS    #Chronic Venous insuffiency  -LLE edema  -ACE wrap    Misc:  # DVT ppx: Xarelto --> IV heparin  # GI ppx: NA  # Activity: IAT  # Diet: DASH/Renal  # Code Status: Full   DISPO: Upgrade to MICU 80M w/ PMHx of HTN, HLD, T2DM, Afib on Xarelto, CAD s/p stents placement (2014), gout, VINI on CPAP, CKD BIBEMS for fall, admitted for multilobular pneumonia. Course complicated by worsening DIETER on CKD, possible rhabdomyolysis and increasing oxygen requirements.     # Hypoxic respiratory failure secondary to extensive multilobular PNA  #Sepsis on Admission with HAGMA  - Septic on admission (T100.7F, , WBC 13.9k)  - CXR (8/31): Multifocal opacities, most confluent at right upper lobe.  - CT Angio Chest (8/31): confluent airspace consolidation in the RUL and to a lesser degree the RLL consistent with pneumonia.  - s/p Vanc/Cefepime in ED  - Admitted to CEU. Pulmonology on board. Pt placed on BIPAP  - VBG (8/31): pH 7.36, pCO2 47, pO2 27, HCO3 27  - Started on Azithro 500mg IV q24hrs and Rocephin 1g IV q24hrs overnight. Changed Azithromycin to Levaquin 500mg IV first day (9/1). 250mg every day after  - Attempted to wean off BIPAP. Pt not tolerating NC or high flow NC and was placed back on BIPAP  - Pt having persistent fevers. Gave 1 dose Tylenol 500mg IV.   - Had episode of confusion overnight (9/1). Found to be acidotic and retaining CO2 on ABG pH 7.25, pCO2 60, pO2 59, HCO3 26  - Increased BIPAP settings: IPAP/EPAP 16/8, FiO2 80% satting at 95%  - s/p 1 dose vanco 9/2  - f/u Blood cx, urine cx  - f/u MRSA nasal swab   - f/u urine legionella/strep  - Strict I and O, daily weights for fluid status monitoring   - Ramirez to monitor urine output  - CXR daily    # DIETER on CKD- unclear etiology, possibly due to rhabdo??  - baseline cr: 1.7-1.9  - Cr trending up this admission: 1.9 --> 3.0 --> 3.9 --> 5.2  - oliguric  - UA positive for WBC, asymptomatic  - renal US normal  - IV hydration 75cc/hr LR  - Ramirez placed overnight, very little urine output  - monitor Cr  - Nephro consulted  - trial of 2mg bumex IV and monitor urine output  - avoid nephrotoxic agents  - f/u Urine Na/cr  - monitor urine for signs of volume overload    #Elevated CK  #Transaminitis  - CK elevated 4253 initially, downtrending  - Uptrending LFTs: T-Bili 1.4, ALP 46, ,   - IV fluids d/lopez as per nephro  - Monitor urine output      #Elevated Trop and BNP  #Likely Type 2 NSTEMI  - Serum Pro-BNP: 13K, Trops: 0.04 --> repeat 0.07  - EKG: Afib w/ RVR, Bifascicular block  - Denies CP or Hx of CHF, no evidence of volume overload on PE  - TTE 9/2020: EF of 62 %, Trace MR, Mild TR, mild AoS  - Will repeat TTE, Trend trops  - Dr Erazo eval    #Fall  #Debility/Deconditioning  - Trauma w/up neg  - Hx does not suggest Neurocardiogenic or cardiogenic etiology  - At baseline Pt walks by himself  - PT eval  - Orthostatics    #HTN  #HLD  #CAD s/p stents placement (2014)  - Not on BB, unknown why. Follows Dr Erazo and scheduled to see him this month  - ASA/Statin  - Hold home ACE-I, HCTZ and amlodipine while patient septic  - isosorbide mononitrate 120 mg oral tablet, extended release: 1 tab(s) orally once a day (in the morning)    #T2DM  -Takes Actoplus Met 15 mg-500 mg q12 at home  - f/u A1C and FS > insulin basal/bolus if >180    #Afib on Xarelto  - Xarelto 15 mg oral tablet q24. D/c xarelto today  - on heparin gtt. Monitor PTTs and titrate as required  - Currently not on any rate control med, HR stable b/w .    #Gout  -Allopurinol    #VINI on CPAP  -NIV qHS    #Chronic Venous insufficiency  -LLE edema  -ACE wrap    Misc:  # DVT ppx: Xarelto --> switched to IV heparin  # GI ppx: NA  # Activity: IAT  # Diet: DASH/Renal  # Code Status: Full   DISPO: Upgrade to MICU    #Handoff  - On heparin gtt, f/u PTT  - F/u Urine Na and Cr  - F/u Echo  - Monitor urine output s/p 1 dose bumex

## 2021-09-02 NOTE — CONSULT NOTE ADULT - ASSESSMENT
1] Acute Hypoxic Respiratory Failure      Sepsis due CAP  - For transfer to ICU  - Continue antibiotics    2] Type 2 MI       CAD S/P PTCA/Stent  - Continue Aspirin 81 mg tablet daily    3] Chronic Atrial Fibrillation  - Off OAC    4] HTN  - Continue to monitor    5] DIETRE  - Possibly pre-renal and complicated by rhabdomyolysis.  Patient with diarrhea x 2 days prior admission.  Patient was found on the floor (duration unclear.  Girl friend states that she found patient on the floor.  - Renal input appreciated  - For HD tonight.    6] Hyperlipidemia  - On statin therapy    Code Status: Full  Prognosis: guarded    Plan discussed with Nursing Staff/House Staff    Murphy Erazo MD  [phone] Office

## 2021-09-02 NOTE — CHART NOTE - NSCHARTNOTEFT_GEN_A_CORE
Pt developed confusion ON, no gross neurological deficits could be appreciated. STAT ABG was ordered which showed PH 7.25, Pco2 60, Po2 59 Saturation 89%. The BIPAP settings were adjusted to 16/8 IPAP/EPAP FIo2 of 80%. The physical exam showed diffuse B/L crackles in the lung fields. The IVC on bedside US exam was not collapsable. Pt is currently vitally stable.     Plan   -Change in mental status likely due to a combination of DIETER Uremia and Co2 Retention  -Repeat ABG in the am on new BIPAP settings  -Strict I and O, daily weights for fluid status monitoring   -Ramirez to monitor urine output  -Urine Lytes ordered, F/u  -F/U repeat BMP in the am  -Repeat CXR to look for signs of worsening fluid overload  -Decrease rate of IVF to 75 ml/HR as patient also has DIETER, re-evaluate the need for IVF in the am after urine lytes result (calculate FENA)

## 2021-09-02 NOTE — CHART NOTE - NSCHARTNOTEFT_GEN_A_CORE
TRANSFER NOTE    Hospital Course:  80M w/ PMHx of HTN, HLD, T2DM, Afib on Xarelto, CAD s/p stents placement (2014), gout, VINI on CPAP BIBEMS for fall, admitted for CAP. Febrile, tachycardic and hypoxic in ED. Labs revealed leukocytosis, HAGMA, elevated Trop 0.04, BNP 13k. UA positve for WBC. Trauma w/up neg. CXR and CT chest revealed confluent airspace consolidation in the RUL and to a lesser degree the RLL consistent with pneumonia.  Patient was admitted to CEU for Acute Hypoxic Respiratory Failure 2/2 to pneumonia. He was put on BIPAP initial settings were 6/12 IPAP/EPAP FI02 75%, the primary team was unable to wean him off BIPAP as he desaturated down to the low 80's without BIPAP. Last night patient developed episodes of confusion and was tachypneic and had increased work of breathing a STAT ABG was done which showed   PH 7.25, Pco2 60, Po2 59 Saturation 89%. The BIPAP settings were adjusted to 16/8 IPAP/EPAP FIo2 of 80%. The physical exam showed diffuse B/L crackles in the lung fields. The IVC on bedside US exam was not collapsable. The repeat ABG only showed mild improvement and he still had increased work of breathing so he is being transferred to the MICU. Pt also has signs of fluid overload along with DIETER he is currently on 75 ml/hr of LR. Pt is full code.        #Fluid overload vs DIETER  -Strict I and O, daily weights for fluid status monitoring   -Ramirez to monitor urine output  -Urine Lytes ordered, F/u  -F/U repeat BMP in the am  -Repeat CXR to look for signs of worsening fluid overload  -Decrease rate of IVF to 75 ml/HR as patient also has DIETER, re-evaluate the need for IVF in the am after urine lytes result (calculate FENA).      #Acute hypoxic Resp Failure 2/2 to CAP  #Sepsis on Admsission  #HAGMA  - Septic on admission (T100.7F, , WBC 13.9k)  - CXR (8/31): Multifocal opacities, most confluent at right upper lobe.  -CT Angio Chest (8/31): confluent airspace consolidation in the RUL and to a lesser degree the RLL consistent with pneumonia.  - s/p Vanc/Cefepime in ED  - Admitted to CEU. Pulmonology on board. Pt placed on BIPAP  - VBG (8/31): pH 7.36, pCO2 47, pO2 27, HCO3 27  - Started on Azithro 500mg IV q24hrs and Rocephin 1g IV q24hrs overnight. Changed Azithromycin to Levaquin 500mg IV first day. 250mg every day after  - Attempted to wean off BIPAP. Pt not tolerating NC or high flow NC and was placed back on BIPAP  - Pt having persistent fevers. Gave 1 dose Tylenol 500mg IV  - f/u BCx, Urine cx, urine Legionela/Strep    #Elevated CK  #Transaminitis  - CK elevated 4253  - Uptrending LFTs: T-Bili 1.4, ALP 46, ,   - on IV fluids  - Monitor urine output      #Elevated Trop and BNP  #Likely Type 2 NSTEMI  -Serum Pro-BNP: 13K, Trops: 0.04 --> repeat 0.07  - EKG: Afib w/ RVR, Bifascicular block  -Denies CP or Hx of CHF, no evidence of volume overload on PE  -TTE 9/2020: EF of 62 %, Trace MR, Mild TR, mild AoS  -Will repeat TTE, Trend trops  -Dr Erazo eval    #Fall  #Debility/Deconditioning  - Trauma w/up neg  - Hx does not suggest Neurocardiogenic or cardiogenic etiology  - At baseline Pt walks by himself  - PT eval  - Orthostatics    #ASx bacteriuria  #UA positive  - denies dysuria    #CKD  - Cr 1.9 on admission, baseline 1.6-1.9 (sable since 9/2020)  - Cr trending up to 3.0 this AM  - Strict I & Os,  - Monitor renal Fx, urine output    #HTN  #HLD  #CAD s/p stents placement (2014)  - Not on BB, unknown why. Follows Dr Erazo and scheduled to see him this month  - ASA/Statin  - Hold home ACE-I, HCTZ and amlodipine while patient septic  - isosorbide mononitrate 120 mg oral tablet, extended release: 1 tab(s) orally once a day (in the morning)    #T2DM  -Takes Actoplus Met 15 mg-500 mg q12 at home  - f/u A1C and FS > insulin basal/bolus if >180    #Afib on Xarelto  -Xarelto 15 mg oral tablet q24  -Currently not on any rate control med, HR stable b/w .    #Gout  -Allopurinol    #VINI on CPAP  -NIV qHS    #Chronic Venous insuffiency  -LLE edema  -ACE wrap            12pt ROS otherwise negative.    VITALS  Vital Signs Last 24 Hrs  T(C): 39 (01 Sep 2021 20:00), Max: 39 (01 Sep 2021 07:52)  T(F): 102.2 (01 Sep 2021 20:00), Max: 102.2 (01 Sep 2021 07:52)  HR: 106 (02 Sep 2021 00:00) (103 - 115)  BP: 141/63 (02 Sep 2021 00:00) (123/60 - 177/78)  BP(mean): 91 (02 Sep 2021 00:00) (91 - 91)  RR: 32 (02 Sep 2021 00:00) (18 - 32)  SpO2: 94% (02 Sep 2021 00:00) (88% - 98%)    CAPILLARY BLOOD GLUCOSE      POCT Blood Glucose.: 143 mg/dL (02 Sep 2021 03:03)  POCT Blood Glucose.: 150 mg/dL (01 Sep 2021 21:15)  POCT Blood Glucose.: 143 mg/dL (01 Sep 2021 16:10)  POCT Blood Glucose.: 152 mg/dL (01 Sep 2021 11:37)  POCT Blood Glucose.: 167 mg/dL (01 Sep 2021 07:50)      PHYSICAL EXAM  General: Resp distress, AAOx1 currently, baseline is AAox3  Head: NC/AT; MMM; PERRL; EOMI;  Neck: Supple; NO LAD  Respiratory: B/L crackles worse on the R side  Cardiovascular: Regular rhythm/rate; S1/S2   Gastrointestinal: Soft; distended non tender with normal bowel sounds  Extremities: peripheral edema and discoloration noted  Neurological:  CNII-XII grossly intact; no obvious focal deficits    MEDICATIONS  (STANDING):  allopurinol 150 milliGRAM(s) Oral daily  aspirin  chewable 81 milliGRAM(s) Oral daily  atorvastatin 40 milliGRAM(s) Oral at bedtime  cefTRIAXone   IVPB 1000 milliGRAM(s) IV Intermittent every 24 hours  chlorhexidine 4% Liquid 1 Application(s) Topical <User Schedule>  dextrose 40% Gel 15 Gram(s) Oral once  dextrose 5%. 1000 milliLiter(s) (50 mL/Hr) IV Continuous <Continuous>  dextrose 5%. 1000 milliLiter(s) (100 mL/Hr) IV Continuous <Continuous>  dextrose 50% Injectable 25 Gram(s) IV Push once  dextrose 50% Injectable 12.5 Gram(s) IV Push once  dextrose 50% Injectable 25 Gram(s) IV Push once  glucagon  Injectable 1 milliGRAM(s) IntraMuscular once  isosorbide   mononitrate ER Tablet (IMDUR) 120 milliGRAM(s) Oral daily  lactated ringers. 1000 milliLiter(s) (100 mL/Hr) IV Continuous <Continuous>  levoFLOXacin IVPB      levoFLOXacin IVPB 250 milliGRAM(s) IV Intermittent every 24 hours  rivaroxaban 15 milliGRAM(s) Oral with dinner    MEDICATIONS  (PRN):  acetaminophen   Tablet .. 650 milliGRAM(s) Oral every 6 hours PRN Temp greater or equal to 38C (100.4F), Mild Pain (1 - 3)      No Known Allergies      LABS                        12.9   13.73 )-----------( 214      ( 01 Sep 2021 08:16 )             39.8     09-01    138  |  100  |  61<HH>  ----------------------------<  150<H>  4.2   |  20  |  3.9<H>    Ca    8.7      01 Sep 2021 16:49  Mg     2.3     09-01    TPro  5.9<L>  /  Alb  2.9<L>  /  TBili  1.4<H>  /  DBili  x   /  AST  455<H>  /  ALT  152<H>  /  AlkPhos  56  09-01    PT/INR - ( 31 Aug 2021 13:24 )   PT: 15.20 sec;   INR: 1.33 ratio         PTT - ( 31 Aug 2021 13:24 )  PTT:34.2 sec  Urinalysis Basic - ( 31 Aug 2021 18:38 )    Color: Yellow / Appearance: Slightly Turbid / SG: >1.050 / pH: x  Gluc: x / Ketone: Negative  / Bili: Negative / Urobili: <2 mg/dL   Blood: x / Protein: 300 mg/dL / Nitrite: Negative   Leuk Esterase: Negative / RBC: 9 /HPF / WBC 32 /HPF   Sq Epi: x / Non Sq Epi: 2 /HPF / Bacteria: Negative      CARDIAC MARKERS ( 01 Sep 2021 16:49 )  x     / x     / 3604 U/L / x     / x      CARDIAC MARKERS ( 01 Sep 2021 08:16 )  x     / 0.07 ng/mL / 4253 U/L / x     / x      CARDIAC MARKERS ( 31 Aug 2021 14:35 )  x     / 0.04 ng/mL / x     / x     / x            IMAGING/EKG/ETC    EKG:    Ventricular Rate 109 BPM    Atrial Rate 114 BPM    QRS Duration 126 ms    Q-T Interval 330 ms    QTC Calculation(Bazett) 444 ms    R Axis -55 degrees    T Axis 110 degrees    Diagnosis Line Atrial fibrillation with occasional Premature ventricular complexes  Left axis deviation  Non-specific intra-ventricular conduction block  T wave abnormality, consider lateral ischemia  Abnormal ECG    Confirmed by Braydon Pickens ([id number]) on 9/1/2021 11:35:12 PM    Xray:  EXAM:  XR CHEST PORTABLE IMMED 1V            PROCEDURE DATE:  08/31/2021            INTERPRETATION:  Clinical History / Reason for exam: Sepsis    Comparison : Chest radiograph September 22, 2020. Correlation is made with CT chest August 31, 2021.    Technique/Positioning: Single AP view of the chest.    Findings:    Support devices: None.    Cardiac/mediastinum/hilum: Partially obscured    Lung parenchyma/Pleura: Multifocal opacities, most confluent at right upper lobe. No pneumothorax.    Skeleton/soft tissues: No acute abnormality.    Impression:    Multifocal opacities, most confluent at right upper lobe.        --- End of Report ---              ELLIOT LANDAU MD; Attending Radiologist  This document has been electronically signed. Sep1 2021  6:25AM      CT:    EXAM:  CT ABDOMEN AND PELVIS IC        EXAM:  CT ANGIO CHEST PULM ART [facility]            PROCEDURE DATE:  08/31/2021            INTERPRETATION:  CLINICAL HISTORY/REASON FOR EXAM: Trauma.    TECHNIQUE: CTA chest with IV contrast, CT abdomen and pelviswith IV contrast performed. Initial CTA images of chest obtained with 100 cc Omnipaque 350 IV contrast administration. Subsequently, CT images of abdomen and pelvis obtained with IV contrast administration. Oral contrast was not administered. Reformatted images in the coronal and sagittal planes were acquired. 3D (MIP images) generated.    COMPARISON: 9/7/2020.      FINDINGS:    CHEST:      PULMONARY ARTERIES: No evidence of acute pulmonary embolism.    LUNGS, PLEURA, AIRWAYS: Right upper lobe confluent airspace consolidation. Scattered areas of consolidation in the right lower lobe as well. No evidence of pleural effusion or pneumothorax. No evidence of central endobronchial obstruction. No bronchiectasis or honeycombing.    THORACIC NODES: Calcified mediastinal lymph nodes, correlation for prior granulomatous disease.    MEDIASTINUM/GREAT VESSELS: No pericardial effusion. Heart size is within normal limits. The aorta and main pulmonary artery are of normal caliber.    ABDOMEN/PELVIS:    HEPATOBILIARY: Unremarkable.    SPLEEN: Unremarkable.    PANCREAS: Stable 2.2 cm pancreatic uncinate process cystic lesion.    ADRENAL GLANDS: Unremarkable.    KIDNEYS: Symmetric pattern of renal enhancement. No hydronephrosis bilaterally. Right renal cyst and other bilateral renal hypodensities too small to characterize.    ABDOMINOPELVIC NODES: No lymphadenopathy.    PELVIC ORGANS: Unremarkable.    PERITONEUM/MESENTERY/BOWEL: No bowel obstruction. No ascites or pneumoperitoneum.    BONES/SOFT TISSUES: Old bilateral rib fractures. No definite acute abnormality.    OTHER: Vascular calcifications.      IMPRESSION:    Confluent airspace consolidation in the right upper lobe and to a lesser degree the right lower lobe, consistent with pneumonia    No evidence of acute intrathoracic or intra-abdominal traumatic injury.    No evidence of pulmonary embolism    --- End of Report ---              MIKEY MILLS MD; Attending Radiologist  This document has been electronically signed. Aug 31 2021  2:57PM TRANSFER NOTE    Hospital Course:  80M w/ PMHx of HTN, HLD, T2DM, Afib on Xarelto, CAD s/p stents placement (2014), gout, VINI on CPAP BIBEMS for fall, admitted for shortness of breath and found ot have extensive RUL consolidation and to a lesser degree the RLL consistent with pneumonia.    In the ED: ebrile, tachycardic and hypoxia  Labs revealed leukocytosis, HAGMA, elevated Trop 0.04, cr:2 (baseline cr: 1.7),  BNP 13k. UA positve for WBC. Trauma w/up neg    He was admitted to the CEU  for Acute Hypoxic Respiratory Failure 2/2 to pneumonia.  He was started on levofloxacin and ceftriaxone (on 9/1/21)  He was put on BIPAP initial settings were 6/12 IPAP/EPAP FI02 75%.     During his stay in CEU his labs revealed elevated CK, transaminitis and worsening kidney function. He was started on LR at 100cc for txt of DIETER and rhabdomyolysis.     On 9/2 in the morning patient was lethargic. His  PH 7.25, Pco2 60, Po2 59 Saturation 89%. The BIPAP settings were adjusted to 16/8 IPAP/EPAP FIo2 of 80%.    The IVC on bedside US exam was collapsible with inspiration.  CXR: showed worsening RUL consolidation, no signs of volume overload.   He was upgraded to ICU   ----------------------------------------------------------------------------------------------------------------------------  Focused Physical exam:   Heart: no murmurs appreciated  Lungs: decreased air entry on right upper and lowe lobes, no wheezing, no crackles, diffuse rhonchi appreciated  Abdomen: soft, non-tender, non-distended  LE: no edema  Skin: no rashes or bites     Assessment   80M w/ PMHx of HTN, HLD, T2DM, Afib on Xarelto, CAD s/p stents placement (2014), gout, VINI on CPAP, CKD BIBEMS for fall, admitted for multilobular pneumonia. Course complicated by worsening DIEETR on CKD, possible rhabdomyolysis and increasing oxygen requirements.     # Hypoxic respiratory failure secondary to extensive multilobular PNA  #Sepsis on Admsission with HAGMA  No volume overload on cxr, no clinical findings of volume overload and IVC is collapsable. However if o2 requirements increase, must r/o pulmonary edema.   - Levofloxacin + ceftriaxone (start on 9/1/21)  - s/p 1 dose vancomycin on 9/2  - f/u Bcx  - f/u MRSA nasal swab   - f/u urine legionella   -Strict I and O, daily weights for fluid status monitoring   -Ramirez to monitor urine output  -CXR daily.     # hypercapnic respiratory failure.   likely 2/2 decreased     # DIETER on CKD- unclear etiology, possibly due to rhabdo??  baseline cr: 1.7-1.9, now cr:3.9  oligoric  renal US normal  - IV hydration 75cc/hr LR  - monitor cr  - avoid nephrotoxic agents  - f/u Urine Na/cr  - monitor for signs of volume overload      #Elevated CK  #Transaminitis  - CK elevated 4253  - Uptrending LFTs: T-Bili 1.4, ALP 46, ,   - on IV fluids  - Monitor urine output      #Elevated Trop and BNP  #Likely Type 2 NSTEMI  -Serum Pro-BNP: 13K, Trops: 0.04 --> repeat 0.07  - EKG: Afib w/ RVR, Bifascicular block  -Denies CP or Hx of CHF, no evidence of volume overload on PE  -TTE 9/2020: EF of 62 %, Trace MR, Mild TR, mild AoS  -Will repeat TTE, Trend trops  -Dr Erazo eval    #Fall  #Debility/Deconditioning  - Trauma w/up neg  - Hx does not suggest Neurocardiogenic or cardiogenic etiology  - At baseline Pt walks by himself  - PT eval  - Orthostatics    #ASx bacteriuria  #UA positive  - denies dysuria    #CKD  - Cr 1.9 on admission, baseline 1.6-1.9 (sable since 9/2020)  - Cr trending up to 3.0 this AM  - Strict I & Os,  - Monitor renal Fx, urine output    #HTN  #HLD  #CAD s/p stents placement (2014)  - Not on BB, unknown why. Follows Dr Erazo and scheduled to see him this month  - ASA/Statin  - Hold home ACE-I, HCTZ and amlodipine while patient septic  - isosorbide mononitrate 120 mg oral tablet, extended release: 1 tab(s) orally once a day (in the morning)    #T2DM  -Takes Actoplus Met 15 mg-500 mg q12 at home  - f/u A1C and FS > insulin basal/bolus if >180    #Afib on Xarelto  -Xarelto 15 mg oral tablet q24  -Currently not on any rate control med, HR stable b/w .    #Gout  -Allopurinol    #VINI on CPAP  -NIV qHS    #Chronic Venous insuffiency  -LLE edema  -ACE wrap            12pt ROS otherwise negative.    VITALS  Vital Signs Last 24 Hrs  T(C): 39 (01 Sep 2021 20:00), Max: 39 (01 Sep 2021 07:52)  T(F): 102.2 (01 Sep 2021 20:00), Max: 102.2 (01 Sep 2021 07:52)  HR: 106 (02 Sep 2021 00:00) (103 - 115)  BP: 141/63 (02 Sep 2021 00:00) (123/60 - 177/78)  BP(mean): 91 (02 Sep 2021 00:00) (91 - 91)  RR: 32 (02 Sep 2021 00:00) (18 - 32)  SpO2: 94% (02 Sep 2021 00:00) (88% - 98%)    CAPILLARY BLOOD GLUCOSE      POCT Blood Glucose.: 143 mg/dL (02 Sep 2021 03:03)  POCT Blood Glucose.: 150 mg/dL (01 Sep 2021 21:15)  POCT Blood Glucose.: 143 mg/dL (01 Sep 2021 16:10)  POCT Blood Glucose.: 152 mg/dL (01 Sep 2021 11:37)  POCT Blood Glucose.: 167 mg/dL (01 Sep 2021 07:50)      PHYSICAL EXAM  General: Resp distress, AAOx1 currently, baseline is AAox3  Head: NC/AT; MMM; PERRL; EOMI;  Neck: Supple; NO LAD  Respiratory: B/L crackles worse on the R side  Cardiovascular: Regular rhythm/rate; S1/S2   Gastrointestinal: Soft; distended non tender with normal bowel sounds  Extremities: peripheral edema and discoloration noted  Neurological:  CNII-XII grossly intact; no obvious focal deficits    MEDICATIONS  (STANDING):  allopurinol 150 milliGRAM(s) Oral daily  aspirin  chewable 81 milliGRAM(s) Oral daily  atorvastatin 40 milliGRAM(s) Oral at bedtime  cefTRIAXone   IVPB 1000 milliGRAM(s) IV Intermittent every 24 hours  chlorhexidine 4% Liquid 1 Application(s) Topical <User Schedule>  dextrose 40% Gel 15 Gram(s) Oral once  dextrose 5%. 1000 milliLiter(s) (50 mL/Hr) IV Continuous <Continuous>  dextrose 5%. 1000 milliLiter(s) (100 mL/Hr) IV Continuous <Continuous>  dextrose 50% Injectable 25 Gram(s) IV Push once  dextrose 50% Injectable 12.5 Gram(s) IV Push once  dextrose 50% Injectable 25 Gram(s) IV Push once  glucagon  Injectable 1 milliGRAM(s) IntraMuscular once  isosorbide   mononitrate ER Tablet (IMDUR) 120 milliGRAM(s) Oral daily  lactated ringers. 1000 milliLiter(s) (100 mL/Hr) IV Continuous <Continuous>  levoFLOXacin IVPB      levoFLOXacin IVPB 250 milliGRAM(s) IV Intermittent every 24 hours  rivaroxaban 15 milliGRAM(s) Oral with dinner    MEDICATIONS  (PRN):  acetaminophen   Tablet .. 650 milliGRAM(s) Oral every 6 hours PRN Temp greater or equal to 38C (100.4F), Mild Pain (1 - 3)      No Known Allergies      LABS                        12.9   13.73 )-----------( 214      ( 01 Sep 2021 08:16 )             39.8     09-01    138  |  100  |  61<HH>  ----------------------------<  150<H>  4.2   |  20  |  3.9<H>    Ca    8.7      01 Sep 2021 16:49  Mg     2.3     09-01    TPro  5.9<L>  /  Alb  2.9<L>  /  TBili  1.4<H>  /  DBili  x   /  AST  455<H>  /  ALT  152<H>  /  AlkPhos  56  09-01    PT/INR - ( 31 Aug 2021 13:24 )   PT: 15.20 sec;   INR: 1.33 ratio         PTT - ( 31 Aug 2021 13:24 )  PTT:34.2 sec  Urinalysis Basic - ( 31 Aug 2021 18:38 )    Color: Yellow / Appearance: Slightly Turbid / SG: >1.050 / pH: x  Gluc: x / Ketone: Negative  / Bili: Negative / Urobili: <2 mg/dL   Blood: x / Protein: 300 mg/dL / Nitrite: Negative   Leuk Esterase: Negative / RBC: 9 /HPF / WBC 32 /HPF   Sq Epi: x / Non Sq Epi: 2 /HPF / Bacteria: Negative      CARDIAC MARKERS ( 01 Sep 2021 16:49 )  x     / x     / 3604 U/L / x     / x      CARDIAC MARKERS ( 01 Sep 2021 08:16 )  x     / 0.07 ng/mL / 4253 U/L / x     / x      CARDIAC MARKERS ( 31 Aug 2021 14:35 )  x     / 0.04 ng/mL / x     / x     / x            IMAGING/EKG/ETC    EKG:    Ventricular Rate 109 BPM    Atrial Rate 114 BPM    QRS Duration 126 ms    Q-T Interval 330 ms    QTC Calculation(Bazett) 444 ms    R Axis -55 degrees    T Axis 110 degrees    Diagnosis Line Atrial fibrillation with occasional Premature ventricular complexes  Left axis deviation  Non-specific intra-ventricular conduction block  T wave abnormality, consider lateral ischemia  Abnormal ECG    Confirmed by Braydon Pickens ([id number]) on 9/1/2021 11:35:12 PM    Xray:  EXAM:  XR CHEST PORTABLE IMMED 1V            PROCEDURE DATE:  08/31/2021            INTERPRETATION:  Clinical History / Reason for exam: Sepsis    Comparison : Chest radiograph September 22, 2020. Correlation is made with CT chest August 31, 2021.    Technique/Positioning: Single AP view of the chest.    Findings:    Support devices: None.    Cardiac/mediastinum/hilum: Partially obscured    Lung parenchyma/Pleura: Multifocal opacities, most confluent at right upper lobe. No pneumothorax.    Skeleton/soft tissues: No acute abnormality.    Impression:    Multifocal opacities, most confluent at right upper lobe.        --- End of Report ---              ELLIOT LANDAU MD; Attending Radiologist  This document has been electronically signed. Sep1 2021  6:25AM      CT:    EXAM:  CT ABDOMEN AND PELVIS IC        EXAM:  CT ANGIO CHEST PULM ART [facility]            PROCEDURE DATE:  08/31/2021            INTERPRETATION:  CLINICAL HISTORY/REASON FOR EXAM: Trauma.    TECHNIQUE: CTA chest with IV contrast, CT abdomen and pelviswith IV contrast performed. Initial CTA images of chest obtained with 100 cc Omnipaque 350 IV contrast administration. Subsequently, CT images of abdomen and pelvis obtained with IV contrast administration. Oral contrast was not administered. Reformatted images in the coronal and sagittal planes were acquired. 3D (MIP images) generated.    COMPARISON: 9/7/2020.      FINDINGS:    CHEST:      PULMONARY ARTERIES: No evidence of acute pulmonary embolism.    LUNGS, PLEURA, AIRWAYS: Right upper lobe confluent airspace consolidation. Scattered areas of consolidation in the right lower lobe as well. No evidence of pleural effusion or pneumothorax. No evidence of central endobronchial obstruction. No bronchiectasis or honeycombing.    THORACIC NODES: Calcified mediastinal lymph nodes, correlation for prior granulomatous disease.    MEDIASTINUM/GREAT VESSELS: No pericardial effusion. Heart size is within normal limits. The aorta and main pulmonary artery are of normal caliber.    ABDOMEN/PELVIS:    HEPATOBILIARY: Unremarkable.    SPLEEN: Unremarkable.    PANCREAS: Stable 2.2 cm pancreatic uncinate process cystic lesion.    ADRENAL GLANDS: Unremarkable.    KIDNEYS: Symmetric pattern of renal enhancement. No hydronephrosis bilaterally. Right renal cyst and other bilateral renal hypodensities too small to characterize.    ABDOMINOPELVIC NODES: No lymphadenopathy.    PELVIC ORGANS: Unremarkable.    PERITONEUM/MESENTERY/BOWEL: No bowel obstruction. No ascites or pneumoperitoneum.    BONES/SOFT TISSUES: Old bilateral rib fractures. No definite acute abnormality.    OTHER: Vascular calcifications.      IMPRESSION:    Confluent airspace consolidation in the right upper lobe and to a lesser degree the right lower lobe, consistent with pneumonia    No evidence of acute intrathoracic or intra-abdominal traumatic injury.    No evidence of pulmonary embolism    --- End of Report ---              MIKEY MILLS MD; Attending Radiologist  This document has been electronically signed. Aug 31 2021  2:57PM TRANSFER NOTE    Hospital Course:  80M w/ PMHx of HTN, HLD, T2DM, Afib on Xarelto, CAD s/p stents placement (2014), gout, VINI on CPAP BIBEMS for fall, admitted for shortness of breath and found ot have extensive RUL consolidation and to a lesser degree the RLL consistent with pneumonia.    In the ED: ebrile, tachycardic and hypoxia  Labs revealed leukocytosis, HAGMA, elevated Trop 0.04, cr:2 (baseline cr: 1.7),  BNP 13k. UA positve for WBC. Trauma w/up neg    He was admitted to the CEU  for Acute Hypoxic Respiratory Failure 2/2 to pneumonia.  He was started on levofloxacin and ceftriaxone (on 9/1/21)  He was put on BIPAP initial settings were 6/12 IPAP/EPAP FI02 75%.     During his stay in CEU his labs revealed elevated CK, transaminitis and worsening kidney function. He was started on LR at 100cc for txt of DIETER and rhabdomyolysis.     On 9/2 in the morning patient was lethargic. His  PH 7.25, Pco2 60, Po2 59 Saturation 89%. The BIPAP settings were adjusted to 16/8 IPAP/EPAP FIo2 of 80%.    The IVC on bedside US exam was collapsible with inspiration.  CXR: showed worsening RUL consolidation, no signs of volume overload.   He was upgraded to ICU   ----------------------------------------------------------------------------------------------------------------------------  Focused Physical exam:   Heart: no murmurs appreciated  Lungs: decreased air entry on right upper and lowe lobes, no wheezing, no crackles, diffuse rhonchi appreciated  Abdomen: soft, non-tender, non-distended  LE: no edema  Skin: no rashes or bites     Assessment   80M w/ PMHx of HTN, HLD, T2DM, Afib on Xarelto, CAD s/p stents placement (2014), gout, VINI on CPAP, CKD BIBEMS for fall, admitted for multilobular pneumonia. Course complicated by worsening DIETER on CKD, possible rhabdomyolysis and increasing oxygen requirements.     # Hypoxic respiratory failure secondary to extensive multilobular PNA  #Sepsis on Admsission with HAGMA  No volume overload on cxr, no clinical findings of volume overload and IVC is collapsable. However if o2 requirements increase, must r/o pulmonary edema.   - Levofloxacin + ceftriaxone (start on 9/1/21)  - s/p 1 dose vancomycin on 9/2  - f/u Bcx  - f/u MRSA nasal swab   - f/u urine legionella   -Strict I and O, daily weights for fluid status monitoring   -Ramirez to monitor urine output  -CXR daily.     # hypercapnic respiratory failure.   likely 2/2 decreased     # DIETER on CKD- unclear etiology, possibly due to rhabdo??  baseline cr: 1.7-1.9, now cr:3.9  oligoric  renal US normal  - IV hydration 75cc/hr LR  - monitor cr  - avoid nephrotoxic agents  - f/u Urine Na/cr  - monitor for signs of volume overload    #possible rhabdomyolysis?  #Transaminitis  - CK elevated 4253, downtrending  - Uptrending LFTs: T-Bili 1.4, ALP 46, ,   - on IV fluids  - Monitor urine output    #Afib (on Xarelto at home)  -given decreased GRF. will start heparin drip  - prr q 6 hrs  -Currently not on any rate control med, HR stable b/w .    #Elevated Trop and BNP  #Likely Type 2 NSTEMI  -Serum Pro-BNP: 13K, Trops: 0.04 --> repeat 0.07  - EKG: Afib w/ RVR, Bifascicular block  -Denies CP or Hx of CHF, no evidence of volume overload on PE  -TTE 9/2020: EF of 62 %, Trace MR, Mild TR, mild AoS  -Will repeat TTE  -Dr Castro gomez pending     #Fall  #Debility/Deconditioning  - Trauma w/up neg  - Hx does not suggest Neurocardiogenic or cardiogenic etiology  - At baseline Pt walks by himself  - PT eval    #ASx bacteriuria  #UA positive  - denies dysuria    #HTN  #HLD  #CAD s/p stents placement (2014)  - Not on BB, unknown why. Follows Dr Erazo and scheduled to see him this month  - ASA/Statin  - Hold home ACE-I, HCTZ and amlodipine while patient septic  - isosorbide mononitrate 120 mg oral tablet, extended release: 1 tab(s) orally once a day (in the morning)    #T2DM  -Takes Actoplus Met 15 mg-500 mg q12 at home  - f/u A1C and FS > insulin basal/bolus if >180    #Gout  -Allopurinol, dose adjusted for GFR    #VINI on CPAP  -NIV qHS    #Chronic Venous insuffiency  -LLE edema  -ACE wrap TRANSFER NOTE    Hospital Course:  80M w/ PMHx of HTN, HLD, T2DM, Afib on Xarelto, CAD s/p stents placement (2014), gout, VINI on CPAP BIBEMS for fall, admitted for shortness of breath and found ot have extensive RUL consolidation and to a lesser degree the RLL consistent with pneumonia.    In the ED: ebrile, tachycardic and hypoxia  Labs revealed leukocytosis, HAGMA, elevated Trop 0.04, cr:2 (baseline cr: 1.7),  BNP 13k. UA positve for WBC. Trauma w/up neg    He was admitted to the [facility]  for Acute Hypoxic Respiratory Failure 2/2 to pneumonia.  He was started on levofloxacin and ceftriaxone (on 9/1/21)  He was put on BIPAP initial settings were 6/12 IPAP/EPAP FI02 75%.     During his stay in CEU his labs revealed elevated CK, transaminitis and worsening kidney function. He was started on LR at 100cc for txt of DIETER and rhabdomyolysis.     On 9/2 in the morning patient was lethargic. His  PH 7.25, Pco2 60, Po2 59 Saturation 89%. The BIPAP settings were adjusted to 16/8 IPAP/EPAP FIo2 of 80%.    The IVC on bedside US exam was collapsible with inspiration.  CXR: showed worsening RUL consolidation, no signs of volume overload.   He was upgraded to ICU   ----------------------------------------------------------------------------------------------------------------------------  Focused Physical exam:   Heart: no murmurs appreciated  Lungs: decreased air entry on right upper and lowe lobes, no wheezing, no crackles, diffuse rhonchi appreciated  Abdomen: soft, non-tender, non-distended  LE: no edema  Skin: no rashes or bites     Assessment   80M w/ PMHx of HTN, HLD, T2DM, Afib on Xarelto, CAD s/p stents placement (2014), gout, VINI on CPAP, CKD BIBEMS for fall, admitted for multilobular pneumonia. Course complicated by worsening DIETER on CKD, possible rhabdomyolysis and increasing oxygen requirements.     # Hypoxic respiratory failure secondary to extensive multilobular PNA  #Sepsis on Admsission with HAGMA  No volume overload on cxr, no clinical findings of volume overload and IVC is collapsable. However if o2 requirements increase, must r/o pulmonary edema.   - Levofloxacin + ceftriaxone (start on 9/1/21)  - s/p 1 dose vancomycin on 9/2  - f/u Bcx  - f/u MRSA nasal swab   - f/u urine legionella   -Strict I and O, daily weights for fluid status monitoring   -Ramirez to monitor urine output  -CXR daily.     # hypercapnic respiratory failure.   likely 2/2 decreased oxygenation from suspected pneumonia    # DIETER on CKD- unclear etiology, possibly due to rhabdo??  baseline cr: 1.7-1.9, now cr:3.9  oligoric  renal US normal  - IV hydration 75cc/hr LR  - monitor cr  - avoid nephrotoxic agents  - f/u Urine Na/cr  - monitor for signs of volume overload    #possible rhabdomyolysis?  #Transaminitis  - CK elevated 4253, downtrending  - Uptrending LFTs: T-Bili 1.4, ALP 46, ,   - on IV fluids  - Monitor urine output    #Afib (on Xarelto at home)  -given decreased GRF. will start heparin drip  - prr q 6 hrs  -Currently not on any rate control med, HR stable b/w .    #Elevated Trop and BNP  #Likely Type 2 NSTEMI  -Serum Pro-BNP: 13K, Trops: 0.04 --> repeat 0.07  - EKG: Afib w/ RVR, Bifascicular block  -Denies CP or Hx of CHF, no evidence of volume overload on PE  -TTE 9/2020: EF of 62 %, Trace MR, Mild TR, mild AoS  -Will repeat TTE  -Dr Castro gomez pending     #Fall  #Debility/Deconditioning  - Trauma w/up neg  - Hx does not suggest Neurocardiogenic or cardiogenic etiology  - At baseline Pt walks by himself  - PT eval    #ASx bacteriuria  #UA positive  - denies dysuria    #HTN  #HLD  #CAD s/p stents placement (2014)  - Not on BB, unknown why. Follows Dr Erazo and scheduled to see him this month  - ASA/Statin  - Hold home ACE-I, HCTZ and amlodipine while patient septic  - isosorbide mononitrate 120 mg oral tablet, extended release: 1 tab(s) orally once a day (in the morning)    #T2DM  -Takes Actoplus Met 15 mg-500 mg q12 at home  - f/u A1C and FS > insulin basal/bolus if >180    #Gout  -Allopurinol, dose adjusted for GFR    #VINI on CPAP  -NIV qHS    #Chronic Venous insuffiency  -LLE edema  -ACE wrap TRANSFER NOTE    Hospital Course:  80M w/ PMHx of HTN, HLD, T2DM, Afib on Xarelto, CAD s/p stents placement (2014), gout, VINI on CPAP BIBEMS for fall, admitted for shortness of breath and found ot have extensive RUL consolidation and to a lesser degree the RLL consistent with pneumonia.    In the ED: ebrile, tachycardic and hypoxia  Labs revealed leukocytosis, HAGMA, elevated Trop 0.04, cr:2 (baseline cr: 1.7),  BNP 13k. UA positve for WBC. Trauma w/up neg    He was admitted to the CEU  for Acute Hypoxic Respiratory Failure 2/2 to pneumonia.  He was started on levofloxacin and ceftriaxone (on 9/1/21)  He was put on BIPAP initial settings were 6/12 IPAP/EPAP FI02 75%.     During his stay in CEU his labs revealed elevated CK, transaminitis and worsening kidney function. He was started on LR at 100cc for txt of DIETER and rhabdomyolysis.     On 9/2 in the morning patient was lethargic. His  PH 7.25, Pco2 60, Po2 59 Saturation 89%. The BIPAP settings were adjusted to 16/8 IPAP/EPAP FIo2 of 80%.    The IVC on bedside US exam was collapsible with inspiration.  CXR: showed worsening RUL consolidation, no signs of volume overload.   He was upgraded to ICU   ----------------------------------------------------------------------------------------------------------------------------  Focused Physical exam:   Heart: no murmurs appreciated  Lungs: decreased air entry on right upper and lower lobes, no wheezing, no crackles, diffuse rhonchi appreciated  Abdomen: soft, non-tender, non-distended  LE: no edema  Skin: no rashes or bites     Assessment   80M w/ PMHx of HTN, HLD, T2DM, Afib on Xarelto, CAD s/p stents placement (2014), gout, VINI on CPAP, CKD BIBEMS for fall, admitted for multilobular pneumonia. Course complicated by worsening DIETER on CKD, possible rhabdomyolysis and increasing oxygen requirements.     # Hypoxic respiratory failure secondary to extensive multilobular PNA  #Sepsis on Admission with HAGMA  No volume overload on CXR, no clinical findings of volume overload and IVC is collapsable. However if o2 requirements increase, must r/o pulmonary edema.   - Levofloxacin + ceftriaxone (start on 9/1/21)  - s/p 1 dose vancomycin on 9/2  - f/u Bcx  - f/u MRSA nasal swab   - f/u urine legionella   -Strict I and O, daily weights for fluid status monitoring   -Ramirez to monitor urine output  -CXR daily.     # hypercapnic respiratory failure.   likely 2/2 decreased oxygenation from suspected pneumonia    # DIETER on CKD- unclear etiology, possibly due to rhabdo??  baseline cr: 1.7-1.9, now cr:3.9  oligoric  renal US normal  - IV hydration 75cc/hr LR  - monitor cr  - avoid nephrotoxic agents  - f/u Urine Na/cr  - monitor for signs of volume overload    #possible rhabdomyolysis?  #Transaminitis  - CK elevated 4253, downtrending  - Uptrending LFTs: T-Bili 1.4, ALP 46, ,   - on IV fluids  - Monitor urine output    #Afib (on Xarelto at home)  -given decreased GRF. will start heparin drip  - prr q 6 hrs  -Currently not on any rate control med, HR stable b/w .    #Elevated Trop and BNP  #Likely Type 2 NSTEMI  -Serum Pro-BNP: 13K, Trops: 0.04 --> repeat 0.07  - EKG: Afib w/ RVR, Bifascicular block  -Denies CP or Hx of CHF, no evidence of volume overload on PE  -TTE 9/2020: EF of 62 %, Trace MR, Mild TR, mild AoS  -Will repeat TTE  -Dr Castro gomez pending     #Fall  #Debility/Deconditioning  - Trauma w/up neg  - Hx does not suggest Neurocardiogenic or cardiogenic etiology  - At baseline Pt walks by himself  - PT eval    #ASx bacteriuria  #UA positive  - denies dysuria    #HTN  #HLD  #CAD s/p stents placement (2014)  - Not on BB, unknown why. Follows Dr Erazo and scheduled to see him this month  - ASA/Statin  - Hold home ACE-I, HCTZ and amlodipine while patient septic  - isosorbide mononitrate 120 mg oral tablet, extended release: 1 tab(s) orally once a day (in the morning)    #T2DM  -Takes Actoplus Met 15 mg-500 mg q12 at home  - f/u A1C and FS > insulin basal/bolus if >180    #Gout  -Allopurinol, dose adjusted for GFR    #VINI on CPAP  -NIV qHS    #Chronic Venous insuffiencey  -LLE edema  -ACE wrap    #Handoff  - On heparin gtt, f/u PTT  - F/u Urine Na and Cr  - F/u Echo  - F/u Blood bx, MRSA swab, urine Legionella/Strep  - Monitor urine output s/p 1 dose bumex TRANSFER NOTE    Hospital Course:  80M w/ PMHx of HTN, HLD, T2DM, Afib on Xarelto, CAD s/p stents placement (2014), gout, VINI on CPAP BIBEMS for fall, admitted for shortness of breath and found ot have extensive RUL consolidation and to a lesser degree the RLL consistent with pneumonia.    In the ED: ebrile, tachycardic and hypoxia  Labs revealed leukocytosis, HAGMA, elevated Trop 0.04, cr:2 (baseline cr: 1.7),  BNP 13k. UA positve for WBC. Trauma w/up neg    He was admitted to the CEU  for Acute Hypoxic Respiratory Failure 2/2 to pneumonia.  He was started on levofloxacin and ceftriaxone (on 9/1/21)  He was put on BIPAP initial settings were 6/12 IPAP/EPAP FI02 75%.     During his stay in CEU his labs revealed elevated CK, transaminitis and worsening kidney function. He was started on LR at 100cc for txt of DIETER and rhabdomyolysis.     On 9/2 in the morning patient was lethargic. His  PH 7.25, Pco2 60, Po2 59 Saturation 89%. The BIPAP settings were adjusted to 16/8 IPAP/EPAP FIo2 of 80%.    The IVC on bedside US exam was collapsible with inspiration.  CXR: showed worsening RUL consolidation, no signs of volume overload.   He was upgraded to ICU   ----------------------------------------------------------------------------------------------------------------------------  Focused Physical exam:   Heart: no murmurs appreciated  Lungs: decreased air entry on right upper and lower lobes, no wheezing, no crackles, diffuse rhonchi appreciated  Abdomen: soft, non-tender, non-distended  LE: no edema  Skin: no rashes or bites     Assessment   80M w/ PMHx of HTN, HLD, T2DM, Afib on Xarelto, CAD s/p stents placement (2014), gout, VINI on CPAP, CKD BIBEMS for fall, admitted for multilobular pneumonia. Course complicated by worsening DIETER on CKD, possible rhabdomyolysis and increasing oxygen requirements.     # Hypoxic respiratory failure secondary to extensive multilobular PNA  #Sepsis on Admission with HAGMA  No volume overload on CXR, no clinical findings of volume overload and IVC is collapsable. However if o2 requirements increase, must r/o pulmonary edema.   - Levofloxacin + ceftriaxone (start on 9/1/21)  - s/p 1 dose vancomycin on 9/2  - f/u Bcx  - f/u MRSA nasal swab   - f/u urine legionella   -Strict I and O, daily weights for fluid status monitoring   -Ramirez to monitor urine output  -CXR daily.     # hypercapnic respiratory failure.   likely 2/2 decreased oxygenation from suspected pneumonia    # DIETER on CKD- unclear etiology, possibly due to rhabdo??  baseline cr: 1.7-1.9, now cr:3.9  oliguric  renal US normal  - Nephro consulted  - d/c fluids, trial of bumex --> pt unresponsive, producing no urine  - [place] placement and HD  - IV hydration 75cc/hr  - monitor cr  - avoid nephrotoxic agents  - f/u Urine Na/cr  - monitor for signs of volume overload    #possible rhabdomyolysis?  #Transaminitis  - CK elevated 4253, downtrending  - Uptrending LFTs: T-Bili 1.4, ALP 46, ,   - on IV fluids  - Monitor urine output    #Afib (on Xarelto at home)  -given decreased GRF. will start heparin drip  - prr q 6 hrs  -Currently not on any rate control med, HR stable b/w .    #Elevated Trop and BNP  #Likely Type 2 NSTEMI  -Serum Pro-BNP: 13K, Trops: 0.04 --> repeat 0.07  - EKG: Afib w/ RVR, Bifascicular block  -Denies CP or Hx of CHF, no evidence of volume overload on PE  -TTE 9/2020: EF of 62 %, Trace MR, Mild TR, mild AoS  -Will repeat TTE  -Dr Castro gomez pending     #Fall  #Debility/Deconditioning  - Trauma w/up neg  - Hx does not suggest Neurocardiogenic or cardiogenic etiology  - At baseline Pt walks by himself  - PT eval    #ASx bacteriuria  #UA positive  - denies dysuria    #HTN  #HLD  #CAD s/p stents placement (2014)  - Not on BB, unknown why. Follows Dr Erazo and scheduled to see him this month  - ASA/Statin  - Hold home ACE-I, HCTZ and amlodipine while patient septic  - isosorbide mononitrate 120 mg oral tablet, extended release: 1 tab(s) orally once a day (in the morning)    #T2DM  -Takes Actoplus Met 15 mg-500 mg q12 at home  - f/u A1C and FS > insulin basal/bolus if >180    #Gout  -Allopurinol, dose adjusted for GFR    #VINI on CPAP  -NIV qHS    #Chronic Venous insuffiencey  -LLE edema  -ACE wrap    #Handoff  - On heparin gtt, f/u PTT  - F/u Urine Na and Cr  - F/u Echo  - F/u Blood bx, MRSA swab, urine Legionella/Strep  - Pending [facility] placement and HD  - Monitor urine output s/p 1 dose bumex

## 2021-09-02 NOTE — PROGRESS NOTE ADULT - SUBJECTIVE AND OBJECTIVE BOX
SARAH BETH MISTY  80y Male    INTERVAL HPI/OVERNIGHT EVENTS:    Overnight events reviewed. He is awaiting critical care bed  remains on bipap  denies pain  quintana with only small amount of concentrated urine  renal eval in progress    T(F): 99.4 (09-02-21 @ 12:00), Max: 102.2 (09-01-21 @ 20:00)  HR: 99 (09-02-21 @ 12:00) (99 - 114)  BP: 143/68 (09-02-21 @ 12:00) (140/73 - 177/78)  RR: 28 (09-02-21 @ 12:00) (28 - 32)  SpO2: 95% (09-02-21 @ 12:00) (88% - 99%) on bipap 12/6 FiO2 75%      I&O's Summary    01 Sep 2021 07:01  -  02 Sep 2021 07:00  --------------------------------------------------------  IN: 1100 mL / OUT: 0 mL / NET: 1100 mL    02 Sep 2021 07:01  -  02 Sep 2021 15:35  --------------------------------------------------------  IN: 1452 mL / OUT: 20 mL / NET: 1432 mL      CAPILLARY BLOOD GLUCOSE      POCT Blood Glucose.: 129 mg/dL (02 Sep 2021 11:42)  POCT Blood Glucose.: 137 mg/dL (02 Sep 2021 08:18)  POCT Blood Glucose.: 143 mg/dL (02 Sep 2021 03:03)  POCT Blood Glucose.: 150 mg/dL (01 Sep 2021 21:15)  POCT Blood Glucose.: 143 mg/dL (01 Sep 2021 16:10)        PHYSICAL EXAM:  GENERAL: NAD  HEAD:  Normocephalic  EYES:  conjunctiva and sclera clear  ENMT: bipap mask  NERVOUS SYSTEM:  Alert, awake, fair concentration  CHEST/LUNG: rhonchi on the right  HEART: tachy  ABDOMEN: Soft, Nontender, Nondistended; Bowel sounds present  EXTREMITIES:   No edema  quintana  Skin dry    Consultant(s) Notes Reviewed:  [x ] YES  [ ] NO  Care Discussed with Consultants/Other Providers [ x] YES  [ ] NO    MEDICATIONS  (STANDING):  allopurinol 50 milliGRAM(s) Oral daily  aspirin  chewable 81 milliGRAM(s) Oral daily  atorvastatin 40 milliGRAM(s) Oral at bedtime  buMETAnide Injectable 2 milliGRAM(s) IV Push once  cefTRIAXone   IVPB 1000 milliGRAM(s) IV Intermittent every 24 hours  chlorhexidine 4% Liquid 1 Application(s) Topical <User Schedule>  dextrose 40% Gel 15 Gram(s) Oral once  dextrose 5%. 1000 milliLiter(s) (50 mL/Hr) IV Continuous <Continuous>  dextrose 5%. 1000 milliLiter(s) (100 mL/Hr) IV Continuous <Continuous>  dextrose 50% Injectable 25 Gram(s) IV Push once  dextrose 50% Injectable 12.5 Gram(s) IV Push once  dextrose 50% Injectable 25 Gram(s) IV Push once  glucagon  Injectable 1 milliGRAM(s) IntraMuscular once  heparin  Infusion. 1300 Unit(s)/Hr (13 mL/Hr) IV Continuous <Continuous>  isosorbide   mononitrate ER Tablet (IMDUR) 120 milliGRAM(s) Oral daily  levoFLOXacin IVPB      levoFLOXacin IVPB 250 milliGRAM(s) IV Intermittent every 24 hours    MEDICATIONS  (PRN):  acetaminophen   Tablet .. 650 milliGRAM(s) Oral every 6 hours PRN Temp greater or equal to 38C (100.4F), Mild Pain (1 - 3)  heparin   Injectable 9000 Unit(s) IV Push every 6 hours PRN For aPTT less than 40  heparin   Injectable 4500 Unit(s) IV Push every 6 hours PRN For aPTT between 40 - 57      LABS:                        12.0   16.81 )-----------( 230      ( 02 Sep 2021 08:52 )             37.4     09-02    139  |  99  |  80<HH>  ----------------------------<  148<H>  4.1   |  22  |  5.1<HH>    Ca    8.5      02 Sep 2021 08:52  Mg     2.4     09-02    TPro  5.5<L>  /  Alb  2.7<L>  /  TBili  1.3<H>  /  DBili  x   /  AST  342<H>  /  ALT  140<H>  /  AlkPhos  59  09-02        Culture - Urine (collected 31 Aug 2021 18:38)  Source: Clean Catch Clean Catch (Midstream)  Final Report (01 Sep 2021 21:55):    <10,000 CFU/mL Normal Urogenital Skye    Culture - Blood (collected 31 Aug 2021 13:24)  Source: .Blood Blood-Peripheral  Preliminary Report (01 Sep 2021 22:01):    No growth to date.    Culture - Blood (collected 31 Aug 2021 13:24)  Source: .Blood Blood-Peripheral  Preliminary Report (01 Sep 2021 22:01):    No growth to date.        RADIOLOGY & ADDITIONAL TESTS:    Imaging or report Personally Reviewed:  [ ] YES  [ ] NO    < from: Xray Chest 1 View-PORTABLE IMMEDIATE (Xray Chest 1 View-PORTABLE IMMEDIATE .) (09.02.21 @ 03:41) >  IMPRESSION:    Increased opacity/consolidation right upper lobe. No evidence of pneumothorax.      < end of copied text >    < from: US Kidney and Bladder (09.01.21 @ 21:38) >  IMPRESSION:    No hydronephrosis.  Urinary bladder nondistended. Prostate gland not delineated.    < end of copied text >        Case discussed with residents and RN on rounds today    Care discussed with pt and family

## 2021-09-02 NOTE — PROGRESS NOTE ADULT - ASSESSMENT
IMPRESSION:    Acute hypoxemic resp failure on bipap 75% worsening status  CAP  sepsis present on admission  acute on Chronic renal failure oliguric  A fib      PLAN:    CNS: avoid CNS depressant    HEENT:  Oral care    PULMONARY:  HOB @ 45 degrees, BIPAP/ HHFNC keep SAo2 88 to 92%, aspiration precaution, discuss with family regarding GOC/ intubation    CARDIOVASCULAR: avoid overload, cheetah    GI: GI prophylaxis                                          Feeding NPO    RENAL:  F/u  lytes.  Correct as needed. accurate I/O, trend CMP, renal eval, might need rrt    INFECTIOUS DISEASE: ceftriaxone/ levaquin/ pancx, sputum/ blood/ urine/ procal vanco x1, swab nose for MRSA, urine strep/ legionella    HEMATOLOGICAL:  DVT prophylaxis. chnage to IV heaprin    ENDOCRINE:  Follow up FS.  Insulin protocol if needed.    CODE STATUS: FULL CODE    DISPOSITION: MICU  very poor prognosis

## 2021-09-02 NOTE — CONSULT NOTE ADULT - ASSESSMENT
# DIETER / ATN in nature / oliguric / creat up-trending / no improvement w/ iv hydration  # PNA / acute respiratory failure     Recommendations:  - d/c iv fluids  - trial of bumex 2 mg iv, monitor urine output    # DIETER / ATN in nature d/t sepsis / possible JOSE contributing / oligo-anuric / creat up-trending / no improvement w/ iv hydration  # PNA / acute respiratory failure / on bipap    Recommendations:  - d/c iv fluids (> 3.5L positive over past 24 hrs)  - trial of bumex 2 mg iv, monitor urine output - did not respond, has 10cc urine in quintana  - d/w pt's wife at bedside, consents for RRT  - needs udall palcement (d/w icu team, will place shortly)  - HD once udall placed, 2hrs, low flows, 0.5-1L UF as tolerated   - renal diet  - check cpk level, phos  cont abx, wbc count up-trending, consider broader spectrum abx   obtain TTE   - document strict i/o's  - CT noted w/o hydronephrosis   - icu monitoring

## 2021-09-02 NOTE — PROGRESS NOTE ADULT - SUBJECTIVE AND OBJECTIVE BOX
Over Night Events: events noted, worsening status, increase oxygen requirement, confused, sp quintana minimal UO, still spiking T, on IVF      PHYSICAL EXAM    ICU Vital Signs Last 24 Hrs  T(C): 38.8 (02 Sep 2021 06:00), Max: 39 (01 Sep 2021 07:52)  T(F): 101.9 (02 Sep 2021 06:00), Max: 102.2 (01 Sep 2021 07:52)  HR: 114 (02 Sep 2021 06:00) (103 - 115)  BP: 140/73 (02 Sep 2021 06:00) (123/60 - 177/78)  BP(mean): 97 (02 Sep 2021 06:00) (91 - 97)  RR: 32 (02 Sep 2021 06:00) (18 - 32)  SpO2: 96% (02 Sep 2021 06:00) (88% - 98%)      General: ill looking/ tachypneic  HEENT: CLARIBEL             Lymph Nodes: No cervical LN   Lungs: Bilateral rhonchi  Cardiovascular: Regular   Abdomen: Soft, Positive BS  Extremities: No clubbing   Neurological: Non focal       08-31-21 @ 07:01  -  09-01-21 @ 07:00  --------------------------------------------------------  IN:    IV PiggyBack: 300 mL    Lactated Ringers Bolus: 500 mL    Oral Fluid: 240 mL  Total IN: 1040 mL    OUT:  Total OUT: 0 mL    Total NET: 1040 mL      09-01-21 @ 07:01  -  09-02-21 @ 06:46  --------------------------------------------------------  IN:    Lactated Ringers: 1100 mL  Total IN: 1100 mL    OUT:  Total OUT: 0 mL    Total NET: 1100 mL          LABS:                          12.9   13.73 )-----------( 214      ( 01 Sep 2021 08:16 )             39.8                                               09-01    138  |  100  |  61<HH>  ----------------------------<  150<H>  4.2   |  20  |  3.9<H>    Ca    8.7      01 Sep 2021 16:49  Mg     2.3     09-01    TPro  5.9<L>  /  Alb  2.9<L>  /  TBili  1.4<H>  /  DBili  x   /  AST  455<H>  /  ALT  152<H>  /  AlkPhos  56  09-01      PT/INR - ( 31 Aug 2021 13:24 )   PT: 15.20 sec;   INR: 1.33 ratio         PTT - ( 31 Aug 2021 13:24 )  PTT:34.2 sec                                       Urinalysis Basic - ( 31 Aug 2021 18:38 )    Color: Yellow / Appearance: Slightly Turbid / SG: >1.050 / pH: x  Gluc: x / Ketone: Negative  / Bili: Negative / Urobili: <2 mg/dL   Blood: x / Protein: 300 mg/dL / Nitrite: Negative   Leuk Esterase: Negative / RBC: 9 /HPF / WBC 32 /HPF   Sq Epi: x / Non Sq Epi: 2 /HPF / Bacteria: Negative        CARDIAC MARKERS ( 01 Sep 2021 16:49 )  x     / x     / 3604 U/L / x     / x      CARDIAC MARKERS ( 01 Sep 2021 08:16 )  x     / 0.07 ng/mL / 4253 U/L / x     / x      CARDIAC MARKERS ( 31 Aug 2021 14:35 )  x     / 0.04 ng/mL / x     / x     / x                                                LIVER FUNCTIONS - ( 01 Sep 2021 16:49 )  Alb: 2.9 g/dL / Pro: 5.9 g/dL / ALK PHOS: 56 U/L / ALT: 152 U/L / AST: 455 U/L / GGT: x                                                  Culture - Urine (collected 31 Aug 2021 18:38)  Source: Clean Catch Clean Catch (Midstream)  Final Report (01 Sep 2021 21:55):    <10,000 CFU/mL Normal Urogenital Skye    Culture - Blood (collected 31 Aug 2021 13:24)  Source: .Blood Blood-Peripheral  Preliminary Report (01 Sep 2021 22:01):    No growth to date.    Culture - Blood (collected 31 Aug 2021 13:24)  Source: .Blood Blood-Peripheral  Preliminary Report (01 Sep 2021 22:01):    No growth to date.                                                                                       ABG - ( 02 Sep 2021 05:05 )  pH, Arterial: 7.30  pH, Blood: x     /  pCO2: 48    /  pO2: 62    / HCO3: 24    / Base Excess: -3.2  /  SaO2: 91.9                MEDICATIONS  (STANDING):  allopurinol 150 milliGRAM(s) Oral daily  aspirin  chewable 81 milliGRAM(s) Oral daily  atorvastatin 40 milliGRAM(s) Oral at bedtime  cefTRIAXone   IVPB 1000 milliGRAM(s) IV Intermittent every 24 hours  chlorhexidine 4% Liquid 1 Application(s) Topical <User Schedule>  dextrose 40% Gel 15 Gram(s) Oral once  dextrose 5%. 1000 milliLiter(s) (50 mL/Hr) IV Continuous <Continuous>  dextrose 5%. 1000 milliLiter(s) (100 mL/Hr) IV Continuous <Continuous>  dextrose 50% Injectable 25 Gram(s) IV Push once  dextrose 50% Injectable 12.5 Gram(s) IV Push once  dextrose 50% Injectable 25 Gram(s) IV Push once  glucagon  Injectable 1 milliGRAM(s) IntraMuscular once  isosorbide   mononitrate ER Tablet (IMDUR) 120 milliGRAM(s) Oral daily  lactated ringers. 1000 milliLiter(s) (100 mL/Hr) IV Continuous <Continuous>  levoFLOXacin IVPB      levoFLOXacin IVPB 250 milliGRAM(s) IV Intermittent every 24 hours  rivaroxaban 15 milliGRAM(s) Oral with dinner    MEDICATIONS  (PRN):  acetaminophen   Tablet .. 650 milliGRAM(s) Oral every 6 hours PRN Temp greater or equal to 38C (100.4F), Mild Pain (1 - 3)

## 2021-09-02 NOTE — CHART NOTE - NSCHARTNOTEFT_GEN_A_CORE
Spoke to the patient's daughter Ms. Haleigh Noonan, discussed code status. As per the daughters wishes Mr. Bond is full code.

## 2021-09-03 LAB
ALBUMIN SERPL ELPH-MCNC: 2.6 G/DL — LOW (ref 3.5–5.2)
ALP SERPL-CCNC: 111 U/L — SIGNIFICANT CHANGE UP (ref 30–115)
ALT FLD-CCNC: 121 U/L — HIGH (ref 0–41)
ANION GAP SERPL CALC-SCNC: 20 MMOL/L — HIGH (ref 7–14)
APTT BLD: 135.9 SEC — CRITICAL HIGH (ref 27–39.2)
APTT BLD: 45 SEC — HIGH (ref 27–39.2)
APTT BLD: 57.4 SEC — HIGH (ref 27–39.2)
APTT BLD: 71.1 SEC — CRITICAL HIGH (ref 27–39.2)
AST SERPL-CCNC: 295 U/L — HIGH (ref 0–41)
BASOPHILS # BLD AUTO: 0.09 K/UL — SIGNIFICANT CHANGE UP (ref 0–0.2)
BASOPHILS NFR BLD AUTO: 0.5 % — SIGNIFICANT CHANGE UP (ref 0–1)
BILIRUB SERPL-MCNC: 1.5 MG/DL — HIGH (ref 0.2–1.2)
BUN SERPL-MCNC: 89 MG/DL — CRITICAL HIGH (ref 10–20)
CALCIUM SERPL-MCNC: 8.2 MG/DL — LOW (ref 8.5–10.1)
CHLORIDE SERPL-SCNC: 99 MMOL/L — SIGNIFICANT CHANGE UP (ref 98–110)
CK SERPL-CCNC: 1200 U/L — HIGH (ref 0–225)
CO2 SERPL-SCNC: 21 MMOL/L — SIGNIFICANT CHANGE UP (ref 17–32)
CREAT SERPL-MCNC: 5.9 MG/DL — CRITICAL HIGH (ref 0.7–1.5)
EOSINOPHIL # BLD AUTO: 0 K/UL — SIGNIFICANT CHANGE UP (ref 0–0.7)
EOSINOPHIL NFR BLD AUTO: 0 % — SIGNIFICANT CHANGE UP (ref 0–8)
GLUCOSE BLDC GLUCOMTR-MCNC: 169 MG/DL — HIGH (ref 70–99)
GLUCOSE BLDC GLUCOMTR-MCNC: 199 MG/DL — HIGH (ref 70–99)
GLUCOSE SERPL-MCNC: 177 MG/DL — HIGH (ref 70–99)
HBV CORE AB SER-ACNC: SIGNIFICANT CHANGE UP
HBV CORE IGM SER-ACNC: SIGNIFICANT CHANGE UP
HBV SURFACE AB SER-ACNC: SIGNIFICANT CHANGE UP
HBV SURFACE AG SER-ACNC: SIGNIFICANT CHANGE UP
HCT VFR BLD CALC: 33.6 % — LOW (ref 42–52)
HGB BLD-MCNC: 11.3 G/DL — LOW (ref 14–18)
IMM GRANULOCYTES NFR BLD AUTO: 2.6 % — HIGH (ref 0.1–0.3)
LYMPHOCYTES # BLD AUTO: 0.45 K/UL — LOW (ref 1.2–3.4)
LYMPHOCYTES # BLD AUTO: 2.3 % — LOW (ref 20.5–51.1)
MAGNESIUM SERPL-MCNC: 2.6 MG/DL — HIGH (ref 1.8–2.4)
MCHC RBC-ENTMCNC: 29.8 PG — SIGNIFICANT CHANGE UP (ref 27–31)
MCHC RBC-ENTMCNC: 33.6 G/DL — SIGNIFICANT CHANGE UP (ref 32–37)
MCV RBC AUTO: 88.7 FL — SIGNIFICANT CHANGE UP (ref 80–94)
MONOCYTES # BLD AUTO: 0.39 K/UL — SIGNIFICANT CHANGE UP (ref 0.1–0.6)
MONOCYTES NFR BLD AUTO: 2 % — SIGNIFICANT CHANGE UP (ref 1.7–9.3)
NEUTROPHILS # BLD AUTO: 18.07 K/UL — HIGH (ref 1.4–6.5)
NEUTROPHILS NFR BLD AUTO: 92.6 % — HIGH (ref 42.2–75.2)
NRBC # BLD: 0 /100 WBCS — SIGNIFICANT CHANGE UP (ref 0–0)
PLATELET # BLD AUTO: 263 K/UL — SIGNIFICANT CHANGE UP (ref 130–400)
POTASSIUM SERPL-MCNC: 4.3 MMOL/L — SIGNIFICANT CHANGE UP (ref 3.5–5)
POTASSIUM SERPL-SCNC: 4.3 MMOL/L — SIGNIFICANT CHANGE UP (ref 3.5–5)
PROT SERPL-MCNC: 5.4 G/DL — LOW (ref 6–8)
RBC # BLD: 3.79 M/UL — LOW (ref 4.7–6.1)
RBC # FLD: 16 % — HIGH (ref 11.5–14.5)
S PNEUM AG UR QL: NEGATIVE — SIGNIFICANT CHANGE UP
SODIUM SERPL-SCNC: 140 MMOL/L — SIGNIFICANT CHANGE UP (ref 135–146)
WBC # BLD: 19.5 K/UL — HIGH (ref 4.8–10.8)
WBC # FLD AUTO: 19.5 K/UL — HIGH (ref 4.8–10.8)

## 2021-09-03 PROCEDURE — 99291 CRITICAL CARE FIRST HOUR: CPT

## 2021-09-03 RX ORDER — SODIUM BICARBONATE 1 MEQ/ML
0.1 SYRINGE (ML) INTRAVENOUS
Qty: 150 | Refills: 0 | Status: DISCONTINUED | OUTPATIENT
Start: 2021-09-03 | End: 2021-09-05

## 2021-09-03 RX ORDER — PANTOPRAZOLE SODIUM 20 MG/1
40 TABLET, DELAYED RELEASE ORAL DAILY
Refills: 0 | Status: DISCONTINUED | OUTPATIENT
Start: 2021-09-03 | End: 2021-09-08

## 2021-09-03 RX ORDER — MORPHINE SULFATE 50 MG/1
2 CAPSULE, EXTENDED RELEASE ORAL EVERY 4 HOURS
Refills: 0 | Status: DISCONTINUED | OUTPATIENT
Start: 2021-09-03 | End: 2021-09-03

## 2021-09-03 RX ORDER — SODIUM CHLORIDE 9 MG/ML
500 INJECTION, SOLUTION INTRAVENOUS
Refills: 0 | Status: DISCONTINUED | OUTPATIENT
Start: 2021-09-03 | End: 2021-09-03

## 2021-09-03 RX ORDER — SODIUM BICARBONATE 1 MEQ/ML
650 SYRINGE (ML) INTRAVENOUS THREE TIMES A DAY
Refills: 0 | Status: DISCONTINUED | OUTPATIENT
Start: 2021-09-03 | End: 2021-09-03

## 2021-09-03 RX ORDER — SODIUM CHLORIDE 9 MG/ML
1000 INJECTION, SOLUTION INTRAVENOUS
Refills: 0 | Status: DISCONTINUED | OUTPATIENT
Start: 2021-09-03 | End: 2021-09-04

## 2021-09-03 RX ORDER — HEPARIN SODIUM 5000 [USP'U]/ML
1300 INJECTION INTRAVENOUS; SUBCUTANEOUS
Qty: 25000 | Refills: 0 | Status: DISCONTINUED | OUTPATIENT
Start: 2021-09-03 | End: 2021-09-05

## 2021-09-03 RX ADMIN — HEPARIN SODIUM 13 UNIT(S)/HR: 5000 INJECTION INTRAVENOUS; SUBCUTANEOUS at 20:30

## 2021-09-03 RX ADMIN — CHLORHEXIDINE GLUCONATE 1 APPLICATION(S): 213 SOLUTION TOPICAL at 05:34

## 2021-09-03 RX ADMIN — Medication 75 MEQ/KG/HR: at 11:52

## 2021-09-03 RX ADMIN — PANTOPRAZOLE SODIUM 40 MILLIGRAM(S): 20 TABLET, DELAYED RELEASE ORAL at 17:53

## 2021-09-03 RX ADMIN — Medication 75 MEQ/KG/HR: at 20:30

## 2021-09-03 RX ADMIN — CEFTRIAXONE 100 MILLIGRAM(S): 500 INJECTION, POWDER, FOR SOLUTION INTRAMUSCULAR; INTRAVENOUS at 01:13

## 2021-09-03 RX ADMIN — HEPARIN SODIUM 13 UNIT(S)/HR: 5000 INJECTION INTRAVENOUS; SUBCUTANEOUS at 21:00

## 2021-09-03 RX ADMIN — SODIUM CHLORIDE 75 MILLILITER(S): 9 INJECTION, SOLUTION INTRAVENOUS at 20:20

## 2021-09-03 NOTE — PROGRESS NOTE ADULT - SUBJECTIVE AND OBJECTIVE BOX
MISTY BURLESON 80y Male  MRN#: [id number]   Hospital Day: 3d    SUBJECTIVE  Patient is a 80y old Male who presents with a chief complaint of Pneumonia (03 Sep 2021 11:45)  Currently admitted to medicine with the primary diagnosis of Pneumonia      INTERVAL HPI AND OVERNIGHT EVENTS:  Patient was examined and seen at bedside. This morning he is resting comfortably in bed and reports "EXTREME THRUST     REVIEW OF SYMPTOMS:  CONSTITUTIONAL: No weakness, fevers or chills; No headaches  EYES: BLURRY VISION   ENT: No vertigo; No ear pain or change in hearing; No sore throat or difficulty swallowing  NECK: No pain or stiffness  RESPIRATORY: MILD SOB   CARDIOVASCULAR: No chest pain or palpitations  GASTROINTESTINAL: No abdominal or epigastric pain; No nausea, vomiting, or hematemesis; No diarrhea or constipation; No melena or hematochezia  GENITOURINARY: No dysuria, frequency or hematuria  MUSCULOSKELETAL: No joint pain, no muscle pain, no weakness  NEUROLOGICAL: No numbness or weakness  SKIN: No itching or rashes    OBJECTIVE  PAST MEDICAL & SURGICAL HISTORY  Atrial fibrillation    Coronary artery disease    Hyperlipidemia    Hypertension    Gout    Diabetes mellitus    VINI on CPAP    History of heart artery stent      ALLERGIES:  No Known Allergies    MEDICATIONS:  STANDING MEDICATIONS  allopurinol 50 milliGRAM(s) Oral daily  aspirin  chewable 81 milliGRAM(s) Oral daily  atorvastatin 40 milliGRAM(s) Oral at bedtime  cefTRIAXone   IVPB 1000 milliGRAM(s) IV Intermittent every 24 hours  chlorhexidine 4% Liquid 1 Application(s) Topical <User Schedule>  dextrose 40% Gel 15 Gram(s) Oral once  dextrose 5%. 1000 milliLiter(s) IV Continuous <Continuous>  dextrose 5%. 1000 milliLiter(s) IV Continuous <Continuous>  dextrose 50% Injectable 25 Gram(s) IV Push once  dextrose 50% Injectable 12.5 Gram(s) IV Push once  dextrose 50% Injectable 25 Gram(s) IV Push once  glucagon  Injectable 1 milliGRAM(s) IntraMuscular once  heparin  Infusion. 1700 Unit(s)/Hr IV Continuous <Continuous>  isosorbide   mononitrate ER Tablet (IMDUR) 120 milliGRAM(s) Oral daily  lactated ringers. 500 milliLiter(s) IV Continuous <Continuous>  levoFLOXacin IVPB      levoFLOXacin IVPB 250 milliGRAM(s) IV Intermittent every 24 hours  pantoprazole  Injectable 40 milliGRAM(s) IV Push daily  sodium bicarbonate  Infusion 0.101 mEq/kG/Hr IV Continuous <Continuous>    PRN MEDICATIONS  acetaminophen   Tablet .. 650 milliGRAM(s) Oral every 6 hours PRN  morphine  - Injectable 2 milliGRAM(s) IV Push every 4 hours PRN      VITAL SIGNS: Last 24 Hours  T(C): 37.1 (03 Sep 2021 12:00), Max: 37.9 (03 Sep 2021 00:00)  T(F): 98.7 (03 Sep 2021 12:00), Max: 100.3 (03 Sep 2021 00:00)  HR: 86 (03 Sep 2021 12:00) (86 - 106)  BP: 137/72 (03 Sep 2021 12:00) (108/64 - 145/66)  BP(mean): 89 (03 Sep 2021 12:00) (79 - 103)  RR: 25 (03 Sep 2021 12:00) (24 - 30)  SpO2: 96% (03 Sep 2021 12:00) (93% - 98%)    LABS:                        11.3   19.50 )-----------( 263      ( 03 Sep 2021 05:03 )             33.6   UPWARD TRENDING WBC     09-03    140  |  99  |  89<HH>  ----------------------------<  177<H>  4.3   |  21  |  5.9<HH>    Ca    8.2<L>      03 Sep 2021 05:03  Mg     2.6     09-03    TPro  5.4<L>  /  Alb  2.6<L>  /  TBili  1.5<H>  /  DBili  x   /  AST  295<H>  /  ALT  121<H>  /  AlkPhos  111  09-03    PTT - ( 03 Sep 2021 05:03 )  PTT:57.4 sec    ABG - ( 02 Sep 2021 05:05 )  pH, Arterial: 7.30  pH, Blood: x     /  pCO2: 48    /  pO2: 62    / HCO3: 24    / Base Excess: -3.2  /  SaO2: 91.9              Creatine Kinase, Serum: 1200 U/L *H* (09-03-21 @ 05:03)      Culture - Urine (collected 31 Aug 2021 18:38)  Source: Clean Catch Clean Catch (Midstream)  Final Report (01 Sep 2021 21:55):    <10,000 CFU/mL Normal Urogenital Skye    Culture - Blood (collected 31 Aug 2021 13:24)  Source: .Blood Blood-Peripheral  Preliminary Report (01 Sep 2021 22:01):    No growth to date.    Culture - Blood (collected 31 Aug 2021 13:24)  Source: .Blood Blood-Peripheral  Preliminary Report (01 Sep 2021 22:01):    No growth to date.      CARDIAC MARKERS ( 03 Sep 2021 05:03 )  x     / x     / 1200 U/L / x     / x      CARDIAC MARKERS ( 02 Sep 2021 08:52 )  x     / x     / 2687 U/L / x     / x      CARDIAC MARKERS ( 01 Sep 2021 16:49 )  x     / x     / 3604 U/L / x     / x          RADIOLOGY:   Xray Chest 1 View- PORTABLE-Urgent (Xray Chest 1 View- PORTABLE-Urgent .) (09.02.21 @ 18:57) >  Impression:    Dense right upper lobe consolidation with increasing opacities involving right lower lobe.        PHYSICAL EXAM:  CONSTITUTIONAL: No acute distress,   HEAD: Atraumatic, normocephalic  EYES: PERRLA, conjunctiva and sclera clear  ENT: Supple, no masses, no thyromegaly, no bruits  PULMONARY: DIFFUSE BILATERAL RHONCHI RT>LFT, INSPIRATORY SQUEAK   CARDIOVASCULAR: IRREGULARLY IRREGULAR   GASTROINTESTINAL: Soft, non-tender, non-distended; bowel sounds present  MUSCULOSKELETAL: 2+ peripheral pulses; no clubbing, no cyanosis, no edema  NEUROLOGY: A&OX4  SKIN: LEFT LOWER LEG VENUS STASIS ULCER, BANDAGED, CLEAN AND DRY

## 2021-09-03 NOTE — PROGRESS NOTE ADULT - SUBJECTIVE AND OBJECTIVE BOX
Patient arrives for Covid-19/SARS testing.    Swab collected using [facility] and CDC guidelines.    Patient given information on receiving results and left with mask in place.     Nephrology progress note    Patient was seen and examined, events over the last 24 h noted .  HD yesterday    Allergies:  No Known Allergies    Hospital Medications:   MEDICATIONS  (STANDING):  allopurinol 50 milliGRAM(s) Oral daily  aspirin  chewable 81 milliGRAM(s) Oral daily  atorvastatin 40 milliGRAM(s) Oral at bedtime  cefTRIAXone   IVPB 1000 milliGRAM(s) IV Intermittent every 24 hours  chlorhexidine 4% Liquid 1 Application(s) Topical <User Schedule>  dextrose 40% Gel 15 Gram(s) Oral once  dextrose 5%. 1000 milliLiter(s) (50 mL/Hr) IV Continuous <Continuous>  dextrose 5%. 1000 milliLiter(s) (100 mL/Hr) IV Continuous <Continuous>  dextrose 50% Injectable 25 Gram(s) IV Push once  dextrose 50% Injectable 12.5 Gram(s) IV Push once  dextrose 50% Injectable 25 Gram(s) IV Push once  glucagon  Injectable 1 milliGRAM(s) IntraMuscular once  heparin  Infusion. 1700 Unit(s)/Hr (17 mL/Hr) IV Continuous <Continuous>  isosorbide   mononitrate ER Tablet (IMDUR) 120 milliGRAM(s) Oral daily  lactated ringers. 500 milliLiter(s) (1000 mL/Hr) IV Continuous <Continuous>  levoFLOXacin IVPB      levoFLOXacin IVPB 250 milliGRAM(s) IV Intermittent every 24 hours  pantoprazole  Injectable 40 milliGRAM(s) IV Push daily  sodium bicarbonate  Infusion 0.101 mEq/kG/Hr (75 mL/Hr) IV Continuous <Continuous>        VITALS:  T(F): 98.7 (09-03-21 @ 12:00), Max: 100.3 (09-03-21 @ 00:00)  HR: 86 (09-03-21 @ 12:00)  BP: 137/72 (09-03-21 @ 12:00)  RR: 25 (09-03-21 @ 12:00)  SpO2: 96% (09-03-21 @ 12:00)  Wt(kg): --    09-01 @ 07:01  -  09-02 @ 07:00  --------------------------------------------------------  IN: 1100 mL / OUT: 0 mL / NET: 1100 mL    09-02 @ 07:01  -  09-03 @ 07:00  --------------------------------------------------------  IN: 1724 mL / OUT: 1060 mL / NET: 664 mL    09-03 @ 07:01  -  09-03 @ 12:27  --------------------------------------------------------  IN: 352 mL / OUT: 22 mL / NET: 330 mL      Height (cm): 182.9 (09-02 @ 22:00)  Weight (kg): 111.2 (09-02 @ 22:00)  BMI (kg/m2): 33.2 (09-02 @ 22:00)  BSA (m2): 2.32 (09-02 @ 22:00)    PHYSICAL EXAM:  Constitutional: NAD  HEENT: anicteric sclera, oropharynx clear, MMM  Neck: No JVD  Respiratory: CTAB, no wheezes, rales or rhonchi  Cardiovascular: S1, S2, RRR  Gastrointestinal: BS+, soft, NT/ND  Extremities: No cyanosis or clubbing. No peripheral edema  :  No quintana.   Skin: No rashes    LABS:  09-03    140  |  99  |  89<HH>  ----------------------------<  177<H>  4.3   |  21  |  5.9<HH>    Ca    8.2<L>      03 Sep 2021 05:03  Mg     2.6     09-03    TPro  5.4<L>  /  Alb  2.6<L>  /  TBili  1.5<H>  /  DBili      /  AST  295<H>  /  ALT  121<H>  /  AlkPhos  111  09-03                          11.3   19.50 )-----------( 263      ( 03 Sep 2021 05:03 )             33.6       Urine Studies:  Urinalysis Basic - ( 31 Aug 2021 18:38 )    Color: Yellow / Appearance: Slightly Turbid / SG: >1.050 / pH:   Gluc:  / Ketone: Negative  / Bili: Negative / Urobili: <2 mg/dL   Blood:  / Protein: 300 mg/dL / Nitrite: Negative   Leuk Esterase: Negative / RBC: 9 /HPF / WBC 32 /HPF   Sq Epi:  / Non Sq Epi: 2 /HPF / Bacteria: Negative      Sodium, Random Urine: <20.0 mmoL/L (09-02 @ 12:10)  Chloride, Random Urine: 20 (09-02 @ 12:10)    RADIOLOGY & ADDITIONAL STUDIES:

## 2021-09-03 NOTE — PHYSICAL THERAPY INITIAL EVALUATION ADULT - PERTINENT HX OF CURRENT PROBLEM, REHAB EVAL
Patient is a 81 YO M w a PMH of A-fib (on home xarelto) CAD (s/p stent 2014),gout, Alcohol dependence (5-6 drinks/day), VINI (on home CPAP) addmitted for acute hypoxic respiratory failure secondary to community acquired pneumonia. Patient was septic on admission (febrile, leukocytosis, tachycardia, hypoxic w identifiable source).

## 2021-09-03 NOTE — PROGRESS NOTE ADULT - ASSESSMENT
ASSESSMENT & PLAN:  Patient is a 81 YO M w a PMH of A-fib (on home xarelto) CAD (s/p stent 2014),gout, Alcohol dependence (5-6 drinks/day), VINI (on home CPAP) addmitted for acute hypoxic respiratory failure secondary to community acquired pneumonia. Patient was septic on admission (febrile, leukocytosis, tachycardia, hypoxic w identifiable source). Found to have HAGMA and UA + for WBC. Of note patient is s/p 2 x COVID vaccine.      Problem List:  #acute hypoxemic respiratory failure secondary to severe community acquired PMN complicated by Sepsis,   -increasing WBC. Afebrile.   -negative MRSA, legionella and COVID  -c/w ceftriaxone 1000mg once daily  -c/w levofloxacin 250 mg once daily   -f/u blood cultures   -c/w high flow at 50/80 (patient did not tolerate BiPAP overnight)     -Repeat ABG once daily  -CXR once daily   -will discuss goals of care with family today         #Acute on Chronic renal failure oliguric  patient had HD yesterday, 1 L removed   -asymptomatic bacterurea   -f/u for signs/symptoms of UTI  -Repeat ABG once daily    #Chronic venous stasis dermatitis   -keep leg wrapped  -elevate legs  -outpatient evaluation   -compression socks            PAST MEDICAL & SURGICAL HISTORY:  Atrial fibrillation    Coronary artery disease    Hyperlipidemia    Hypertension    Gout    Diabetes mellitus    VINI on CPAP    History of heart artery stent        #Misc  - DVT Prophylaxis: heparin   - GI Prophylaxis: pantoprazole   - Diet:  - Activity: bed rest   - IV Fluids:   - Code Status: full     Dispo: acute

## 2021-09-03 NOTE — PHYSICAL THERAPY INITIAL EVALUATION ADULT - BED MOBILITY LIMITATIONS, REHAB EVAL
Substantial generalized weakness. Pt sat at EOB for ~10 min, performing intermittent seated LAQ, attempted scooting to head of bed, and seated balance activities to promote core stabilization./decreased ability to use arms for pushing/pulling/decreased ability to use legs for bridging/pushing/impaired ability to control trunk for mobility

## 2021-09-03 NOTE — PROGRESS NOTE ADULT - ASSESSMENT
# DIETER / ATN in nature d/t sepsis / possible JOSE contributing / oligo-anuric / creat up-trending / no improvement w/ iv hydration  # PNA / acute respiratory failure / on bipap    Recommendations:  - d/c iv fluids   - Had HD yesterday,  tolerated , Hd likely tomorrow  - renal diet  - check cpk level, phos  obtain TTE   - document strict i/o's  - CT noted w/o hydronephrosis   - icu monitoring

## 2021-09-03 NOTE — PROGRESS NOTE ADULT - SUBJECTIVE AND OBJECTIVE BOX
MISTY BURLESON 80y Male  MRN#: [id number]   Hospital Day: 3d    SUBJECTIVE  Patient is a 80y old Male who presents with a chief complaint of Sepsis (02 Sep 2021 19:48)  Currently admitted to medicine with the primary diagnosis of Pneumonia      INTERVAL HPI AND OVERNIGHT EVENTS:  Patient was examined and seen at bedside. This morning he is resting comfortably in bed and reports no issues or overnight events. He was unable to tolerate the BiPAP over night, and was put on high flow nasal cannula instead.    REVIEW OF SYMPTOMS:  CONSTITUTIONAL: No weakness, fevers or chills; No headaches  ENT: No sore throat or difficulty swallowing  RESPIRATORY: + cough, no wheezing, or hemoptysis; + shortness of breath  CARDIOVASCULAR: No chest pain or palpitations  GASTROINTESTINAL: No abdominal or epigastric pain; No nausea, vomiting, or hematemesis; No diarrhea or constipation; No melena or hematochezia  GENITOURINARY: No dysuria, frequency or hematuria  MUSCULOSKELETAL: No joint pain, no muscle pain, no weakness  NEUROLOGICAL: No numbness or weakness  SKIN: No itching or rashes    OBJECTIVE  PAST MEDICAL & SURGICAL HISTORY  Atrial fibrillation    Coronary artery disease    Hyperlipidemia    Hypertension    Gout    Diabetes mellitus    VINI on CPAP    History of heart artery stent      ALLERGIES:  No Known Allergies    MEDICATIONS:  STANDING MEDICATIONS  allopurinol 50 milliGRAM(s) Oral daily  aspirin  chewable 81 milliGRAM(s) Oral daily  atorvastatin 40 milliGRAM(s) Oral at bedtime  cefTRIAXone   IVPB 1000 milliGRAM(s) IV Intermittent every 24 hours  chlorhexidine 4% Liquid 1 Application(s) Topical <User Schedule>  dextrose 40% Gel 15 Gram(s) Oral once  dextrose 5%. 1000 milliLiter(s) IV Continuous <Continuous>  dextrose 5%. 1000 milliLiter(s) IV Continuous <Continuous>  dextrose 50% Injectable 25 Gram(s) IV Push once  dextrose 50% Injectable 12.5 Gram(s) IV Push once  dextrose 50% Injectable 25 Gram(s) IV Push once  glucagon  Injectable 1 milliGRAM(s) IntraMuscular once  heparin  Infusion. 1700 Unit(s)/Hr IV Continuous <Continuous>  isosorbide   mononitrate ER Tablet (IMDUR) 120 milliGRAM(s) Oral daily  lactated ringers. 500 milliLiter(s) IV Continuous <Continuous>  levoFLOXacin IVPB      levoFLOXacin IVPB 250 milliGRAM(s) IV Intermittent every 24 hours  pantoprazole  Injectable 40 milliGRAM(s) IV Push daily  sodium bicarbonate  Infusion 0.101 mEq/kG/Hr IV Continuous <Continuous>    PRN MEDICATIONS  acetaminophen   Tablet .. 650 milliGRAM(s) Oral every 6 hours PRN  morphine  - Injectable 2 milliGRAM(s) IV Push every 4 hours PRN      VITAL SIGNS: Last 24 Hours  T(C): 36.8 (03 Sep 2021 08:00), Max: 37.9 (03 Sep 2021 00:00)  T(F): 98.3 (03 Sep 2021 08:00), Max: 100.3 (03 Sep 2021 00:00)  HR: 88 (03 Sep 2021 10:00) (86 - 106)  BP: 144/67 (03 Sep 2021 10:00) (108/64 - 145/66)  BP(mean): 91 (03 Sep 2021 10:00) (79 - 103)  RR: 26 (03 Sep 2021 10:00) (24 - 30)  SpO2: 96% (03 Sep 2021 10:00) (93% - 98%)    LABS:                        11.3   19.50 )-----------( 263      ( 03 Sep 2021 05:03 )             33.6     09-03    140  |  99  |  89<HH>  ----------------------------<  177<H>  4.3   |  21  |  5.9<HH>    Ca    8.2<L>      03 Sep 2021 05:03  Mg     2.6     09-03    TPro  5.4<L>  /  Alb  2.6<L>  /  TBili  1.5<H>  /  DBili  x   /  AST  295<H>  /  ALT  121<H>  /  AlkPhos  111  09-03    PTT - ( 03 Sep 2021 05:03 )  PTT:57.4 sec    ABG - ( 02 Sep 2021 05:05 )  pH, Arterial: 7.30  pH, Blood: x     /  pCO2: 48    /  pO2: 62    / HCO3: 24    / Base Excess: -3.2  /  SaO2: 91.9              Creatine Kinase, Serum: 1200 U/L *H* (09-03-21 @ 05:03)      Culture - Urine (collected 31 Aug 2021 18:38)  Source: Clean Catch Clean Catch (Midstream)  Final Report (01 Sep 2021 21:55):    <10,000 CFU/mL Normal Urogenital Skye    Culture - Blood (collected 31 Aug 2021 13:24)  Source: .Blood Blood-Peripheral  Preliminary Report (01 Sep 2021 22:01):    No growth to date.    Culture - Blood (collected 31 Aug 2021 13:24)  Source: .Blood Blood-Peripheral  Preliminary Report (01 Sep 2021 22:01):    No growth to date.      CARDIAC MARKERS ( 03 Sep 2021 05:03 )  x     / x     / 1200 U/L / x     / x      CARDIAC MARKERS ( 02 Sep 2021 08:52 )  x     / x     / 2687 U/L / x     / x      CARDIAC MARKERS ( 01 Sep 2021 16:49 )  x     / x     / 3604 U/L / x     / x          RADIOLOGY:    < from: Xray Chest 1 View- PORTABLE-Urgent (Xray Chest 1 View- PORTABLE-Urgent .) (09.02.21 @ 18:57) >  Impression:    Dense right upper lobe consolidation with increasing opacities involving right lower lobe.    < end of copied text >    < from: US Kidney and Bladder (09.01.21 @ 21:38) >  IMPRESSION:    No hydronephrosis.  Urinary bladder nondistended. Prostate gland not delineated.    < end of copied text >    < from: CT Angio Chest PE Protocol w/ IV Cont (08.31.21 @ 14:34) >  IMPRESSION:    Confluent airspace consolidation in the right upper lobe and to a lesser degree the right lower lobe, consistent with pneumonia    No evidence of acute intrathoracic or intra-abdominal traumatic injury.    No evidence of pulmonary embolism    --- End of Report ---      < end of copied text >      PHYSICAL EXAM:  CONSTITUTIONAL: No acute distress, ill-appearing  HEAD: Atraumatic, normocephalic  EYES: EOM intact, conjunctiva and sclera clear  ENT: Supple, no masses, moist mucous membranes  PULMONARY: Crackles in the right upper lungs, and decreased breath sounds for the right lower lung. Normal BS for left lung. No wheezing.  CARDIOVASCULAR: Regular rate and rhythm; no murmurs, rubs, or gallops  GASTROINTESTINAL: Soft, non-tender, non-distended; bowel sounds present  MUSCULOSKELETAL: no clubbing, no cyanosis, no edema.  NEUROLOGY: non-focal  SKIN: No rashes. Warm and dry. Venous stasis ulcer is present on his left lower lateral leg and is bandaged.      ASSESSMENT & PLAN:  Patient is an 79 y/o male with a pmhx of afib, CAD s/p stent placement in 2014, HTN, gout, DM, VINI on CPAP, and HLD. He presented to the ED due to a fall. He had no loss of consciousness and did not hit his head. He reported coughing and shortness of breath for one week prior. During the workup, he was diagnosed with pneumonia and DIETER. Today is he sitting comfortably with a high flow nasal cannula.    #Community acquired pneumonia  - Currently on high flow nasal cannula.   - Attempted BiPAP last night and he felt claustrophobic.  - Attempt BiPAP again tonight.  - Urine legionella is negative.  - Blood cultures are preliminarily no growth to date.  - Continue ceftriaxone and levofloxacin.  - Daily chest x-rays    #DIETER due to ATN  - Nephrology on board.  - Underwent dialysis yesterday and removed 1L of fluid.  - BUN and Cr are up-trending.  - Begin IVF and f/u kidney function w/ CMP.  - CK is downtrending  - F/u phosphate levels  - Monitor electrolyte levels.    #HLD  #HTN  #CAD  - Elevated troponins on admission - f/u troponins until downtrending.  - BNP was 13,129 on admission.  - Last echo was 9/2020 EF was 62%, f/u TTE.  - Continue aspirin, atorvostatin.    #Afib  - On xarelto at home.  - Continue heparin and monitor PTT.    #Misc  - DVT Prophylaxis: heparin drip  - GI Prophylaxis: Protonix  - Diet: DASH diet  - Activity: OOBTC  - IV Fluids: LR @ 1000 ml/hr  - Code Status: Full code

## 2021-09-03 NOTE — PROGRESS NOTE ADULT - SUBJECTIVE AND OBJECTIVE BOX
Patient is a 80y old  Male who presents with a chief complaint of Sepsis (02 Sep 2021 19:48)        Over Night Events:  Remains critically ill on HFNCO2.  Off pressors.          ROS:     All ROS are negative except HPI         PHYSICAL EXAM    ICU Vital Signs Last 24 Hrs  T(C): 36.8 (03 Sep 2021 08:00), Max: 37.9 (03 Sep 2021 00:00)  T(F): 98.3 (03 Sep 2021 08:00), Max: 100.3 (03 Sep 2021 00:00)  HR: 88 (03 Sep 2021 09:00) (86 - 106)  BP: 145/66 (03 Sep 2021 09:00) (108/64 - 145/66)  BP(mean): 88 (03 Sep 2021 09:00) (79 - 103)  ABP: --  ABP(mean): --  RR: 25 (03 Sep 2021 09:00) (24 - 30)  SpO2: 96% (03 Sep 2021 09:00) (93% - 98%)      CONSTITUTIONAL:  Well nourished.  Ill appearing in NAD    ENT:   Airway patent,   Mouth with normal mucosa.   No thrush    EYES:   Pupils equal,   Round and reactive to light.    CARDIAC:   Normal rate,   Regular rhythm.     edema      Vascular:  Normal systolic impulse  No Carotid bruits    RESPIRATORY:   No wheezing  Bilateral crackles   Normal chest expansion  Not tachypneic,  No use of accessory muscles    GASTROINTESTINAL:  Abdomen soft,   Non-tender,   No guarding,   + BS    MUSCULOSKELETAL:   Range of motion is not limited,  No clubbing, cyanosis    NEUROLOGICAL:   Alert and oriented   No motor  deficits.    SKIN:   Skin normal color for race,   Warm and dry  No evidence of rash.    PSYCHIATRIC:   Normal mood and affect.   No apparent risk to self or others.    HEMATOLOGICAL:  No cervical  lymphadenopathy.  no inguinal lymphadenopathy      09-02-21 @ 07:01  -  09-03-21 @ 07:00  --------------------------------------------------------  IN:    Heparin Infusion: 170 mL    Heparin Infusion: 104 mL    IV PiggyBack: 650 mL    Lactated Ringers: 800 mL  Total IN: 1724 mL    OUT:    Indwelling Catheter - Urethral (mL): 60 mL    Other (mL): 1000 mL  Total OUT: 1060 mL    Total NET: 664 mL      09-03-21 @ 07:01  -  09-03-21 @ 09:47  --------------------------------------------------------  IN:    Heparin Infusion: 51 mL  Total IN: 51 mL    OUT:    Indwelling Catheter - Urethral (mL): 10 mL  Total OUT: 10 mL    Total NET: 41 mL          LABS:                            11.3   19.50 )-----------( 263      ( 03 Sep 2021 05:03 )             33.6                                               09-03    140  |  99  |  89<HH>  ----------------------------<  177<H>  4.3   |  21  |  5.9<HH>    Ca    8.2<L>      03 Sep 2021 05:03  Mg     2.6     09-03    TPro  5.4<L>  /  Alb  2.6<L>  /  TBili  1.5<H>  /  DBili  x   /  AST  295<H>  /  ALT  121<H>  /  AlkPhos  111  09-03      PTT - ( 03 Sep 2021 05:03 )  PTT:57.4 sec                                           CARDIAC MARKERS ( 03 Sep 2021 05:03 )  x     / x     / 1200 U/L / x     / x      CARDIAC MARKERS ( 02 Sep 2021 08:52 )  x     / x     / 2687 U/L / x     / x      CARDIAC MARKERS ( 01 Sep 2021 16:49 )  x     / x     / 3604 U/L / x     / x                                                LIVER FUNCTIONS - ( 03 Sep 2021 05:03 )  Alb: 2.6 g/dL / Pro: 5.4 g/dL / ALK PHOS: 111 U/L / ALT: 121 U/L / AST: 295 U/L / GGT: x                                                  Culture - Urine (collected 31 Aug 2021 18:38)  Source: Clean Catch Clean Catch (Midstream)  Final Report (01 Sep 2021 21:55):    <10,000 CFU/mL Normal Urogenital Skye    Culture - Blood (collected 31 Aug 2021 13:24)  Source: .Blood Blood-Peripheral  Preliminary Report (01 Sep 2021 22:01):    No growth to date.    Culture - Blood (collected 31 Aug 2021 13:24)  Source: .Blood Blood-Peripheral  Preliminary Report (01 Sep 2021 22:01):    No growth to date.                                                                                       ABG - ( 02 Sep 2021 05:05 )  pH, Arterial: 7.30  pH, Blood: x     /  pCO2: 48    /  pO2: 62    / HCO3: 24    / Base Excess: -3.2  /  SaO2: 91.9                MEDICATIONS  (STANDING):  allopurinol 50 milliGRAM(s) Oral daily  aspirin  chewable 81 milliGRAM(s) Oral daily  atorvastatin 40 milliGRAM(s) Oral at bedtime  cefTRIAXone   IVPB 1000 milliGRAM(s) IV Intermittent every 24 hours  chlorhexidine 4% Liquid 1 Application(s) Topical <User Schedule>  dextrose 40% Gel 15 Gram(s) Oral once  dextrose 5%. 1000 milliLiter(s) (50 mL/Hr) IV Continuous <Continuous>  dextrose 5%. 1000 milliLiter(s) (100 mL/Hr) IV Continuous <Continuous>  dextrose 50% Injectable 25 Gram(s) IV Push once  dextrose 50% Injectable 12.5 Gram(s) IV Push once  dextrose 50% Injectable 25 Gram(s) IV Push once  glucagon  Injectable 1 milliGRAM(s) IntraMuscular once  heparin  Infusion. 1700 Unit(s)/Hr (17 mL/Hr) IV Continuous <Continuous>  isosorbide   mononitrate ER Tablet (IMDUR) 120 milliGRAM(s) Oral daily  levoFLOXacin IVPB      levoFLOXacin IVPB 250 milliGRAM(s) IV Intermittent every 24 hours  pantoprazole  Injectable 40 milliGRAM(s) IV Push daily    MEDICATIONS  (PRN):  acetaminophen   Tablet .. 650 milliGRAM(s) Oral every 6 hours PRN Temp greater or equal to 38C (100.4F), Mild Pain (1 - 3)  heparin   Injectable 9000 Unit(s) IV Push every 6 hours PRN For aPTT less than 40  heparin   Injectable 4500 Unit(s) IV Push every 6 hours PRN For aPTT between 40 - 57      New X-rays reviewed:                                                                                  ECHO    CXR interpreted by me:  KENDRA infiltrate

## 2021-09-03 NOTE — PROGRESS NOTE ADULT - ASSESSMENT
IMPRESSION:    Acute hypoxemic resp failure   Severe CAP   Sepsis present on admission  Acute on Chronic renal failure oliguric  HO A fib      PLAN:    CNS: avoid CNS depressant    HEENT:  Oral care    PULMONARY:  HOB @ 45 degrees, BIPAP/ HHFNC keep SAo2 88 to 92%, aspiration precaution, discuss with family regarding GOC/ intubation.  Repeat ABG     CARDIOVASCULAR: Avoid overload.  LR bolus and start NaHCo3 drip     GI: GI prophylaxis                                          Feeding NPO.  Speech and swallow     RENAL:  F/u  lytes.  Correct as needed.  Accurate I/O, trend CMP.      INFECTIOUS DISEASE: ceftriaxone/ Levaquin.   FU pancx, sputum/ blood/ urine/ procal.  MRSA neg, FU urine strep    HEMATOLOGICAL:  DVT prophylaxis. On IV Hep.  FU PTT     ENDOCRINE:  Follow up FS.  Insulin protocol if needed.    CODE STATUS: FULL CODE    DISPOSITION: MICU  Very poor prognosis

## 2021-09-03 NOTE — SWALLOW BEDSIDE ASSESSMENT ADULT - SLP PERTINENT HISTORY OF CURRENT PROBLEM
Pt admitted w/ acute hypoxemic respiratory failure secondary to severe community acquired PMN complicated by Sepsis, acute on chronic renal failure  PMH of A-fib (on home xarelto) CAD (s/p stent 2014),gout, Alcohol dependence (5-6 drinks/day), VINI (on home CPAP) addmitted for acute hypoxic respiratory failure secondary to community acquired pneumonia. Patient was septic on admission

## 2021-09-03 NOTE — PHYSICAL THERAPY INITIAL EVALUATION ADULT - GENERAL OBSERVATIONS, REHAB EVAL
1340 - 1418 Pt rec semifowler in bed with +tele, +IV's, +HFNC, +quintana, in NAD with girlfriend and daughter at b/s. Pt agreeable to b/s PT. SPO2 94% at rest on HFNC, desats to 88% during activity (ther ex and scooting at EOB) however quickly returns to 94% with rest.

## 2021-09-03 NOTE — PHYSICAL THERAPY INITIAL EVALUATION ADULT - LEVEL OF INDEPENDENCE: SCOOT/BRIDGE, REHAB EVAL
Scooting at EOB. Pt fatigues quickly and desat to 88% on HFNC when trying to scoot. Returns to 94% wit rest sitting at EOB./maximum assist (25% patients effort)

## 2021-09-03 NOTE — PHYSICAL THERAPY INITIAL EVALUATION ADULT - LEVEL OF INDEPENDENCE: SIT/STAND, REHAB EVAL
Held 2* unable to tolerate; pt presents with substantial generalized weakness, on HFNC and desats to ~88% during activity./unable to perform

## 2021-09-04 LAB
ALBUMIN SERPL ELPH-MCNC: 2.4 G/DL — LOW (ref 3.5–5.2)
ALP SERPL-CCNC: 135 U/L — HIGH (ref 30–115)
ALT FLD-CCNC: 95 U/L — HIGH (ref 0–41)
ANION GAP SERPL CALC-SCNC: 16 MMOL/L — HIGH (ref 7–14)
APTT BLD: 40.1 SEC — HIGH (ref 27–39.2)
APTT BLD: 57.1 SEC — HIGH (ref 27–39.2)
APTT BLD: 80.8 SEC — CRITICAL HIGH (ref 27–39.2)
AST SERPL-CCNC: 214 U/L — HIGH (ref 0–41)
BASOPHILS # BLD AUTO: 0.04 K/UL — SIGNIFICANT CHANGE UP (ref 0–0.2)
BASOPHILS NFR BLD AUTO: 0.3 % — SIGNIFICANT CHANGE UP (ref 0–1)
BILIRUB SERPL-MCNC: 1.4 MG/DL — HIGH (ref 0.2–1.2)
BUN SERPL-MCNC: 120 MG/DL — CRITICAL HIGH (ref 10–20)
CALCIUM SERPL-MCNC: 7.7 MG/DL — LOW (ref 8.5–10.1)
CHLORIDE SERPL-SCNC: 94 MMOL/L — LOW (ref 98–110)
CO2 SERPL-SCNC: 26 MMOL/L — SIGNIFICANT CHANGE UP (ref 17–32)
CREAT SERPL-MCNC: 7 MG/DL — CRITICAL HIGH (ref 0.7–1.5)
EOSINOPHIL # BLD AUTO: 0.08 K/UL — SIGNIFICANT CHANGE UP (ref 0–0.7)
EOSINOPHIL NFR BLD AUTO: 0.7 % — SIGNIFICANT CHANGE UP (ref 0–8)
GLUCOSE BLDC GLUCOMTR-MCNC: 219 MG/DL — HIGH (ref 70–99)
GLUCOSE BLDC GLUCOMTR-MCNC: 262 MG/DL — HIGH (ref 70–99)
GLUCOSE BLDC GLUCOMTR-MCNC: 267 MG/DL — HIGH (ref 70–99)
GLUCOSE BLDC GLUCOMTR-MCNC: 319 MG/DL — HIGH (ref 70–99)
GLUCOSE SERPL-MCNC: 337 MG/DL — HIGH (ref 70–99)
HCT VFR BLD CALC: 31.9 % — LOW (ref 42–52)
HCT VFR BLD CALC: 32.3 % — LOW (ref 42–52)
HGB BLD-MCNC: 11 G/DL — LOW (ref 14–18)
HGB BLD-MCNC: 11 G/DL — LOW (ref 14–18)
IMM GRANULOCYTES NFR BLD AUTO: 8.6 % — HIGH (ref 0.1–0.3)
LYMPHOCYTES # BLD AUTO: 0.46 K/UL — LOW (ref 1.2–3.4)
LYMPHOCYTES # BLD AUTO: 4 % — LOW (ref 20.5–51.1)
MAGNESIUM SERPL-MCNC: 2.8 MG/DL — HIGH (ref 1.8–2.4)
MCHC RBC-ENTMCNC: 30.1 PG — SIGNIFICANT CHANGE UP (ref 27–31)
MCHC RBC-ENTMCNC: 30.1 PG — SIGNIFICANT CHANGE UP (ref 27–31)
MCHC RBC-ENTMCNC: 34.1 G/DL — SIGNIFICANT CHANGE UP (ref 32–37)
MCHC RBC-ENTMCNC: 34.5 G/DL — SIGNIFICANT CHANGE UP (ref 32–37)
MCV RBC AUTO: 87.4 FL — SIGNIFICANT CHANGE UP (ref 80–94)
MCV RBC AUTO: 88.5 FL — SIGNIFICANT CHANGE UP (ref 80–94)
MONOCYTES # BLD AUTO: 0.23 K/UL — SIGNIFICANT CHANGE UP (ref 0.1–0.6)
MONOCYTES NFR BLD AUTO: 2 % — SIGNIFICANT CHANGE UP (ref 1.7–9.3)
NEUTROPHILS # BLD AUTO: 9.66 K/UL — HIGH (ref 1.4–6.5)
NEUTROPHILS NFR BLD AUTO: 84.4 % — HIGH (ref 42.2–75.2)
NRBC # BLD: 0 /100 WBCS — SIGNIFICANT CHANGE UP (ref 0–0)
NRBC # BLD: 0 /100 WBCS — SIGNIFICANT CHANGE UP (ref 0–0)
PLATELET # BLD AUTO: 254 K/UL — SIGNIFICANT CHANGE UP (ref 130–400)
PLATELET # BLD AUTO: 266 K/UL — SIGNIFICANT CHANGE UP (ref 130–400)
POTASSIUM SERPL-MCNC: 4 MMOL/L — SIGNIFICANT CHANGE UP (ref 3.5–5)
POTASSIUM SERPL-SCNC: 4 MMOL/L — SIGNIFICANT CHANGE UP (ref 3.5–5)
PROT SERPL-MCNC: 5 G/DL — LOW (ref 6–8)
RBC # BLD: 3.65 M/UL — LOW (ref 4.7–6.1)
RBC # BLD: 3.65 M/UL — LOW (ref 4.7–6.1)
RBC # FLD: 15.7 % — HIGH (ref 11.5–14.5)
RBC # FLD: 15.7 % — HIGH (ref 11.5–14.5)
SODIUM SERPL-SCNC: 136 MMOL/L — SIGNIFICANT CHANGE UP (ref 135–146)
WBC # BLD: 11.45 K/UL — HIGH (ref 4.8–10.8)
WBC # BLD: 12.17 K/UL — HIGH (ref 4.8–10.8)
WBC # FLD AUTO: 11.45 K/UL — HIGH (ref 4.8–10.8)
WBC # FLD AUTO: 12.17 K/UL — HIGH (ref 4.8–10.8)

## 2021-09-04 PROCEDURE — 74018 RADEX ABDOMEN 1 VIEW: CPT | Mod: 26

## 2021-09-04 PROCEDURE — 71045 X-RAY EXAM CHEST 1 VIEW: CPT | Mod: 26

## 2021-09-04 PROCEDURE — 71045 X-RAY EXAM CHEST 1 VIEW: CPT | Mod: 26,77

## 2021-09-04 PROCEDURE — 93306 TTE W/DOPPLER COMPLETE: CPT | Mod: 26

## 2021-09-04 PROCEDURE — 99291 CRITICAL CARE FIRST HOUR: CPT

## 2021-09-04 RX ORDER — INSULIN LISPRO 100/ML
VIAL (ML) SUBCUTANEOUS
Refills: 0 | Status: DISCONTINUED | OUTPATIENT
Start: 2021-09-04 | End: 2021-09-08

## 2021-09-04 RX ADMIN — HEPARIN SODIUM 15 UNIT(S)/HR: 5000 INJECTION INTRAVENOUS; SUBCUTANEOUS at 06:17

## 2021-09-04 RX ADMIN — ATORVASTATIN CALCIUM 40 MILLIGRAM(S): 80 TABLET, FILM COATED ORAL at 21:06

## 2021-09-04 RX ADMIN — CEFTRIAXONE 100 MILLIGRAM(S): 500 INJECTION, POWDER, FOR SOLUTION INTRAMUSCULAR; INTRAVENOUS at 00:07

## 2021-09-04 RX ADMIN — CEFTRIAXONE 100 MILLIGRAM(S): 500 INJECTION, POWDER, FOR SOLUTION INTRAMUSCULAR; INTRAVENOUS at 23:43

## 2021-09-04 RX ADMIN — Medication 81 MILLIGRAM(S): at 21:05

## 2021-09-04 RX ADMIN — Medication 75 MEQ/KG/HR: at 01:51

## 2021-09-04 RX ADMIN — CHLORHEXIDINE GLUCONATE 1 APPLICATION(S): 213 SOLUTION TOPICAL at 05:37

## 2021-09-04 RX ADMIN — PANTOPRAZOLE SODIUM 40 MILLIGRAM(S): 20 TABLET, DELAYED RELEASE ORAL at 12:30

## 2021-09-04 RX ADMIN — Medication 3: at 18:30

## 2021-09-04 NOTE — PROGRESS NOTE ADULT - ASSESSMENT
IMPRESSION:    Acute hypoxemic resp failure   Severe CAP   Sepsis present on admission  Acute on Chronic renal failure with improved UP with IV fluid   HO A fib      PLAN:    CNS: avoid CNS depressant    HEENT:  Oral care    PULMONARY:  HOB @ 45 degrees, BIPAP/ HHFNC keep SAo2 88 to 92%, Aspiration precaution,     CARDIOVASCULAR: Avoid overload. Continue IVF     GI: GI prophylaxis                                          Feeding NG feeding.       RENAL:  F/u  lytes.  Correct as needed.  Accurate I/O, trend CMP.      INFECTIOUS DISEASE: Continue Ceftriaxone/ Levaquin.      HEMATOLOGICAL:  DVT prophylaxis. On IV Hep.  FU PTT     ENDOCRINE:  Follow up FS.  Insulin protocol if needed.    CODE STATUS: FULL CODE    DISPOSITION: MICU  Very poor prognosis

## 2021-09-04 NOTE — PROGRESS NOTE ADULT - SUBJECTIVE AND OBJECTIVE BOX
seen and examined  on high flow o2         PAST HISTORY  --------------------------------------------------------------------------------  No significant changes to PMH, PSH, FHx, SHx, unless otherwise noted    ALLERGIES & MEDICATIONS  --------------------------------------------------------------------------------  Allergies    No Known Allergies    Intolerances      Standing Inpatient Medications  allopurinol 50 milliGRAM(s) Oral daily  aspirin  chewable 81 milliGRAM(s) Oral daily  atorvastatin 40 milliGRAM(s) Oral at bedtime  cefTRIAXone   IVPB 1000 milliGRAM(s) IV Intermittent every 24 hours  chlorhexidine 4% Liquid 1 Application(s) Topical <User Schedule>  dextrose 5% + sodium chloride 0.9%. 1000 milliLiter(s) IV Continuous <Continuous>  heparin  Infusion 1300 Unit(s)/Hr IV Continuous <Continuous>  isosorbide   mononitrate ER Tablet (IMDUR) 120 milliGRAM(s) Oral daily  levoFLOXacin IVPB      levoFLOXacin IVPB 250 milliGRAM(s) IV Intermittent every 24 hours  pantoprazole  Injectable 40 milliGRAM(s) IV Push daily  sodium bicarbonate  Infusion 0.101 mEq/kG/Hr IV Continuous <Continuous>    PRN Inpatient Medications  acetaminophen   Tablet .. 650 milliGRAM(s) Oral every 6 hours PRN  morphine  - Injectable 2 milliGRAM(s) IV Push every 4 hours PRN            VITALS/PHYSICAL EXAM  --------------------------------------------------------------------------------  T(C): 36.7 (09-04-21 @ 03:00), Max: 37.4 (09-03-21 @ 20:00)  HR: 88 (09-04-21 @ 07:00) (78 - 94)  BP: 126/77 (09-04-21 @ 07:00) (106/62 - 145/66)  RR: 24 (09-04-21 @ 07:00) (18 - 30)  SpO2: 97% (09-04-21 @ 07:00) (93% - 97%)  Wt(kg): --  Height (cm): 182.9 (09-02-21 @ 22:00)  Weight (kg): 111.2 (09-02-21 @ 22:00)  BMI (kg/m2): 33.2 (09-02-21 @ 22:00)  BSA (m2): 2.32 (09-02-21 @ 22:00)      09-03-21 @ 07:01  -  09-04-21 @ 07:00  --------------------------------------------------------  IN: 3018 mL / OUT: 191 mL / NET: 2827 mL      Physical Exam:  	Gen: on high flow o2   	Pulm: B/L jose   	CV: S1S2; no rub  	Abd: +distended  	Vascular access:    LABS/STUDIES  --------------------------------------------------------------------------------              11.0   12.17 >-----------<  254      [09-04-21 @ 04:40]              32.3     140  |  99  |  89  ----------------------------<  177      [09-03-21 @ 05:03]  4.3   |  21  |  5.9        Ca     8.2     [09-03-21 @ 05:03]      Mg     2.6     [09-03-21 @ 05:03]    TPro  5.4  /  Alb  2.6  /  TBili  1.5  /  DBili  x   /  AST  295  /  ALT  121  /  AlkPhos  111  [09-03-21 @ 05:03]      PTT: 45.0       [09-03-21 @ 20:19]    CK 1200      [09-03-21 @ 05:03]    Creatinine Trend:  SCr 5.9 [09-03 @ 05:03]  SCr 5.1 [09-02 @ 08:52]  SCr 3.9 [09-01 @ 16:49]  SCr 3.0 [09-01 @ 08:16]  SCr 1.9 [08-31 @ 13:24]    Urinalysis - [08-31-21 @ 18:38]      Color Yellow / Appearance Slightly Turbid / SG >1.050 / pH 6.0      Gluc Trace / Ketone Negative  / Bili Negative / Urobili <2 mg/dL       Blood Large / Protein 300 mg/dL / Leuk Est Negative / Nitrite Negative      RBC 9 / WBC 32 / Hyaline 20 / Gran 3 / Sq Epi  / Non Sq Epi 2 / Bacteria Negative    Urine Sodium <20.0      [09-02-21 @ 12:10]  Urine Chloride 20      [09-02-21 @ 12:10]    Lipid: chol 60, , HDL 17, LDL --      [09-01-21 @ 08:16]    HBsAb Nonreact      [09-02-21 @ 22:00]  HBsAg Nonreact      [09-02-21 @ 22:00]  HBcAb Nonreact      [09-02-21 @ 22:00]

## 2021-09-04 NOTE — PROGRESS NOTE ADULT - SUBJECTIVE AND OBJECTIVE BOX
Patient is a 80y old  Male who presents with a chief complaint of Pneumonia (03 Sep 2021 11:45)        Over Night Events:  Remains critically ill on HFNCO2.  Off pressors.          ROS:     All ROS are negative except HPI         PHYSICAL EXAM    ICU Vital Signs Last 24 Hrs  T(C): 36.7 (04 Sep 2021 03:00), Max: 37.4 (03 Sep 2021 20:00)  T(F): 98.1 (04 Sep 2021 03:00), Max: 99.3 (03 Sep 2021 20:00)  HR: 88 (04 Sep 2021 07:00) (78 - 94)  BP: 126/77 (04 Sep 2021 07:00) (106/62 - 144/67)  BP(mean): 88 (04 Sep 2021 07:00) (76 - 107)  ABP: --  ABP(mean): --  RR: 24 (04 Sep 2021 07:00) (18 - 30)  SpO2: 97% (04 Sep 2021 07:00) (93% - 97%)      CONSTITUTIONAL:  Well nourished.  Ill appearing.  NAD    ENT:   Airway patent,   Mouth with normal mucosa.   No thrush    EYES:   Pupils equal,   Round and reactive to light.    CARDIAC:   Normal rate,   Regular rhythm.    No edema      Vascular:  Normal systolic impulse  No Carotid bruits    RESPIRATORY:   No wheezing  Bilateral BS  Normal chest expansion  Not tachypneic,  No use of accessory muscles    GASTROINTESTINAL:  Abdomen soft,   Non-tender,   No guarding,   + BS    MUSCULOSKELETAL:   Range of motion is not limited,  No clubbing, cyanosis    NEUROLOGICAL:   Alert and oriented   No motor  deficits.    SKIN:   Skin normal color for race,   Warm and dry   No evidence of rash.    PSYCHIATRIC:   Normal mood and affect.   No apparent risk to self or others.    HEMATOLOGICAL:  No cervical  lymphadenopathy.  no inguinal lymphadenopathy      09-03-21 @ 07:01  -  09-04-21 @ 07:00  --------------------------------------------------------  IN:    dextrose 5% + sodium chloride 0.9%: 900 mL    Heparin: 147 mL    Heparin Infusion: 221 mL    IV PiggyBack: 250 mL    Sodium Bicarbonate: 1500 mL  Total IN: 3018 mL    OUT:    Indwelling Catheter - Urethral (mL): 191 mL    Lactated Ringers: 0 mL  Total OUT: 191 mL    Total NET: 2827 mL          LABS:                            11.0   12.17 )-----------( 254      ( 04 Sep 2021 04:40 )             32.3                                               09-03    140  |  99  |  89<HH>  ----------------------------<  177<H>  4.3   |  21  |  5.9<HH>        Ca    8.2<L>      03 Sep 2021 05:03  Mg     2.6     09-03    TPro  5.4<L>  /  Alb  2.6<L>  /  TBili  1.5<H>  /  DBili  x   /  AST  295<H>  /  ALT  121<H>  /  AlkPhos  111  09-03      PTT - ( 03 Sep 2021 20:19 )  PTT:45.0 sec                                           CARDIAC MARKERS ( 03 Sep 2021 05:03 )  x     / x     / 1200 U/L / x     / x                                                LIVER FUNCTIONS - ( 03 Sep 2021 05:03 )  Alb: 2.6 g/dL / Pro: 5.4 g/dL / ALK PHOS: 111 U/L / ALT: 121 U/L / AST: 295 U/L / GGT: x                                                                                                                                       MEDICATIONS  (STANDING):  allopurinol 50 milliGRAM(s) Oral daily  aspirin  chewable 81 milliGRAM(s) Oral daily  atorvastatin 40 milliGRAM(s) Oral at bedtime  cefTRIAXone   IVPB 1000 milliGRAM(s) IV Intermittent every 24 hours  chlorhexidine 4% Liquid 1 Application(s) Topical <User Schedule>  dextrose 5% + sodium chloride 0.9%. 1000 milliLiter(s) (75 mL/Hr) IV Continuous <Continuous>  heparin  Infusion 1300 Unit(s)/Hr (15 mL/Hr) IV Continuous <Continuous>  isosorbide   mononitrate ER Tablet (IMDUR) 120 milliGRAM(s) Oral daily  levoFLOXacin IVPB      levoFLOXacin IVPB 250 milliGRAM(s) IV Intermittent every 24 hours  pantoprazole  Injectable 40 milliGRAM(s) IV Push daily  sodium bicarbonate  Infusion 0.101 mEq/kG/Hr (75 mL/Hr) IV Continuous <Continuous>    MEDICATIONS  (PRN):  acetaminophen   Tablet .. 650 milliGRAM(s) Oral every 6 hours PRN Temp greater or equal to 38C (100.4F), Mild Pain (1 - 3)  morphine  - Injectable 2 milliGRAM(s) IV Push every 4 hours PRN agitation      New X-rays reviewed:                                                                                  ECHO    CXR interpreted by me:  Right infiltrate

## 2021-09-04 NOTE — PROGRESS NOTE ADULT - ASSESSMENT
1] Acute Hypoxic Respiratory Failure      Sepsis due CAP  - Continue antibiotic therapy    2] Type 2 MI       CAD S/P PTCA/Stent  - Continue Aspirin 81 mg tablet daily    3] Chronic Atrial Fibrillation  - On intravenous unfractionated heparin.  Keep PTT 60-80    4] HTN  - Continue to monitor    5] DIETER/ATN  - Renal input appreciated    6] Hyperlipidemia  - On statin therapy    Code Status: Full  Prognosis: poor    Will follow as needed    Murphy Earzo MD  [phone] Office

## 2021-09-04 NOTE — PROGRESS NOTE ADULT - SUBJECTIVE AND OBJECTIVE BOX
Patient was seen and examined by me in ICU.  EMR reviewed.    Patient is lethargic.  He opens eyes to verbal commands.    Vitals:  T(C): 36.7 (09-04-21 @ 03:00), Max: 37.9 (09-03-21 @ 00:00)  HR: 72 (09-04-21 @ 17:00) (70 - 106)  BP: 112/59 (09-04-21 @ 17:00) (102/65 - 145/66)  RR: 19 (09-04-21 @ 17:00) (18 - 30)  SpO2: 99% (09-04-21 @ 17:00) (93% - 100%)    Telemetry: Atrial Fib    LABS:                        11.0   11.45 )-----------( 266      ( 04 Sep 2021 11:08 )             31.9     09-04    136  |  94<L>  |  120<HH>  ----------------------------<  337<H>  4.0   |  26  |  7.0<HH>    Ca    7.7<L>      04 Sep 2021 11:08  Mg     2.8     09-04    TPro  5.0<L>  /  Alb  2.4<L>  /  TBili  1.4<H>  /  DBili  x   /  AST  214<H>  /  ALT  95<H>  /  AlkPhos  135<H>  09-04    PTT - ( 04 Sep 2021 11:08 )  PTT:40.1 sec  MEDICATIONS  (STANDING):  allopurinol 50 milliGRAM(s) Oral daily  aspirin  chewable 81 milliGRAM(s) Oral daily  atorvastatin 40 milliGRAM(s) Oral at bedtime  cefTRIAXone   IVPB 1000 milliGRAM(s) IV Intermittent every 24 hours  chlorhexidine 4% Liquid 1 Application(s) Topical <User Schedule>  heparin  Infusion 1300 Unit(s)/Hr (15 mL/Hr) IV Continuous <Continuous>  insulin lispro (ADMELOG) corrective regimen sliding scale   SubCutaneous three times a day before meals  isosorbide   mononitrate ER Tablet (IMDUR) 120 milliGRAM(s) Oral daily  levoFLOXacin IVPB      levoFLOXacin IVPB 250 milliGRAM(s) IV Intermittent every 24 hours  pantoprazole  Injectable 40 milliGRAM(s) IV Push daily  sodium bicarbonate  Infusion 0.101 mEq/kG/Hr (75 mL/Hr) IV Continuous <Continuous>    MEDICATIONS  (PRN):  acetaminophen   Tablet .. 650 milliGRAM(s) Oral every 6 hours PRN Temp greater or equal to 38C (100.4F), Mild Pain (1 - 3)  morphine  - Injectable 2 milliGRAM(s) IV Push every 4 hours PRN agitation      PHYSICAL EXAM:  Lethargic  Irregular rhythm; nl S1S2  Bilateral breath sounds anteriorly  Abdomen soft  Edema in both legs  Dressing left lower leg  Moving extremities      < from: Xray Chest 1 View- PORTABLE-Routine (Xray Chest 1 View- PORTABLE-Routine in AM.) (09.04.21 @ 05:33) >  Findings:    Support devices: Stable positioning.    Cardiac/mediastinum/hilum: Stable. Thoracic aortic calcification.    Lung parenchyma/Pleura: Right upper lobe consolidation, right basilar opacity/. Left basilar opacity.    Skeleton/soft tissues: Stable.    Impression:    Right upper lobe consolidation and right basilar opacities, stable. Left basilar opacity, new.      < end of copied text >

## 2021-09-04 NOTE — PROGRESS NOTE ADULT - ASSESSMENT
# DIETER / ATN in nature d/t sepsis / possible JOSE contributing / oligo-anuric / creat up-trending / no improvement w/ iv hydration  # PNA / acute respiratory failure / on bipap  # creatinine trending up  # strict I/O   # s/p hd thrusday , hd again today   # d/c bicarbonate drip  # check ph level   # will follow

## 2021-09-05 LAB
ALBUMIN SERPL ELPH-MCNC: 2.2 G/DL — LOW (ref 3.5–5.2)
ALP SERPL-CCNC: 130 U/L — HIGH (ref 30–115)
ALT FLD-CCNC: 81 U/L — HIGH (ref 0–41)
ANION GAP SERPL CALC-SCNC: 14 MMOL/L — SIGNIFICANT CHANGE UP (ref 7–14)
APTT BLD: 58.9 SEC — HIGH (ref 27–39.2)
APTT BLD: 60.9 SEC — HIGH (ref 27–39.2)
APTT BLD: 68.8 SEC — HIGH (ref 27–39.2)
AST SERPL-CCNC: 171 U/L — HIGH (ref 0–41)
BASE EXCESS BLDV CALC-SCNC: 11.4 MMOL/L — HIGH (ref -2–3)
BASOPHILS # BLD AUTO: 0.09 K/UL — SIGNIFICANT CHANGE UP (ref 0–0.2)
BASOPHILS NFR BLD AUTO: 0.8 % — SIGNIFICANT CHANGE UP (ref 0–1)
BILIRUB SERPL-MCNC: 1.3 MG/DL — HIGH (ref 0.2–1.2)
BUN SERPL-MCNC: 77 MG/DL — CRITICAL HIGH (ref 10–20)
CA-I SERPL-SCNC: 1.01 MMOL/L — LOW (ref 1.15–1.33)
CALCIUM SERPL-MCNC: 7.6 MG/DL — LOW (ref 8.5–10.1)
CHLORIDE SERPL-SCNC: 98 MMOL/L — SIGNIFICANT CHANGE UP (ref 98–110)
CO2 SERPL-SCNC: 30 MMOL/L — SIGNIFICANT CHANGE UP (ref 17–32)
CREAT SERPL-MCNC: 4.9 MG/DL — CRITICAL HIGH (ref 0.7–1.5)
CULTURE RESULTS: SIGNIFICANT CHANGE UP
CULTURE RESULTS: SIGNIFICANT CHANGE UP
EOSINOPHIL # BLD AUTO: 0.16 K/UL — SIGNIFICANT CHANGE UP (ref 0–0.7)
EOSINOPHIL NFR BLD AUTO: 1.4 % — SIGNIFICANT CHANGE UP (ref 0–8)
GAS PNL BLDV: 137 MMOL/L — SIGNIFICANT CHANGE UP (ref 136–145)
GAS PNL BLDV: SIGNIFICANT CHANGE UP
GLUCOSE BLDC GLUCOMTR-MCNC: 231 MG/DL — HIGH (ref 70–99)
GLUCOSE BLDC GLUCOMTR-MCNC: 254 MG/DL — HIGH (ref 70–99)
GLUCOSE BLDC GLUCOMTR-MCNC: 255 MG/DL — HIGH (ref 70–99)
GLUCOSE SERPL-MCNC: 237 MG/DL — HIGH (ref 70–99)
HCO3 BLDV-SCNC: 37 MMOL/L — HIGH (ref 22–29)
HCT VFR BLD CALC: 32.6 % — LOW (ref 42–52)
HCT VFR BLDA CALC: 34 % — LOW (ref 39–51)
HGB BLD CALC-MCNC: 11.4 G/DL — LOW (ref 12.6–17.4)
HGB BLD-MCNC: 10.9 G/DL — LOW (ref 14–18)
IMM GRANULOCYTES NFR BLD AUTO: 11.8 % — HIGH (ref 0.1–0.3)
LACTATE BLDV-MCNC: 1.4 MMOL/L — SIGNIFICANT CHANGE UP (ref 0.5–2)
LYMPHOCYTES # BLD AUTO: 0.67 K/UL — LOW (ref 1.2–3.4)
LYMPHOCYTES # BLD AUTO: 5.8 % — LOW (ref 20.5–51.1)
MAGNESIUM SERPL-MCNC: 2.4 MG/DL — SIGNIFICANT CHANGE UP (ref 1.8–2.4)
MCHC RBC-ENTMCNC: 29.3 PG — SIGNIFICANT CHANGE UP (ref 27–31)
MCHC RBC-ENTMCNC: 33.4 G/DL — SIGNIFICANT CHANGE UP (ref 32–37)
MCV RBC AUTO: 87.6 FL — SIGNIFICANT CHANGE UP (ref 80–94)
MONOCYTES # BLD AUTO: 0.3 K/UL — SIGNIFICANT CHANGE UP (ref 0.1–0.6)
MONOCYTES NFR BLD AUTO: 2.6 % — SIGNIFICANT CHANGE UP (ref 1.7–9.3)
NEUTROPHILS # BLD AUTO: 9.03 K/UL — HIGH (ref 1.4–6.5)
NEUTROPHILS NFR BLD AUTO: 77.6 % — HIGH (ref 42.2–75.2)
NRBC # BLD: 0 /100 WBCS — SIGNIFICANT CHANGE UP (ref 0–0)
PCO2 BLDV: 55 MMHG — SIGNIFICANT CHANGE UP (ref 42–55)
PH BLDV: 7.44 — HIGH (ref 7.32–7.43)
PLATELET # BLD AUTO: 264 K/UL — SIGNIFICANT CHANGE UP (ref 130–400)
PO2 BLDV: 42 MMHG — SIGNIFICANT CHANGE UP
POTASSIUM BLDV-SCNC: 3 MMOL/L — LOW (ref 3.5–5.1)
POTASSIUM SERPL-MCNC: 3.6 MMOL/L — SIGNIFICANT CHANGE UP (ref 3.5–5)
POTASSIUM SERPL-SCNC: 3.6 MMOL/L — SIGNIFICANT CHANGE UP (ref 3.5–5)
PROT SERPL-MCNC: 4.6 G/DL — LOW (ref 6–8)
RBC # BLD: 3.72 M/UL — LOW (ref 4.7–6.1)
RBC # FLD: 15.7 % — HIGH (ref 11.5–14.5)
SAO2 % BLDV: 75.4 % — SIGNIFICANT CHANGE UP
SODIUM SERPL-SCNC: 142 MMOL/L — SIGNIFICANT CHANGE UP (ref 135–146)
SPECIMEN SOURCE: SIGNIFICANT CHANGE UP
SPECIMEN SOURCE: SIGNIFICANT CHANGE UP
WBC # BLD: 11.62 K/UL — HIGH (ref 4.8–10.8)
WBC # FLD AUTO: 11.62 K/UL — HIGH (ref 4.8–10.8)

## 2021-09-05 PROCEDURE — 71045 X-RAY EXAM CHEST 1 VIEW: CPT | Mod: 26

## 2021-09-05 PROCEDURE — 99291 CRITICAL CARE FIRST HOUR: CPT

## 2021-09-05 RX ORDER — LINEZOLID 600 MG/300ML
INJECTION, SOLUTION INTRAVENOUS
Refills: 0 | Status: DISCONTINUED | OUTPATIENT
Start: 2021-09-05 | End: 2021-09-06

## 2021-09-05 RX ORDER — HEPARIN SODIUM 5000 [USP'U]/ML
1500 INJECTION INTRAVENOUS; SUBCUTANEOUS
Qty: 25000 | Refills: 0 | Status: DISCONTINUED | OUTPATIENT
Start: 2021-09-05 | End: 2021-09-09

## 2021-09-05 RX ORDER — LINEZOLID 600 MG/300ML
600 INJECTION, SOLUTION INTRAVENOUS EVERY 12 HOURS
Refills: 0 | Status: DISCONTINUED | OUTPATIENT
Start: 2021-09-05 | End: 2021-09-06

## 2021-09-05 RX ORDER — LINEZOLID 600 MG/300ML
600 INJECTION, SOLUTION INTRAVENOUS ONCE
Refills: 0 | Status: COMPLETED | OUTPATIENT
Start: 2021-09-05 | End: 2021-09-05

## 2021-09-05 RX ORDER — CEFTRIAXONE 500 MG/1
1000 INJECTION, POWDER, FOR SOLUTION INTRAMUSCULAR; INTRAVENOUS EVERY 24 HOURS
Refills: 0 | Status: COMPLETED | OUTPATIENT
Start: 2021-09-05 | End: 2021-09-11

## 2021-09-05 RX ADMIN — ATORVASTATIN CALCIUM 40 MILLIGRAM(S): 80 TABLET, FILM COATED ORAL at 22:37

## 2021-09-05 RX ADMIN — ISOSORBIDE MONONITRATE 120 MILLIGRAM(S): 60 TABLET, EXTENDED RELEASE ORAL at 14:34

## 2021-09-05 RX ADMIN — Medication 50 MILLIGRAM(S): at 14:33

## 2021-09-05 RX ADMIN — Medication 3: at 18:15

## 2021-09-05 RX ADMIN — Medication 2: at 12:26

## 2021-09-05 RX ADMIN — CHLORHEXIDINE GLUCONATE 1 APPLICATION(S): 213 SOLUTION TOPICAL at 06:36

## 2021-09-05 RX ADMIN — Medication 3: at 09:01

## 2021-09-05 RX ADMIN — LINEZOLID 300 MILLIGRAM(S): 600 INJECTION, SOLUTION INTRAVENOUS at 11:59

## 2021-09-05 RX ADMIN — Medication 81 MILLIGRAM(S): at 14:34

## 2021-09-05 RX ADMIN — CEFTRIAXONE 100 MILLIGRAM(S): 500 INJECTION, POWDER, FOR SOLUTION INTRAMUSCULAR; INTRAVENOUS at 11:05

## 2021-09-05 RX ADMIN — LINEZOLID 300 MILLIGRAM(S): 600 INJECTION, SOLUTION INTRAVENOUS at 22:36

## 2021-09-05 RX ADMIN — PANTOPRAZOLE SODIUM 40 MILLIGRAM(S): 20 TABLET, DELAYED RELEASE ORAL at 12:00

## 2021-09-05 NOTE — PROGRESS NOTE ADULT - SUBJECTIVE AND OBJECTIVE BOX
Patient is a 80y old  Male who presents with a chief complaint of Fall (04 Sep 2021 18:09)        Over Night Events:  On HFNCO2. 50 liters 60%.  Tolerated BiPAP on and off and during sleep yesterday         ROS:     All ROS are negative except HPI         PHYSICAL EXAM    ICU Vital Signs Last 24 Hrs  T(C): 36.6 (05 Sep 2021 08:00), Max: 36.8 (04 Sep 2021 12:00)  T(F): 97.8 (05 Sep 2021 08:00), Max: 98.3 (04 Sep 2021 12:00)  HR: 72 (05 Sep 2021 08:00) (68 - 100)  BP: 155/79 (05 Sep 2021 08:00) (102/65 - 155/79)  BP(mean): 107 (05 Sep 2021 08:00) (73 - 108)  ABP: --  ABP(mean): --  RR: 14 (05 Sep 2021 08:00) (14 - 23)  SpO2: 97% (05 Sep 2021 08:00) (97% - 100%)      CONSTITUTIONAL:  Well nourished.  Ill appearing.  In NAD    ENT:   Airway patent,   Mouth with normal mucosa.   No thrush    EYES:   Pupils equal,   Round and reactive to light.    CARDIAC:   Normal rate,   Regular rhythm.    No edema      Vascular:  Normal systolic impulse  No Carotid bruits    RESPIRATORY:   No wheezing  Bilateral BS  Normal chest expansion  Not tachypneic,  No use of accessory muscles    GASTROINTESTINAL:  Abdomen soft,   Non-tender,   No guarding,   + BS    MUSCULOSKELETAL:   Range of motion is not limited,  No clubbing, cyanosis    NEUROLOGICAL:   Alert  Follows commands   No motor  deficits.    SKIN:   Skin normal color for race,   Warm and dry.   No evidence of rash.    PSYCHIATRIC:   Lethargic   No apparent risk to self or others.    HEMATOLOGICAL:  No cervical  lymphadenopathy.  no inguinal lymphadenopathy      09-04-21 @ 07:01  -  09-05-21 @ 07:00  --------------------------------------------------------  IN:    Enteral Tube Flush: 30 mL    Heparin: 376 mL    IV PiggyBack: 50 mL    Sodium Bicarbonate: 1800 mL  Total IN: 2256 mL    OUT:    dextrose 5% + sodium chloride 0.9%: 0 mL    Indwelling Catheter - Urethral (mL): 420 mL    Other (mL): 500 mL  Total OUT: 920 mL    Total NET: 1336 mL      09-05-21 @ 07:01  -  09-05-21 @ 09:04  --------------------------------------------------------  IN:    Heparin: 17 mL    Sodium Bicarbonate: 75 mL  Total IN: 92 mL    OUT:    Indwelling Catheter - Urethral (mL): 25 mL  Total OUT: 25 mL    Total NET: 67 mL          LABS:                            10.9   11.62 )-----------( 264      ( 05 Sep 2021 04:49 )             32.6                                               09-05    142  |  98  |  77<HH>  ----------------------------<  237<H>  3.6   |  30  |  4.9<HH>    Ca    7.6<L>      05 Sep 2021 04:49  Mg     2.4     09-05    TPro  4.6<L>  /  Alb  2.2<L>  /  TBili  1.3<H>  /  DBili  x   /  AST  171<H>  /  ALT  81<H>  /  AlkPhos  130<H>  09-05      PTT - ( 05 Sep 2021 04:49 )  PTT:58.9 sec                                                                                     LIVER FUNCTIONS - ( 05 Sep 2021 04:49 )  Alb: 2.2 g/dL / Pro: 4.6 g/dL / ALK PHOS: 130 U/L / ALT: 81 U/L / AST: 171 U/L / GGT: x                                                                                                                                       MEDICATIONS  (STANDING):  allopurinol 50 milliGRAM(s) Oral daily  aspirin  chewable 81 milliGRAM(s) Oral daily  atorvastatin 40 milliGRAM(s) Oral at bedtime  chlorhexidine 4% Liquid 1 Application(s) Topical <User Schedule>  heparin  Infusion 1300 Unit(s)/Hr (17 mL/Hr) IV Continuous <Continuous>  insulin lispro (ADMELOG) corrective regimen sliding scale   SubCutaneous three times a day before meals  isosorbide   mononitrate ER Tablet (IMDUR) 120 milliGRAM(s) Oral daily  levoFLOXacin IVPB      levoFLOXacin IVPB 250 milliGRAM(s) IV Intermittent every 24 hours  pantoprazole  Injectable 40 milliGRAM(s) IV Push daily  sodium bicarbonate  Infusion 0.101 mEq/kG/Hr (75 mL/Hr) IV Continuous <Continuous>    MEDICATIONS  (PRN):  acetaminophen   Tablet .. 650 milliGRAM(s) Oral every 6 hours PRN Temp greater or equal to 38C (100.4F), Mild Pain (1 - 3)  morphine  - Injectable 2 milliGRAM(s) IV Push every 4 hours PRN agitation      New X-rays reviewed:                                                                                  ECHO    CXR interpreted by me:  Dense RUL infiltrate

## 2021-09-05 NOTE — CONSULT NOTE ADULT - ASSESSMENT
ASSESSMENT  89M w/ PMHx as above BIBEMS for fall. Patient says he felt dizzy yesterday causing him to fall on to his bed found to have RUL pneumoniae      IMPRESSION  #Sepsis on admission (fevers, WBC>12) secondary to gram negative pneumonia  - CT Angio Chest PE Protocol w/ IV Cont (08.31.21 @ 14:34): Confluent airspace consolidation in the right upper lobe and to a lesser degree the right lower lobe, consistent with pneumonia No evidence of acute intrathoracic or intra-abdominal traumatic injury.No evidence of pulmonary embolism  - MRSA PCR Result.: Negative: By: Real-Time PCR (Polymerase Reaction Method) (09.02.21 @ 12:10)  - Legionella Antigen, Urine: Negative  (09.01.21 @ 19:05)    #s/p Fall   #Obesity BMI (kg/m2): 33.2  #Abx allergy:     RECOMMENDATIONS  - MRSA nares negative, but can add linezolid 600 mg q 12 hours -- please obtain Sputum Cx to allow for de-escalation if negative  - continue ceftriaxone 1g daily  - agree with levofloxacin in case Legionella - Urine Legionella negative, but checks for 1 serotype -- please obtain Legionella -ab  - check urine strep antigen     Please call or message on Microsoft Teams if with any questions.  Spectra [phone]   ASSESSMENT  89M w/ PMHx as above BIBEMS for fall. Patient says he felt dizzy yesterday causing him to fall on to his bed found to have RUL pneumoniae      IMPRESSION  #Sepsis on admission (fevers, WBC>12) secondary to gram negative pneumonia  - CT Angio Chest PE Protocol w/ IV Cont (08.31.21 @ 14:34): Confluent airspace consolidation in the right upper lobe and to a lesser degree the right lower lobe, consistent with pneumonia No evidence of acute intrathoracic or intra-abdominal traumatic injury.No evidence of pulmonary embolism  - MRSA PCR Result.: Negative: By: Real-Time PCR (Polymerase Reaction Method) (09.02.21 @ 12:10)  - Legionella Antigen, Urine: Negative  (09.01.21 @ 19:05)    #s/p Fall   #RLE Extremity Ulcer -- does not appear infected    #Obesity BMI (kg/m2): 33.2  #Abx allergy:     RECOMMENDATIONS  - MRSA nares negative, but can add linezolid 600 mg q 12 hours -- please obtain Sputum Cx to allow for de-escalation if negative  - continue ceftriaxone 1g daily  - agree with levofloxacin in case Legionella - Urine Legionella negative, but checks for 1 serotype -- please obtain Legionella -ab  - check urine strep antigen     Please call or message on Microsoft Teams if with any questions.  Spectra [phone]

## 2021-09-05 NOTE — PROGRESS NOTE ADULT - SUBJECTIVE AND OBJECTIVE BOX
Nephrology Progress Note    MISTY BURLESON  MRN-[id number]  80y  Male    S:  Patient is seen and examined, events over the last 24h noted.    O:  Allergies:  No Known Allergies    Hospital Medications:   MEDICATIONS  (STANDING):  allopurinol 50 milliGRAM(s) Oral daily  aspirin  chewable 81 milliGRAM(s) Oral daily  atorvastatin 40 milliGRAM(s) Oral at bedtime  cefTRIAXone   IVPB 1000 milliGRAM(s) IV Intermittent every 24 hours  chlorhexidine 4% Liquid 1 Application(s) Topical <User Schedule>  heparin  Infusion 1500 Unit(s)/Hr (15 mL/Hr) IV Continuous <Continuous>  insulin lispro (ADMELOG) corrective regimen sliding scale   SubCutaneous three times a day before meals  isosorbide   mononitrate ER Tablet (IMDUR) 120 milliGRAM(s) Oral daily  levoFLOXacin IVPB 250 milliGRAM(s) IV Intermittent every 24 hours  linezolid  IVPB 600 milliGRAM(s) IV Intermittent every 12 hours  pantoprazole  Injectable 40 milliGRAM(s) IV Push daily  sodium bicarbonate  Infusion 0.101 mEq/kG/Hr (75 mL/Hr) IV Continuous <Continuous>    MEDICATIONS  (PRN):  acetaminophen   Tablet .. 650 milliGRAM(s) Oral every 6 hours PRN Temp greater or equal to 38C (100.4F), Mild Pain (1 - 3)  morphine  - Injectable 2 milliGRAM(s) IV Push every 4 hours PRN agitation    Home Medications:  Actoplus Met 15 mg-500 mg oral tablet: 1 tab(s) orally 2 times a day (31 Aug 2021 22:08)  allopurinol: 150 milligram(s) orally once a day (31 Aug 2021 22:08)  amLODIPine 10 mg oral tablet: 1 tab(s) orally once a day (in the evening) (31 Aug 2021 22:08)  aspirin 81 mg oral tablet, chewable: 1 tab(s) orally once a day (31 Aug 2021 22:08)  atorvastatin 40 mg oral tablet: 1 tab(s) orally once a day (31 Aug 2021 22:08)  fosinopril 40 mg oral tablet: 1 tab(s) orally once a day (in the evening) (31 Aug 2021 22:08)  hydroCHLOROthiazide 12.5 mg oral tablet: 1 tab(s) orally once a day (31 Aug 2021 22:08)  isosorbide mononitrate 120 mg oral tablet, extended release: 1 tab(s) orally once a day (in the morning) (31 Aug 2021 22:08)  Xarelto 15 mg oral tablet: 1 tab(s) orally once a day (in the evening) (31 Aug 2021 22:08)      VITALS:  T(F): 98 (09-05-21 @ 12:00), Max: 98.3 (09-04-21 @ 20:00)  HR: 68 (09-05-21 @ 14:00)  BP: 159/75 (09-05-21 @ 14:00)  RR: 21 (09-05-21 @ 14:00)  SpO2: 96% (09-05-21 @ 14:21)  Wt(kg): --  I&O's Detail    04 Sep 2021 07:01  -  05 Sep 2021 07:00  --------------------------------------------------------  IN:    Enteral Tube Flush: 30 mL    Heparin: 376 mL    IV PiggyBack: 50 mL    Sodium Bicarbonate: 1800 mL  Total IN: 2256 mL    OUT:    dextrose 5% + sodium chloride 0.9%: 0 mL    Indwelling Catheter - Urethral (mL): 420 mL    Other (mL): 500 mL  Total OUT: 920 mL    Total NET: 1336 mL      05 Sep 2021 07:01  -  05 Sep 2021 15:46  --------------------------------------------------------  IN:    Heparin: 51 mL    Heparin: 75 mL    IV PiggyBack: 350 mL    Sodium Bicarbonate: 450 mL  Total IN: 926 mL    OUT:    Indwelling Catheter - Urethral (mL): 205 mL  Total OUT: 205 mL    Total NET: 721 mL        I&O's Summary    04 Sep 2021 07:01  -  05 Sep 2021 07:00  --------------------------------------------------------  IN: 2256 mL / OUT: 920 mL / NET: 1336 mL    05 Sep 2021 07:01  -  05 Sep 2021 15:46  --------------------------------------------------------  IN: 926 mL / OUT: 205 mL / NET: 721 mL          PHYSICAL EXAM:  Gen: NAD  Resp: decreased bs b/l  Card: S1/S2  Abd: soft  Vascular access: udall      LABS:    Blood Gas Profile w/Lytes - Venous: Performed in Lab (09-05-21 @ 09:52)  Blood Gas Venous - Sodium: 137 mmol/L (09-05-21 @ 09:52)  Blood Gas Venous - Potassium: 3.0 mmol/L (09-05-21 @ 09:52)    09-05    142  |  98  |  77<HH>  ----------------------------<  237<H>  3.6   |  30  |  4.9<HH>    Ca    7.6<L>      05 Sep 2021 04:49  Mg     2.4     09-05    TPro  4.6<L>  /  Alb  2.2<L>  /  TBili  1.3<H>  /  DBili      /  AST  171<H>  /  ALT  81<H>  /  AlkPhos  130<H>  09-05    eGFR if Non African American: 10 mL/min/1.73M2 (09-05-21 @ 04:49)  eGFR if African American: 12 mL/min/1.73M2 (09-05-21 @ 04:49)    Phosphorus Level, Serum: 3.5 mg/dL (09-24-20 @ 16:55)  Phosphorus Level, Serum: 3.0 mg/dL (09-23-20 @ 21:06)                            10.9   11.62 )-----------( 264      ( 05 Sep 2021 04:49 )             32.6     Mean Cell Volume: 87.6 fL (09-05-21 @ 04:49)        Urine Studies:  Urinalysis Basic - ( 31 Aug 2021 18:38 )    Color: Yellow / Appearance: Slightly Turbid / SG: >1.050 / pH:   Gluc:  / Ketone: Negative  / Bili: Negative / Urobili: <2 mg/dL   Blood:  / Protein: 300 mg/dL / Nitrite: Negative   Leuk Esterase: Negative / RBC: 9 /HPF / WBC 32 /HPF   Sq Epi:  / Non Sq Epi: 2 /HPF / Bacteria: Negative          Sodium, Random Urine: <20.0 mmoL/L (09-02 @ 12:10)  Chloride, Random Urine: 20 (09-02 @ 12:10)    Creatinine trend:  Creatinine, Serum: 4.9 mg/dL (09-05-21 @ 04:49)  Creatinine, Serum: 7.0 mg/dL (09-04-21 @ 11:08)  Creatinine, Serum: 5.9 mg/dL (09-03-21 @ 05:03)  Creatinine, Serum: 5.1 mg/dL (09-02-21 @ 08:52)  Creatinine, Serum: 3.9 mg/dL (09-01-21 @ 16:49)  Creatinine, Serum: 3.0 mg/dL (09-01-21 @ 08:16)  Creatinine, Serum: 1.9 mg/dL (08-31-21 @ 13:24)  Creatinine, Serum: 1.9 mg/dL (09-24-20 @ 16:55)  Creatinine, Serum: 1.8 mg/dL (09-23-20 @ 21:06)  Creatinine, Serum: 1.6 mg/dL (09-22-20 @ 22:20)  Creatinine, Serum: 1.4 mg/dL (09-07-20 @ 14:10)    Hepatitis B Core IgM Antibody: Nonreact (09-02-21 @ 22:00)  Hepatitis B Core Antibody, Total: Nonreact (09-02-21 @ 22:00)  Hepatitis B Surface Antibody: Nonreact (09-02-21 @ 22:00)      US Kidney and Bladder:   EXAM:  US KIDNEYS AND BLADDER          PROCEDURE DATE:  09/01/2021      INTERPRETATION:  CLINICAL INFORMATION: Acute on chronic renal failure..    COMPARISON: CT abdomen and pelvis August 31, 2021.    TECHNIQUE: Sonography of the kidneys and bladder.    FINDINGS:    Right kidney: 12.1 cm. No hydronephrosis or calculi. Right renal 4.7 cm cyst.    Left kidney:  12.6 cm. No hydronephrosis or calculi.    Urinary bladder nondistended. Prostate gland not delineated.    IMPRESSION:    No hydronephrosis.  Urinary bladder nondistended. Prostate gland not delineated.

## 2021-09-05 NOTE — PROGRESS NOTE ADULT - ASSESSMENT
IMPRESSION:    Acute hypoxemic resp failure   Severe CAP MRSA wound   Sepsis present on admission  Acute on Chronic renal failure with improved UP with IV fluid   HO A fib      PLAN:    CNS: avoid CNS depressant    HEENT:  Oral care    PULMONARY:  HOB @ 45 degrees, BIPAP/ HHFNC keep SAo2 88 to 92%, Aspiration precaution,     CARDIOVASCULAR: Avoid overload. Continue IVF.  Daily PH     GI: GI prophylaxis                                          Feeding NG feeding outside NIV.       RENAL:  F/u  lytes.  Correct as needed.  Accurate I/O, trend CMP.      INFECTIOUS DISEASE: Continue Ceftriaxone/ Levaquin.  Add Zyvox.      HEMATOLOGICAL:  DVT prophylaxis. On IV Hep.  FU PTT     ENDOCRINE:  Follow up FS.  Insulin protocol if needed.    CODE STATUS: FULL CODE    DISPOSITION: MICU  Very poor prognosis

## 2021-09-05 NOTE — PROGRESS NOTE ADULT - ASSESSMENT
Patient is a 79 YO M w a PMH of A-fib (on home xarelto) CAD (s/p stent 2014),gout, Alcohol dependence (5-6 drinks/day), VINI (on home CPAP) addmitted for acute hypoxic respiratory failure secondary to SEVERE community acquired pneumonia. Patient was septic on admission (febrile, leukocytosis, tachycardia, hypoxic w identifiable source). Found to have HAGMA and UA + for WBC. Of note patient is s/p 2 x COVID vaccine.      Problem List:  #acute hypoxemic respiratory failure secondary to severe community acquired PMN complicated by Sepsis,   -increasing WBC. Afebrile. NO GROWTH TO DATE   -negative MRSA, legionella and COVID -  -c/w ceftriaxone 1000mg once daily  -c/w levofloxacin 250 mg once daily   -START LINEZOLID (ID CONSULTED AS PER NEW PHARMACY PROTOCOL)   -f/u blood cultures  (NEGATIVE TO DATE)  -Repeat vBG once daily  -CXR once daily   -will discuss goals of care with family today   -ORSA REPORTED BY INFECTION CONTROL, UNABLE TO FIND CULTURE THEY ARE FOLLOWING. THERE WAS A +PCR IN 2020 BUT NEGATIVE MRSA NARES SEPT 2 2021. ATTEMPTED TO CONTACT OFFICE FOR CLARIFICATION, LEFT MESSAGE WILL CALL AGAIN   -patient tolerated BiPAP last night and is feeling better this morning.  -c/w BiPAP at night, tube feeds between therapy during the day          #Acute on Chronic renal failure oliguric  patient had HD 9/1, 1 L removed NOW S/P UDOL PLACEMENT RT IJV  -asymptomatic bacterurea   -f/u for signs/symptoms of UTI  -Repeat ABG once daily  -Patient is fluid + 751mL    #Chronic venous stasis dermatitis   -keep leg wrapped  -elevate legs  -outpatient evaluation   -compression socks  -WOUND CULTURE TAKEN 9/5 F/U CULTURES           PAST MEDICAL & SURGICAL HISTORY:  Atrial fibrillation    Coronary artery disease    Hyperlipidemia    Hypertension    Gout    Diabetes mellitus    VINI on CPAP    History of heart artery stent        #Misc  - DVT Prophylaxis: heparin   - GI Prophylaxis: pantoprazole   - Diet:  - Activity: bed rest   - IV Fluids:   - Code Status: full     Dispo: acute

## 2021-09-05 NOTE — CONSULT NOTE ADULT - SUBJECTIVE AND OBJECTIVE BOX
MISTY BURLESON  80y, Male  Allergy: No Known Allergies      CHIEF COMPLAINT: Fall (04 Sep 2021 18:09)      LOS  5d    HPI:  PC:  Fall    PMHx: HTN, HLD, T2DM, Afib on Xarelto, CAD s/p stents placement (2014), gout, VINI on CPAP    HPI: 80M w/ PMHx as above BIBEMS for fall. Patient says he felt dizzy yesterday causing him to fall on to his bed. Denies any room spinning or pre-syncope. Unable to describe the dizziness to me. No HT, LOC, numbness, weakness, tingling, headache, vision changes, fever, cold/flu symptoms, CP. Patient endorses that he has been feeling SOB for the past 2 days and has been coughing for the past 1 week per daughter at bedside. Also states that he had diarrhea for the past 2 days, but not today (Of note Pt S/p Moderna Vaccine 2 doses).    ED Course: Febrile, tachycardic and hypoxic in ED. Labs revealed leukocytosis, HAGMA, elevated Trop 0.04, BNP 13k. UA positve for WBC. Trauma w/up neg. CXR and CT chest revealed confluent airspace consolidation in the RUL and to a lesser degree the RLL consistent with pneumonia. (31 Aug 2021 22:29)      INFECTIOUS DISEASE HISTORY:  CTA CHest 8/31 with RUL and small RLL consolidation  Received cefepime 8/31, ceftriaxone 9/1-9/4, azitromycin 9/1, levofloxacin 9/2-9/41  WBC improving, but serial X ray with persistent right upper lobe opacitiy.   Denies any fevers, chills, nausea, vomiting, abdominal pain.   Was having diarrhea, but now resolved.   No recent travel.     PAST MEDICAL & SURGICAL HISTORY:  Atrial fibrillation    Coronary artery disease    Hyperlipidemia    Hypertension    Gout    Diabetes mellitus    VINI on CPAP    History of heart artery stent        FAMILY HISTORY      SOCIAL HISTORY  Social History:  Former smoker, Quit at age 25  Uses [employer] over weekends (31 Aug 2021 22:29)        ROS  General: Denies rigors, nightsweats  HEENT: Denies headache, rhinorrhea, sore throat, eye pain  CV: Denies CP, palpitations  PULM: Denies wheezing, hemoptysis  GI: Denies hematemesis, hematochezia, melena  : Denies discharge, hematuria  MSK: Denies arthralgias, myalgias  SKIN: Denies rash, lesions  NEURO: Denies paresthesias, weakness  PSYCH: Denies depression, anxiety    VITALS:  T(F): 98, Max: 98.3 (09-04-21 @ 20:00)  HR: 68  BP: 159/75  RR: 21Vital Signs Last 24 Hrs  T(C): 36.7 (05 Sep 2021 12:00), Max: 36.8 (04 Sep 2021 20:00)  T(F): 98 (05 Sep 2021 12:00), Max: 98.3 (04 Sep 2021 20:00)  HR: 68 (05 Sep 2021 14:00) (67 - 86)  BP: 159/75 (05 Sep 2021 14:00) (108/61 - 160/80)  BP(mean): 100 (05 Sep 2021 14:00) (73 - 122)  RR: 21 (05 Sep 2021 14:00) (14 - 44)  SpO2: 96% (05 Sep 2021 14:21) (96% - 100%)    PHYSICAL EXAM:  Gen: NAD, resting in bed  HEENT: Normocephalic, atraumatic  Neck: supple, no lymphadenopathy  CV: Regular rate & regular rhythm  Lungs: decreased BS at bases, no fremitus  Abdomen: Soft, BS present  Ext: Warm, well perfused  Neuro: non focal, awake  Skin: no rash, no erythema  Lines: no phlebitis    TESTS & MEASUREMENTS:                        10.9   11.62 )-----------( 264      ( 05 Sep 2021 04:49 )             32.6     09-05    142  |  98  |  77<HH>  ----------------------------<  237<H>  3.6   |  30  |  4.9<HH>    Ca    7.6<L>      05 Sep 2021 04:49  Mg     2.4     09-05    TPro  4.6<L>  /  Alb  2.2<L>  /  TBili  1.3<H>  /  DBili  x   /  AST  171<H>  /  ALT  81<H>  /  AlkPhos  130<H>  09-05    eGFR if Non African American: 10 mL/min/1.73M2 (09-05-21 @ 04:49)  eGFR if African American: 12 mL/min/1.73M2 (09-05-21 @ 04:49)    LIVER FUNCTIONS - ( 05 Sep 2021 04:49 )  Alb: 2.2 g/dL / Pro: 4.6 g/dL / ALK PHOS: 130 U/L / ALT: 81 U/L / AST: 171 U/L / GGT: x               Culture - Urine (collected 08-31-21 @ 18:38)  Source: Clean Catch Clean Catch (Midstream)  Final Report (09-01-21 @ 21:55):    <10,000 CFU/mL Normal Urogenital Skye    Culture - Blood (collected 08-31-21 @ 13:24)  Source: .Blood Blood-Peripheral  Preliminary Report (09-01-21 @ 22:01):    No growth to date.    Culture - Blood (collected 08-31-21 @ 13:24)  Source: .Blood Blood-Peripheral  Preliminary Report (09-01-21 @ 22:01):    No growth to date.    Culture - Fungal, Other (collected 09-23-20 @ 09:36)  Source: .Other None  Final Report (10-24-20 @ 15:00):    No fungus isolated after 4 weeks.    Culture - Surgical Swab (collected 09-23-20 @ 09:36)  Source: .Surgical Swab None  Final Report (09-28-20 @ 16:49):    Numerous Serratia marcescens  Organism: Serratia marcescens (09-28-20 @ 16:49)  Organism: Serratia marcescens (09-28-20 @ 16:49)      -  Amikacin: S <=16      -  Amoxicillin/Clavulanic Acid: R >16/8      -  Ampicillin: R >16 These ampicillin results predict results for amoxicillin      -  Ampicillin/Sulbactam: R >16/8 Enterobacter, Citrobacter, and Serratia may develop resistance during prolonged therapy (3-4 days)      -  Aztreonam: S <=4      -  Cefazolin: R >16 Enterobacter, Citrobacter, and Serratia may develop resistance during prolonged therapy (3-4 days)      -  Cefepime: S <=2      -  Cefoxitin: R 16      -  Ceftriaxone: I 2 Enterobacter, Citrobacter, and Serratia may develop resistance during prolonged therapy      -  Ciprofloxacin: S <=0.25      -  Ertapenem: S <=0.5      -  Gentamicin: S <=2      -  Levofloxacin: S <=0.5      -  Meropenem: S <=1      -  Piperacillin/Tazobactam: S <=8      -  Tobramycin: S <=2      -  Trimethoprim/Sulfamethoxazole: S <=0.5/9.5      Method Type: LOPEZ        Blood Gas Venous - Lactate: 1.40 mmol/L (09-05-21 @ 09:52)  Blood Gas Venous - Lactate: 2.10 mmol/L (08-31-21 @ 16:32)      INFECTIOUS DISEASES TESTING  Hepatitis B Surface Antigen: Nonreact (09-02-21 @ 22:00)  MRSA PCR Result.: Negative (09-02-21 @ 12:10)  Legionella Antigen, Urine: Negative (09-01-21 @ 19:05)  COVID-19 PCR: NotDetec (08-31-21 @ 13:24)      RADIOLOGY & ADDITIONAL TESTS:  I have personally reviewed the last Chest xray  CXR  Xray Chest 1 View- PORTABLE-Urgent:   EXAM:  XR CHEST PORTABLE URGENT 1V            PROCEDURE DATE:  09/04/2021            INTERPRETATION:  Clinical History / Reason for exam: ngt    Comparison : Chest radiograph:  9/4/2021    Technique/Positioning: Frontal view of the chest    Findings:    Support devices: Right IJ central venous line. There is an enteric tube which is incompletely visualized distally.    Cardiac/mediastinum/hilum: Partially obscured and not well evaluated    Lung parenchyma/Pleura: Stable bilateral opacities. No definite pneumothorax.    Skeleton/soft tissues: No significant change.    Impression:  Stable bilateral opacities. No definite pneumothorax.    Lines and support devices as described above. There is an enteric tube which is incompletely visualized distally.          --- End of Report ---              MATTHEW LEDESMA MD; Attending Radiologist  This document has been electronically signed. Sep  5 2021 12:41AM (09-04-21 @ 18:14)      CT      CARDIOLOGY TESTING  12 Lead ECG:   Ventricular Rate 109 BPM    Atrial Rate 114 BPM    QRS Duration 126 ms    Q-T Interval 330 ms    QTC Calculation(Bazett) 444 ms    R Axis -55 degrees    T Axis 110 degrees    Diagnosis Line Atrial fibrillation with occasional Premature ventricular complexes  Left axis deviation  Non-specific intra-ventricular conduction block  T wave abnormality, consider lateral ischemia  Abnormal ECG    Confirmed by Braydon Pickens ([id number]) on 9/1/2021 11:35:12 PM (09-01-21 @ 22:07)  12 Lead ECG:   Ventricular Rate 105 BPM    QRS Duration 148 ms    Q-T Interval 350 ms    QTC Calculation(Bazett) 462 ms    R Axis -83 degrees    T Axis 71 degrees    Diagnosis Line Atrial fibrillation with rapid ventricular response with premature ventricular  oraberrantly conducted complexes  Right bundle branch block  Left anterior fascicular block  *** Bifascicular block ***  Left ventricular hypertrophy with repolarization abnormality ( R in aVL )  Abnormal ECG    Confirmed by DEMETRI AVERY MD ([id number])on 8/31/2021 2:09:41 PM (08-31-21 @ 13:00)      MEDICATIONS  allopurinol 50 Oral daily  aspirin  chewable 81 Oral daily  atorvastatin 40 Oral at bedtime  cefTRIAXone   IVPB 1000 IV Intermittent every 24 hours  chlorhexidine 4% Liquid 1 Topical <User Schedule>  heparin  Infusion 1500 IV Continuous <Continuous>  insulin lispro (ADMELOG) corrective regimen sliding scale  SubCutaneous three times a day before meals  isosorbide   mononitrate ER Tablet (IMDUR) 120 Oral daily  levoFLOXacin IVPB     levoFLOXacin IVPB 250 IV Intermittent every 24 hours  linezolid  IVPB     linezolid  IVPB 600 IV Intermittent every 12 hours  pantoprazole  Injectable 40 IV Push daily  sodium bicarbonate  Infusion 0.101 IV Continuous <Continuous>      Weight  Weight (kg): 111.2 (09-02-21 @ 22:00)    ANTIBIOTICS:  cefTRIAXone   IVPB 1000 milliGRAM(s) IV Intermittent every 24 hours  levoFLOXacin IVPB      levoFLOXacin IVPB 250 milliGRAM(s) IV Intermittent every 24 hours  linezolid  IVPB      linezolid  IVPB 600 milliGRAM(s) IV Intermittent every 12 hours      ALLERGIES:  No Known Allergies

## 2021-09-05 NOTE — PROGRESS NOTE ADULT - ASSESSMENT
# DIETER / ATN in nature d/t sepsis / possible JOSE contributing / oligo-anuric / creat up-trending / no improvement w/ iv hydration  # PNA / acute respiratory failure   # strict I/O   # s/p hd yesterday, urine output improved, will assess daily   # d/c bicarbonate drip  # add lasix 60mg iv q12h  # check ph level   # will follow  # DIETER / ATN in nature d/t sepsis / possible JOSE contributing / oligo-anuric / creat up-trending / no improvement w/ iv hydration  # PNA / acute respiratory failure   # strict I/O   # s/p hd yesterday, urine output improved, will assess daily   # d/c bicarbonate drip  # add lasix 60mg iv q12h  # check ph level   # will follow   d/w ICU team

## 2021-09-05 NOTE — PROGRESS NOTE ADULT - SUBJECTIVE AND OBJECTIVE BOX
MISTY BURLESON 80y Male  MRN#: [id number]   Hospital Day: 5d    SUBJECTIVE  Patient is a 80y old Male who presents with a chief complaint of Fall (04 Sep 2021 18:09)  Currently admitted to medicine with the primary diagnosis of Pneumonia      INTERVAL HPI AND OVERNIGHT EVENTS:  Patient was examined and seen at bedside. This morning he is resting comfortably in bed and reports no issues or overnight events.  OVERNIGHT REPORT: TOLERATED BIPAP, ORSA + WOUND CULTURE LLE     REVIEW OF SYMPTOMS:  CONSTITUTIONAL: No weakness, fevers or chills; No headaches  EYES: No visual changes, eye pain, or discharge  ENT: No vertigo; No ear pain or change in hearing; No sore throat or difficulty swallowing  NECK: No pain or stiffness  RESPIRATORY: MILD SOB   CARDIOVASCULAR: No chest pain or palpitations  GASTROINTESTINAL: No abdominal or epigastric pain; No nausea, vomiting, or hematemesis; No diarrhea or constipation; No melena or hematochezia  GENITOURINARY: No dysuria, frequency or hematuria  MUSCULOSKELETAL: GENERALIZED WEAKNESS  NEUROLOGICAL: No numbness or weakness  SKIN: LLE ULCER     OBJECTIVE  PAST MEDICAL & SURGICAL HISTORY  Atrial fibrillation    Coronary artery disease    Hyperlipidemia    Hypertension    Gout    Diabetes mellitus    VINI on CPAP    History of heart artery stent      ALLERGIES:  No Known Allergies    MEDICATIONS:  STANDING MEDICATIONS  allopurinol 50 milliGRAM(s) Oral daily  aspirin  chewable 81 milliGRAM(s) Oral daily  atorvastatin 40 milliGRAM(s) Oral at bedtime  cefTRIAXone   IVPB 1000 milliGRAM(s) IV Intermittent every 24 hours  chlorhexidine 4% Liquid 1 Application(s) Topical <User Schedule>  heparin  Infusion 1500 Unit(s)/Hr IV Continuous <Continuous>  insulin lispro (ADMELOG) corrective regimen sliding scale   SubCutaneous three times a day before meals  isosorbide   mononitrate ER Tablet (IMDUR) 120 milliGRAM(s) Oral daily  levoFLOXacin IVPB      levoFLOXacin IVPB 250 milliGRAM(s) IV Intermittent every 24 hours  linezolid  IVPB      linezolid  IVPB 600 milliGRAM(s) IV Intermittent every 12 hours  pantoprazole  Injectable 40 milliGRAM(s) IV Push daily  sodium bicarbonate  Infusion 0.101 mEq/kG/Hr IV Continuous <Continuous>    PRN MEDICATIONS  acetaminophen   Tablet .. 650 milliGRAM(s) Oral every 6 hours PRN  morphine  - Injectable 2 milliGRAM(s) IV Push every 4 hours PRN      VITAL SIGNS: Last 24 Hours  T(C): 36.7 (05 Sep 2021 12:00), Max: 36.8 (04 Sep 2021 20:00)  T(F): 98 (05 Sep 2021 12:00), Max: 98.3 (04 Sep 2021 20:00)  HR: 78 (05 Sep 2021 12:00) (67 - 86)  BP: 155/76 (05 Sep 2021 12:00) (102/65 - 155/79)  BP(mean): 105 (05 Sep 2021 12:00) (73 - 110)  RR: 22 (05 Sep 2021 12:00) (14 - 44)  SpO2: 100% (05 Sep 2021 12:00) (97% - 100%)    LABS:                        10.9   11.62 )-----------( 264      ( 05 Sep 2021 04:49 )             32.6     09-05    142  |  98  |  77<HH>  ----------------------------<  237<H>  3.6   |  30  |  4.9<HH>    Ca    7.6<L>      05 Sep 2021 04:49  Mg     2.4     09-05    TPro  4.6<L>  /  Alb  2.2<L>  /  TBili  1.3<H>  /  DBili  x   /  AST  171<H>  /  ALT  81<H>  /  AlkPhos  130<H>  09-05    PTT - ( 05 Sep 2021 12:21 )  PTT:68.8 sec       Activated Partial Thromboplastin Time: 68.8 sec (09.05.21 @ 12:21)   Activated Partial Thromboplastin Time: 58.9 sec (09.05.21 @ 04:49)    04 Sep 2021 07:01  -  05 Sep 2021 07:00  --------------------------------------------------------  IN:    Enteral Tube Flush: 30 mL    Heparin: 376 mL    IV PiggyBack: 50 mL    Sodium Bicarbonate: 1800 mL  Total IN: 2256 mL    OUT:    dextrose 5% + sodium chloride 0.9%: 0 mL    Indwelling Catheter - Urethral (mL): 420 mL    Other (mL): 500 mL  Total OUT: 920 mL    Total NET: 1336 mL      05 Sep 2021 07:01  -  05 Sep 2021 13:38  --------------------------------------------------------  IN:    Heparin: 51 mL    Heparin: 45 mL    IV PiggyBack: 350 mL    Sodium Bicarbonate: 450 mL  Total IN: 896 mL    OUT:    Indwelling Catheter - Urethral (mL): 145 mL  Total OUT: 145 mL    Total NET: 751 mL      RADIOLOGY:  Xray Chest 1 View- PORTABLE-Routine (Xray Chest 1 View- PORTABLE-Routine in AM.) (09.05.21 @ 06:27) >  Impression:  Stable bilateral opacities. No definite pneumothorax.    Lines and support devices as described above. Enteric tube appears to course below diaphragm, but the tip is incompletely visualized.        PHYSICAL EXAM:  CONSTITUTIONAL: No acute distress,   HEAD: Atraumatic, normocephalic  EYES: PERRLA, conjunctiva and sclera clear  ENT: Supple, no masses, no thyromegaly, no bruits,  U-DOL RT SIDE NECK   PULMONARY: RT UPPER/MID LOBE CRACKLES   CARDIOVASCULAR: IRREGULARLY IRREGULAR   GASTROINTESTINAL: Soft, non-tender, non-distended; bowel sounds present  MUSCULOSKELETAL: 2+ peripheral pulses; LLE EDEMA>RT , 1+ PITTING 1/2 WAY UP SHIN   NEUROLOGY:  AAOx3  SKIN: LLE VENUS STASIS ULCER, WRAPPED (CLEAN DRY)

## 2021-09-06 LAB
ALBUMIN SERPL ELPH-MCNC: 2.2 G/DL — LOW (ref 3.5–5.2)
ALP SERPL-CCNC: 271 U/L — HIGH (ref 30–115)
ALT FLD-CCNC: 122 U/L — HIGH (ref 0–41)
ANION GAP SERPL CALC-SCNC: 15 MMOL/L — HIGH (ref 7–14)
ANION GAP SERPL CALC-SCNC: 15 MMOL/L — HIGH (ref 7–14)
APTT BLD: 57.5 SEC — HIGH (ref 27–39.2)
APTT BLD: 60.7 SEC — HIGH (ref 27–39.2)
APTT BLD: 62 SEC — HIGH (ref 27–39.2)
AST SERPL-CCNC: 338 U/L — HIGH (ref 0–41)
BASOPHILS # BLD AUTO: 0.06 K/UL — SIGNIFICANT CHANGE UP (ref 0–0.2)
BASOPHILS NFR BLD AUTO: 0.5 % — SIGNIFICANT CHANGE UP (ref 0–1)
BILIRUB SERPL-MCNC: 1.2 MG/DL — SIGNIFICANT CHANGE UP (ref 0.2–1.2)
BUN SERPL-MCNC: 94 MG/DL — CRITICAL HIGH (ref 10–20)
BUN SERPL-MCNC: 96 MG/DL — CRITICAL HIGH (ref 10–20)
CA-I SERPL-SCNC: 1.05 MMOL/L — LOW (ref 1.15–1.33)
CALCIUM SERPL-MCNC: 7.7 MG/DL — LOW (ref 8.5–10.1)
CALCIUM SERPL-MCNC: 8.1 MG/DL — LOW (ref 8.5–10.1)
CHLORIDE SERPL-SCNC: 94 MMOL/L — LOW (ref 98–110)
CHLORIDE SERPL-SCNC: 94 MMOL/L — LOW (ref 98–110)
CO2 SERPL-SCNC: 31 MMOL/L — SIGNIFICANT CHANGE UP (ref 17–32)
CO2 SERPL-SCNC: 32 MMOL/L — SIGNIFICANT CHANGE UP (ref 17–32)
CREAT SERPL-MCNC: 4.9 MG/DL — CRITICAL HIGH (ref 0.7–1.5)
CREAT SERPL-MCNC: 5.1 MG/DL — CRITICAL HIGH (ref 0.7–1.5)
EOSINOPHIL # BLD AUTO: 0.12 K/UL — SIGNIFICANT CHANGE UP (ref 0–0.7)
EOSINOPHIL NFR BLD AUTO: 1.1 % — SIGNIFICANT CHANGE UP (ref 0–8)
GAS PNL BLDV: 138 MMOL/L — SIGNIFICANT CHANGE UP (ref 136–145)
GAS PNL BLDV: SIGNIFICANT CHANGE UP
GLUCOSE BLDC GLUCOMTR-MCNC: 239 MG/DL — HIGH (ref 70–99)
GLUCOSE BLDC GLUCOMTR-MCNC: 256 MG/DL — HIGH (ref 70–99)
GLUCOSE BLDC GLUCOMTR-MCNC: 281 MG/DL — HIGH (ref 70–99)
GLUCOSE SERPL-MCNC: 250 MG/DL — HIGH (ref 70–99)
GLUCOSE SERPL-MCNC: 309 MG/DL — HIGH (ref 70–99)
HCT VFR BLD CALC: 32.8 % — LOW (ref 42–52)
HCT VFR BLDA CALC: 35 % — LOW (ref 39–51)
HGB BLD CALC-MCNC: 11.6 G/DL — LOW (ref 12.6–17.4)
HGB BLD-MCNC: 11.1 G/DL — LOW (ref 14–18)
IMM GRANULOCYTES NFR BLD AUTO: 12.6 % — HIGH (ref 0.1–0.3)
LACTATE BLDV-MCNC: 1.6 MMOL/L — SIGNIFICANT CHANGE UP (ref 0.5–2)
LYMPHOCYTES # BLD AUTO: 0.83 K/UL — LOW (ref 1.2–3.4)
LYMPHOCYTES # BLD AUTO: 7.5 % — LOW (ref 20.5–51.1)
MAGNESIUM SERPL-MCNC: 2.3 MG/DL — SIGNIFICANT CHANGE UP (ref 1.8–2.4)
MCHC RBC-ENTMCNC: 29.4 PG — SIGNIFICANT CHANGE UP (ref 27–31)
MCHC RBC-ENTMCNC: 33.8 G/DL — SIGNIFICANT CHANGE UP (ref 32–37)
MCV RBC AUTO: 86.8 FL — SIGNIFICANT CHANGE UP (ref 80–94)
MONOCYTES # BLD AUTO: 0.35 K/UL — SIGNIFICANT CHANGE UP (ref 0.1–0.6)
MONOCYTES NFR BLD AUTO: 3.1 % — SIGNIFICANT CHANGE UP (ref 1.7–9.3)
NEUTROPHILS # BLD AUTO: 8.36 K/UL — HIGH (ref 1.4–6.5)
NEUTROPHILS NFR BLD AUTO: 75.2 % — SIGNIFICANT CHANGE UP (ref 42.2–75.2)
NRBC # BLD: 0 /100 WBCS — SIGNIFICANT CHANGE UP (ref 0–0)
PCO2 BLDV: 48 MMHG — SIGNIFICANT CHANGE UP (ref 42–55)
PH BLDV: 7.5 — HIGH (ref 7.32–7.43)
PLATELET # BLD AUTO: 269 K/UL — SIGNIFICANT CHANGE UP (ref 130–400)
PO2 BLDV: 44 MMHG — SIGNIFICANT CHANGE UP
POTASSIUM BLDV-SCNC: 2.6 MMOL/L — CRITICAL LOW (ref 3.5–5.1)
POTASSIUM SERPL-MCNC: 3.2 MMOL/L — LOW (ref 3.5–5)
POTASSIUM SERPL-MCNC: 3.2 MMOL/L — LOW (ref 3.5–5)
POTASSIUM SERPL-SCNC: 3.2 MMOL/L — LOW (ref 3.5–5)
POTASSIUM SERPL-SCNC: 3.2 MMOL/L — LOW (ref 3.5–5)
PROT SERPL-MCNC: 4.8 G/DL — LOW (ref 6–8)
RBC # BLD: 3.78 M/UL — LOW (ref 4.7–6.1)
RBC # FLD: 15.5 % — HIGH (ref 11.5–14.5)
SAO2 % BLDV: 77 % — SIGNIFICANT CHANGE UP
SARS-COV-2 RNA SPEC QL NAA+PROBE: SIGNIFICANT CHANGE UP
SODIUM SERPL-SCNC: 140 MMOL/L — SIGNIFICANT CHANGE UP (ref 135–146)
SODIUM SERPL-SCNC: 141 MMOL/L — SIGNIFICANT CHANGE UP (ref 135–146)
WBC # BLD: 11.12 K/UL — HIGH (ref 4.8–10.8)
WBC # FLD AUTO: 11.12 K/UL — HIGH (ref 4.8–10.8)

## 2021-09-06 PROCEDURE — 71045 X-RAY EXAM CHEST 1 VIEW: CPT | Mod: 26

## 2021-09-06 PROCEDURE — 99233 SBSQ HOSP IP/OBS HIGH 50: CPT

## 2021-09-06 RX ORDER — SODIUM CHLORIDE 9 MG/ML
1000 INJECTION, SOLUTION INTRAVENOUS
Refills: 0 | Status: DISCONTINUED | OUTPATIENT
Start: 2021-09-06 | End: 2021-09-06

## 2021-09-06 RX ORDER — POTASSIUM CHLORIDE 20 MEQ
20 PACKET (EA) ORAL ONCE
Refills: 0 | Status: COMPLETED | OUTPATIENT
Start: 2021-09-06 | End: 2021-09-06

## 2021-09-06 RX ORDER — SODIUM CHLORIDE 9 MG/ML
1000 INJECTION, SOLUTION INTRAVENOUS
Refills: 0 | Status: DISCONTINUED | OUTPATIENT
Start: 2021-09-06 | End: 2021-09-07

## 2021-09-06 RX ORDER — POTASSIUM CHLORIDE 20 MEQ
20 PACKET (EA) ORAL
Refills: 0 | Status: COMPLETED | OUTPATIENT
Start: 2021-09-06 | End: 2021-09-06

## 2021-09-06 RX ADMIN — Medication 50 MILLIGRAM(S): at 11:06

## 2021-09-06 RX ADMIN — Medication 81 MILLIGRAM(S): at 11:06

## 2021-09-06 RX ADMIN — ATORVASTATIN CALCIUM 40 MILLIGRAM(S): 80 TABLET, FILM COATED ORAL at 21:55

## 2021-09-06 RX ADMIN — LINEZOLID 300 MILLIGRAM(S): 600 INJECTION, SOLUTION INTRAVENOUS at 07:07

## 2021-09-06 RX ADMIN — Medication 3: at 07:30

## 2021-09-06 RX ADMIN — Medication 50 MILLIEQUIVALENT(S): at 18:31

## 2021-09-06 RX ADMIN — CHLORHEXIDINE GLUCONATE 1 APPLICATION(S): 213 SOLUTION TOPICAL at 07:07

## 2021-09-06 RX ADMIN — Medication 2: at 16:18

## 2021-09-06 RX ADMIN — Medication 20 MILLIEQUIVALENT(S): at 11:05

## 2021-09-06 RX ADMIN — CEFTRIAXONE 100 MILLIGRAM(S): 500 INJECTION, POWDER, FOR SOLUTION INTRAMUSCULAR; INTRAVENOUS at 09:29

## 2021-09-06 RX ADMIN — PANTOPRAZOLE SODIUM 40 MILLIGRAM(S): 20 TABLET, DELAYED RELEASE ORAL at 11:06

## 2021-09-06 RX ADMIN — Medication 3: at 11:11

## 2021-09-06 RX ADMIN — Medication 50 MILLIEQUIVALENT(S): at 20:01

## 2021-09-06 NOTE — PROGRESS NOTE ADULT - SUBJECTIVE AND OBJECTIVE BOX
Patient is a 80y old  Male who presents with a chief complaint of Pneumonia (05 Sep 2021 15:18)        Over Night Events:  Remains critically ill on HF.  tolerated NIV last 24 hours.  Off pressors.          ROS:     All ROS are negative except HPI         PHYSICAL EXAM    ICU Vital Signs Last 24 Hrs  T(C): 36 (06 Sep 2021 08:00), Max: 36.7 (05 Sep 2021 12:00)  T(F): 96.8 (06 Sep 2021 08:00), Max: 98.1 (06 Sep 2021 00:00)  HR: 86 (06 Sep 2021 09:06) (67 - 96)  BP: 136/74 (06 Sep 2021 08:00) (104/51 - 160/80)  BP(mean): 102 (06 Sep 2021 08:00) (68 - 122)  ABP: --  ABP(mean): --  RR: 26 (06 Sep 2021 08:00) (10 - 44)  SpO2: 97% (06 Sep 2021 09:06) (91% - 100%)      CONSTITUTIONAL:  Well nourished.  NAD    ENT:   Airway patent,   Mouth with normal mucosa.   No thrush    EYES:   Pupils equal,   Round and reactive to light.    CARDIAC:   Normal rate,   Regular rhythm.     edema      Vascular:  Normal systolic impulse  No Carotid bruits    RESPIRATORY:   No wheezing  Bilateral BS  Normal chest expansion  Not tachypneic,  No use of accessory muscles    GASTROINTESTINAL:  Abdomen soft,   Non-tender,   No guarding,   + BS    MUSCULOSKELETAL:   Range of motion is not limited,  No clubbing, cyanosis    NEUROLOGICAL:   Alert and oriented   No motor  deficits.    SKIN:   Skin normal color for race,   Warm and dry.   No evidence of rash.    PSYCHIATRIC:   No apparent risk to self or others.    HEMATOLOGICAL:  No cervical  lymphadenopathy.  no inguinal lymphadenopathy      09-05-21 @ 07:01  -  09-06-21 @ 07:00  --------------------------------------------------------  IN:    Heparin: 315 mL    Heparin: 51 mL    IV PiggyBack: 950 mL    Nepro with Carb Steady: 400 mL    Sodium Bicarbonate: 900 mL  Total IN: 2616 mL    OUT:    Indwelling Catheter - Urethral (mL): 600 mL  Total OUT: 600 mL    Total NET: 2016 mL      09-06-21 @ 07:01  -  09-06-21 @ 09:07  --------------------------------------------------------  IN:    Heparin: 15 mL  Total IN: 15 mL    OUT:    Indwelling Catheter - Urethral (mL): 40 mL  Total OUT: 40 mL    Total NET: -25 mL          LABS:                            11.1   11.12 )-----------( 269      ( 06 Sep 2021 04:45 )             32.8                                               09-06    140  |  94<L>  |  94<HH>  ----------------------------<  309<H>  3.2<L>   |  31  |  4.9<HH>    Ca    7.7<L>      06 Sep 2021 04:45  Mg     2.3     09-06    TPro  4.8<L>  /  Alb  2.2<L>  /  TBili  1.2  /  DBili  x   /  AST  338<H>  /  ALT  122<H>  /  AlkPhos  271<H>  09-06      PTT - ( 06 Sep 2021 04:45 )  PTT:60.7 sec                                                                                     LIVER FUNCTIONS - ( 06 Sep 2021 04:45 )  Alb: 2.2 g/dL / Pro: 4.8 g/dL / ALK PHOS: 271 U/L / ALT: 122 U/L / AST: 338 U/L / GGT: x                                                                                                                                       MEDICATIONS  (STANDING):  allopurinol 50 milliGRAM(s) Oral daily  aspirin  chewable 81 milliGRAM(s) Oral daily  atorvastatin 40 milliGRAM(s) Oral at bedtime  cefTRIAXone   IVPB 1000 milliGRAM(s) IV Intermittent every 24 hours  chlorhexidine 4% Liquid 1 Application(s) Topical <User Schedule>  heparin  Infusion 1500 Unit(s)/Hr (15 mL/Hr) IV Continuous <Continuous>  insulin lispro (ADMELOG) corrective regimen sliding scale   SubCutaneous three times a day before meals  isosorbide   mononitrate ER Tablet (IMDUR) 120 milliGRAM(s) Oral daily  levoFLOXacin IVPB      levoFLOXacin IVPB 250 milliGRAM(s) IV Intermittent every 24 hours  linezolid  IVPB      linezolid  IVPB 600 milliGRAM(s) IV Intermittent every 12 hours  pantoprazole  Injectable 40 milliGRAM(s) IV Push daily    MEDICATIONS  (PRN):  acetaminophen   Tablet .. 650 milliGRAM(s) Oral every 6 hours PRN Temp greater or equal to 38C (100.4F), Mild Pain (1 - 3)  morphine  - Injectable 2 milliGRAM(s) IV Push every 4 hours PRN agitation      New X-rays reviewed:                                                                                  ECHO    CXR interpreted by me:  Right sided infiltrates

## 2021-09-06 NOTE — PROGRESS NOTE ADULT - ASSESSMENT
# DIETER / ATN in nature d/t sepsis / possible JOSE contributing / oligo-anuric / creat up-trending / no improvement w/ iv hydration  # PNA / acute respiratory failure   # strict I/O   # s/p hd sat , urine output improved a bit and Cr stable albeit at 4.9 will likley need HD tomorrow ,  # check phos level   # will follow   d/w ICU team

## 2021-09-06 NOTE — PROGRESS NOTE ADULT - SUBJECTIVE AND OBJECTIVE BOX
Nephrology progress note    Patient was seen and examined, events over the last 24 h noted .  HD sat  remaisn on hi-marquita    Allergies:  No Known Allergies    Hospital Medications:   MEDICATIONS  (STANDING):  allopurinol 50 milliGRAM(s) Oral daily  aspirin  chewable 81 milliGRAM(s) Oral daily  atorvastatin 40 milliGRAM(s) Oral at bedtime  cefTRIAXone   IVPB 1000 milliGRAM(s) IV Intermittent every 24 hours  chlorhexidine 4% Liquid 1 Application(s) Topical <User Schedule>  heparin  Infusion 1500 Unit(s)/Hr (15 mL/Hr) IV Continuous <Continuous>  insulin lispro (ADMELOG) corrective regimen sliding scale   SubCutaneous three times a day before meals  lactated ringers. 1000 milliLiter(s) (75 mL/Hr) IV Continuous <Continuous>  levoFLOXacin IVPB 250 milliGRAM(s) IV Intermittent every 24 hours  levoFLOXacin IVPB      pantoprazole  Injectable 40 milliGRAM(s) IV Push daily        VITALS:  T(F): 96.8 (09-06-21 @ 08:00), Max: 98.1 (09-06-21 @ 00:00)  HR: 72 (09-06-21 @ 11:00)  BP: 160/72 (09-06-21 @ 11:00)  RR: 23 (09-06-21 @ 11:00)  SpO2: 97% (09-06-21 @ 11:00)  Wt(kg): --    09-04 @ 07:01  -  09-05 @ 07:00  --------------------------------------------------------  IN: 2256 mL / OUT: 920 mL / NET: 1336 mL    09-05 @ 07:01  -  09-06 @ 07:00  --------------------------------------------------------  IN: 2616 mL / OUT: 600 mL / NET: 2016 mL    09-06 @ 07:01  -  09-06 @ 12:49  --------------------------------------------------------  IN: 495 mL / OUT: 165 mL / NET: 330 mL          PHYSICAL EXAM:  Gen: NAD  Resp: decreased bs b/l  Card: S1/S2  Abd: soft  Vascular access: udall    LABS:  09-06    140  |  94<L>  |  94<HH>  ----------------------------<  309<H>  3.2<L>   |  31  |  4.9<HH>    Ca    7.7<L>      06 Sep 2021 04:45  Mg     2.3     09-06    TPro  4.8<L>  /  Alb  2.2<L>  /  TBili  1.2  /  DBili      /  AST  338<H>  /  ALT  122<H>  /  AlkPhos  271<H>  09-06                          11.1   11.12 )-----------( 269      ( 06 Sep 2021 04:45 )             32.8       Urine Studies:  Urinalysis Basic - ( 31 Aug 2021 18:38 )    Color: Yellow / Appearance: Slightly Turbid / SG: >1.050 / pH:   Gluc:  / Ketone: Negative  / Bili: Negative / Urobili: <2 mg/dL   Blood:  / Protein: 300 mg/dL / Nitrite: Negative   Leuk Esterase: Negative / RBC: 9 /HPF / WBC 32 /HPF   Sq Epi:  / Non Sq Epi: 2 /HPF / Bacteria: Negative      Sodium, Random Urine: <20.0 mmoL/L (09-02 @ 12:10)  Chloride, Random Urine: 20 (09-02 @ 12:10)    RADIOLOGY & ADDITIONAL STUDIES:

## 2021-09-06 NOTE — PROGRESS NOTE ADULT - ASSESSMENT
IMPRESSION:    Acute hypoxemic resp failure   Severe CAP MRSA wound   Sepsis present on admission  Acute on Chronic renal failure with improved UO with IV fluid   HO A fib      PLAN:    CNS: avoid CNS depressant    HEENT:  Oral care    PULMONARY:  HOB @ 45 degrees, BIPAP/ HHFNC keep SAo2 88 to 92%, Aspiration precaution,     CARDIOVASCULAR: Avoid overload. Continue IVF switch to LR 75 cc per hour.  Daily PH     GI: GI prophylaxis                                          Feeding NG feeding outside NIV.       RENAL:  F/u  lytes.  Correct as needed.  Accurate I/O, trend CMP.      INFECTIOUS DISEASE: Continue Ceftriaxone/ Levaquin.  DC Zyvox.      HEMATOLOGICAL:  DVT prophylaxis. On IV Hep.  FU PTT     ENDOCRINE:  Follow up FS.  Insulin protocol if needed.    CODE STATUS: FULL CODE    DISPOSITION: MICU    Very poor prognosis

## 2021-09-06 NOTE — PROGRESS NOTE ADULT - ASSESSMENT
Patient is a 79 YO M w a PMH of A-fib (on home xarelto) CAD (s/p stent 2014),gout, Alcohol dependence (5-6 drinks/day), VINI (on home CPAP) addmitted for acute hypoxic respiratory failure secondary to SEVERE community acquired pneumonia. Patient was septic on admission (febrile, leukocytosis, tachycardia, hypoxic w identifiable source). Found to have HAGMA and UA + for WBC. Of note patient is s/p 2 x COVID vaccine. Patient was MRSA nares negative w no growth to date from lesion biopsy. Patient is clinically improving and doing well on his BiPAP at night. When i went into the room 9/6 morning BiPAP was on his cheek and he was maintaining O2 of 88-93%.      Problem List:  #acute hypoxemic respiratory failure secondary to severe community acquired PMN complicated by Sepsis,   -STABLE WBC. Afebrile. NO GROWTH TO DATE   -negative MRSA, legionella and COVID -  -c/w ceftriaxone 1000mg once daily  -c/w levofloxacin 250 mg once daily   -d/c LINEZOLID (ID CONSULTED AS PER NEW PHARMACY PROTOCOL)   -f/u blood cultures  (NEGATIVE TO DATE)  -Repeat vBG once daily  -CXR once daily   -will discuss goals of care with family today   -ORSA REPORTED BY INFECTION CONTROL, UNABLE TO FIND CULTURE THEY ARE FOLLOWING. THERE WAS A +PCR IN 2020 BUT NEGATIVE MRSA NARES SEPT 2 2021. ATTEMPTED TO CONTACT OFFICE FOR CLARIFICATION, UNABLE TO REACH THEM   -patient tolerated BiPAP last night and is feeling better this morning.  -c/w BiPAP at night, tube feeds between therapy during the day          #Acute on Chronic renal failure oliguric  patient had HD 9/1, 1 L removed NOW S/P UDOL PLACEMENT RT IJV  -asymptomatic bacterurea   -f/u for signs/symptoms of UTI  -Repeat ABG once daily  -patient was alkalotic yesterday and Bicarb drip was d/c  -if azotemia returns consider Dialysis 9/7  -hold dialysis 9/6    #Chronic venous stasis dermatitis   -keep leg wrapped  -elevate legs  -outpatient evaluation   -compression socks  -WOUND CULTURE TAKEN 9/5 F/U CULTURES           PAST MEDICAL & SURGICAL HISTORY:  Atrial fibrillation    Coronary artery disease    Hyperlipidemia    Hypertension    Gout    Diabetes mellitus    VINI on CPAP    History of heart artery stent        #Misc  - DVT Prophylaxis: heparin   - GI Prophylaxis: pantoprazole   - Diet:  - Activity: bed rest   - IV Fluids:   - Code Status: full     Dispo: acute

## 2021-09-06 NOTE — PROGRESS NOTE ADULT - SUBJECTIVE AND OBJECTIVE BOX
MISTY BURLESON 80y Male  MRN#: [id number]   Hospital Day: 6d    SUBJECTIVE  Patient is a 80y old Male who presents with a chief complaint of Pneumonia (05 Sep 2021 15:18)  Currently admitted to medicine with the primary diagnosis of Pneumonia      INTERVAL HPI AND OVERNIGHT EVENTS:  Patient was examined and seen at bedside. This morning he is resting comfortably in bed and reports no issues or overnight events.  OVERNIGHT: TOLERATED BiPAP WELL    REVIEW OF SYMPTOMS:  CONSTITUTIONAL: DIFFUSE WEAKNESS  EYES: No visual changes, eye pain, or discharge  ENT: No vertigo; No ear pain or change in hearing; No sore throat or difficulty swallowing  NECK: No pain or stiffness  RESPIRATORY: No cough, wheezing, or hemoptysis; No shortness of breath  CARDIOVASCULAR: No chest pain or palpitations  GASTROINTESTINAL: No abdominal or epigastric pain; No nausea, vomiting, or hematemesis; No diarrhea or constipation; No melena or hematochezia  GENITOURINARY: No dysuria, frequency or hematuria  MUSCULOSKELETAL: No joint pain, no muscle pain, no weakness  NEUROLOGICAL: No numbness or weakness  SKIN: LEFT LOWER LEG     OBJECTIVE  PAST MEDICAL & SURGICAL HISTORY  Atrial fibrillation    Coronary artery disease    Hyperlipidemia    Hypertension    Gout    Diabetes mellitus    VINI on CPAP    History of heart artery stent      ALLERGIES:  No Known Allergies    MEDICATIONS:  STANDING MEDICATIONS  allopurinol 50 milliGRAM(s) Oral daily  aspirin  chewable 81 milliGRAM(s) Oral daily  atorvastatin 40 milliGRAM(s) Oral at bedtime  cefTRIAXone   IVPB 1000 milliGRAM(s) IV Intermittent every 24 hours  chlorhexidine 4% Liquid 1 Application(s) Topical <User Schedule>  heparin  Infusion 1500 Unit(s)/Hr IV Continuous <Continuous>  insulin lispro (ADMELOG) corrective regimen sliding scale   SubCutaneous three times a day before meals  lactated ringers. 1000 milliLiter(s) IV Continuous <Continuous>  levoFLOXacin IVPB 250 milliGRAM(s) IV Intermittent every 24 hours  levoFLOXacin IVPB      pantoprazole  Injectable 40 milliGRAM(s) IV Push daily    PRN MEDICATIONS  acetaminophen   Tablet .. 650 milliGRAM(s) Oral every 6 hours PRN  morphine  - Injectable 2 milliGRAM(s) IV Push every 4 hours PRN      VITAL SIGNS: Last 24 Hours  T(C): 36 (06 Sep 2021 08:00), Max: 36.7 (05 Sep 2021 12:00)  T(F): 96.8 (06 Sep 2021 08:00), Max: 98.1 (06 Sep 2021 00:00)  HR: 72 (06 Sep 2021 11:00) (68 - 96)  BP: 160/72 (06 Sep 2021 11:00) (104/51 - 177/89)  BP(mean): 102 (06 Sep 2021 11:00) (68 - 122)  RR: 23 (06 Sep 2021 11:00) (10 - 26)  SpO2: 97% (06 Sep 2021 11:00) (91% - 100%)    LABS:                        11.1   11.12 )-----------( 269      ( 06 Sep 2021 04:45 )             32.8     09-06    140  |  94<L>  |  94<HH>  ----------------------------<  309<H>  3.2<L>   |  31  |  4.9<HH>    Ca    7.7<L>      06 Sep 2021 04:45  Mg     2.3     09-06    TPro  4.8<L>  /  Alb  2.2<L>  /  TBili  1.2  /  DBili  x   /  AST  338<H>  /  ALT  122<H>  /  AlkPhos  271<H>  09-06    PTT - ( 06 Sep 2021 04:45 )  PTT:60.7 sec              RADIOLOGY:  Xray Chest 1 View- PORTABLE-Routine (Xray Chest 1 View- PORTABLE-Routine in AM.) (09.06.21 @ 05:47) >  Impression:    Stable bilateral opacities, most pronounced at right upper lobe.          PHYSICAL EXAM:  CONSTITUTIONAL: No acute distress,   HEAD: Atraumatic, normocephalic  EYES:  PERRLA, conjunctiva and sclera clear  ENT: Supple, no masses, no thyromegaly, no bruits,   PULMONARY: COARSE BREATH SOUNDS RT>LFT   CARDIOVASCULAR: IRREGULARLY IRREGULAR   GASTROINTESTINAL: Soft, non-tender, non-distended; bowel sounds present  MUSCULOSKELETAL: CHRONIC VENOUS STASIS W EDEMA L>R  NEUROLOGY: A&O X3  SKIN: AS ABOVE

## 2021-09-07 LAB
APTT BLD: 66.6 SEC — HIGH (ref 27–39.2)
GLUCOSE BLDC GLUCOMTR-MCNC: 266 MG/DL — HIGH (ref 70–99)
GLUCOSE BLDC GLUCOMTR-MCNC: 306 MG/DL — HIGH (ref 70–99)
GLUCOSE BLDC GLUCOMTR-MCNC: 313 MG/DL — HIGH (ref 70–99)
GLUCOSE BLDC GLUCOMTR-MCNC: 412 MG/DL — HIGH (ref 70–99)
HCT VFR BLD CALC: 36 % — LOW (ref 42–52)
HGB BLD-MCNC: 12 G/DL — LOW (ref 14–18)
MCHC RBC-ENTMCNC: 29.6 PG — SIGNIFICANT CHANGE UP (ref 27–31)
MCHC RBC-ENTMCNC: 33.3 G/DL — SIGNIFICANT CHANGE UP (ref 32–37)
MCV RBC AUTO: 88.7 FL — SIGNIFICANT CHANGE UP (ref 80–94)
NRBC # BLD: 0 /100 WBCS — SIGNIFICANT CHANGE UP (ref 0–0)
PLATELET # BLD AUTO: 360 K/UL — SIGNIFICANT CHANGE UP (ref 130–400)
RBC # BLD: 4.06 M/UL — LOW (ref 4.7–6.1)
RBC # FLD: 15.8 % — HIGH (ref 11.5–14.5)
WBC # BLD: 11.89 K/UL — HIGH (ref 4.8–10.8)
WBC # FLD AUTO: 11.89 K/UL — HIGH (ref 4.8–10.8)

## 2021-09-07 PROCEDURE — 71045 X-RAY EXAM CHEST 1 VIEW: CPT | Mod: 26

## 2021-09-07 PROCEDURE — 99233 SBSQ HOSP IP/OBS HIGH 50: CPT

## 2021-09-07 RX ORDER — AMLODIPINE BESYLATE 2.5 MG/1
10 TABLET ORAL DAILY
Refills: 0 | Status: DISCONTINUED | OUTPATIENT
Start: 2021-09-07 | End: 2021-09-18

## 2021-09-07 RX ORDER — INSULIN LISPRO 100/ML
9 VIAL (ML) SUBCUTANEOUS
Refills: 0 | Status: DISCONTINUED | OUTPATIENT
Start: 2021-09-07 | End: 2021-09-08

## 2021-09-07 RX ORDER — AMLODIPINE BESYLATE 2.5 MG/1
5 TABLET ORAL DAILY
Refills: 0 | Status: DISCONTINUED | OUTPATIENT
Start: 2021-09-07 | End: 2021-09-07

## 2021-09-07 RX ORDER — SODIUM CHLORIDE 9 MG/ML
1000 INJECTION, SOLUTION INTRAVENOUS
Refills: 0 | Status: DISCONTINUED | OUTPATIENT
Start: 2021-09-07 | End: 2021-09-18

## 2021-09-07 RX ORDER — INSULIN GLARGINE 100 [IU]/ML
29 INJECTION, SOLUTION SUBCUTANEOUS AT BEDTIME
Refills: 0 | Status: DISCONTINUED | OUTPATIENT
Start: 2021-09-07 | End: 2021-09-08

## 2021-09-07 RX ORDER — DEXTROSE 50 % IN WATER 50 %
15 SYRINGE (ML) INTRAVENOUS ONCE
Refills: 0 | Status: DISCONTINUED | OUTPATIENT
Start: 2021-09-07 | End: 2021-09-18

## 2021-09-07 RX ORDER — DEXTROSE 50 % IN WATER 50 %
25 SYRINGE (ML) INTRAVENOUS ONCE
Refills: 0 | Status: DISCONTINUED | OUTPATIENT
Start: 2021-09-07 | End: 2021-09-18

## 2021-09-07 RX ORDER — DEXTROSE 50 % IN WATER 50 %
12.5 SYRINGE (ML) INTRAVENOUS ONCE
Refills: 0 | Status: DISCONTINUED | OUTPATIENT
Start: 2021-09-07 | End: 2021-09-18

## 2021-09-07 RX ORDER — GLUCAGON INJECTION, SOLUTION 0.5 MG/.1ML
1 INJECTION, SOLUTION SUBCUTANEOUS ONCE
Refills: 0 | Status: DISCONTINUED | OUTPATIENT
Start: 2021-09-07 | End: 2021-09-18

## 2021-09-07 RX ADMIN — ATORVASTATIN CALCIUM 40 MILLIGRAM(S): 80 TABLET, FILM COATED ORAL at 21:20

## 2021-09-07 RX ADMIN — Medication 4: at 16:17

## 2021-09-07 RX ADMIN — Medication 50 MILLIGRAM(S): at 11:55

## 2021-09-07 RX ADMIN — PANTOPRAZOLE SODIUM 40 MILLIGRAM(S): 20 TABLET, DELAYED RELEASE ORAL at 11:56

## 2021-09-07 RX ADMIN — SODIUM CHLORIDE 75 MILLILITER(S): 9 INJECTION, SOLUTION INTRAVENOUS at 02:21

## 2021-09-07 RX ADMIN — CEFTRIAXONE 100 MILLIGRAM(S): 500 INJECTION, POWDER, FOR SOLUTION INTRAMUSCULAR; INTRAVENOUS at 09:17

## 2021-09-07 RX ADMIN — AMLODIPINE BESYLATE 5 MILLIGRAM(S): 2.5 TABLET ORAL at 02:30

## 2021-09-07 RX ADMIN — CHLORHEXIDINE GLUCONATE 1 APPLICATION(S): 213 SOLUTION TOPICAL at 05:43

## 2021-09-07 RX ADMIN — Medication 60 MILLIGRAM(S): at 09:23

## 2021-09-07 RX ADMIN — Medication 3: at 06:07

## 2021-09-07 RX ADMIN — Medication 60 MILLIGRAM(S): at 17:02

## 2021-09-07 RX ADMIN — AMLODIPINE BESYLATE 10 MILLIGRAM(S): 2.5 TABLET ORAL at 11:55

## 2021-09-07 RX ADMIN — HEPARIN SODIUM 15 UNIT(S)/HR: 5000 INJECTION INTRAVENOUS; SUBCUTANEOUS at 02:22

## 2021-09-07 RX ADMIN — Medication 4: at 12:17

## 2021-09-07 RX ADMIN — Medication 9 UNIT(S): at 21:20

## 2021-09-07 RX ADMIN — INSULIN GLARGINE 29 UNIT(S): 100 INJECTION, SOLUTION SUBCUTANEOUS at 22:20

## 2021-09-07 RX ADMIN — Medication 81 MILLIGRAM(S): at 11:55

## 2021-09-07 NOTE — PROGRESS NOTE ADULT - SUBJECTIVE AND OBJECTIVE BOX
Over Night Events: events noted, on [facility] 50/50, IV heparin      PHYSICAL EXAM    ICU Vital Signs Last 24 Hrs  T(C): 36.8 (07 Sep 2021 08:00), Max: 36.8 (07 Sep 2021 08:00)  T(F): 98.2 (07 Sep 2021 08:00), Max: 98.2 (07 Sep 2021 08:00)  HR: 74 (07 Sep 2021 08:00) (60 - 86)  BP: 148/67 (07 Sep 2021 08:00) (133/73 - 187/83)  BP(mean): 87 (07 Sep 2021 08:00) (87 - 128)  RR: 18 (07 Sep 2021 08:00) (16 - 32)  SpO2: 99% (07 Sep 2021 08:00) (93% - 100%)      General: ill looking  HEENT: CLARIBEL             Lymph Nodes: No cervical LN   Lungs: Bilateral rhonchi  Cardiovascular: Regular   Abdomen: Soft, Positive BS  Extremities: No clubbing   Skin: Warm  Neurological: Non focal       09-06-21 @ 07:01  -  09-07-21 @ 07:00  --------------------------------------------------------  IN:    Enteral Tube Flush: 650 mL    Heparin: 360 mL    IV PiggyBack: 100 mL    Lactated Ringers: 1500 mL    Nepro with Carb Steady: 800 mL  Total IN: 3410 mL    OUT:    Indwelling Catheter - Urethral (mL): 1395 mL  Total OUT: 1395 mL    Total NET: 2015 mL      09-07-21 @ 07:01  -  09-07-21 @ 08:24  --------------------------------------------------------  IN:    Heparin: 15 mL  Total IN: 15 mL    OUT:    Indwelling Catheter - Urethral (mL): 100 mL  Total OUT: 100 mL    Total NET: -85 mL          LABS:                          12.0   11.89 )-----------( 360      ( 07 Sep 2021 04:59 )             36.0                                               09-06    141  |  94<L>  |  96<HH>  ----------------------------<  250<H>  3.2<L>   |  32  |  5.1<HH>    Ca    8.1<L>      06 Sep 2021 16:16  Mg     2.3     09-06    TPro  4.8<L>  /  Alb  2.2<L>  /  TBili  1.2  /  DBili  x   /  AST  338<H>  /  ALT  122<H>  /  AlkPhos  271<H>  09-06      PTT - ( 07 Sep 2021 04:59 )  PTT:66.6 sec                                                                                     LIVER FUNCTIONS - ( 06 Sep 2021 04:45 )  Alb: 2.2 g/dL / Pro: 4.8 g/dL / ALK PHOS: 271 U/L / ALT: 122 U/L / AST: 338 U/L / GGT: x                                                                                                                                       MEDICATIONS  (STANDING):  allopurinol 50 milliGRAM(s) Oral daily  amLODIPine   Tablet 5 milliGRAM(s) Oral daily  aspirin  chewable 81 milliGRAM(s) Oral daily  atorvastatin 40 milliGRAM(s) Oral at bedtime  cefTRIAXone   IVPB 1000 milliGRAM(s) IV Intermittent every 24 hours  chlorhexidine 4% Liquid 1 Application(s) Topical <User Schedule>  heparin  Infusion 1500 Unit(s)/Hr (15 mL/Hr) IV Continuous <Continuous>  insulin lispro (ADMELOG) corrective regimen sliding scale   SubCutaneous three times a day before meals  levoFLOXacin IVPB 250 milliGRAM(s) IV Intermittent every 24 hours  levoFLOXacin IVPB      pantoprazole  Injectable 40 milliGRAM(s) IV Push daily    MEDICATIONS  (PRN):  acetaminophen   Tablet .. 650 milliGRAM(s) Oral every 6 hours PRN Temp greater or equal to 38C (100.4F), Mild Pain (1 - 3)  morphine  - Injectable 2 milliGRAM(s) IV Push every 4 hours PRN agitation      Xrays:   reviewed

## 2021-09-07 NOTE — PROGRESS NOTE ADULT - ASSESSMENT
IMPRESSION:    Acute hypoxemic resp failure on 50/50  Severe CAP    Sepsis present on admission  Acute on Chronic renal failure with improved UO with IV fluid   HO A fib      PLAN:    CNS: avoid CNS depressant    HEENT:  Oral care    PULMONARY:  HOB @ 45 degrees, BIPAP/ HHFNC keep SAo2 88 to 92%, Aspiration precaution,     CARDIOVASCULAR: Avoid overload. IV heaprin    GI: GI prophylaxis                                          Feeding NG feeding     RENAL:  F/u  lytes.  Correct as needed.  Accurate I/O, trend CMP.      INFECTIOUS DISEASE: abx per ID, solumedrol    HEMATOLOGICAL:  DVT prophylaxis. On IV Hep.  FU PTT     ENDOCRINE:  Follow up FS.  Insulin protocol if needed.    CODE STATUS: FULL CODE    DISPOSITION: MICU    Very poor prognosis

## 2021-09-07 NOTE — PROGRESS NOTE ADULT - SUBJECTIVE AND OBJECTIVE BOX
MISTY BURLESON 80y Male  MRN#: [id number]   Hospital Day: 7d    SUBJECTIVE  Patient is a 80y old Male who presents with a chief complaint of sob (07 Sep 2021 08:23)  Currently admitted to medicine with the primary diagnosis of Pneumonia      INTERVAL HPI AND OVERNIGHT EVENTS:  Patient was examined and seen at bedside. This morning he is resting comfortably in bed and reports no issues or overnight events.   OVERNIGHT: tolerated BiPAP well overnight, hypertensive to 187/82 overnight w no tachycardia responded to 5 amlodipine    REVIEW OF SYMPTOMS:  CONSTITUTIONAL: No weakness, fevers or chills; No headaches  EYES: No visual changes, eye pain, or discharge  ENT: No vertigo; No ear pain or change in hearing; No sore throat or difficulty swallowing  NECK: No pain or stiffness  RESPIRATORY: MILD SOB W/O NC OR BiPAP  CARDIOVASCULAR: No chest pain or palpitations  GASTROINTESTINAL: No abdominal or epigastric pain; No nausea, vomiting, or hematemesis; No diarrhea or constipation; No melena or hematochezia  GENITOURINARY: No dysuria, frequency or hematuria  MUSCULOSKELETAL: No joint pain, no muscle pain, no weakness  NEUROLOGICAL: No numbness or weakness  SKIN: No itching or rashes    OBJECTIVE  PAST MEDICAL & SURGICAL HISTORY  Atrial fibrillation    Coronary artery disease    Hyperlipidemia    Hypertension    Gout    Diabetes mellitus    VINI on CPAP    History of heart artery stent      ALLERGIES:  No Known Allergies    MEDICATIONS:  STANDING MEDICATIONS  allopurinol 50 milliGRAM(s) Oral daily  amLODIPine   Tablet 10 milliGRAM(s) Oral daily  aspirin  chewable 81 milliGRAM(s) Oral daily  atorvastatin 40 milliGRAM(s) Oral at bedtime  cefTRIAXone   IVPB 1000 milliGRAM(s) IV Intermittent every 24 hours  chlorhexidine 4% Liquid 1 Application(s) Topical <User Schedule>  heparin  Infusion 1500 Unit(s)/Hr IV Continuous <Continuous>  insulin lispro (ADMELOG) corrective regimen sliding scale   SubCutaneous three times a day before meals  levoFLOXacin IVPB      levoFLOXacin IVPB 250 milliGRAM(s) IV Intermittent every 24 hours  methylPREDNISolone sodium succinate Injectable 60 milliGRAM(s) IV Push every 12 hours  pantoprazole  Injectable 40 milliGRAM(s) IV Push daily    PRN MEDICATIONS  acetaminophen   Tablet .. 650 milliGRAM(s) Oral every 6 hours PRN  morphine  - Injectable 2 milliGRAM(s) IV Push every 4 hours PRN      VITAL SIGNS: Last 24 Hours  T(C): 36.8 (07 Sep 2021 08:00), Max: 36.8 (07 Sep 2021 08:00)  T(F): 98.2 (07 Sep 2021 08:00), Max: 98.2 (07 Sep 2021 08:00)  HR: 68 (07 Sep 2021 11:00) (60 - 88)  BP: 179/83 (07 Sep 2021 11:00) (133/73 - 187/83)  BP(mean): 117 (07 Sep 2021 11:00) (87 - 128)  RR: 18 (07 Sep 2021 11:00) (16 - 31)  SpO2: 99% (07 Sep 2021 11:00) (93% - 100%)    LABS:                        12.0   11.89 )-----------( 360      ( 07 Sep 2021 04:59 )             36.0     09-06    141  |  94<L>  |  96<HH>  ----------------------------<  250<H>  3.2<L>   |  32  |  5.1<HH>    Ca    8.1<L>      06 Sep 2021 16:16  Mg     2.3     09-06    TPro  4.8<L>  /  Alb  2.2<L>  /  TBili  1.2  /  DBili  x   /  AST  338<H>  /  ALT  122<H>  /  AlkPhos  271<H>  09-06    PTT - ( 07 Sep 2021 04:59 )  PTT:66.6 sec              RADIOLOGY:   Xray Chest 1 View- PORTABLE-Routine (Xray Chest 1 View- PORTABLE-Routine in AM.) (09.06.21 @ 05:47) >  Impression:    Stable bilateral opacities, most pronounced at right upper lobe.          PHYSICAL EXAM:  CONSTITUTIONAL: No acute distress, OBESE CLASS II ,   HEAD: Atraumatic, normocephalic  EYES: PERRLA, conjunctiva and sclera clear  ENT: Supple, no masses, no thyromegaly, no bruits, no JVD; DARK RED CRUSTS IN MOUTH R>L  PULMONARY: LATE EXPIRATORY SQUEEK RLL>Lmid, NO RHONCHI OR WHEEZE   CARDIOVASCULAR: Regular rate and rhythm; no murmurs, rubs, or gallops  GASTROINTESTINAL: Soft, non-tender, non-distended; bowel sounds present  MUSCULOSKELETAL: BL NON PITTING EDEMA L>R   NEUROLOGY: A&O X4   SKIN: LLE EDEMA WITH RESOLVING BLISTERS AND WRAPPED OPEN BLISTER (CLEAN DRY DRESSING)

## 2021-09-07 NOTE — PROGRESS NOTE ADULT - ASSESSMENT
Patient is a 81 YO M w a PMH of A-fib (on home xarelto) CAD (s/p stent 2014),gout, Alcohol dependence (5-6 drinks/day), VINI (on home CPAP) addmitted for acute hypoxic respiratory failure secondary to SEVERE community acquired pneumonia. Patient was septic on admission (febrile, leukocytosis, tachycardia, hypoxic w identifiable source). Found to have HAGMA and UA + for WBC. Of note patient is s/p 2 x COVID vaccine. Patient was MRSA nares negative w no growth to date from lesion biopsy. Patient is clinically improving and doing well on his BiPAP at night. When i went into the room 9/6 morning BiPAP was on his cheek and he was maintaining O2 of 88-93%. Patient is hypokalemic 9/7 with an increasing BUN, CVVHD due today w/ Ramirez to be removed after. Patient was hypertensive to 187/80 w HR 76 overnight and responded well to Amlidipine 5. Home dose of Amlodipine 10 restarted today. Patient passed speech and swallow evaluation, note pending for diet order, NG tube pulled. Repeat chest XR showed stable RT consolidation, on physical exam no rhonchi or crackles, much improved from previous exams. Solumedrol 60 q12 started today. Oral care also increased to q2hr     Problem List:  #acute hypoxemic respiratory failure secondary to severe community acquired PMN complicated by Sepsis,   -STABLE WBC. Afebrile. NO GROWTH TO DATE   -negative MRSA, legionella and COVID -  -c/w ceftriaxone 1000mg once daily  -c/w levofloxacin 250 mg once daily   -d/c LINEZOLID (ID CONSULTED AS PER NEW PHARMACY PROTOCOL)   -f/u blood cultures  (NEGATIVE TO DATE)  -Repeat vBG once daily  -CXR once daily   -ORSA REPORTED BY INFECTION CONTROL, UNABLE TO FIND CULTURE THEY ARE FOLLOWING. THERE WAS A +PCR IN 2020 BUT NEGATIVE MRSA NARES SEPT 2 2021. ATTEMPTED TO CONTACT OFFICE FOR CLARIFICATION, UNABLE TO REACH THEM   -patient tolerated BiPAP last night and is feeling better this morning.  -c/w BiPAP at night, NC by day   -start Solumedrol 60 q 12 IV (off label use for sever ARDS, decrease fibrosis and decrease suppression of capillary permeability and PMN extravasation       #Acute on Chronic renal failure oliguric  patient had HD 9/1, 1 L removed NOW S/P UDOL PLACEMENT RT IJV  -asymptomatic bacteruria   -f/u for signs/symptoms of UTI  -Repeat vBG once daily  -patient was alkalotic 9/5 and Bicarb drip was d/c  -dialysis to restart today 9/7  -held dialysis 9/4-9-6     #Chronic venous stasis dermatitis   -keep leg wrapped  -elevate legs  -outpatient evaluation   -compression socks  -WOUND CULTURE TAKEN 9/5 F/U CULTURES NO GROWTH TO DATE        PAST MEDICAL & SURGICAL HISTORY:  Atrial fibrillation    Coronary artery disease    Hyperlipidemia    Hypertension    Gout    Diabetes mellitus    VINI on CPAP    History of heart artery stent        #Misc  - DVT Prophylaxis: heparin   - GI Prophylaxis: pantoprazole   - Diet:  - Activity: bed rest   - IV Fluids:   - Code Status: full     Dispo: acute

## 2021-09-07 NOTE — CHART NOTE - NSCHARTNOTEFT_GEN_A_CORE
Chart/labs/meds reviewed.     Pt extubated. NGT pulled. seen by SLP and cleared for PO. Diet order pending SLP's note. Pt rescreened as low risk.    Recommend   1. diet consistencies per SLP + diet modifier consistent carb diet with evening snacks (defer renal modifier given K 3.2), supplemented with-  2. PO supplements of Nepro QD and beneprotein 2pck TID  3. daily renal vitamins Nephrovite with RRT  4. insulin for -180  5. bowel regimen PRN re: pt on morphine c/f dec gut motility  6. weigh daily    Pt to be assessed according to LOS protocol. Place RD c/s if more assistance needed. Chart/labs/meds reviewed.     Pt extubated. NGT pulled. seen by SLP and cleared for PO. Diet order pending SLP's note. Pt rescreened as low nutrition risk by RD.    Recommend   1. diet consistencies per SLP + diet modifier consistent carb diet with evening snacks (defer renal modifier given K 3.2), supplemented with-  2. PO supplements of Nepro QD and beneprotein 2pck TID  3. daily renal vitamins Nephrovite with RRT  4. insulin for -180  5. bowel regimen PRN re: pt on morphine c/f dec gut motility  6. weigh daily    Pt to be assessed according to LOS protocol. Place RD c/s if more assistance needed.

## 2021-09-07 NOTE — PROGRESS NOTE ADULT - SUBJECTIVE AND OBJECTIVE BOX
seen and examined  on high flow o2         PAST HISTORY  --------------------------------------------------------------------------------  No significant changes to PMH, PSH, FHx, SHx, unless otherwise noted    ALLERGIES & MEDICATIONS  --------------------------------------------------------------------------------  Allergies    No Known Allergies    Intolerances      Standing Inpatient Medications  allopurinol 50 milliGRAM(s) Oral daily  amLODIPine   Tablet 5 milliGRAM(s) Oral daily  aspirin  chewable 81 milliGRAM(s) Oral daily  atorvastatin 40 milliGRAM(s) Oral at bedtime  cefTRIAXone   IVPB 1000 milliGRAM(s) IV Intermittent every 24 hours  chlorhexidine 4% Liquid 1 Application(s) Topical <User Schedule>  heparin  Infusion 1500 Unit(s)/Hr IV Continuous <Continuous>  insulin lispro (ADMELOG) corrective regimen sliding scale   SubCutaneous three times a day before meals  levoFLOXacin IVPB 250 milliGRAM(s) IV Intermittent every 24 hours  levoFLOXacin IVPB      pantoprazole  Injectable 40 milliGRAM(s) IV Push daily    PRN Inpatient Medications  acetaminophen   Tablet .. 650 milliGRAM(s) Oral every 6 hours PRN  morphine  - Injectable 2 milliGRAM(s) IV Push every 4 hours PRN          VITALS/PHYSICAL EXAM  --------------------------------------------------------------------------------  T(C): 36 (09-07-21 @ 04:00), Max: 36.1 (09-07-21 @ 00:00)  HR: 82 (09-07-21 @ 07:00) (60 - 86)  BP: 157/68 (09-07-21 @ 07:00) (133/73 - 187/83)  RR: 21 (09-07-21 @ 07:00) (16 - 32)  SpO2: 99% (09-07-21 @ 07:00) (93% - 100%)  Wt(kg): --        09-06-21 @ 07:01  -  09-07-21 @ 07:00  --------------------------------------------------------  IN: 3410 mL / OUT: 1395 mL / NET: 2015 mL      Physical Exam:  	Gen: on high flow o2   	Abd: +distended  	Vascular access:    LABS/STUDIES  --------------------------------------------------------------------------------              12.0   11.89 >-----------<  360      [09-07-21 @ 04:59]              36.0     141  |  94  |  96  ----------------------------<  250      [09-06-21 @ 16:16]  3.2   |  32  |  5.1        Ca     8.1     [09-06-21 @ 16:16]      Mg     2.3     [09-06-21 @ 04:45]    TPro  4.8  /  Alb  2.2  /  TBili  1.2  /  DBili  x   /  AST  338  /  ALT  122  /  AlkPhos  271  [09-06-21 @ 04:45]      PTT: 66.6       [09-07-21 @ 04:59]      Creatinine Trend:  SCr 5.1 [09-06 @ 16:16]  SCr 4.9 [09-06 @ 04:45]  SCr 4.9 [09-05 @ 04:49]  SCr 7.0 [09-04 @ 11:08]  SCr 5.9 [09-03 @ 05:03]    Urinalysis - [08-31-21 @ 18:38]      Color Yellow / Appearance Slightly Turbid / SG >1.050 / pH 6.0      Gluc Trace / Ketone Negative  / Bili Negative / Urobili <2 mg/dL       Blood Large / Protein 300 mg/dL / Leuk Est Negative / Nitrite Negative      RBC 9 / WBC 32 / Hyaline 20 / Gran 3 / Sq Epi  / Non Sq Epi 2 / Bacteria Negative    Urine Sodium <20.0      [09-02-21 @ 12:10]  Urine Chloride 20      [09-02-21 @ 12:10]    Lipid: chol 60, , HDL 17, LDL --      [09-01-21 @ 08:16]    HBsAb Nonreact      [09-02-21 @ 22:00]  HBsAg Nonreact      [09-02-21 @ 22:00]  HBcAb Nonreact      [09-02-21 @ 22:00]

## 2021-09-08 LAB
ANION GAP SERPL CALC-SCNC: 15 MMOL/L — HIGH (ref 7–14)
BUN SERPL-MCNC: 102 MG/DL — CRITICAL HIGH (ref 10–20)
CALCIUM SERPL-MCNC: 8.6 MG/DL — SIGNIFICANT CHANGE UP (ref 8.5–10.1)
CHLORIDE SERPL-SCNC: 98 MMOL/L — SIGNIFICANT CHANGE UP (ref 98–110)
CO2 SERPL-SCNC: 30 MMOL/L — SIGNIFICANT CHANGE UP (ref 17–32)
CREAT SERPL-MCNC: 4.6 MG/DL — CRITICAL HIGH (ref 0.7–1.5)
GLUCOSE BLDC GLUCOMTR-MCNC: 219 MG/DL — HIGH (ref 70–99)
GLUCOSE BLDC GLUCOMTR-MCNC: 359 MG/DL — HIGH (ref 70–99)
GLUCOSE BLDC GLUCOMTR-MCNC: 363 MG/DL — HIGH (ref 70–99)
GLUCOSE BLDC GLUCOMTR-MCNC: 399 MG/DL — HIGH (ref 70–99)
GLUCOSE BLDC GLUCOMTR-MCNC: 401 MG/DL — HIGH (ref 70–99)
GLUCOSE BLDC GLUCOMTR-MCNC: 466 MG/DL — CRITICAL HIGH (ref 70–99)
GLUCOSE BLDC GLUCOMTR-MCNC: 476 MG/DL — CRITICAL HIGH (ref 70–99)
GLUCOSE SERPL-MCNC: 374 MG/DL — HIGH (ref 70–99)
HCT VFR BLD CALC: 37.9 % — LOW (ref 42–52)
HGB BLD-MCNC: 12.6 G/DL — LOW (ref 14–18)
MCHC RBC-ENTMCNC: 29.2 PG — SIGNIFICANT CHANGE UP (ref 27–31)
MCHC RBC-ENTMCNC: 33.2 G/DL — SIGNIFICANT CHANGE UP (ref 32–37)
MCV RBC AUTO: 87.7 FL — SIGNIFICANT CHANGE UP (ref 80–94)
NRBC # BLD: 0 /100 WBCS — SIGNIFICANT CHANGE UP (ref 0–0)
PLATELET # BLD AUTO: 401 K/UL — HIGH (ref 130–400)
POTASSIUM SERPL-MCNC: 3.6 MMOL/L — SIGNIFICANT CHANGE UP (ref 3.5–5)
POTASSIUM SERPL-SCNC: 3.6 MMOL/L — SIGNIFICANT CHANGE UP (ref 3.5–5)
RBC # BLD: 4.32 M/UL — LOW (ref 4.7–6.1)
RBC # FLD: 15.5 % — HIGH (ref 11.5–14.5)
SODIUM SERPL-SCNC: 143 MMOL/L — SIGNIFICANT CHANGE UP (ref 135–146)
WBC # BLD: 14.6 K/UL — HIGH (ref 4.8–10.8)
WBC # FLD AUTO: 14.6 K/UL — HIGH (ref 4.8–10.8)

## 2021-09-08 PROCEDURE — 71045 X-RAY EXAM CHEST 1 VIEW: CPT | Mod: 26

## 2021-09-08 PROCEDURE — 99232 SBSQ HOSP IP/OBS MODERATE 35: CPT

## 2021-09-08 RX ORDER — INSULIN LISPRO 100/ML
5 VIAL (ML) SUBCUTANEOUS ONCE
Refills: 0 | Status: COMPLETED | OUTPATIENT
Start: 2021-09-08 | End: 2021-09-08

## 2021-09-08 RX ORDER — DEXTROSE 50 % IN WATER 50 %
50 SYRINGE (ML) INTRAVENOUS
Refills: 0 | Status: DISCONTINUED | OUTPATIENT
Start: 2021-09-08 | End: 2021-09-18

## 2021-09-08 RX ORDER — HYDRALAZINE HCL 50 MG
25 TABLET ORAL EVERY 8 HOURS
Refills: 0 | Status: DISCONTINUED | OUTPATIENT
Start: 2021-09-08 | End: 2021-09-12

## 2021-09-08 RX ORDER — PANTOPRAZOLE SODIUM 20 MG/1
40 TABLET, DELAYED RELEASE ORAL
Refills: 0 | Status: DISCONTINUED | OUTPATIENT
Start: 2021-09-08 | End: 2021-09-18

## 2021-09-08 RX ORDER — HYDRALAZINE HCL 50 MG
25 TABLET ORAL
Refills: 0 | Status: DISCONTINUED | OUTPATIENT
Start: 2021-09-08 | End: 2021-09-08

## 2021-09-08 RX ORDER — ISOSORBIDE MONONITRATE 60 MG/1
120 TABLET, EXTENDED RELEASE ORAL DAILY
Refills: 0 | Status: DISCONTINUED | OUTPATIENT
Start: 2021-09-08 | End: 2021-09-18

## 2021-09-08 RX ORDER — NYSTATIN 500MM UNIT
500000 POWDER (EA) MISCELLANEOUS EVERY 6 HOURS
Refills: 0 | Status: DISCONTINUED | OUTPATIENT
Start: 2021-09-08 | End: 2021-09-18

## 2021-09-08 RX ORDER — INSULIN HUMAN 100 [IU]/ML
5 INJECTION, SOLUTION SUBCUTANEOUS
Qty: 50 | Refills: 0 | Status: DISCONTINUED | OUTPATIENT
Start: 2021-09-08 | End: 2021-09-08

## 2021-09-08 RX ORDER — ISOSORBIDE MONONITRATE 60 MG/1
120 TABLET, EXTENDED RELEASE ORAL DAILY
Refills: 0 | Status: DISCONTINUED | OUTPATIENT
Start: 2021-09-08 | End: 2021-09-08

## 2021-09-08 RX ORDER — INSULIN HUMAN 100 [IU]/ML
9 INJECTION, SOLUTION SUBCUTANEOUS
Qty: 100 | Refills: 0 | Status: DISCONTINUED | OUTPATIENT
Start: 2021-09-08 | End: 2021-09-09

## 2021-09-08 RX ORDER — DEXTROSE 50 % IN WATER 50 %
25 SYRINGE (ML) INTRAVENOUS
Refills: 0 | Status: DISCONTINUED | OUTPATIENT
Start: 2021-09-08 | End: 2021-09-08

## 2021-09-08 RX ADMIN — INSULIN HUMAN 5 UNIT(S)/HR: 100 INJECTION, SOLUTION SUBCUTANEOUS at 18:30

## 2021-09-08 RX ADMIN — Medication 50 MILLIGRAM(S): at 11:56

## 2021-09-08 RX ADMIN — Medication 25 MILLIGRAM(S): at 15:06

## 2021-09-08 RX ADMIN — Medication 500000 UNIT(S): at 17:34

## 2021-09-08 RX ADMIN — ISOSORBIDE MONONITRATE 120 MILLIGRAM(S): 60 TABLET, EXTENDED RELEASE ORAL at 11:56

## 2021-09-08 RX ADMIN — ATORVASTATIN CALCIUM 40 MILLIGRAM(S): 80 TABLET, FILM COATED ORAL at 21:23

## 2021-09-08 RX ADMIN — HEPARIN SODIUM 15 UNIT(S)/HR: 5000 INJECTION INTRAVENOUS; SUBCUTANEOUS at 15:05

## 2021-09-08 RX ADMIN — Medication 9 UNIT(S): at 08:34

## 2021-09-08 RX ADMIN — CHLORHEXIDINE GLUCONATE 1 APPLICATION(S): 213 SOLUTION TOPICAL at 05:37

## 2021-09-08 RX ADMIN — AMLODIPINE BESYLATE 10 MILLIGRAM(S): 2.5 TABLET ORAL at 05:37

## 2021-09-08 RX ADMIN — CEFTRIAXONE 100 MILLIGRAM(S): 500 INJECTION, POWDER, FOR SOLUTION INTRAMUSCULAR; INTRAVENOUS at 09:06

## 2021-09-08 RX ADMIN — PANTOPRAZOLE SODIUM 40 MILLIGRAM(S): 20 TABLET, DELAYED RELEASE ORAL at 11:55

## 2021-09-08 RX ADMIN — Medication 81 MILLIGRAM(S): at 11:55

## 2021-09-08 RX ADMIN — Medication 25 MILLIGRAM(S): at 21:23

## 2021-09-08 RX ADMIN — Medication 60 MILLIGRAM(S): at 05:38

## 2021-09-08 RX ADMIN — Medication 6: at 11:55

## 2021-09-08 RX ADMIN — Medication 5: at 08:34

## 2021-09-08 RX ADMIN — Medication 9 UNIT(S): at 11:55

## 2021-09-08 NOTE — PROGRESS NOTE ADULT - SUBJECTIVE AND OBJECTIVE BOX
events noted,. on FNC 50/40, no HD yesterday      Vital Signs Last 24 Hrs  T(C): 36.2 (08 Sep 2021 04:00), Max: 36.7 (08 Sep 2021 00:00)  T(F): 97.2 (08 Sep 2021 04:00), Max: 98 (08 Sep 2021 00:00)  HR: 72 (08 Sep 2021 08:00) (68 - 90)  BP: 180/83 (08 Sep 2021 08:00) (146/71 - 189/90)  BP(mean): 133 (08 Sep 2021 08:00) (97 - 145)  RR: 15 (08 Sep 2021 08:00) (15 - 40)  SpO2: 96% (08 Sep 2021 08:00) (94% - 100%)    PHYSICAL EXAMINATION:    GENERAL: ill looking    HEENT: Head is normocephalic and atraumatic.     NECK: Supple.    LUNGS: dec bs both bases    HEART: JUAN 3/6    ABDOMEN: Soft, nontender, and nondistended.      EXTREMITIES: Without any cyanosis, clubbing, rash, lesions or edema.    NEUROLOGIC: non focal    SKIN: No ulceration or induration present.      LABS:                        12.6   14.60 )-----------( 401      ( 08 Sep 2021 05:00 )             37.9     09-08    143  |  98  |  102<HH>  ----------------------------<  374<H>  3.6   |  30  |  4.6<HH>    Ca    8.6      08 Sep 2021 05:00      PTT - ( 07 Sep 2021 04:59 )  PTT:66.6 sec                      09-07-21 @ 07:01  -  09-08-21 @ 07:00  --------------------------------------------------------  IN: 425 mL / OUT: 1005 mL / NET: -580 mL        MICROBIOLOGY:      MEDICATIONS  (STANDING):  allopurinol 50 milliGRAM(s) Oral daily  amLODIPine   Tablet 10 milliGRAM(s) Oral daily  aspirin  chewable 81 milliGRAM(s) Oral daily  atorvastatin 40 milliGRAM(s) Oral at bedtime  cefTRIAXone   IVPB 1000 milliGRAM(s) IV Intermittent every 24 hours  chlorhexidine 4% Liquid 1 Application(s) Topical <User Schedule>  dextrose 40% Gel 15 Gram(s) Oral once  dextrose 5%. 1000 milliLiter(s) (50 mL/Hr) IV Continuous <Continuous>  dextrose 5%. 1000 milliLiter(s) (100 mL/Hr) IV Continuous <Continuous>  dextrose 50% Injectable 25 Gram(s) IV Push once  dextrose 50% Injectable 12.5 Gram(s) IV Push once  dextrose 50% Injectable 25 Gram(s) IV Push once  glucagon  Injectable 1 milliGRAM(s) IntraMuscular once  heparin  Infusion 1500 Unit(s)/Hr (15 mL/Hr) IV Continuous <Continuous>  insulin glargine Injectable (LANTUS) 29 Unit(s) SubCutaneous at bedtime  insulin lispro (ADMELOG) corrective regimen sliding scale   SubCutaneous three times a day before meals  insulin lispro Injectable (ADMELOG) 9 Unit(s) SubCutaneous three times a day before meals  levoFLOXacin IVPB      levoFLOXacin IVPB 250 milliGRAM(s) IV Intermittent every 24 hours  methylPREDNISolone sodium succinate Injectable 60 milliGRAM(s) IV Push every 12 hours  pantoprazole  Injectable 40 milliGRAM(s) IV Push daily    MEDICATIONS  (PRN):  acetaminophen   Tablet .. 650 milliGRAM(s) Oral every 6 hours PRN Temp greater or equal to 38C (100.4F), Mild Pain (1 - 3)  morphine  - Injectable 2 milliGRAM(s) IV Push every 4 hours PRN agitation      RADIOLOGY & ADDITIONAL STUDIES:

## 2021-09-08 NOTE — ADVANCED PRACTICE NURSE CONSULT - RECOMMEDATIONS
Plan: Clean L leg lateral wound with saline pat dry then apply xeroform with Kerlix dressing   Continue Pressure Injury  preventive  measures   Continue skin care   Asses wound and inform primary provider of any changes   Case discussed with primary Rn   Wound/ ostomy specialist  to f/u as needed   Offloading: [ ] Frequent position changes [ ] Devices/Equipment  Cleansing: [ ] Saline [ ] Soap/Water [ ] Other: ______  Topicals: [ ] Barrier Cream [ ] Antimicrobial [ ] Enzymatic Wound Debridement  Dressings: [ ] Dry, sterile [ ] Foam [ ] Absorbant Pads [ ] Collagenase

## 2021-09-08 NOTE — PROGRESS NOTE ADULT - ASSESSMENT
Patient is a 81 YO M w a PMH of A-fib (on home xarelto) CAD (s/p stent 2014),gout, Alcohol dependence (5-6 drinks/day), VINI (on home CPAP) addmitted for acute hypoxic respiratory failure secondary to SEVERE community acquired pneumonia. Patient was septic on admission (febrile, leukocytosis, tachycardia, hypoxic w identifiable source). Found to have HAGMA and UA + for WBC. Of note patient is s/p 2 x COVID vaccine. Patient was MRSA nares negative w no growth to date from lesion biopsy. Patient is clinically improving and doing well on his BiPAP at night. When i went into the room 9/6 morning BiPAP was on his cheek and he was maintaining O2 of 88-93%. Patient is hypokalemic 9/7 with an increasing BUN, CVVHD due today w/ Ramirez to be removed after. Patient was hypertensive to 187/80 w HR 76 overnight and responded well to Amlidipine 5. Home dose of Amlodipine 10 restarted today. Patient passed speech and swallow evaluation, note pending for diet order, NG tube pulled. Repeat chest XR showed stable RT consolidation, on physical exam no rhonchi or crackles, much improved from previous exams. Solumedrol 60 q12 started 9/7. Oral care also increased to q2hr  9/8 Patient was hypertensive to 181/70's, Hyralazine and Imdur started. Patient now 's. Was changed to HF NC at 5L and saturating well. Condom catheter was placed and UO was ~100cc/hr. Cr is downtrending, BUN uptrending, No HD today.      Problem List:  #acute hypoxemic respiratory failure secondary to severe community acquired PMN complicated by Sepsis,   -STABLE WBC. Afebrile. NO GROWTH TO DATE   -negative MRSA, legionella and COVID -  -c/w ceftriaxone 1000mg once daily  -c/w levofloxacin 250 mg once daily   -d/c LINEZOLID (ID CONSULTED AS PER NEW PHARMACY PROTOCOL)   -f/u blood cultures  (NEGATIVE TO DATE)  -Repeat vBG once daily  -CXR once daily   -ORSA REPORTED BY INFECTION CONTROL, UNABLE TO FIND CULTURE THEY ARE FOLLOWING. THERE WAS A +PCR IN 2020 BUT NEGATIVE MRSA NARES SEPT 2 2021. ATTEMPTED TO CONTACT OFFICE FOR CLARIFICATION, UNABLE TO REACH THEM   -patient tolerated BiPAP last night and is feeling better this morning.  -c/w BiPAP at night, NC by day   -start Solumedrol 60 q 12 IV (off label use for sever ARDS, decrease fibrosis and decrease suppression of capillary permeability and PMN extravasation   -NC 5L today and saturating 100%    #HTN  Patient had BP as high as 189/90  -Hydralazine 25 q 8 and Imdur 120 started today  -SBP now 130's    #White plaque on tongue  -steroids use, open mouth, satelite lesions looks like Candidiasis   -nurse concerned for Candidiasis  -Nystatin 5 q 6 swish and swallow started  -follow up oral leasion  -oral hygene q 2hr     #Acute on Chronic renal failure oliguric  patient had HD 9/1, 1 L removed NOW S/P UDOL PLACEMENT RT IJV  -asymptomatic bacteruria   -f/u for signs/symptoms of UTI  -Repeat vBG once daily  -patient was alkalotic 9/5 and Bicarb drip was d/c  -held dialysis 9/4-9-8  -BUN uptrending, Cr downtrending, UO rapidly increasing all indicative of RESOLVING ATN      #Chronic venous stasis dermatitis   -keep leg wrapped  -elevate legs  -outpatient evaluation   -compression socks  -WOUND CULTURE TAKEN 9/5 F/U CULTURES NO GROWTH TO DATE        PAST MEDICAL & SURGICAL HISTORY:  Atrial fibrillation    Coronary artery disease    Hyperlipidemia    Hypertension    Gout    Diabetes mellitus    VINI on CPAP    History of heart artery stent        #Misc  - DVT Prophylaxis: heparin   - GI Prophylaxis: pantoprazole   - Diet:  - Activity: bed rest   - IV Fluids:   - Code Status: full

## 2021-09-08 NOTE — ADVANCED PRACTICE NURSE CONSULT - ASSESSMENT
Patient is a 80 y male  presented  BIBEMS for fall. Patient says he felt dizzy yesterday causing him to fall on to his bed. Denies any room spinning or pre-syncope. Unable to describe the dizziness to me. No HT, LOC, numbness, weakness, tingling, headache, vision changes, fever, cold/flu symptoms, CP. Patient endorses that he has been feeling SOB for the past 2 days and has been coughing for the past 1 week per daughter at bedside. Also states that he had diarrhea for the past 2 days, but not today (Of note Pt S/p Moderna Vaccine 2 doses).  ED Course: Febrile, tachycardic and hypoxic in ED. Labs revealed leukocytosis, HAGMA, elevated Trop 0.04, BNP 13k. UA positve for WBC. Trauma w/up neg. CXR and CT chest revealed confluent airspace consolidation in the RUL and to a lesser degree the RLL consistent with pneumonia.   PAST MEDICAL & SURGICAL HISTORY:  Atrial fibrillation  Coronary artery disease  Hyperlipidemia  Hypertension  Gout  Diabetes mellitus  VINI on CPAP  History of heart artery stent    Assessment:  Patient received in bed, A/O  responds appropriately to verbal command                       Skin assesed-    Wound #1  Location:  L lateral leg   Size: 3x1  Tissue Description :   [ ] Necrotic   [ ] Slough   [ ] Infected   [ ] Granulation (firm, beefy red tissue)   [ ] Hypergranulation (soft, gelatinous)  [ x] Poor-Quality Granulation (pale, grey/brown/red granulation tissue)   [ ] Epithelium (pink/mauve at wound edges)  [ ] Macerated  [ ] Other: _______  Wound Exudate : None    Wound Edge): Inatct  Periwound Condition :   [ ] Maceration [ ] Excoriation [x ] Dry skin [ ] Hyperkeratosis [ ] Callus [ ] Eczema [ ] Other: _______    Other Etiology:  [ ] Aterial  [ ] Venous   [ ] Surgical Incision  [x ] Other:

## 2021-09-08 NOTE — PROGRESS NOTE ADULT - ASSESSMENT
IMPRESSION:    Acute hypoxemic resp failure on 50/40  Severe CAP    Sepsis present on admission  Acute on Chronic renal failure with improved UO with IV fluid   HO A fib      PLAN:    CNS: avoid CNS depressant    HEENT:  Oral care    PULMONARY:  HOB @ 45 degrees, BIPAP/ HHFNC keep SAo2 88 to 92%, Aspiration precaution, trial  NC    CARDIOVASCULAR: Avoid overload. IV heaprin, start hydralazine/ imdur    GI: GI prophylaxis                                          Feeding po    RENAL:  F/u  lytes.  Correct as needed.  Accurate I/O, trend CMP.      INFECTIOUS DISEASE: abx per ID, solumedrol 60 d 24    HEMATOLOGICAL:  DVT prophylaxis. On IV Hep.  FU PTT     ENDOCRINE:  Follow up FS.  Insulin protocol if needed.    CODE STATUS: FULL CODE    DISPOSITION: MICU    Very poor prognosis

## 2021-09-08 NOTE — PROGRESS NOTE ADULT - SUBJECTIVE AND OBJECTIVE BOX
seen and examined  on high flow o2         PAST HISTORY  --------------------------------------------------------------------------------  No significant changes to PMH, PSH, FHx, SHx, unless otherwise noted    ALLERGIES & MEDICATIONS  --------------------------------------------------------------------------------  Allergies    No Known Allergies    Intolerances      Standing Inpatient Medications  allopurinol 50 milliGRAM(s) Oral daily  amLODIPine   Tablet 10 milliGRAM(s) Oral daily  aspirin  chewable 81 milliGRAM(s) Oral daily  atorvastatin 40 milliGRAM(s) Oral at bedtime  cefTRIAXone   IVPB 1000 milliGRAM(s) IV Intermittent every 24 hours  chlorhexidine 4% Liquid 1 Application(s) Topical <User Schedule>  dextrose 40% Gel 15 Gram(s) Oral once  dextrose 5%. 1000 milliLiter(s) IV Continuous <Continuous>  dextrose 5%. 1000 milliLiter(s) IV Continuous <Continuous>  dextrose 50% Injectable 25 Gram(s) IV Push once  dextrose 50% Injectable 12.5 Gram(s) IV Push once  dextrose 50% Injectable 25 Gram(s) IV Push once  glucagon  Injectable 1 milliGRAM(s) IntraMuscular once  heparin  Infusion 1500 Unit(s)/Hr IV Continuous <Continuous>  insulin glargine Injectable (LANTUS) 29 Unit(s) SubCutaneous at bedtime  insulin lispro (ADMELOG) corrective regimen sliding scale   SubCutaneous three times a day before meals  insulin lispro Injectable (ADMELOG) 9 Unit(s) SubCutaneous three times a day before meals  levoFLOXacin IVPB      levoFLOXacin IVPB 250 milliGRAM(s) IV Intermittent every 24 hours  methylPREDNISolone sodium succinate Injectable 60 milliGRAM(s) IV Push every 12 hours  pantoprazole  Injectable 40 milliGRAM(s) IV Push daily    PRN Inpatient Medications  acetaminophen   Tablet .. 650 milliGRAM(s) Oral every 6 hours PRN  morphine  - Injectable 2 milliGRAM(s) IV Push every 4 hours PRN      VITALS/PHYSICAL EXAM  --------------------------------------------------------------------------------  T(C): 36.2 (09-08-21 @ 04:00), Max: 36.7 (09-08-21 @ 00:00)  HR: 72 (09-08-21 @ 08:00) (68 - 90)  BP: 180/83 (09-08-21 @ 08:00) (146/71 - 189/90)  RR: 15 (09-08-21 @ 08:00) (15 - 40)  SpO2: 96% (09-08-21 @ 08:00) (94% - 100%)  Wt(kg): --        09-07-21 @ 07:01  -  09-08-21 @ 07:00  --------------------------------------------------------  IN: 425 mL / OUT: 1005 mL / NET: -580 mL      Physical Exam:  	Gen: on high flow o2   	Pulm:  B/L jose   	CV:  S1S2; no rub  	Abd: +distended  	Vascular access: betsy     LABS/STUDIES  --------------------------------------------------------------------------------              12.6   14.60 >-----------<  401      [09-08-21 @ 05:00]              37.9     143  |  98  |  102  ----------------------------<  374      [09-08-21 @ 05:00]  3.6   |  30  |  4.6        Ca     8.6     [09-08-21 @ 05:00]        PTT: 66.6       [09-07-21 @ 04:59]    Creatinine Trend:  SCr 4.6 [09-08 @ 05:00]  SCr 5.1 [09-06 @ 16:16]  SCr 4.9 [09-06 @ 04:45]  SCr 4.9 [09-05 @ 04:49]  SCr 7.0 [09-04 @ 11:08]    Urinalysis - [08-31-21 @ 18:38]      Color Yellow / Appearance Slightly Turbid / SG >1.050 / pH 6.0      Gluc Trace / Ketone Negative  / Bili Negative / Urobili <2 mg/dL       Blood Large / Protein 300 mg/dL / Leuk Est Negative / Nitrite Negative      RBC 9 / WBC 32 / Hyaline 20 / Gran 3 / Sq Epi  / Non Sq Epi 2 / Bacteria Negative    Urine Sodium <20.0      [09-02-21 @ 12:10]  Urine Chloride 20      [09-02-21 @ 12:10]    Lipid: chol 60, , HDL 17, LDL --      [09-01-21 @ 08:16]    HBsAb Nonreact      [09-02-21 @ 22:00]  HBsAg Nonreact      [09-02-21 @ 22:00]  HBcAb Nonreact      [09-02-21 @ 22:00]

## 2021-09-08 NOTE — PROGRESS NOTE ADULT - SUBJECTIVE AND OBJECTIVE BOX
MISTY BURLESON 80y Male  MRN#: [id number]   Hospital Day: 8d    SUBJECTIVE  Patient is a 80y old Male who presents with a chief complaint of sob (08 Sep 2021 08:26)  Currently admitted to medicine with the primary diagnosis of Pneumonia      INTERVAL HPI AND OVERNIGHT EVENTS:  Patient was examined and seen at bedside. This morning he is resting comfortably in bed and reports no issues. Overnight he was consistently hypertensive to the 180's over 90's. He was given amlodipine 10mg at 6AM 9/8 and one hour later he was still hypertensive at 189/90. His glucose this AM was 412 as well.    REVIEW OF SYMPTOMS:  CONSTITUTIONAL: No weakness, fevers or chills; No headaches  ENT: No sore throat or difficulty swallowing  RESPIRATORY: No cough, wheezing, or hemoptysis  CARDIOVASCULAR: No chest pain or palpitations  GASTROINTESTINAL: No abdominal or epigastric pain; No nausea, vomiting, or hematemesis; No diarrhea or constipation; No melena or hematochezia  MUSCULOSKELETAL: No joint pain, no muscle pain, no weakness  NEUROLOGICAL: No numbness or weakness  SKIN: No itching or rashes    OBJECTIVE  PAST MEDICAL & SURGICAL HISTORY  Atrial fibrillation  Coronary artery disease  Hyperlipidemia  Hypertension  Gout  Diabetes mellitus  VINI on CPAP  History of heart artery stent    ALLERGIES:  No Known Allergies    MEDICATIONS:  STANDING MEDICATIONS  allopurinol 50 milliGRAM(s) Oral daily  amLODIPine   Tablet 10 milliGRAM(s) Oral daily  aspirin  chewable 81 milliGRAM(s) Oral daily  atorvastatin 40 milliGRAM(s) Oral at bedtime  cefTRIAXone   IVPB 1000 milliGRAM(s) IV Intermittent every 24 hours  chlorhexidine 4% Liquid 1 Application(s) Topical <User Schedule>  dextrose 40% Gel 15 Gram(s) Oral once  dextrose 5%. 1000 milliLiter(s) IV Continuous <Continuous>  dextrose 5%. 1000 milliLiter(s) IV Continuous <Continuous>  dextrose 50% Injectable 25 Gram(s) IV Push once  dextrose 50% Injectable 12.5 Gram(s) IV Push once  dextrose 50% Injectable 25 Gram(s) IV Push once  glucagon  Injectable 1 milliGRAM(s) IntraMuscular once  heparin  Infusion 1500 Unit(s)/Hr IV Continuous <Continuous>  insulin glargine Injectable (LANTUS) 29 Unit(s) SubCutaneous at bedtime  insulin lispro (ADMELOG) corrective regimen sliding scale   SubCutaneous three times a day before meals  insulin lispro Injectable (ADMELOG) 9 Unit(s) SubCutaneous three times a day before meals  levoFLOXacin IVPB      levoFLOXacin IVPB 250 milliGRAM(s) IV Intermittent every 24 hours  methylPREDNISolone sodium succinate Injectable 60 milliGRAM(s) IV Push every 12 hours  pantoprazole    Tablet 40 milliGRAM(s) Oral before breakfast    PRN MEDICATIONS  acetaminophen   Tablet .. 650 milliGRAM(s) Oral every 6 hours PRN  morphine  - Injectable 2 milliGRAM(s) IV Push every 4 hours PRN    VITAL SIGNS: Last 24 Hours  T(C): 36.2 (08 Sep 2021 04:00), Max: 36.7 (08 Sep 2021 00:00)  T(F): 97.2 (08 Sep 2021 04:00), Max: 98 (08 Sep 2021 00:00)  HR: 92 (08 Sep 2021 09:00) (68 - 92)  BP: 162/83 (08 Sep 2021 09:00) (146/71 - 189/90)  BP(mean): 117 (08 Sep 2021 09:00) (97 - 145)  RR: 29 (08 Sep 2021 09:00) (15 - 40)  SpO2: 97% (08 Sep 2021 09:00) (94% - 100%)    LABS:                        12.6   14.60 )-----------( 401      ( 08 Sep 2021 05:00 )             37.9     09-08    143  |  98  |  102<HH>  ----------------------------<  374<H>  3.6   |  30  |  4.6<HH>    Ca    8.6      08 Sep 2021 05:00      PTT - ( 07 Sep 2021 04:59 )  PTT:66.6 sec    RADIOLOGY:  < from: Xray Chest 1 View- PORTABLE-Routine (Xray Chest 1 View- PORTABLE-Routine in AM.) (09.06.21 @ 05:47) >  Impression:    Stable bilateral opacities, most pronounced at right upper lobe.    --- End of Report ---    < end of copied text >    PHYSICAL EXAM:  CONSTITUTIONAL: No acute distress, well-developed, well-groomed, AAOx3  HEAD: Atraumatic, normocephalic  EYES: EOM intact, conjunctiva and sclera clear  ENT: Supple, no masses, no thyromegaly; moist mucous membranes  PULMONARY: Decreased breath sounds on right side, normal breath sounds on left side; no wheezes, rales, or rhonchi  CARDIOVASCULAR: Regular rate and rhythm; no murmurs, rubs, or gallops  GASTROINTESTINAL: Soft, non-tender, non-distended; bowel sounds present  MUSCULOSKELETAL: no clubbing, no cyanosis. Edema of left lower extremity, nonpitting. Venous stasis ulcer on left lower leg that was dressed.  NEUROLOGY: non-focal, able to move all four extremities.  SKIN: No rashes or lesions; warm and dry    ASSESSMENT & PLAN:  Patient is an 81 y/o male with a pmhx of afib on xeralto at home and currently on heparin, CAD s/p stent in 2014, VINI on CPAP, DM, HTN, HLD, and gout. Presented to the hospital due to a fall, and was subsequently diagnosed with CAP. Hypertensive overnight and glucose was 412. Solumedrol was started yesterday at 60mg bid.    #CAP  - Currently on rocephin and levoquin, continue   - On high flow nasal cannula during the day and BIPAP at night, continue to keep saturation 88-92%.  - Started on methylprednisone yesterday 60mg BID  - Change methylprednisone to 60mg daily due to high glucose levels  - F/u legionella antibody  - Daily CXR    #DIETER on CKD  - Last HD was 9/4, currently producing urine  - Nephro suggested no need for HD today  - Creatinine is trending down; from 5.1 --> 4.6 this AM, if it continues d/c UDALL 9/9 AM per nephro  - Monitor BUN and Cr   - Correct K to 4.0   - F/u phosphate  - Continue to monitor lytes  - Continue to monitor I&O's    #HTN  - Hypertensive overnight, was given amlodipine 10mg, did not help  - Begin hydralazine  - Monitor BP    #DM  - Glucose was 412 this AM, most likely due to methylprednisone that was started yesterday  - Change methylprednisone to 60mg once daily to reduce glucose  - Continue to monitor glucose fingersticks  - Manage with insulin sliding scale    #AFib  - Currently on heparin with a therapeutic PTT  - F/u PTT    #CAD  #HLD  - Continue atorvostatin  - Continue aspirin    #Gout  - Continue allopurinol    #VINI  - Continue BIPAP at night    #Misc  - DVT Prophylaxis: Heparin  - GI Prophylaxis: Protonix  - Diet: Dysphagia 1 pureed diet  - Activity: Out of bed  - IV Fluids: IVL  - Code Status: Full code    Dispo: MICU

## 2021-09-08 NOTE — PROGRESS NOTE ADULT - SUBJECTIVE AND OBJECTIVE BOX
MISTY BURLESON 80y Male  MRN#: [id number]   Hospital Day: 8d    SUBJECTIVE  Patient is a 80y old Male who presents with a chief complaint of Community acquired pneumonia (08 Sep 2021 09:38)  Currently admitted to medicine with the primary diagnosis of Pneumonia      INTERVAL HPI AND OVERNIGHT EVENTS:  Patient was examined and seen at bedside. This morning he is resting comfortably in bed and reports no issues or overnight events.  OVERNIGHT: GLUCOSE ~412 27 LANTUS 27 STARTED AND 9 LISPRO    REVIEW OF SYMPTOMS:  CONSTITUTIONAL: No weakness, fevers or chills; No headaches  EYES: No visual changes, eye pain, or discharge  ENT: No vertigo; No ear pain or change in hearing; No sore throat or difficulty swallowing  NECK: No pain or stiffness  RESPIRATORY: No cough, wheezing, or hemoptysis; No shortness of breath  CARDIOVASCULAR: No chest pain or palpitations  GASTROINTESTINAL: No abdominal or epigastric pain; No nausea, vomiting, or hematemesis; No diarrhea or constipation; No melena or hematochezia  GENITOURINARY: No dysuria, frequency or hematuria  MUSCULOSKELETAL: No joint pain, no muscle pain, no weakness  NEUROLOGICAL: No numbness or weakness  SKIN: No itching or rashes    OBJECTIVE  PAST MEDICAL & SURGICAL HISTORY  Atrial fibrillation    Coronary artery disease    Hyperlipidemia    Hypertension    Gout    Diabetes mellitus    VINI on CPAP    History of heart artery stent      ALLERGIES:  No Known Allergies    MEDICATIONS:  STANDING MEDICATIONS  allopurinol 50 milliGRAM(s) Oral daily  amLODIPine   Tablet 10 milliGRAM(s) Oral daily  aspirin  chewable 81 milliGRAM(s) Oral daily  atorvastatin 40 milliGRAM(s) Oral at bedtime  cefTRIAXone   IVPB 1000 milliGRAM(s) IV Intermittent every 24 hours  chlorhexidine 4% Liquid 1 Application(s) Topical <User Schedule>  dextrose 40% Gel 15 Gram(s) Oral once  dextrose 5%. 1000 milliLiter(s) IV Continuous <Continuous>  dextrose 5%. 1000 milliLiter(s) IV Continuous <Continuous>  dextrose 50% Injectable 25 Gram(s) IV Push once  dextrose 50% Injectable 12.5 Gram(s) IV Push once  dextrose 50% Injectable 25 Gram(s) IV Push once  glucagon  Injectable 1 milliGRAM(s) IntraMuscular once  heparin  Infusion 1500 Unit(s)/Hr IV Continuous <Continuous>  hydrALAZINE 25 milliGRAM(s) Oral every 8 hours  insulin glargine Injectable (LANTUS) 29 Unit(s) SubCutaneous at bedtime  insulin lispro (ADMELOG) corrective regimen sliding scale   SubCutaneous three times a day before meals  insulin lispro Injectable (ADMELOG) 9 Unit(s) SubCutaneous three times a day before meals  isosorbide   mononitrate ER Tablet (IMDUR) 120 milliGRAM(s) Oral daily  levoFLOXacin IVPB      levoFLOXacin IVPB 250 milliGRAM(s) IV Intermittent every 24 hours  methylPREDNISolone sodium succinate Injectable 60 milliGRAM(s) IV Push every 24 hours  pantoprazole    Tablet 40 milliGRAM(s) Oral before breakfast    PRN MEDICATIONS  acetaminophen   Tablet .. 650 milliGRAM(s) Oral every 6 hours PRN  morphine  - Injectable 2 milliGRAM(s) IV Push every 4 hours PRN      VITAL SIGNS: Last 24 Hours  T(C): 36.2 (08 Sep 2021 04:00), Max: 36.7 (08 Sep 2021 00:00)  T(F): 97.2 (08 Sep 2021 04:00), Max: 98 (08 Sep 2021 00:00)  HR: 84 (08 Sep 2021 12:00) (70 - 92)  BP: 131/67 (08 Sep 2021 12:00) (131/67 - 189/90)  BP(mean): 88 (08 Sep 2021 12:00) (88 - 145)  RR: 22 (08 Sep 2021 12:00) (12 - 40)  SpO2: 96% (08 Sep 2021 12:00) (94% - 99%)    LABS:                        12.6   14.60 )-----------( 401      ( 08 Sep 2021 05:00 )             37.9     09-08    143  |  98  |  102<HH>  ----------------------------<  374<H>  3.6   |  30  |  4.6<HH>    Ca    8.6      08 Sep 2021 05:00      PTT - ( 07 Sep 2021 04:59 )  PTT:66.6 sec              RADIOLOGY:  NO NEW IMAGING DONE TODAY    PHYSICAL EXAM:  CONSTITUTIONAL: No acute distress, OBESE CLASS II  HEAD: Atraumatic, normocephalic  EYES: PERRLA, conjunctiva and sclera clear  ENT: DARK CRUSTS IN CORNER OF MOUTH, WHITE PLAQUES ON TONGUE   PULMONARY: Clear to auscultation bilaterally; no wheezes, rales, or rhonchi  CARDIOVASCULAR: Regular rate and rhythm; no murmurs, rubs, or gallops  GASTROINTESTINAL: Soft, non-tender, non-distended; bowel sounds present  MUSCULOSKELETAL: 1+ peripheral pulses; no clubbing, no cyanosis, LLE EDEMA, W CLEARING BLISTERS, DRESSING CLEAN AND DRY   NEUROLOGY: non-focal  SKIN: AS ABOVE

## 2021-09-08 NOTE — PROGRESS NOTE ADULT - ASSESSMENT
DIETER / ATN in nature d/t sepsis / possible JOSE contributing / oligo-anuric / creat up-trending / no improvement w/ iv hydration/ PNA / acute respiratory failure   # UO improving   # no need for HD today   # if creatinine continue to improve, d/c udall in am   # if BP remains elevated , iamlodipine increased, add hydralazine 25 q 12   # check ph level   #correct k to 4   # remains on rocephine, and levaquin   # will follow

## 2021-09-09 LAB
ALBUMIN SERPL ELPH-MCNC: 2.5 G/DL — LOW (ref 3.5–5.2)
ALP SERPL-CCNC: 198 U/L — HIGH (ref 30–115)
ALT FLD-CCNC: 73 U/L — HIGH (ref 0–41)
ANION GAP SERPL CALC-SCNC: 14 MMOL/L — SIGNIFICANT CHANGE UP (ref 7–14)
AST SERPL-CCNC: 77 U/L — HIGH (ref 0–41)
BILIRUB SERPL-MCNC: 0.9 MG/DL — SIGNIFICANT CHANGE UP (ref 0.2–1.2)
BUN SERPL-MCNC: 106 MG/DL — CRITICAL HIGH (ref 10–20)
CALCIUM SERPL-MCNC: 8.8 MG/DL — SIGNIFICANT CHANGE UP (ref 8.5–10.1)
CHLORIDE SERPL-SCNC: 98 MMOL/L — SIGNIFICANT CHANGE UP (ref 98–110)
CO2 SERPL-SCNC: 33 MMOL/L — HIGH (ref 17–32)
CREAT SERPL-MCNC: 4 MG/DL — HIGH (ref 0.7–1.5)
GLUCOSE BLDC GLUCOMTR-MCNC: 110 MG/DL — HIGH (ref 70–99)
GLUCOSE BLDC GLUCOMTR-MCNC: 136 MG/DL — HIGH (ref 70–99)
GLUCOSE BLDC GLUCOMTR-MCNC: 138 MG/DL — HIGH (ref 70–99)
GLUCOSE BLDC GLUCOMTR-MCNC: 163 MG/DL — HIGH (ref 70–99)
GLUCOSE BLDC GLUCOMTR-MCNC: 184 MG/DL — HIGH (ref 70–99)
GLUCOSE BLDC GLUCOMTR-MCNC: 222 MG/DL — HIGH (ref 70–99)
GLUCOSE BLDC GLUCOMTR-MCNC: 275 MG/DL — HIGH (ref 70–99)
GLUCOSE BLDC GLUCOMTR-MCNC: 277 MG/DL — HIGH (ref 70–99)
GLUCOSE BLDC GLUCOMTR-MCNC: 287 MG/DL — HIGH (ref 70–99)
GLUCOSE BLDC GLUCOMTR-MCNC: 289 MG/DL — HIGH (ref 70–99)
GLUCOSE BLDC GLUCOMTR-MCNC: 291 MG/DL — HIGH (ref 70–99)
GLUCOSE BLDC GLUCOMTR-MCNC: 303 MG/DL — HIGH (ref 70–99)
GLUCOSE BLDC GLUCOMTR-MCNC: 331 MG/DL — HIGH (ref 70–99)
GLUCOSE BLDC GLUCOMTR-MCNC: 69 MG/DL — LOW (ref 70–99)
GLUCOSE SERPL-MCNC: 146 MG/DL — HIGH (ref 70–99)
HCT VFR BLD CALC: 35.6 % — LOW (ref 42–52)
HGB BLD-MCNC: 11.9 G/DL — LOW (ref 14–18)
LEGIONELLA AB SER-ACNC: <0.91 — SIGNIFICANT CHANGE UP (ref 0–0.9)
MCHC RBC-ENTMCNC: 29.2 PG — SIGNIFICANT CHANGE UP (ref 27–31)
MCHC RBC-ENTMCNC: 33.4 G/DL — SIGNIFICANT CHANGE UP (ref 32–37)
MCV RBC AUTO: 87.5 FL — SIGNIFICANT CHANGE UP (ref 80–94)
NRBC # BLD: 0 /100 WBCS — SIGNIFICANT CHANGE UP (ref 0–0)
PLATELET # BLD AUTO: 480 K/UL — HIGH (ref 130–400)
POTASSIUM SERPL-MCNC: 3.6 MMOL/L — SIGNIFICANT CHANGE UP (ref 3.5–5)
POTASSIUM SERPL-SCNC: 3.6 MMOL/L — SIGNIFICANT CHANGE UP (ref 3.5–5)
PROT SERPL-MCNC: 5.3 G/DL — LOW (ref 6–8)
RBC # BLD: 4.07 M/UL — LOW (ref 4.7–6.1)
RBC # FLD: 15.8 % — HIGH (ref 11.5–14.5)
SODIUM SERPL-SCNC: 145 MMOL/L — SIGNIFICANT CHANGE UP (ref 135–146)
WBC # BLD: 20.45 K/UL — HIGH (ref 4.8–10.8)
WBC # FLD AUTO: 20.45 K/UL — HIGH (ref 4.8–10.8)

## 2021-09-09 PROCEDURE — 71045 X-RAY EXAM CHEST 1 VIEW: CPT | Mod: 26

## 2021-09-09 PROCEDURE — 99233 SBSQ HOSP IP/OBS HIGH 50: CPT

## 2021-09-09 RX ORDER — INSULIN GLARGINE 100 [IU]/ML
45 INJECTION, SOLUTION SUBCUTANEOUS ONCE
Refills: 0 | Status: DISCONTINUED | OUTPATIENT
Start: 2021-09-09 | End: 2021-09-09

## 2021-09-09 RX ORDER — INSULIN LISPRO 100/ML
10 VIAL (ML) SUBCUTANEOUS
Refills: 0 | Status: DISCONTINUED | OUTPATIENT
Start: 2021-09-09 | End: 2021-09-18

## 2021-09-09 RX ORDER — INSULIN GLARGINE 100 [IU]/ML
45 INJECTION, SOLUTION SUBCUTANEOUS ONCE
Refills: 0 | Status: COMPLETED | OUTPATIENT
Start: 2021-09-09 | End: 2021-09-09

## 2021-09-09 RX ORDER — APIXABAN 2.5 MG/1
2.5 TABLET, FILM COATED ORAL
Refills: 0 | Status: DISCONTINUED | OUTPATIENT
Start: 2021-09-09 | End: 2021-09-18

## 2021-09-09 RX ORDER — DEXTROSE 50 % IN WATER 50 %
12.5 SYRINGE (ML) INTRAVENOUS ONCE
Refills: 0 | Status: COMPLETED | OUTPATIENT
Start: 2021-09-09 | End: 2021-09-09

## 2021-09-09 RX ORDER — INSULIN GLARGINE 100 [IU]/ML
45 INJECTION, SOLUTION SUBCUTANEOUS AT BEDTIME
Refills: 0 | Status: DISCONTINUED | OUTPATIENT
Start: 2021-09-10 | End: 2021-09-14

## 2021-09-09 RX ORDER — INSULIN LISPRO 100/ML
5 VIAL (ML) SUBCUTANEOUS ONCE
Refills: 0 | Status: COMPLETED | OUTPATIENT
Start: 2021-09-09 | End: 2021-09-09

## 2021-09-09 RX ORDER — INSULIN LISPRO 100/ML
VIAL (ML) SUBCUTANEOUS
Refills: 0 | Status: DISCONTINUED | OUTPATIENT
Start: 2021-09-09 | End: 2021-09-18

## 2021-09-09 RX ADMIN — PANTOPRAZOLE SODIUM 40 MILLIGRAM(S): 20 TABLET, DELAYED RELEASE ORAL at 06:52

## 2021-09-09 RX ADMIN — INSULIN GLARGINE 45 UNIT(S): 100 INJECTION, SOLUTION SUBCUTANEOUS at 14:22

## 2021-09-09 RX ADMIN — Medication 500000 UNIT(S): at 18:15

## 2021-09-09 RX ADMIN — Medication 500000 UNIT(S): at 05:28

## 2021-09-09 RX ADMIN — Medication 500000 UNIT(S): at 13:06

## 2021-09-09 RX ADMIN — Medication 10 UNIT(S): at 16:44

## 2021-09-09 RX ADMIN — Medication 500000 UNIT(S): at 00:25

## 2021-09-09 RX ADMIN — Medication 6: at 16:44

## 2021-09-09 RX ADMIN — Medication 25 MILLIGRAM(S): at 13:07

## 2021-09-09 RX ADMIN — Medication 50 MILLIGRAM(S): at 13:06

## 2021-09-09 RX ADMIN — CHLORHEXIDINE GLUCONATE 1 APPLICATION(S): 213 SOLUTION TOPICAL at 05:30

## 2021-09-09 RX ADMIN — ATORVASTATIN CALCIUM 40 MILLIGRAM(S): 80 TABLET, FILM COATED ORAL at 22:04

## 2021-09-09 RX ADMIN — Medication 25 MILLIGRAM(S): at 22:03

## 2021-09-09 RX ADMIN — Medication 5 UNIT(S): at 22:29

## 2021-09-09 RX ADMIN — ISOSORBIDE MONONITRATE 120 MILLIGRAM(S): 60 TABLET, EXTENDED RELEASE ORAL at 13:06

## 2021-09-09 RX ADMIN — Medication 12.5 GRAM(S): at 02:31

## 2021-09-09 RX ADMIN — Medication 60 MILLIGRAM(S): at 05:29

## 2021-09-09 RX ADMIN — Medication 81 MILLIGRAM(S): at 13:06

## 2021-09-09 RX ADMIN — Medication 500000 UNIT(S): at 23:25

## 2021-09-09 RX ADMIN — CEFTRIAXONE 100 MILLIGRAM(S): 500 INJECTION, POWDER, FOR SOLUTION INTRAMUSCULAR; INTRAVENOUS at 09:43

## 2021-09-09 RX ADMIN — AMLODIPINE BESYLATE 10 MILLIGRAM(S): 2.5 TABLET ORAL at 05:29

## 2021-09-09 RX ADMIN — APIXABAN 2.5 MILLIGRAM(S): 2.5 TABLET, FILM COATED ORAL at 18:15

## 2021-09-09 RX ADMIN — Medication 25 MILLIGRAM(S): at 05:29

## 2021-09-09 NOTE — PHARMACOTHERAPY INTERVENTION NOTE - INTERVENTION TYPE RECOOMEND
Dose Optimization/Non-renal Dose Adjustment
Therapy Recommended - Alternative treatment
Timing/Frequency of Administration Recommended

## 2021-09-09 NOTE — PROGRESS NOTE ADULT - SUBJECTIVE AND OBJECTIVE BOX
seen and examined   off high flow o2        PAST HISTORY  --------------------------------------------------------------------------------  No significant changes to PMH, PSH, FHx, SHx, unless otherwise noted    ALLERGIES & MEDICATIONS  --------------------------------------------------------------------------------  Allergies    No Known Allergies    Intolerances      Standing Inpatient Medications  allopurinol 50 milliGRAM(s) Oral daily  amLODIPine   Tablet 10 milliGRAM(s) Oral daily  aspirin  chewable 81 milliGRAM(s) Oral daily  atorvastatin 40 milliGRAM(s) Oral at bedtime  cefTRIAXone   IVPB 1000 milliGRAM(s) IV Intermittent every 24 hours  chlorhexidine 4% Liquid 1 Application(s) Topical <User Schedule>  dextrose 40% Gel 15 Gram(s) Oral once  dextrose 5%. 1000 milliLiter(s) IV Continuous <Continuous>  dextrose 5%. 1000 milliLiter(s) IV Continuous <Continuous>  dextrose 50% Injectable 25 Gram(s) IV Push once  dextrose 50% Injectable 12.5 Gram(s) IV Push once  dextrose 50% Injectable 25 Gram(s) IV Push once  dextrose 50% Injectable 50 milliLiter(s) IV Push every 15 minutes  glucagon  Injectable 1 milliGRAM(s) IntraMuscular once  heparin  Infusion 1500 Unit(s)/Hr IV Continuous <Continuous>  hydrALAZINE 25 milliGRAM(s) Oral every 8 hours  insulin regular Infusion 9 Unit(s)/Hr IV Continuous <Continuous>  isosorbide   mononitrate ER Tablet (IMDUR) 120 milliGRAM(s) Oral daily  levoFLOXacin IVPB      levoFLOXacin IVPB 250 milliGRAM(s) IV Intermittent every 24 hours  methylPREDNISolone sodium succinate Injectable 60 milliGRAM(s) IV Push every 24 hours  nystatin    Suspension 853924 Unit(s) Swish and Swallow every 6 hours  pantoprazole    Tablet 40 milliGRAM(s) Oral before breakfast    PRN Inpatient Medications  acetaminophen   Tablet .. 650 milliGRAM(s) Oral every 6 hours PRN  morphine  - Injectable 2 milliGRAM(s) IV Push every 4 hours PRN          VITALS/PHYSICAL EXAM  --------------------------------------------------------------------------------  T(C): 36.5 (09-09-21 @ 04:00), Max: 36.6 (09-08-21 @ 20:00)  HR: 72 (09-09-21 @ 06:00) (62 - 92)  BP: 142/76 (09-09-21 @ 06:00) (112/60 - 180/83)  RR: 19 (09-09-21 @ 06:00) (12 - 35)  SpO2: 97% (09-09-21 @ 06:00) (92% - 99%)  Wt(kg): --        09-08-21 @ 07:01  -  09-09-21 @ 07:00  --------------------------------------------------------  IN: 1415 mL / OUT: 1775 mL / NET: -360 mL      Physical Exam:  	Gen: NAD,  	Abd:  soft, nontender/nondistended  	LE:  edema  	Vascular access: udall     LABS/STUDIES  --------------------------------------------------------------------------------              11.9   20.45 >-----------<  480      [09-09-21 @ 05:06]              35.6     145  |  98  |  106  ----------------------------<  146      [09-09-21 @ 05:06]  3.6   |  33  |  4.0        Ca     8.8     [09-09-21 @ 05:06]    TPro  5.3  /  Alb  2.5  /  TBili  0.9  /  DBili  x   /  AST  77  /  ALT  73  /  AlkPhos  198  [09-09-21 @ 05:06]      Creatinine Trend:  SCr 4.0 [09-09 @ 05:06]  SCr 4.6 [09-08 @ 05:00]  SCr 5.1 [09-06 @ 16:16]  SCr 4.9 [09-06 @ 04:45]  SCr 4.9 [09-05 @ 04:49]    Urinalysis - [08-31-21 @ 18:38]      Color Yellow / Appearance Slightly Turbid / SG >1.050 / pH 6.0      Gluc Trace / Ketone Negative  / Bili Negative / Urobili <2 mg/dL       Blood Large / Protein 300 mg/dL / Leuk Est Negative / Nitrite Negative      RBC 9 / WBC 32 / Hyaline 20 / Gran 3 / Sq Epi  / Non Sq Epi 2 / Bacteria Negative    Urine Sodium <20.0      [09-02-21 @ 12:10]  Urine Chloride 20      [09-02-21 @ 12:10]    Lipid: chol 60, , HDL 17, LDL --      [09-01-21 @ 08:16]    HBsAb Nonreact      [09-02-21 @ 22:00]  HBsAg Nonreact      [09-02-21 @ 22:00]  HBcAb Nonreact      [09-02-21 @ 22:00]

## 2021-09-09 NOTE — PHARMACOTHERAPY INTERVENTION NOTE - COMMENTS
-med rec, home med Imdur 120mg daily, will resume  -decrease solumedrol 60mg IV daily, start 9/9, pt received dose earlier  -hydralazine 25mg po q12h, recommended increasing to q8h  -changing pantoprazole to po
plan-start solumedrol 60mg IV q12h, d/w team, recommended stat dose  -amlodipine 10mg po daily, pt received 5mg @ 2:30am, d/w team, would like to admt 10mg today (~11:30am)
med rounds -A fib, start apixaban-2.5mg po q12h (SCr 4, 79yo)  -change solumedrol to prednisone 40mg daily, start 9/10, received solumedrol earlier today  -insulin drip, received 82 units, will start Lantus 45 units & lispro w/ meals & coverage

## 2021-09-09 NOTE — PROGRESS NOTE ADULT - SUBJECTIVE AND OBJECTIVE BOX
OVERNIGHT EVENTS: events noted, insulin drip, on NC 5 L, IV heparin, bipap overnight    Vital Signs Last 24 Hrs  T(C): 36.2 (09 Sep 2021 08:00), Max: 36.6 (08 Sep 2021 20:00)  T(F): 97.2 (09 Sep 2021 08:00), Max: 97.8 (08 Sep 2021 20:00)  HR: 76 (09 Sep 2021 08:00) (62 - 86)  BP: 135/78 (09 Sep 2021 08:00) (112/60 - 158/68)  BP(mean): 95 (09 Sep 2021 08:00) (69 - 142)  RR: 24 (09 Sep 2021 08:00) (12 - 35)  SpO2: 97% (09 Sep 2021 08:00) (92% - 99%)    PHYSICAL EXAMINATION:    GENERAL: ill looking    HEENT: Head is normocephalic and atraumatic.    NECK: Supple.    LUNGS: dec bs r side  HEART: Regular rate and rhythm without murmur.    ABDOMEN: Soft, nontender, and nondistended.      EXTREMITIES: Without any cyanosis, clubbing, rash, lesions or edema.    NEUROLOGIC: Grossly intact.    SKIN: No ulceration or induration present.      LABS:                        11.9   20.45 )-----------( 480      ( 09 Sep 2021 05:06 )             35.6     09-09    145  |  98  |  106<HH>  ----------------------------<  146<H>  3.6   |  33<H>  |  4.0<H>    Ca    8.8      09 Sep 2021 05:06    TPro  5.3<L>  /  Alb  2.5<L>  /  TBili  0.9  /  DBili  x   /  AST  77<H>  /  ALT  73<H>  /  AlkPhos  198<H>  09-09 09-08-21 @ 07:01  -  09-09-21 @ 07:00  --------------------------------------------------------  IN: 1433 mL / OUT: 1775 mL / NET: -342 mL    09-09-21 @ 07:01  -  09-09-21 @ 09:15  --------------------------------------------------------  IN: 35 mL / OUT: 0 mL / NET: 35 mL        MICROBIOLOGY:      MEDICATIONS  (STANDING):  allopurinol 50 milliGRAM(s) Oral daily  amLODIPine   Tablet 10 milliGRAM(s) Oral daily  aspirin  chewable 81 milliGRAM(s) Oral daily  atorvastatin 40 milliGRAM(s) Oral at bedtime  cefTRIAXone   IVPB 1000 milliGRAM(s) IV Intermittent every 24 hours  chlorhexidine 4% Liquid 1 Application(s) Topical <User Schedule>  dextrose 40% Gel 15 Gram(s) Oral once  dextrose 5%. 1000 milliLiter(s) (50 mL/Hr) IV Continuous <Continuous>  dextrose 5%. 1000 milliLiter(s) (100 mL/Hr) IV Continuous <Continuous>  dextrose 50% Injectable 25 Gram(s) IV Push once  dextrose 50% Injectable 12.5 Gram(s) IV Push once  dextrose 50% Injectable 25 Gram(s) IV Push once  dextrose 50% Injectable 50 milliLiter(s) IV Push every 15 minutes  glucagon  Injectable 1 milliGRAM(s) IntraMuscular once  heparin  Infusion 1500 Unit(s)/Hr (15 mL/Hr) IV Continuous <Continuous>  hydrALAZINE 25 milliGRAM(s) Oral every 8 hours  insulin regular Infusion 9 Unit(s)/Hr (9 mL/Hr) IV Continuous <Continuous>  isosorbide   mononitrate ER Tablet (IMDUR) 120 milliGRAM(s) Oral daily  levoFLOXacin IVPB      levoFLOXacin IVPB 250 milliGRAM(s) IV Intermittent every 24 hours  methylPREDNISolone sodium succinate Injectable 60 milliGRAM(s) IV Push every 24 hours  nystatin    Suspension [id number] Unit(s) Swish and Swallow every 6 hours  pantoprazole    Tablet 40 milliGRAM(s) Oral before breakfast    MEDICATIONS  (PRN):  acetaminophen   Tablet .. 650 milliGRAM(s) Oral every 6 hours PRN Temp greater or equal to 38C (100.4F), Mild Pain (1 - 3)  morphine  - Injectable 2 milliGRAM(s) IV Push every 4 hours PRN agitation      RADIOLOGY & ADDITIONAL STUDIES:

## 2021-09-09 NOTE — PROGRESS NOTE ADULT - SUBJECTIVE AND OBJECTIVE BOX
MISTY BURLESON 80y Male  MRN#: [id number]   Hospital Day: 9d    SUBJECTIVE  Patient is a 80y old Male who presents with a chief complaint of sob (09 Sep 2021 09:15)  Currently admitted to medicine with the primary diagnosis of Pneumonia      INTERVAL HPI AND OVERNIGHT EVENTS:  Patient was examined and seen at bedside. This morning he is resting comfortably in bed. He had one hypoglycemic episode overnight where his glucose went down to 69. It is now stable in the mid-low 100's.    REVIEW OF SYMPTOMS:  CONSTITUTIONAL: No weakness, fevers or chills; No headaches  ENT: No vertigo; No ear pain or change in hearing; No difficulty swallowing. + sore throat.  RESPIRATORY: + cough. No wheezing, or hemoptysis; No shortness of breath  CARDIOVASCULAR: No chest pain or palpitations  GASTROINTESTINAL: No abdominal or epigastric pain; No nausea, vomiting, or hematemesis; No diarrhea or constipation; No melena or hematochezia  GENITOURINARY: No dysuria, frequency or hematuria  MUSCULOSKELETAL: No joint pain, no muscle pain, no weakness  NEUROLOGICAL: No numbness or weakness  SKIN: No itching or rashes    OBJECTIVE  PAST MEDICAL & SURGICAL HISTORY  Atrial fibrillation    Coronary artery disease    Hyperlipidemia    Hypertension    Gout    Diabetes mellitus    VINI on CPAP    History of heart artery stent      ALLERGIES:  No Known Allergies    MEDICATIONS:  STANDING MEDICATIONS  allopurinol 50 milliGRAM(s) Oral daily  amLODIPine   Tablet 10 milliGRAM(s) Oral daily  apixaban 2.5 milliGRAM(s) Oral two times a day  aspirin  chewable 81 milliGRAM(s) Oral daily  atorvastatin 40 milliGRAM(s) Oral at bedtime  cefTRIAXone   IVPB 1000 milliGRAM(s) IV Intermittent every 24 hours  chlorhexidine 4% Liquid 1 Application(s) Topical <User Schedule>  dextrose 40% Gel 15 Gram(s) Oral once  dextrose 5%. 1000 milliLiter(s) IV Continuous <Continuous>  dextrose 5%. 1000 milliLiter(s) IV Continuous <Continuous>  dextrose 50% Injectable 25 Gram(s) IV Push once  dextrose 50% Injectable 12.5 Gram(s) IV Push once  dextrose 50% Injectable 25 Gram(s) IV Push once  dextrose 50% Injectable 50 milliLiter(s) IV Push every 15 minutes  glucagon  Injectable 1 milliGRAM(s) IntraMuscular once  hydrALAZINE 25 milliGRAM(s) Oral every 8 hours  insulin glargine Injectable (LANTUS) 45 Unit(s) SubCutaneous once  insulin lispro (ADMELOG) corrective regimen sliding scale   SubCutaneous three times a day before meals  isosorbide   mononitrate ER Tablet (IMDUR) 120 milliGRAM(s) Oral daily  levoFLOXacin IVPB      levoFLOXacin IVPB 250 milliGRAM(s) IV Intermittent every 24 hours  nystatin    Suspension 067035 Unit(s) Swish and Swallow every 6 hours  pantoprazole    Tablet 40 milliGRAM(s) Oral before breakfast    PRN MEDICATIONS  acetaminophen   Tablet .. 650 milliGRAM(s) Oral every 6 hours PRN  morphine  - Injectable 2 milliGRAM(s) IV Push every 4 hours PRN      VITAL SIGNS: Last 24 Hours  T(C): 36.2 (09 Sep 2021 08:00), Max: 36.6 (08 Sep 2021 20:00)  T(F): 97.2 (09 Sep 2021 08:00), Max: 97.8 (08 Sep 2021 20:00)  HR: 76 (09 Sep 2021 08:00) (62 - 86)  BP: 135/78 (09 Sep 2021 08:00) (112/60 - 158/68)  BP(mean): 95 (09 Sep 2021 08:00) (69 - 142)  RR: 29 (09 Sep 2021 10:49) (16 - 35)  SpO2: 99% (09 Sep 2021 10:49) (92% - 99%)    LABS:                        11.9   20.45 )-----------( 480      ( 09 Sep 2021 05:06 )             35.6     09-09    145  |  98  |  106<HH>  ----------------------------<  146<H>  3.6   |  33<H>  |  4.0<H>    Ca    8.8      09 Sep 2021 05:06    TPro  5.3<L>  /  Alb  2.5<L>  /  TBili  0.9  /  DBili  x   /  AST  77<H>  /  ALT  73<H>  /  AlkPhos  198<H>  09-09    RADIOLOGY:  < from: Xray Chest 1 View- PORTABLE-Routine (Xray Chest 1 View- PORTABLE-Routine in AM.) (09.09.21 @ 05:00) >  Impression:    Stable bilateral lung opacities    --- End of Report ---    < end of copied text >    PHYSICAL EXAM:  CONSTITUTIONAL: No acute distress, well-developed, well-groomed, AAOx3  HEAD: Atraumatic, normocephalic  EYES: EOM intact, conjunctiva and sclera clear  ENT: Supple, no masses; moist mucous membranes. Thrush on tongue.  PULMONARY: Decreased breath sounds on RUL, normal breath sounds on left lung; no wheezes, rales, or rhonchi  CARDIOVASCULAR: Regular rate and rhythm; no murmurs, rubs, or gallops  GASTROINTESTINAL: Soft, non-tender, non-distended; bowel sounds present  MUSCULOSKELETAL: no clubbing, no cyanosis, edema of left lower extremity. Venous stasis ulcer of left lower extremity.  NEUROLOGY: non-focal, able to move all extremities.    ASSESSMENT & PLAN:  Patient is an 81 y/o male with a pmhx of afib on xeralto at home, CAD s/p stent placement in 2014, HLD, HTN, DM, VINI on CPAP at home, and gout. Today he reports that he is feeling good. He wants to get up and out of bed. His blood pressure is more controlled compared to yesterday, and his sugars are more controlled as well.    #CAP  - Clinically improving on rocephin and levoquin, continue for       #Misc  - DVT Prophylaxis:  - GI Prophylaxis:  - Diet:  - Activity:  - IV Fluids:  - Code Status:    Dispo: MISTY BURLESON 80y Male  MRN#: [id number]   Hospital Day: 9d    SUBJECTIVE  Patient is a 80y old Male who presents with a chief complaint of sob (09 Sep 2021 09:15)  Currently admitted to medicine with the primary diagnosis of Pneumonia      INTERVAL HPI AND OVERNIGHT EVENTS:  Patient was examined and seen at bedside. This morning he is resting comfortably in bed. He had one hypoglycemic episode overnight where his glucose went down to 69. It is now stable in the mid-low 100's.    REVIEW OF SYMPTOMS:  CONSTITUTIONAL: No weakness, fevers or chills; No headaches  ENT: No vertigo; No ear pain or change in hearing; No difficulty swallowing. + sore throat.  RESPIRATORY: + cough. No wheezing, or hemoptysis; No shortness of breath  CARDIOVASCULAR: No chest pain or palpitations  GASTROINTESTINAL: No abdominal or epigastric pain; No nausea, vomiting, or hematemesis; No diarrhea or constipation; No melena or hematochezia  GENITOURINARY: No dysuria, frequency or hematuria  MUSCULOSKELETAL: No joint pain, no muscle pain, no weakness  NEUROLOGICAL: No numbness or weakness  SKIN: No itching or rashes    OBJECTIVE  PAST MEDICAL & SURGICAL HISTORY  Atrial fibrillation    Coronary artery disease    Hyperlipidemia    Hypertension    Gout    Diabetes mellitus    VINI on CPAP    History of heart artery stent      ALLERGIES:  No Known Allergies    MEDICATIONS:  STANDING MEDICATIONS  allopurinol 50 milliGRAM(s) Oral daily  amLODIPine   Tablet 10 milliGRAM(s) Oral daily  apixaban 2.5 milliGRAM(s) Oral two times a day  aspirin  chewable 81 milliGRAM(s) Oral daily  atorvastatin 40 milliGRAM(s) Oral at bedtime  cefTRIAXone   IVPB 1000 milliGRAM(s) IV Intermittent every 24 hours  chlorhexidine 4% Liquid 1 Application(s) Topical <User Schedule>  dextrose 40% Gel 15 Gram(s) Oral once  dextrose 5%. 1000 milliLiter(s) IV Continuous <Continuous>  dextrose 5%. 1000 milliLiter(s) IV Continuous <Continuous>  dextrose 50% Injectable 25 Gram(s) IV Push once  dextrose 50% Injectable 12.5 Gram(s) IV Push once  dextrose 50% Injectable 25 Gram(s) IV Push once  dextrose 50% Injectable 50 milliLiter(s) IV Push every 15 minutes  glucagon  Injectable 1 milliGRAM(s) IntraMuscular once  hydrALAZINE 25 milliGRAM(s) Oral every 8 hours  insulin glargine Injectable (LANTUS) 45 Unit(s) SubCutaneous once  insulin lispro (ADMELOG) corrective regimen sliding scale   SubCutaneous three times a day before meals  isosorbide   mononitrate ER Tablet (IMDUR) 120 milliGRAM(s) Oral daily  levoFLOXacin IVPB      levoFLOXacin IVPB 250 milliGRAM(s) IV Intermittent every 24 hours  nystatin    Suspension [id number] Unit(s) Swish and Swallow every 6 hours  pantoprazole    Tablet 40 milliGRAM(s) Oral before breakfast    PRN MEDICATIONS  acetaminophen   Tablet .. 650 milliGRAM(s) Oral every 6 hours PRN  morphine  - Injectable 2 milliGRAM(s) IV Push every 4 hours PRN      VITAL SIGNS: Last 24 Hours  T(C): 36.2 (09 Sep 2021 08:00), Max: 36.6 (08 Sep 2021 20:00)  T(F): 97.2 (09 Sep 2021 08:00), Max: 97.8 (08 Sep 2021 20:00)  HR: 76 (09 Sep 2021 08:00) (62 - 86)  BP: 135/78 (09 Sep 2021 08:00) (112/60 - 158/68)  BP(mean): 95 (09 Sep 2021 08:00) (69 - 142)  RR: 29 (09 Sep 2021 10:49) (16 - 35)  SpO2: 99% (09 Sep 2021 10:49) (92% - 99%)    LABS:                        11.9   20.45 )-----------( 480      ( 09 Sep 2021 05:06 )             35.6     09-09    145  |  98  |  106<HH>  ----------------------------<  146<H>  3.6   |  33<H>  |  4.0<H>    Ca    8.8      09 Sep 2021 05:06    TPro  5.3<L>  /  Alb  2.5<L>  /  TBili  0.9  /  DBili  x   /  AST  77<H>  /  ALT  73<H>  /  AlkPhos  198<H>  09-09    RADIOLOGY:  < from: Xray Chest 1 View- PORTABLE-Routine (Xray Chest 1 View- PORTABLE-Routine in AM.) (09.09.21 @ 05:00) >  Impression:    Stable bilateral lung opacities    --- End of Report ---    < end of copied text >    PHYSICAL EXAM:  CONSTITUTIONAL: No acute distress, well-developed, well-groomed, AAOx3  HEAD: Atraumatic, normocephalic  EYES: EOM intact, conjunctiva and sclera clear  ENT: Supple, no masses; moist mucous membranes. Thrush on tongue.  PULMONARY: Decreased breath sounds on RUL, normal breath sounds on left lung; no wheezes, rales, or rhonchi  CARDIOVASCULAR: Regular rate and rhythm; no murmurs, rubs, or gallops  GASTROINTESTINAL: Soft, non-tender, non-distended; bowel sounds present  MUSCULOSKELETAL: no clubbing, no cyanosis, edema of left lower extremity. Venous stasis ulcer of left lower extremity.  NEUROLOGY: non-focal, able to move all extremities.    ASSESSMENT & PLAN:  Patient is an 81 y/o male with a pmhx of afib on xeralto at home, CAD s/p stent placement in 2014, HLD, HTN, DM, VINI on CPAP at home, and gout. Today he reports that he is feeling good. He wants to get up and out of bed. His blood pressure is more controlled compared to yesterday, and his sugars are more controlled as well. No shortness of breath, but he does admit to coughing still.    #CAP  - Clinically improving on rocephin and levoquin, day 10/14 on antibiotics.  - Continue daily CXR  - Uptrending WBC is most likely due to the steroids  - Change solumedrol to prednisone 40mg daily  - Continue 5L on NC  - Consult PT/OT to get him out of bed to chair, has not been out of bed since he has been here    #DIETER on CKD  - Improving Cr  - Rising BUN due to the steroids  - D/c udall per nephro  - Correct K+ to 4  - F/u phosphate  - Continue to monitor Cr and BUN    #DM  - Fasting sugars are now in the mid-low 100's  - Continue monitoring FS  - Insulin sliding scale as needed    #Afib  - On xeralto at home  - D/c heparin, start eliquis   - Continue to monitor on the tele    #Oral thrush  - Complains of a sore throat, but it's getting better with the nystatin mouth wash  - Continue nystatin swish and swallow    #VINI on CPAP at home  - Continue with BIPAP at night    #HLD  #CAD  #HTN  - Continue with aspirin and atorvostatin  - Continue with isosorbide mononitrate  - BP is more controlled now on hydralazine and amlodipine, continue  - Monitor BP    #Gout  - Continue allopurinol    #Misc  - DVT Prophylaxis: Eliquis  - GI Prophylaxis: Protonix  - Diet: Dysphagia 1 pureed  - Activity: Out of bed to chair  - IV Fluids: IVL  - Code Status: Full code    Dispo: MICU

## 2021-09-09 NOTE — PROGRESS NOTE ADULT - ASSESSMENT
IMPRESSION:    Acute hypoxemic resp failure on NC 5 L   Severe CAP/ Pneumonitis  Sepsis present on admission  Acute on Chronic renal failure with improved UO with IV fluid   HO A fib      PLAN:    CNS: avoid CNS depressant    HEENT:  Oral care    PULMONARY:  HOB @ 45 degrees, BIPAP/ HHFNC keep SAo2 88 to 92%, Aspiration precaution, trial  NC    CARDIOVASCULAR: Avoid overload. IV heaprin, start hydralazine/ imdur    GI: GI prophylaxis                                          Feeding po    RENAL:  F/u  lytes.  Correct as needed.  Accurate I/O, trend CMP.      INFECTIOUS DISEASE: abx per ID, prednisone 40 daily    HEMATOLOGICAL:  DVT prophylaxis. On IV Hep. ELIQUIS    ENDOCRINE:  Follow up FS.  Insulin protocol if needed.    CODE STATUS: FULL CODE    DISPOSITION: MICU  PT/ OT  DC U DOLL  Very poor prognosis

## 2021-09-09 NOTE — PROGRESS NOTE ADULT - SUBJECTIVE AND OBJECTIVE BOX
MISTY BURLESON 80y Male  MRN#: [id number]   Hospital Day: 9d    SUBJECTIVE  Patient is a 80y old Male who presents with a chief complaint of Community acquired pneumonia (09 Sep 2021 11:49)  Currently admitted to medicine with the primary diagnosis of Pneumonia      INTERVAL HPI AND OVERNIGHT EVENTS:  Patient was examined and seen at bedside. This morning he is resting comfortably in bed and reports no issues or overnight events.    REVIEW OF SYMPTOMS:  CONSTITUTIONAL: No weakness, fevers or chills; No headaches  EYES: No visual changes, eye pain, or discharge  ENT: No vertigo; No ear pain or change in hearing; No sore throat or difficulty swallowing  NECK: No pain or stiffness  RESPIRATORY: No cough, wheezing, or hemoptysis; No shortness of breath  CARDIOVASCULAR: No chest pain or palpitations  GASTROINTESTINAL: No abdominal or epigastric pain; No nausea, vomiting, or hematemesis; No diarrhea or constipation; No melena or hematochezia  GENITOURINARY: No dysuria, frequency or hematuria  MUSCULOSKELETAL: No joint pain, no muscle pain, no weakness  NEUROLOGICAL: No numbness or weakness  SKIN: No itching or rashes    OBJECTIVE  PAST MEDICAL & SURGICAL HISTORY  Atrial fibrillation    Coronary artery disease    Hyperlipidemia    Hypertension    Gout    Diabetes mellitus    VINI on CPAP    History of heart artery stent      ALLERGIES:  No Known Allergies    MEDICATIONS:  STANDING MEDICATIONS  allopurinol 50 milliGRAM(s) Oral daily  amLODIPine   Tablet 10 milliGRAM(s) Oral daily  apixaban 2.5 milliGRAM(s) Oral two times a day  aspirin  chewable 81 milliGRAM(s) Oral daily  atorvastatin 40 milliGRAM(s) Oral at bedtime  cefTRIAXone   IVPB 1000 milliGRAM(s) IV Intermittent every 24 hours  chlorhexidine 4% Liquid 1 Application(s) Topical <User Schedule>  dextrose 40% Gel 15 Gram(s) Oral once  dextrose 5%. 1000 milliLiter(s) IV Continuous <Continuous>  dextrose 5%. 1000 milliLiter(s) IV Continuous <Continuous>  dextrose 50% Injectable 25 Gram(s) IV Push once  dextrose 50% Injectable 12.5 Gram(s) IV Push once  dextrose 50% Injectable 25 Gram(s) IV Push once  dextrose 50% Injectable 50 milliLiter(s) IV Push every 15 minutes  glucagon  Injectable 1 milliGRAM(s) IntraMuscular once  hydrALAZINE 25 milliGRAM(s) Oral every 8 hours  insulin glargine Injectable (LANTUS) 45 Unit(s) SubCutaneous once  insulin lispro (ADMELOG) corrective regimen sliding scale   SubCutaneous three times a day before meals  insulin lispro Injectable (ADMELOG) 10 Unit(s) SubCutaneous three times a day before meals  isosorbide   mononitrate ER Tablet (IMDUR) 120 milliGRAM(s) Oral daily  levoFLOXacin IVPB      levoFLOXacin IVPB 250 milliGRAM(s) IV Intermittent every 24 hours  nystatin    Suspension 625206 Unit(s) Swish and Swallow every 6 hours  pantoprazole    Tablet 40 milliGRAM(s) Oral before breakfast    PRN MEDICATIONS  acetaminophen   Tablet .. 650 milliGRAM(s) Oral every 6 hours PRN  morphine  - Injectable 2 milliGRAM(s) IV Push every 4 hours PRN      VITAL SIGNS: Last 24 Hours  T(C): 36.2 (09 Sep 2021 08:00), Max: 36.6 (08 Sep 2021 20:00)  T(F): 97.2 (09 Sep 2021 08:00), Max: 97.8 (08 Sep 2021 20:00)  HR: 88 (09 Sep 2021 11:00) (62 - 88)  BP: 131/51 (09 Sep 2021 11:00) (112/60 - 158/68)  BP(mean): 67 (09 Sep 2021 11:00) (67 - 142)  RR: 30 (09 Sep 2021 11:00) (16 - 35)  SpO2: 99% (09 Sep 2021 11:00) (92% - 99%)    LABS:                        11.9   20.45 )-----------( 480      ( 09 Sep 2021 05:06 )             35.6     09-09    145  |  98  |  106<HH>  ----------------------------<  146<H>  3.6   |  33<H>  |  4.0<H>    Ca    8.8      09 Sep 2021 05:06    TPro  5.3<L>  /  Alb  2.5<L>  /  TBili  0.9  /  DBili  x   /  AST  77<H>  /  ALT  73<H>  /  AlkPhos  198<H>  09-09                  RADIOLOGY:      PHYSICAL EXAM:  CONSTITUTIONAL: No acute distress, well-developed, well-groomed, AAOx3  HEAD: Atraumatic, normocephalic  EYES: EOM intact, PERRLA, conjunctiva and sclera clear  ENT: Supple, no masses, no thyromegaly, no bruits, no JVD; moist mucous membranes  PULMONARY: Clear to auscultation bilaterally; no wheezes, rales, or rhonchi  CARDIOVASCULAR: Regular rate and rhythm; no murmurs, rubs, or gallops  GASTROINTESTINAL: Soft, non-tender, non-distended; bowel sounds present  MUSCULOSKELETAL: 2+ peripheral pulses; no clubbing, no cyanosis, no edema  NEUROLOGY: non-focal  SKIN: No rashes or lesions; warm and dry   MISTY BURLESON 80y Male  MRN#: [id number]   Hospital Day: 9d    SUBJECTIVE  Patient is a 80y old Male who presents with a chief complaint of Community acquired pneumonia (09 Sep 2021 11:49)  Currently admitted to medicine with the primary diagnosis of Pneumonia      INTERVAL HPI AND OVERNIGHT EVENTS:  Patient was examined and seen at bedside. This morning he is resting comfortably in bed and reports no issues or overnight events.  OVERNIGHT: tolerated BiPAP, one episode of hypoglycemia to 69, drip adjusted    REVIEW OF SYMPTOMS:  CONSTITUTIONAL: No weakness, fevers or chills; No headaches  EYES: No visual changes, eye pain, or discharge  ENT: No vertigo; No ear pain or change in hearing; No sore throat or difficulty swallowing  NECK: No pain or stiffness  RESPIRATORY: No cough, wheezing, or hemoptysis; No shortness of breath  CARDIOVASCULAR: No chest pain or palpitations  GASTROINTESTINAL: No abdominal or epigastric pain; No nausea, vomiting, or hematemesis; No diarrhea or constipation; No melena or hematochezia  GENITOURINARY: No dysuria, frequency or hematuria  MUSCULOSKELETAL: No joint pain, no muscle pain, no weakness  NEUROLOGICAL: No numbness or weakness  SKIN: LLE EDEMA AND BLISTER    OBJECTIVE  PAST MEDICAL & SURGICAL HISTORY  Atrial fibrillation    Coronary artery disease    Hyperlipidemia    Hypertension    Gout    Diabetes mellitus    VINI on CPAP    History of heart artery stent      ALLERGIES:  No Known Allergies    MEDICATIONS:  STANDING MEDICATIONS  allopurinol 50 milliGRAM(s) Oral daily  amLODIPine   Tablet 10 milliGRAM(s) Oral daily  apixaban 2.5 milliGRAM(s) Oral two times a day  aspirin  chewable 81 milliGRAM(s) Oral daily  atorvastatin 40 milliGRAM(s) Oral at bedtime  cefTRIAXone   IVPB 1000 milliGRAM(s) IV Intermittent every 24 hours  chlorhexidine 4% Liquid 1 Application(s) Topical <User Schedule>  dextrose 40% Gel 15 Gram(s) Oral once  dextrose 5%. 1000 milliLiter(s) IV Continuous <Continuous>  dextrose 5%. 1000 milliLiter(s) IV Continuous <Continuous>  dextrose 50% Injectable 25 Gram(s) IV Push once  dextrose 50% Injectable 12.5 Gram(s) IV Push once  dextrose 50% Injectable 25 Gram(s) IV Push once  dextrose 50% Injectable 50 milliLiter(s) IV Push every 15 minutes  glucagon  Injectable 1 milliGRAM(s) IntraMuscular once  hydrALAZINE 25 milliGRAM(s) Oral every 8 hours  insulin glargine Injectable (LANTUS) 45 Unit(s) SubCutaneous once  insulin lispro (ADMELOG) corrective regimen sliding scale   SubCutaneous three times a day before meals  insulin lispro Injectable (ADMELOG) 10 Unit(s) SubCutaneous three times a day before meals  isosorbide   mononitrate ER Tablet (IMDUR) 120 milliGRAM(s) Oral daily  levoFLOXacin IVPB      levoFLOXacin IVPB 250 milliGRAM(s) IV Intermittent every 24 hours  nystatin    Suspension [id number] Unit(s) Swish and Swallow every 6 hours  pantoprazole    Tablet 40 milliGRAM(s) Oral before breakfast    PRN MEDICATIONS  acetaminophen   Tablet .. 650 milliGRAM(s) Oral every 6 hours PRN  morphine  - Injectable 2 milliGRAM(s) IV Push every 4 hours PRN      VITAL SIGNS: Last 24 Hours  T(C): 36.2 (09 Sep 2021 08:00), Max: 36.6 (08 Sep 2021 20:00)  T(F): 97.2 (09 Sep 2021 08:00), Max: 97.8 (08 Sep 2021 20:00)  HR: 88 (09 Sep 2021 11:00) (62 - 88)  BP: 131/51 (09 Sep 2021 11:00) (112/60 - 158/68)  BP(mean): 67 (09 Sep 2021 11:00) (67 - 142)  RR: 30 (09 Sep 2021 11:00) (16 - 35)  SpO2: 99% (09 Sep 2021 11:00) (92% - 99%)    LABS:                        11.9   20.45 )-----------( 480      ( 09 Sep 2021 05:06 )             35.6     09-09    145  |  98  |  106<HH>  ----------------------------<  146<H>  3.6   |  33<H>  |  4.0<H>    Ca    8.8      09 Sep 2021 05:06    TPro  5.3<L>  /  Alb  2.5<L>  /  TBili  0.9  /  DBili  x   /  AST  77<H>  /  ALT  73<H>  /  AlkPhos  198<H>  09-09                  RADIOLOGY:  Xray Chest 1 View- PORTABLE-Routine (Xray Chest 1 View- PORTABLE-Routine in AM.) (09.09.21 @ 05:00) >  Impression:    Stable bilateral lung opacities          PHYSICAL EXAM:  CONSTITUTIONAL: No acute distress,OBESE CLASS II,  HEAD: Atraumatic, normocephalic  EYES: PERRLA, conjunctiva and sclera clear  ENT: Supple, no masses, no thyromegaly, no bruits,   PULMONARY: VERY MILD RT SIDED LATE EXPIRATORY CRACKLES   CARDIOVASCULAR: Regular rate and rhythm; no murmurs, rubs, or gallops  GASTROINTESTINAL: Soft, non-tender, non-distended; bowel sounds present  MUSCULOSKELETAL: 2+ peripheral pulses; no clubbing, no cyanosis,LLE EDEMA, NON PITTING   NEUROLOGY: A&O X 4  SKIN: LLE VENUS STASIS DERMATITIS W HEALING BLISTER WRAPPED, DRESSING CLEAN AND DRY

## 2021-09-09 NOTE — PROGRESS NOTE ADULT - ASSESSMENT
Patient is a 81 YO M w a PMH of A-fib (on home xarelto) CAD (s/p stent 2014),gout, Alcohol dependence (5-6 drinks/day), VINI (on home CPAP) addmitted for acute hypoxic respiratory failure secondary to SEVERE community acquired pneumonia. Patient was septic on admission (febrile, leukocytosis, tachycardia, hypoxic w identifiable source). Found to have HAGMA and UA + for WBC. Of note patient is s/p 2 x COVID vaccine. Patient was MRSA nares negative w no growth to date from lesion biopsy. Patient is clinically improving and doing well on his BiPAP at night. When i went into the room 9/6 morning BiPAP was on his cheek and he was maintaining O2 of 88-93%. Patient is hypokalemic 9/7 with an increasing BUN, CVVHD due today w/ Ramirez to be removed after. Patient was hypertensive to 187/80 w HR 76 overnight and responded well to Amlidipine 5. Home dose of Amlodipine 10 restarted today. Patient passed speech and swallow evaluation, note pending for diet order, NG tube pulled. Repeat chest XR showed stable RT consolidation, on physical exam no rhonchi or crackles, much improved from previous exams. Solumedrol 60 q12 started 9/7. Oral care also increased to q2hr  9/8 Patient was hypertensive to 181/70's, Hyralazine and Imdur started. Patient now 's. Was changed to HF NC at 5L and saturating well. Condom catheter was placed and UO was ~100cc/hr. Cr is downtrending, BUN uptrending, No HD today. AM glucose 412. Insulin drip started   9/9 insulin drip stopped, Lantus 45 started w Admelog 10 before meals, BP well controlled with hydralazine 25 and IMDUR 120. Patient appears well and is the most conversational and awake that I have seen thus far. Patient tolerating diet well. Increasing leukocytosis (afebrile no other signs of sepsis) likely secondary to Solumedrol, solumedrol d/c-ed and Prednisone 40mg once daily started      Problem List:  #acute hypoxemic respiratory failure secondary to severe community acquired PMN complicated by Sepsis,   -STABLE WBC. Afebrile. NO GROWTH TO DATE   -negative MRSA, legionella and COVID -  -c/w ceftriaxone 1000mg once daily  -c/w levofloxacin 250 mg once daily   - today is day 10/14 of antibiotic duration, 4 more days  -d/c LINEZOLID   -f/u blood cultures  (NEGATIVE TO DATE)  -Repeat vBG once daily  -CXR once daily   -ORSA REPORTED BY INFECTION CONTROL, UNABLE TO FIND CULTURE THEY ARE FOLLOWING. THERE WAS A +PCR IN 2020 BUT NEGATIVE MRSA NARES SEPT 2 2021. ATTEMPTED TO CONTACT OFFICE FOR CLARIFICATION, UNABLE TO REACH THEM   -patient tolerated BiPAP last night and is feeling better this morning.  -c/w BiPAP at night, NC by day   -d/c Solumedrol 60 q 12 IV and replace w 40mg Prednisone once daily   -NC 5L today and saturating 100%  -trend WBC     #HTN  Patient had BP as high as 189/90  -Hydralazine 25 q 8 and Imdur 120 started today  -SBP now 130's    #White plaque on tongue  -steroids use, open mouth, satelite lesions looks like Candidiasis   -nurse concerned for Candidiasis  -Nystatin 5 q 6 swish and swallow started  -follow up oral leasion  -oral hygene q 2hr     #Acute on Chronic renal failure oliguric  patient had HD 9/1, 1 L removed NOW S/P UDOL PLACEMENT RT IJV  -asymptomatic bacteruria   -f/u for signs/symptoms of UTI  -Repeat vBG once daily  -patient was alkalotic 9/5 and Bicarb drip was d/c  -held dialysis 9/4-9-9  -BUN uptrending, Cr downtrending, UO rapidly increasing all indicative of RESOLVING ATN      #Chronic venous stasis dermatitis   -keep leg wrapped  -elevate legs  -outpatient evaluation   -compression socks  -WOUND CULTURE TAKEN 9/5 F/U CULTURES NO GROWTH TO DATE        PAST MEDICAL & SURGICAL HISTORY:  Atrial fibrillation    Coronary artery disease    Hyperlipidemia    Hypertension    Gout    Diabetes mellitus    VINI on CPAP    History of heart artery stent        #Misc  - DVT Prophylaxis: heparin   - GI Prophylaxis: pantoprazole   - Diet:  - Activity: OUT OF BED TO CHAIR   - IV Fluids:   - Code Status: full  Patient is a 81 YO M w a PMH of A-fib (on home xarelto) CAD (s/p stent 2014),gout, Alcohol dependence (5-6 drinks/day), VINI (on home CPAP) addmitted for acute hypoxic respiratory failure secondary to SEVERE community acquired pneumonia. Patient was septic on admission (febrile, leukocytosis, tachycardia, hypoxic w identifiable source). Found to have HAGMA and UA + for WBC. Of note patient is s/p 2 x COVID vaccine. Patient was MRSA nares negative w no growth to date from lesion biopsy. Patient is clinically improving and doing well on his BiPAP at night. When i went into the room 9/6 morning BiPAP was on his cheek and he was maintaining O2 of 88-93%. Patient is hypokalemic 9/7 with an increasing BUN, CVVHD due today w/ Ramirez to be removed after. Patient was hypertensive to 187/80 w HR 76 overnight and responded well to Amlidipine 5. Home dose of Amlodipine 10 restarted today. Patient passed speech and swallow evaluation, note pending for diet order, NG tube pulled. Repeat chest XR showed stable RT consolidation, on physical exam no rhonchi or crackles, much improved from previous exams. Solumedrol 60 q12 started 9/7. Oral care also increased to q2hr  9/8 Patient was hypertensive to 181/70's, Hyralazine and Imdur started. Patient now 's. Was changed to HF NC at 5L and saturating well. Condom catheter was placed and UO was ~100cc/hr. Cr is downtrending, BUN uptrending, No HD today. AM glucose 412. Insulin drip started   9/9 insulin drip stopped, Lantus 45 started w Admelog 10 before meals, BP well controlled with hydralazine 25 and IMDUR 120. Patient appears well and is the most conversational and awake that I have seen thus far. Patient tolerating diet well. Increasing leukocytosis (afebrile no other signs of sepsis) likely secondary to Solumedrol, solumedrol d/c-ed and Prednisone 40mg once daily started.      Problem List:  #acute hypoxemic respiratory failure secondary to severe community acquired PMN complicated by Sepsis,   -STABLE WBC. Afebrile. NO GROWTH TO DATE   -negative MRSA, legionella and COVID -  -c/w ceftriaxone 1000mg once daily  -c/w levofloxacin 250 mg once daily   - today is day 10/14 of antibiotic duration, 4 more days  -d/c LINEZOLID   -f/u blood cultures  (NEGATIVE TO DATE)  -Repeat vBG once daily  -CXR once daily   -ORSA REPORTED BY INFECTION CONTROL, UNABLE TO FIND CULTURE THEY ARE FOLLOWING. THERE WAS A +PCR IN 2020 BUT NEGATIVE MRSA NARES SEPT 2 2021. ATTEMPTED TO CONTACT OFFICE FOR CLARIFICATION, UNABLE TO REACH THEM   -patient tolerated BiPAP last night and is feeling better this morning.  -c/w BiPAP at night, NC by day   -d/c Solumedrol 60 q 12 IV and replace w 40mg Prednisone once daily   -NC 5L today and saturating 100%  -trend WBC     #HTN  Patient had BP as high as 189/90  -Hydralazine 25 q 8 and Imdur 120 started today  -SBP now 130's    #White plaque on tongue  -steroids use, open mouth, satelite lesions looks like Candidiasis   -nurse concerned for Candidiasis  -Nystatin 5 q 6 swish and swallow started  -follow up oral leasion  -oral hygene q 2hr     #Acute on Chronic renal failure oliguric  patient had HD 9/1, 1 L removed NOW S/P UDOL PLACEMENT RT IJV  -asymptomatic bacteruria   -f/u for signs/symptoms of UTI  -Repeat vBG once daily  -patient was alkalotic 9/5 and Bicarb drip was d/c  -held dialysis 9/4-9-9  -BUN uptrending, Cr downtrending, UO rapidly increasing all indicative of RESOLVING ATN      #Chronic venous stasis dermatitis   -keep leg wrapped  -elevate legs  -outpatient evaluation   -compression socks  -WOUND CULTURE TAKEN 9/5 F/U CULTURES NO GROWTH TO DATE    #Necrotic Sacral Wound  wound care following, sacral wound now necrotic  -wound care nurse note pending, will implement wound care instructions ASAP         PAST MEDICAL & SURGICAL HISTORY:  Atrial fibrillation    Coronary artery disease    Hyperlipidemia    Hypertension    Gout    Diabetes mellitus    VINI on CPAP    History of heart artery stent        #Misc  - DVT Prophylaxis: heparin   - GI Prophylaxis: pantoprazole   - Diet:  - Activity: OUT OF BED TO CHAIR   - IV Fluids:   - Code Status: full

## 2021-09-09 NOTE — PROGRESS NOTE ADULT - ASSESSMENT
DIETER / ATN in nature d/t sepsis / possible JOSE contributing / oligo-anuric / creat up-trending / no improvement w/ iv hydration/ PNA / acute respiratory failure   # UO improving   # no need for HD today   # creatinine trending down   # d/c udall   # BUN notes stable, on steroids   # BP better controlled   # check ph level   #correct k to 4   # remains on rocephine, and levaquin   # will follow

## 2021-09-10 LAB
ALBUMIN SERPL ELPH-MCNC: 2.6 G/DL — LOW (ref 3.5–5.2)
ALP SERPL-CCNC: 201 U/L — HIGH (ref 30–115)
ALT FLD-CCNC: 78 U/L — HIGH (ref 0–41)
ANION GAP SERPL CALC-SCNC: 12 MMOL/L — SIGNIFICANT CHANGE UP (ref 7–14)
ANION GAP SERPL CALC-SCNC: 15 MMOL/L — HIGH (ref 7–14)
AST SERPL-CCNC: 88 U/L — HIGH (ref 0–41)
BILIRUB SERPL-MCNC: 0.9 MG/DL — SIGNIFICANT CHANGE UP (ref 0.2–1.2)
BUN SERPL-MCNC: 101 MG/DL — CRITICAL HIGH (ref 10–20)
BUN SERPL-MCNC: 101 MG/DL — CRITICAL HIGH (ref 10–20)
CALCIUM SERPL-MCNC: 9 MG/DL — SIGNIFICANT CHANGE UP (ref 8.5–10.1)
CALCIUM SERPL-MCNC: 9.2 MG/DL — SIGNIFICANT CHANGE UP (ref 8.5–10.1)
CHLORIDE SERPL-SCNC: 101 MMOL/L — SIGNIFICANT CHANGE UP (ref 98–110)
CHLORIDE SERPL-SCNC: 96 MMOL/L — LOW (ref 98–110)
CO2 SERPL-SCNC: 29 MMOL/L — SIGNIFICANT CHANGE UP (ref 17–32)
CO2 SERPL-SCNC: 36 MMOL/L — HIGH (ref 17–32)
CREAT SERPL-MCNC: 3.4 MG/DL — HIGH (ref 0.7–1.5)
CREAT SERPL-MCNC: 3.4 MG/DL — HIGH (ref 0.7–1.5)
GLUCOSE BLDC GLUCOMTR-MCNC: 247 MG/DL — HIGH (ref 70–99)
GLUCOSE BLDC GLUCOMTR-MCNC: 279 MG/DL — HIGH (ref 70–99)
GLUCOSE BLDC GLUCOMTR-MCNC: 309 MG/DL — HIGH (ref 70–99)
GLUCOSE BLDC GLUCOMTR-MCNC: 98 MG/DL — SIGNIFICANT CHANGE UP (ref 70–99)
GLUCOSE SERPL-MCNC: 111 MG/DL — HIGH (ref 70–99)
GLUCOSE SERPL-MCNC: 357 MG/DL — HIGH (ref 70–99)
HCT VFR BLD CALC: 35.1 % — LOW (ref 42–52)
HGB BLD-MCNC: 11.7 G/DL — LOW (ref 14–18)
MCHC RBC-ENTMCNC: 29.2 PG — SIGNIFICANT CHANGE UP (ref 27–31)
MCHC RBC-ENTMCNC: 33.3 G/DL — SIGNIFICANT CHANGE UP (ref 32–37)
MCV RBC AUTO: 87.5 FL — SIGNIFICANT CHANGE UP (ref 80–94)
NRBC # BLD: 0 /100 WBCS — SIGNIFICANT CHANGE UP (ref 0–0)
PLATELET # BLD AUTO: 507 K/UL — HIGH (ref 130–400)
POTASSIUM SERPL-MCNC: 3.9 MMOL/L — SIGNIFICANT CHANGE UP (ref 3.5–5)
POTASSIUM SERPL-MCNC: 4.2 MMOL/L — SIGNIFICANT CHANGE UP (ref 3.5–5)
POTASSIUM SERPL-SCNC: 3.9 MMOL/L — SIGNIFICANT CHANGE UP (ref 3.5–5)
POTASSIUM SERPL-SCNC: 4.2 MMOL/L — SIGNIFICANT CHANGE UP (ref 3.5–5)
PROT SERPL-MCNC: 5.4 G/DL — LOW (ref 6–8)
RBC # BLD: 4.01 M/UL — LOW (ref 4.7–6.1)
RBC # FLD: 15.7 % — HIGH (ref 11.5–14.5)
SODIUM SERPL-SCNC: 140 MMOL/L — SIGNIFICANT CHANGE UP (ref 135–146)
SODIUM SERPL-SCNC: 149 MMOL/L — HIGH (ref 135–146)
WBC # BLD: 14.87 K/UL — HIGH (ref 4.8–10.8)
WBC # FLD AUTO: 14.87 K/UL — HIGH (ref 4.8–10.8)

## 2021-09-10 PROCEDURE — 71045 X-RAY EXAM CHEST 1 VIEW: CPT | Mod: 26

## 2021-09-10 PROCEDURE — 99232 SBSQ HOSP IP/OBS MODERATE 35: CPT

## 2021-09-10 RX ADMIN — Medication 0: at 08:01

## 2021-09-10 RX ADMIN — APIXABAN 2.5 MILLIGRAM(S): 2.5 TABLET, FILM COATED ORAL at 17:15

## 2021-09-10 RX ADMIN — Medication 10 UNIT(S): at 11:14

## 2021-09-10 RX ADMIN — Medication 81 MILLIGRAM(S): at 11:46

## 2021-09-10 RX ADMIN — Medication 500000 UNIT(S): at 05:52

## 2021-09-10 RX ADMIN — CEFTRIAXONE 100 MILLIGRAM(S): 500 INJECTION, POWDER, FOR SOLUTION INTRAMUSCULAR; INTRAVENOUS at 08:29

## 2021-09-10 RX ADMIN — Medication 500000 UNIT(S): at 17:15

## 2021-09-10 RX ADMIN — Medication 50 MILLIGRAM(S): at 11:46

## 2021-09-10 RX ADMIN — Medication 500000 UNIT(S): at 11:46

## 2021-09-10 RX ADMIN — Medication 40 MILLIGRAM(S): at 05:52

## 2021-09-10 RX ADMIN — ATORVASTATIN CALCIUM 40 MILLIGRAM(S): 80 TABLET, FILM COATED ORAL at 21:43

## 2021-09-10 RX ADMIN — PANTOPRAZOLE SODIUM 40 MILLIGRAM(S): 20 TABLET, DELAYED RELEASE ORAL at 05:53

## 2021-09-10 RX ADMIN — ISOSORBIDE MONONITRATE 120 MILLIGRAM(S): 60 TABLET, EXTENDED RELEASE ORAL at 11:46

## 2021-09-10 RX ADMIN — Medication 25 MILLIGRAM(S): at 05:52

## 2021-09-10 RX ADMIN — INSULIN GLARGINE 45 UNIT(S): 100 INJECTION, SOLUTION SUBCUTANEOUS at 21:43

## 2021-09-10 RX ADMIN — Medication 25 MILLIGRAM(S): at 21:43

## 2021-09-10 RX ADMIN — Medication 8: at 17:14

## 2021-09-10 RX ADMIN — AMLODIPINE BESYLATE 10 MILLIGRAM(S): 2.5 TABLET ORAL at 05:52

## 2021-09-10 RX ADMIN — Medication 25 MILLIGRAM(S): at 15:12

## 2021-09-10 RX ADMIN — Medication 4: at 11:14

## 2021-09-10 RX ADMIN — Medication 10 UNIT(S): at 17:14

## 2021-09-10 RX ADMIN — Medication 10 UNIT(S): at 08:01

## 2021-09-10 RX ADMIN — CHLORHEXIDINE GLUCONATE 1 APPLICATION(S): 213 SOLUTION TOPICAL at 05:53

## 2021-09-10 RX ADMIN — APIXABAN 2.5 MILLIGRAM(S): 2.5 TABLET, FILM COATED ORAL at 05:52

## 2021-09-10 NOTE — PROGRESS NOTE ADULT - SUBJECTIVE AND OBJECTIVE BOX
OVERNIGHT EVENTS: events noted, off drips, bipap overnight, off drips    Vital Signs Last 24 Hrs  T(C): 36.2 (10 Sep 2021 04:00), Max: 36.8 (09 Sep 2021 16:00)  T(F): 97.1 (10 Sep 2021 04:00), Max: 98.2 (09 Sep 2021 16:00)  HR: 86 (10 Sep 2021 07:00) (70 - 92)  BP: 150/67 (10 Sep 2021 07:00) (110/53 - 167/80)  BP(mean): 105 (10 Sep 2021 07:00) (65 - 120)  RR: 39 (10 Sep 2021 07:00) (20 - 45)  SpO2: 96% (10 Sep 2021 07:00) (91% - 100%)    PHYSICAL EXAMINATION:    GENERAL: Ill looking    HEENT: Head is normocephalic and atraumatic.     NECK: Supple.    LUNGS: dec bs  r side    HEART: JUAN 2/6    ABDOMEN: Soft, nontender, and nondistended.      EXTREMITIES: Without any cyanosis, clubbing, rash, lesions or edema.    NEUROLOGIC: NON FOCAL    SKIN: ulcer leg      LABS:                        11.7   14.87 )-----------( 507      ( 10 Sep 2021 05:41 )             35.1     09-10    149<H>  |  101  |  101<HH>  ----------------------------<  111<H>  3.9   |  36<H>  |  3.4<H>    Ca    9.2      10 Sep 2021 05:41    TPro  5.4<L>  /  Alb  2.6<L>  /  TBili  0.9  /  DBili  x   /  AST  88<H>  /  ALT  78<H>  /  AlkPhos  201<H>  09-10                          09-09-21 @ 07:01  -  09-10-21 @ 07:00  --------------------------------------------------------  IN: 561 mL / OUT: 1400 mL / NET: -839 mL        MICROBIOLOGY:      MEDICATIONS  (STANDING):  allopurinol 50 milliGRAM(s) Oral daily  amLODIPine   Tablet 10 milliGRAM(s) Oral daily  apixaban 2.5 milliGRAM(s) Oral two times a day  aspirin  chewable 81 milliGRAM(s) Oral daily  atorvastatin 40 milliGRAM(s) Oral at bedtime  cefTRIAXone   IVPB 1000 milliGRAM(s) IV Intermittent every 24 hours  chlorhexidine 4% Liquid 1 Application(s) Topical <User Schedule>  dextrose 40% Gel 15 Gram(s) Oral once  dextrose 5%. 1000 milliLiter(s) (50 mL/Hr) IV Continuous <Continuous>  dextrose 5%. 1000 milliLiter(s) (100 mL/Hr) IV Continuous <Continuous>  dextrose 50% Injectable 25 Gram(s) IV Push once  dextrose 50% Injectable 12.5 Gram(s) IV Push once  dextrose 50% Injectable 25 Gram(s) IV Push once  dextrose 50% Injectable 50 milliLiter(s) IV Push every 15 minutes  glucagon  Injectable 1 milliGRAM(s) IntraMuscular once  hydrALAZINE 25 milliGRAM(s) Oral every 8 hours  insulin glargine Injectable (LANTUS) 45 Unit(s) SubCutaneous at bedtime  insulin lispro (ADMELOG) corrective regimen sliding scale   SubCutaneous three times a day before meals  insulin lispro Injectable (ADMELOG) 10 Unit(s) SubCutaneous three times a day before meals  isosorbide   mononitrate ER Tablet (IMDUR) 120 milliGRAM(s) Oral daily  levoFLOXacin IVPB      levoFLOXacin IVPB 250 milliGRAM(s) IV Intermittent every 24 hours  nystatin    Suspension 740258 Unit(s) Swish and Swallow every 6 hours  pantoprazole    Tablet 40 milliGRAM(s) Oral before breakfast  predniSONE   Tablet 40 milliGRAM(s) Oral daily    MEDICATIONS  (PRN):  acetaminophen   Tablet .. 650 milliGRAM(s) Oral every 6 hours PRN Temp greater or equal to 38C (100.4F), Mild Pain (1 - 3)  morphine  - Injectable 2 milliGRAM(s) IV Push every 4 hours PRN agitation      RADIOLOGY & ADDITIONAL STUDIES:

## 2021-09-10 NOTE — CHART NOTE - NSCHARTNOTEFT_GEN_A_CORE
MICU Transfer Note    Transfer from: ICU  Transfer to:  (  ) Medicine    (  ) Telemetry    (  ) RCU    (  ) Palliative    (  ) Stroke Unit    ( X ) ___Stepdown____________  Accepting physican:    MEDICATIONS:  STANDING MEDICATIONS  allopurinol 50 milliGRAM(s) Oral daily  amLODIPine   Tablet 10 milliGRAM(s) Oral daily  apixaban 2.5 milliGRAM(s) Oral two times a day  aspirin  chewable 81 milliGRAM(s) Oral daily  atorvastatin 40 milliGRAM(s) Oral at bedtime  cefTRIAXone   IVPB 1000 milliGRAM(s) IV Intermittent every 24 hours  chlorhexidine 4% Liquid 1 Application(s) Topical <User Schedule>  dextrose 40% Gel 15 Gram(s) Oral once  dextrose 5%. 1000 milliLiter(s) IV Continuous <Continuous>  dextrose 5%. 1000 milliLiter(s) IV Continuous <Continuous>  dextrose 50% Injectable 25 Gram(s) IV Push once  dextrose 50% Injectable 12.5 Gram(s) IV Push once  dextrose 50% Injectable 25 Gram(s) IV Push once  dextrose 50% Injectable 50 milliLiter(s) IV Push every 15 minutes  glucagon  Injectable 1 milliGRAM(s) IntraMuscular once  hydrALAZINE 25 milliGRAM(s) Oral every 8 hours  insulin glargine Injectable (LANTUS) 45 Unit(s) SubCutaneous at bedtime  insulin lispro (ADMELOG) corrective regimen sliding scale   SubCutaneous three times a day before meals  insulin lispro Injectable (ADMELOG) 10 Unit(s) SubCutaneous three times a day before meals  isosorbide   mononitrate ER Tablet (IMDUR) 120 milliGRAM(s) Oral daily  levoFLOXacin IVPB      levoFLOXacin IVPB 250 milliGRAM(s) IV Intermittent every 24 hours  nystatin    Suspension [id number] Unit(s) Swish and Swallow every 6 hours  pantoprazole    Tablet 40 milliGRAM(s) Oral before breakfast  predniSONE   Tablet 40 milliGRAM(s) Oral daily    PRN MEDICATIONS  acetaminophen   Tablet .. 650 milliGRAM(s) Oral every 6 hours PRN  morphine  - Injectable 2 milliGRAM(s) IV Push every 4 hours PRN      VITAL SIGNS: Last 24 Hours  T(C): 35.8 (10 Sep 2021 08:00), Max: 36.8 (09 Sep 2021 16:00)  T(F): 96.5 (10 Sep 2021 08:00), Max: 98.2 (09 Sep 2021 16:00)  HR: 92 (10 Sep 2021 09:00) (70 - 92)  BP: 152/72 (10 Sep 2021 09:00) (110/53 - 167/80)  BP(mean): 106 (10 Sep 2021 09:00) (65 - 120)  RR: 23 (10 Sep 2021 09:00) (20 - 45)  SpO2: 98% (10 Sep 2021 09:00) (91% - 100%)    LABS:                        11.7   14.87 )-----------( 507      ( 10 Sep 2021 05:41 )             35.1     09-10    149<H>  |  101  |  101<HH>  ----------------------------<  111<H>  3.9   |  36<H>  |  3.4<H>    Ca    9.2      10 Sep 2021 05:41    TPro  5.4<L>  /  Alb  2.6<L>  /  TBili  0.9  /  DBili  x   /  AST  88<H>  /  ALT  78<H>  /  AlkPhos  201<H>  09-10                RADIOLOGY:  < from: Xray Chest 1 View- PORTABLE-Routine (Xray Chest 1 View- PORTABLE-Routine in AM.) (09.10.21 @ 04:57) >    Impression:    Unchanged right lung opacities. Decrease in the left basilar opacity.            CCU COURSE:  admitted for sever CAP, patient was able to maintain O2 sat initially w non-rebreather and BiPAP at night now w 5L NC w BiPAP at night. Patient on day 11/14 of Abx  AMS on admission, patient now A&O x 4  Patient had DIETER on CKD IV and required CVVHD for several days, was initially oliguric, now having adiquate urine output, now s/p Udol removal  CXR, phys exam, and Mini Mental exam performed daily        ASSESSMENT & PLAN:   Patient is a 81 YO M w a PMH of A-fib (on home xarelto) CAD (s/p stent 2014),gout, Alcohol dependence (5-6 drinks/day), VINI (on home CPAP) addmitted for acute hypoxic respiratory failure secondary to SEVERE community acquired pneumonia. Patient was septic on admission (febrile, leukocytosis, tachycardia, hypoxic w identifiable source). Found to have HAGMA and UA + for WBC. Of note patient is s/p 2 x COVID vaccine. Patient was MRSA nares negative w no growth to date from lesion biopsy. Patient is clinically improving and doing well on his BiPAP at night. When i went into the room 9/6 morning BiPAP was on his cheek and he was maintaining O2 of 88-93%. Patient is hypokalemic 9/7 with an increasing BUN, CVVHD due today w/ Ramirez to be removed after. Patient was hypertensive to 187/80 w HR 76 overnight and responded well to Amlidipine 5. Home dose of Amlodipine 10 restarted today. Patient passed speech and swallow evaluation, note pending for diet order, NG tube pulled. Repeat chest XR showed stable RT consolidation, on physical exam no rhonchi or crackles, much improved from previous exams. Solumedrol 60 q12 started 9/7. Oral care also increased to q2hr  9/8 Patient was hypertensive to 181/70's, Hyralazine and Imdur started. Patient now 's. Was changed to HF NC at 5L and saturating well. Condom catheter was placed and UO was ~100cc/hr. Cr is downtrending, BUN uptrending, No HD today. AM glucose 412. Insulin drip started   9/9 insulin drip stopped, Lantus 45 started w Admelog 10 before meals, BP well controlled with hydralazine 25 and IMDUR 120. Patient appears well and is the most conversational and awake that I have seen thus far. Patient tolerating diet well. Increasing leukocytosis (afebrile no other signs of sepsis) likely secondary to Solumedrol, solumedrol d/c-ed and Prednisone 40mg once daily started.      Problem List:  #acute hypoxemic respiratory failure secondary to severe community acquired PMN complicated by Sepsis, STABLE   -downtrending WBC. Afebrile. culture negative (no growth), maintaining 100% saturation on 5L NC w BiPAP at night   -negative MRSA, legionella and COVID -  -c/w ceftriaxone 1000mg once daily  -c/w levofloxacin 250 mg once daily   - 9/10 is day 11/14 of antibiotic duration, 3 more days  -d/c LINEZOLID   -f/u blood cultures  (NEGATIVE TO DATE)  -Repeat vBG once daily  -CXR once daily   -ORSA REPORTED BY INFECTION CONTROL, UNABLE TO FIND CULTURE THEY ARE FOLLOWING. THERE WAS A +PCR IN 2020 BUT NEGATIVE MRSA NARES SEPT 2 2021. ATTEMPTED TO CONTACT OFFICE FOR CLARIFICATION, UNABLE TO REACH THEM   -patient tolerated BiPAP last night and is feeling better this morning.  -c/w BiPAP at night, NC by day   -d/c Solumedrol 60 q 12 IV and replace w 40mg Prednisone once daily   -NC 5L today and saturating 100%  -trend WBC     #Hyperglycemia to 412 RESOLVED WATCH FOR HYPOGLYCEMIA  -patient is known diabetic NOT on home insulin  -on steroid as well as D5 in heparin  -was placed on Insulin Drip 9/8 and d/c 9/9  -patient is on Lantus 45, Admelogue 10 MAY NEED TO BE ADJUSTED AND/OR STOPPED AS STEROIDS ARE TEMPORARY AND HE IS NOT ON AT HOME     #HTN STABLE   Patient had BP as high as 189/90  -Hydralazine 25 q 8 and Imdur 120 started today  -SBP now 130's    #White plaque on tongue IMPROVED   -steroids use, open mouth, satelite lesions looks like Candidiasis   -nurse concerned for Candidiasis  -Nystatin 5 q 6 swish and swallow started  -follow up oral leasion  -oral hygene q 2hr     #Acute on Chronic renal failure RESOLVED   patient had HD 9/1, 1 L removed now s/p Udol placed and removed(sept 9). No outpatient hx of dialysis   -asymptomatic bacteruria   -f/u for signs/symptoms of UTI  -Repeat vBG once daily  -patient was alkalotic 9/5 and Bicarb drip was d/c  -held dialysis 9/4-9-9  -BUN uptrending, Cr downtrending, UO rapidly increasing all indicative of RESOLVING ATN       #Chronic venous stasis dermatitis IMPROVING   -keep leg wrapped  -elevate legs  -outpatient evaluation   -compression socks  -No growth from culture     #A-Fib well controlled  Amlodipine  On eloquis 2.5 BID       PAST MEDICAL & SURGICAL HISTORY:  Atrial fibrillation    Coronary artery disease    Hyperlipidemia    Hypertension    Gout    Diabetes mellitus    VINI on CPAP    History of heart artery stent        #Misc  - DVT Prophylaxis: heparin   - GI Prophylaxis: pantoprazole   - Diet:  - Activity: OUT OF BED TO CHAIR   - IV Fluids:   - Code Status: full             For Follow-Up: MICU Transfer Note    Transfer from: ICU  Transfer to:  (  ) Medicine    (  ) Telemetry    (  ) RCU    (  ) Palliative    (  ) Stroke Unit    ( X ) ___Stepdown____________  Accepting physican:    MEDICATIONS:  STANDING MEDICATIONS  allopurinol 50 milliGRAM(s) Oral daily  amLODIPine   Tablet 10 milliGRAM(s) Oral daily  apixaban 2.5 milliGRAM(s) Oral two times a day  aspirin  chewable 81 milliGRAM(s) Oral daily  atorvastatin 40 milliGRAM(s) Oral at bedtime  cefTRIAXone   IVPB 1000 milliGRAM(s) IV Intermittent every 24 hours  chlorhexidine 4% Liquid 1 Application(s) Topical <User Schedule>  dextrose 40% Gel 15 Gram(s) Oral once  dextrose 5%. 1000 milliLiter(s) IV Continuous <Continuous>  dextrose 5%. 1000 milliLiter(s) IV Continuous <Continuous>  dextrose 50% Injectable 25 Gram(s) IV Push once  dextrose 50% Injectable 12.5 Gram(s) IV Push once  dextrose 50% Injectable 25 Gram(s) IV Push once  dextrose 50% Injectable 50 milliLiter(s) IV Push every 15 minutes  glucagon  Injectable 1 milliGRAM(s) IntraMuscular once  hydrALAZINE 25 milliGRAM(s) Oral every 8 hours  insulin glargine Injectable (LANTUS) 45 Unit(s) SubCutaneous at bedtime  insulin lispro (ADMELOG) corrective regimen sliding scale   SubCutaneous three times a day before meals  insulin lispro Injectable (ADMELOG) 10 Unit(s) SubCutaneous three times a day before meals  isosorbide   mononitrate ER Tablet (IMDUR) 120 milliGRAM(s) Oral daily  levoFLOXacin IVPB      levoFLOXacin IVPB 250 milliGRAM(s) IV Intermittent every 24 hours  nystatin    Suspension [id number] Unit(s) Swish and Swallow every 6 hours  pantoprazole    Tablet 40 milliGRAM(s) Oral before breakfast  predniSONE   Tablet 40 milliGRAM(s) Oral daily    PRN MEDICATIONS  acetaminophen   Tablet .. 650 milliGRAM(s) Oral every 6 hours PRN  morphine  - Injectable 2 milliGRAM(s) IV Push every 4 hours PRN      VITAL SIGNS: Last 24 Hours  T(C): 35.8 (10 Sep 2021 08:00), Max: 36.8 (09 Sep 2021 16:00)  T(F): 96.5 (10 Sep 2021 08:00), Max: 98.2 (09 Sep 2021 16:00)  HR: 92 (10 Sep 2021 09:00) (70 - 92)  BP: 152/72 (10 Sep 2021 09:00) (110/53 - 167/80)  BP(mean): 106 (10 Sep 2021 09:00) (65 - 120)  RR: 23 (10 Sep 2021 09:00) (20 - 45)  SpO2: 98% (10 Sep 2021 09:00) (91% - 100%)    LABS:                        11.7   14.87 )-----------( 507      ( 10 Sep 2021 05:41 )             35.1     09-10    149<H>  |  101  |  101<HH>  ----------------------------<  111<H>  3.9   |  36<H>  |  3.4<H>    Ca    9.2      10 Sep 2021 05:41    TPro  5.4<L>  /  Alb  2.6<L>  /  TBili  0.9  /  DBili  x   /  AST  88<H>  /  ALT  78<H>  /  AlkPhos  201<H>  09-10                RADIOLOGY:  < from: Xray Chest 1 View- PORTABLE-Routine (Xray Chest 1 View- PORTABLE-Routine in AM.) (09.10.21 @ 04:57) >    Impression:    Unchanged right lung opacities. Decrease in the left basilar opacity.            CCU COURSE:  admitted for sever CAP, patient was able to maintain O2 sat initially w non-rebreather and BiPAP at night now w 5L NC w BiPAP at night. Patient on day 11/14 of Abx  AMS on admission, patient now A&O x 4  Patient had DIETER on CKD IV and required CVVHD for several days, was initially oliguric, now having adiquate urine output, now s/p Udol removal  CXR, phys exam, and Mini Mental exam performed daily        ASSESSMENT & PLAN:   Patient is a 79 YO M w a PMH of A-fib (on home xarelto) CAD (s/p stent 2014),gout, Alcohol dependence (5-6 drinks/day), VINI (on home CPAP) addmitted for acute hypoxic respiratory failure secondary to SEVERE community acquired pneumonia. Patient was septic on admission (febrile, leukocytosis, tachycardia, hypoxic w identifiable source). Found to have HAGMA and UA + for WBC. Of note patient is s/p 2 x COVID vaccine. Patient was MRSA nares negative w no growth to date from lesion biopsy. Patient is clinically improving and doing well on his BiPAP at night. When i went into the room 9/6 morning BiPAP was on his cheek and he was maintaining O2 of 88-93%. Patient is hypokalemic 9/7 with an increasing BUN, CVVHD due today w/ Ramirez to be removed after. Patient was hypertensive to 187/80 w HR 76 overnight and responded well to Amlidipine 5. Home dose of Amlodipine 10 restarted today. Patient passed speech and swallow evaluation, note pending for diet order, NG tube pulled. Repeat chest XR showed stable RT consolidation, on physical exam no rhonchi or crackles, much improved from previous exams. Solumedrol 60 q12 started 9/7. Oral care also increased to q2hr  9/8 Patient was hypertensive to 181/70's, Hyralazine and Imdur started. Patient now 's. Was changed to HF NC at 5L and saturating well. Condom catheter was placed and UO was ~100cc/hr. Cr is downtrending, BUN uptrending, No HD today. AM glucose 412. Insulin drip started   9/9 insulin drip stopped, Lantus 45 started w Admelog 10 before meals, BP well controlled with hydralazine 25 and IMDUR 120. Patient appears well and is the most conversational and awake that I have seen thus far. Patient tolerating diet well. Increasing leukocytosis (afebrile no other signs of sepsis) likely secondary to Solumedrol, solumedrol d/c-ed and Prednisone 40mg once daily started.      Problem List:  #acute hypoxemic respiratory failure secondary to severe community acquired PMN complicated by Sepsis, STABLE   -downtrending WBC. Afebrile. culture negative (no growth), maintaining 100% saturation on 5L NC w BiPAP at night   -negative MRSA, legionella and COVID -  -c/w ceftriaxone 1000mg once daily  -c/w levofloxacin 250 mg once daily   - 9/10 is day 11/14 of antibiotic duration, 3 more days  -d/c LINEZOLID   -f/u blood cultures  (NEGATIVE TO DATE)  -Repeat vBG once daily  -CXR once daily   -ORSA REPORTED BY INFECTION CONTROL, UNABLE TO FIND CULTURE THEY ARE FOLLOWING. THERE WAS A +PCR IN 2020 BUT NEGATIVE MRSA NARES SEPT 2 2021. ATTEMPTED TO CONTACT OFFICE FOR CLARIFICATION, UNABLE TO REACH THEM   -patient tolerated BiPAP last night and is feeling better this morning.  -c/w BiPAP at night, NC by day   -d/c Solumedrol 60 q 12 IV and replace w 40mg Prednisone once daily   -NC 5L today and saturating 100%  -trend WBC     #Hyperglycemia to 412 RESOLVED WATCH FOR HYPOGLYCEMIA  -patient is known diabetic NOT on home insulin  -on steroid as well as D5 in heparin  -was placed on Insulin Drip 9/8 and d/c 9/9  -patient is on Lantus 45, Admelogue 10 MAY NEED TO BE ADJUSTED AND/OR STOPPED AS STEROIDS ARE TEMPORARY AND HE IS NOT ON AT HOME     #HTN STABLE   Patient had BP as high as 189/90  -Hydralazine 25 q 8 and Imdur 120 started today  -SBP now 130's    #White plaque on tongue IMPROVED   -steroids use, open mouth, satelite lesions looks like Candidiasis   -nurse concerned for Candidiasis  -Nystatin 5 q 6 swish and swallow started  -follow up oral leasion  -oral hygene q 2hr     #Acute on Chronic renal failure RESOLVED   patient had HD 9/1, 1 L removed now s/p Udol placed and removed(sept 9). No outpatient hx of dialysis   -asymptomatic bacteruria   -f/u for signs/symptoms of UTI  -Repeat vBG once daily  -patient was alkalotic 9/5 and Bicarb drip was d/c  -held dialysis 9/4-9-9  -BUN uptrending, Cr downtrending, UO rapidly increasing all indicative of RESOLVING ATN       #Chronic venous stasis dermatitis IMPROVING   -keep leg wrapped  -elevate legs  -outpatient evaluation   -compression socks  -No growth from culture     #A-Fib well controlled  Amlodipine  On eloquis 2.5 BID       PAST MEDICAL & SURGICAL HISTORY:  Atrial fibrillation    Coronary artery disease    Hyperlipidemia    Hypertension    Gout    Diabetes mellitus    VINI on CPAP    History of heart artery stent        #Misc  - DVT Prophylaxis: heparin   - GI Prophylaxis: pantoprazole   - Diet:  - Activity: OUT OF BED TO CHAIR   - IV Fluids:   - Code Status: full             For Follow-Up:  16:00 Na (needs to be reminded to drink water is forgetful)  watch out for hypoglycemia given no home insulin but he needs it now

## 2021-09-10 NOTE — PROGRESS NOTE ADULT - SUBJECTIVE AND OBJECTIVE BOX
MISTY BURLESON 80y Male  MRN#: [id number]   Hospital Day: 10d    SUBJECTIVE  Patient is a 80y old Male who presents with a chief complaint of sob (10 Sep 2021 08:21)  Currently admitted to medicine with the primary diagnosis of Pneumonia    INTERVAL HPI AND OVERNIGHT EVENTS:  Patient was examined and seen at bedside. This morning he is resting comfortably in bed and reports no issues or overnight events.     REVIEW OF SYMPTOMS:  CONSTITUTIONAL: No weakness, fevers or chills; No headaches  ENT: No difficulty swallowing. + sore throat, better compared to yesterday  RESPIRATORY: No wheezing, or hemoptysis; No shortness of breath. + cough  CARDIOVASCULAR: No chest pain or palpitations  GASTROINTESTINAL: No abdominal or epigastric pain; No nausea, vomiting, or hematemesis; No diarrhea or constipation; No melena or hematochezia  GENITOURINARY: No dysuria, frequency or hematuria  MUSCULOSKELETAL: No joint pain, no muscle pain, no weakness  NEUROLOGICAL: No numbness or weakness  SKIN: No itching or rashes    OBJECTIVE  PAST MEDICAL & SURGICAL HISTORY  Atrial fibrillation  Coronary artery disease  Hyperlipidemia  Hypertension  Gout  Diabetes mellitus  VINI on CPAP  History of heart artery stent    ALLERGIES:  No Known Allergies    MEDICATIONS:  STANDING MEDICATIONS  allopurinol 50 milliGRAM(s) Oral daily  amLODIPine   Tablet 10 milliGRAM(s) Oral daily  apixaban 2.5 milliGRAM(s) Oral two times a day  aspirin  chewable 81 milliGRAM(s) Oral daily  atorvastatin 40 milliGRAM(s) Oral at bedtime  cefTRIAXone   IVPB 1000 milliGRAM(s) IV Intermittent every 24 hours  chlorhexidine 4% Liquid 1 Application(s) Topical <User Schedule>  dextrose 40% Gel 15 Gram(s) Oral once  dextrose 5%. 1000 milliLiter(s) IV Continuous <Continuous>  dextrose 5%. 1000 milliLiter(s) IV Continuous <Continuous>  dextrose 50% Injectable 25 Gram(s) IV Push once  dextrose 50% Injectable 12.5 Gram(s) IV Push once  dextrose 50% Injectable 25 Gram(s) IV Push once  dextrose 50% Injectable 50 milliLiter(s) IV Push every 15 minutes  glucagon  Injectable 1 milliGRAM(s) IntraMuscular once  hydrALAZINE 25 milliGRAM(s) Oral every 8 hours  insulin glargine Injectable (LANTUS) 45 Unit(s) SubCutaneous at bedtime  insulin lispro (ADMELOG) corrective regimen sliding scale   SubCutaneous three times a day before meals  insulin lispro Injectable (ADMELOG) 10 Unit(s) SubCutaneous three times a day before meals  isosorbide   mononitrate ER Tablet (IMDUR) 120 milliGRAM(s) Oral daily  levoFLOXacin IVPB      levoFLOXacin IVPB 250 milliGRAM(s) IV Intermittent every 24 hours  nystatin    Suspension [id number] Unit(s) Swish and Swallow every 6 hours  pantoprazole    Tablet 40 milliGRAM(s) Oral before breakfast  predniSONE   Tablet 40 milliGRAM(s) Oral daily    PRN MEDICATIONS  acetaminophen   Tablet .. 650 milliGRAM(s) Oral every 6 hours PRN  morphine  - Injectable 2 milliGRAM(s) IV Push every 4 hours PRN    VITAL SIGNS: Last 24 Hours  T(C): 35.8 (10 Sep 2021 08:00), Max: 36.8 (09 Sep 2021 16:00)  T(F): 96.5 (10 Sep 2021 08:00), Max: 98.2 (09 Sep 2021 16:00)  HR: 92 (10 Sep 2021 09:00) (70 - 92)  BP: 152/72 (10 Sep 2021 09:00) (110/53 - 167/80)  BP(mean): 106 (10 Sep 2021 09:00) (65 - 120)  RR: 23 (10 Sep 2021 09:00) (20 - 45)  SpO2: 98% (10 Sep 2021 09:00) (91% - 100%)    LABS:                        11.7   14.87 )-----------( 507      ( 10 Sep 2021 05:41 )             35.1     09-10    149<H>  |  101  |  101<HH>  ----------------------------<  111<H>  3.9   |  36<H>  |  3.4<H>    Ca    9.2      10 Sep 2021 05:41    TPro  5.4<L>  /  Alb  2.6<L>  /  TBili  0.9  /  DBili  x   /  AST  88<H>  /  ALT  78<H>  /  AlkPhos  201<H>  09-10    RADIOLOGY:  < from: Xray Chest 1 View- PORTABLE-Routine (Xray Chest 1 View- PORTABLE-Routine in AM.) (09.09.21 @ 05:00) >  Impression:    Stable bilateral lung opacities    --- End of Report ---    < end of copied text >    PHYSICAL EXAM:  CONSTITUTIONAL: No acute distress, well-developed, well-groomed, AAOx3  HEAD: Atraumatic, normocephalic  EYES: EOM intact conjunctiva and sclera clear  ENT: Supple, no masses; moist mucous membranes, + oral thrush, improving from yesterday.  PULMONARY: Decreased breath sounds in RUL, normal breath sounds in left lung; no wheezes, rales, or rhonchi  CARDIOVASCULAR: Regular rate and rhythm; no murmurs, rubs, or gallops  GASTROINTESTINAL: Soft, non-tender, non-distended; bowel sounds present  MUSCULOSKELETAL: no clubbing, no cyanosis, + edema of LLE. Venous stasis ulcer on left lower extremity.  NEUROLOGY: non-focal, able to move all four extremities.    ASSESSMENT & PLAN:  Patient is an 79 y/o male with a pmhx of afib on xeralto at home, CAD s/p stent placement in 2014, HTN, HLD, DM, VINI on CPAP at home, and gout. Today he is doing well, says he would like to go home. He is on 5L of NC and is saturating at 98%.    #CAP  - Clinically improving  - Day 11/14 of rocephin and levoquin  - Continue prednisone 40mg daily  - WBC is trending down  - Continue daily CXR  - Currently on 5L of NC and is saturating well  - Stepdown today    #DIETER on CKD  - Resolving; Cr and BUN are both downtrending now  - [place] was d/c'ed yesterday, no need for HD anymore  - Making urine    #Afib  - Not on any rate control at home  - Takes xeralto at home  - Currently on eliquis; continue with eliquis    #DM  - Continue to monitor FS   - Manage w/ insulin sliding scale    #HTN  #HLD  #CAD  - Continue atorvostatin and aspirin  - Continue with amlodipine 10 mg daily and hydralazine 25mg q8 hours  - Continue to monitor BP    #VINI on CPAP  - Used the BIPAP last night, continue with it at night    #Gout  - Continue allopurinol    #Misc  - DVT Prophylaxis: Eliquis  - GI Prophylaxis: Protonix  - Diet: Dysphagia 1 pureed  - Activity: Out of bed to chair  - IV Fluids: IVL  - Code Status: Full code    Dispo: Step down

## 2021-09-10 NOTE — PROGRESS NOTE ADULT - ASSESSMENT
IMPRESSION:    Acute hypoxemic resp failure on NC 5 L   Severe CAP/ Pneumonitis  Sepsis present on admission  Acute on Chronic renal failure off HD  HO A fib      PLAN:    CNS: avoid CNS depressant    HEENT:  Oral care    PULMONARY:  HOB @ 45 degrees, BIPAP/ HHFNC keep SAo2 88 to 92%, Aspiration precaution, trial  NC    CARDIOVASCULAR: Avoid overload. Eliquis    GI: GI prophylaxis                                          Feeding po    RENAL:  F/u  lytes.  Correct as needed.  Accurate I/O, trend CMP.      INFECTIOUS DISEASE: abx per ID, prednisone 40 daily    HEMATOLOGICAL:  DVT prophylaxis.    ENDOCRINE:  Follow up FS.  Insulin protocol if needed.    CODE STATUS: FULL CODE    DISPOSITION: MICU  PT/ OT  Very poor prognosis

## 2021-09-10 NOTE — PROGRESS NOTE ADULT - SUBJECTIVE AND OBJECTIVE BOX
Nephrology progress note    Patient is seen and examined, events over the last 24 h noted .  Pt is doing well, Beecher removed, creat improving  Allergies:  No Known Allergies    Hospital Medications:   MEDICATIONS  (STANDING):  allopurinol 50 milliGRAM(s) Oral daily  amLODIPine   Tablet 10 milliGRAM(s) Oral daily  apixaban 2.5 milliGRAM(s) Oral two times a day  aspirin  chewable 81 milliGRAM(s) Oral daily  atorvastatin 40 milliGRAM(s) Oral at bedtime  cefTRIAXone   IVPB 1000 milliGRAM(s) IV Intermittent every 24 hours  chlorhexidine 4% Liquid 1 Application(s) Topical <User Schedule>  dextrose 40% Gel 15 Gram(s) Oral once  dextrose 5%. 1000 milliLiter(s) (50 mL/Hr) IV Continuous <Continuous>  dextrose 5%. 1000 milliLiter(s) (100 mL/Hr) IV Continuous <Continuous>  dextrose 50% Injectable 25 Gram(s) IV Push once  dextrose 50% Injectable 12.5 Gram(s) IV Push once  dextrose 50% Injectable 25 Gram(s) IV Push once  dextrose 50% Injectable 50 milliLiter(s) IV Push every 15 minutes  glucagon  Injectable 1 milliGRAM(s) IntraMuscular once  hydrALAZINE 25 milliGRAM(s) Oral every 8 hours  insulin glargine Injectable (LANTUS) 45 Unit(s) SubCutaneous at bedtime  insulin lispro (ADMELOG) corrective regimen sliding scale   SubCutaneous three times a day before meals  insulin lispro Injectable (ADMELOG) 10 Unit(s) SubCutaneous three times a day before meals  isosorbide   mononitrate ER Tablet (IMDUR) 120 milliGRAM(s) Oral daily  levoFLOXacin IVPB      levoFLOXacin IVPB 250 milliGRAM(s) IV Intermittent every 24 hours  nystatin    Suspension 461235 Unit(s) Swish and Swallow every 6 hours  pantoprazole    Tablet 40 milliGRAM(s) Oral before breakfast  predniSONE   Tablet 40 milliGRAM(s) Oral daily        VITALS:  T(F): 97 (09-10-21 @ 12:00), Max: 98.2 (09-09-21 @ 16:00)  HR: 84 (09-10-21 @ 11:00)  BP: 140/54 (09-10-21 @ 11:00)  RR: 25 (09-10-21 @ 11:00)  SpO2: 98% (09-10-21 @ 11:00)  Wt(kg): --    09-08 @ 07:01  -  09-09 @ 07:00  --------------------------------------------------------  IN: 1433 mL / OUT: 1775 mL / NET: -342 mL    09-09 @ 07:01  -  09-10 @ 07:00  --------------------------------------------------------  IN: 561 mL / OUT: 1400 mL / NET: -839 mL    09-10 @ 07:01  -  09-10 @ 12:06  --------------------------------------------------------  IN: 450 mL / OUT: 795 mL / NET: -345 mL          PHYSICAL EXAM:  Constitutional: NAD  HEENT: anicteric sclera  Neck: No JVD  Respiratory: CTA  Cardiovascular: S1, S2, RRR  Gastrointestinal: BS+, soft, NT/ND  Extremities: No peripheral edema  Neurological: A/O x 3  : No CVA tenderness. No quintana (condom cath).   Skin: No rashes  Vascular Access:    LABS:  09-10    149<H>  |  101  |  101<HH>  ----------------------------<  111<H>  3.9   |  36<H>  |  3.4<H>  Creatinine Trend: 3.4<--, 4.0<--, 4.6<--, 5.1<--, 4.9<--, 4.9<--    Ca    9.2      10 Sep 2021 05:41    TPro  5.4<L>  /  Alb  2.6<L>  /  TBili  0.9  /  DBili      /  AST  88<H>  /  ALT  78<H>  /  AlkPhos  201<H>  09-10                          11.7   14.87 )-----------( 507      ( 10 Sep 2021 05:41 )             35.1       Urine Studies:      RADIOLOGY & ADDITIONAL STUDIES:  < from: Xray Chest 1 View- PORTABLE-Routine (Xray Chest 1 View- PORTABLE-Routine in AM.) (09.09.21 @ 05:00) >  Stable bilateral lung opacities    < end of copied text >

## 2021-09-10 NOTE — PROGRESS NOTE ADULT - ASSESSMENT
DIETER / ATN in nature d/t sepsis / possible JOSE contributing / oligo-anuric / creat up-trending / no improvement w/ iv hydration/ PNA / acute respiratory failure   # UO improving (last HD 9/2)  # creatinine trending down, [place] d/lopez  # BUN notes stable, on steroids   #Hypernatremia - encourage po fluid intake  # BP better controlled   # check ph level   # remains on rocephine, and levaquin   # will follow

## 2021-09-11 LAB
ALBUMIN SERPL ELPH-MCNC: 2.5 G/DL — LOW (ref 3.5–5.2)
ALBUMIN SERPL ELPH-MCNC: 2.5 G/DL — LOW (ref 3.5–5.2)
ALP SERPL-CCNC: 158 U/L — HIGH (ref 30–115)
ALP SERPL-CCNC: 165 U/L — HIGH (ref 30–115)
ALT FLD-CCNC: 61 U/L — HIGH (ref 0–41)
ALT FLD-CCNC: 63 U/L — HIGH (ref 0–41)
ANION GAP SERPL CALC-SCNC: 12 MMOL/L — SIGNIFICANT CHANGE UP (ref 7–14)
ANION GAP SERPL CALC-SCNC: 14 MMOL/L — SIGNIFICANT CHANGE UP (ref 7–14)
AST SERPL-CCNC: 51 U/L — HIGH (ref 0–41)
AST SERPL-CCNC: 52 U/L — HIGH (ref 0–41)
BASOPHILS # BLD AUTO: 0.01 K/UL — SIGNIFICANT CHANGE UP (ref 0–0.2)
BASOPHILS NFR BLD AUTO: 0.1 % — SIGNIFICANT CHANGE UP (ref 0–1)
BILIRUB SERPL-MCNC: 1 MG/DL — SIGNIFICANT CHANGE UP (ref 0.2–1.2)
BILIRUB SERPL-MCNC: 1 MG/DL — SIGNIFICANT CHANGE UP (ref 0.2–1.2)
BLD GP AB SCN SERPL QL: SIGNIFICANT CHANGE UP
BUN SERPL-MCNC: 101 MG/DL — CRITICAL HIGH (ref 10–20)
BUN SERPL-MCNC: 96 MG/DL — CRITICAL HIGH (ref 10–20)
CALCIUM SERPL-MCNC: 9 MG/DL — SIGNIFICANT CHANGE UP (ref 8.5–10.1)
CALCIUM SERPL-MCNC: 9.1 MG/DL — SIGNIFICANT CHANGE UP (ref 8.5–10.1)
CHLORIDE SERPL-SCNC: 98 MMOL/L — SIGNIFICANT CHANGE UP (ref 98–110)
CHLORIDE SERPL-SCNC: 99 MMOL/L — SIGNIFICANT CHANGE UP (ref 98–110)
CO2 SERPL-SCNC: 31 MMOL/L — SIGNIFICANT CHANGE UP (ref 17–32)
CO2 SERPL-SCNC: 32 MMOL/L — SIGNIFICANT CHANGE UP (ref 17–32)
CREAT SERPL-MCNC: 3 MG/DL — HIGH (ref 0.7–1.5)
CREAT SERPL-MCNC: 3.2 MG/DL — HIGH (ref 0.7–1.5)
EOSINOPHIL # BLD AUTO: 0.07 K/UL — SIGNIFICANT CHANGE UP (ref 0–0.7)
EOSINOPHIL NFR BLD AUTO: 0.6 % — SIGNIFICANT CHANGE UP (ref 0–8)
GLUCOSE BLDC GLUCOMTR-MCNC: 154 MG/DL — HIGH (ref 70–99)
GLUCOSE BLDC GLUCOMTR-MCNC: 159 MG/DL — HIGH (ref 70–99)
GLUCOSE BLDC GLUCOMTR-MCNC: 201 MG/DL — HIGH (ref 70–99)
GLUCOSE BLDC GLUCOMTR-MCNC: 271 MG/DL — HIGH (ref 70–99)
GLUCOSE BLDC GLUCOMTR-MCNC: 282 MG/DL — HIGH (ref 70–99)
GLUCOSE BLDC GLUCOMTR-MCNC: 331 MG/DL — HIGH (ref 70–99)
GLUCOSE BLDC GLUCOMTR-MCNC: 418 MG/DL — HIGH (ref 70–99)
GLUCOSE SERPL-MCNC: 171 MG/DL — HIGH (ref 70–99)
GLUCOSE SERPL-MCNC: 190 MG/DL — HIGH (ref 70–99)
HCT VFR BLD CALC: 34.2 % — LOW (ref 42–52)
HCT VFR BLD CALC: 34.3 % — LOW (ref 42–52)
HGB BLD-MCNC: 11.4 G/DL — LOW (ref 14–18)
HGB BLD-MCNC: 11.5 G/DL — LOW (ref 14–18)
IMM GRANULOCYTES NFR BLD AUTO: 1.4 % — HIGH (ref 0.1–0.3)
LYMPHOCYTES # BLD AUTO: 1.05 K/UL — LOW (ref 1.2–3.4)
LYMPHOCYTES # BLD AUTO: 8.3 % — LOW (ref 20.5–51.1)
MAGNESIUM SERPL-MCNC: 1.8 MG/DL — SIGNIFICANT CHANGE UP (ref 1.8–2.4)
MCHC RBC-ENTMCNC: 29.3 PG — SIGNIFICANT CHANGE UP (ref 27–31)
MCHC RBC-ENTMCNC: 29.6 PG — SIGNIFICANT CHANGE UP (ref 27–31)
MCHC RBC-ENTMCNC: 33.3 G/DL — SIGNIFICANT CHANGE UP (ref 32–37)
MCHC RBC-ENTMCNC: 33.5 G/DL — SIGNIFICANT CHANGE UP (ref 32–37)
MCV RBC AUTO: 87.5 FL — SIGNIFICANT CHANGE UP (ref 80–94)
MCV RBC AUTO: 88.8 FL — SIGNIFICANT CHANGE UP (ref 80–94)
MONOCYTES # BLD AUTO: 0.78 K/UL — HIGH (ref 0.1–0.6)
MONOCYTES NFR BLD AUTO: 6.1 % — SIGNIFICANT CHANGE UP (ref 1.7–9.3)
NEUTROPHILS # BLD AUTO: 10.61 K/UL — HIGH (ref 1.4–6.5)
NEUTROPHILS NFR BLD AUTO: 83.5 % — HIGH (ref 42.2–75.2)
NRBC # BLD: 0 /100 WBCS — SIGNIFICANT CHANGE UP (ref 0–0)
NRBC # BLD: 0 /100 WBCS — SIGNIFICANT CHANGE UP (ref 0–0)
PHOSPHATE SERPL-MCNC: 5.6 MG/DL — HIGH (ref 2.1–4.9)
PLATELET # BLD AUTO: 496 K/UL — HIGH (ref 130–400)
PLATELET # BLD AUTO: 510 K/UL — HIGH (ref 130–400)
POTASSIUM SERPL-MCNC: 3.5 MMOL/L — SIGNIFICANT CHANGE UP (ref 3.5–5)
POTASSIUM SERPL-MCNC: 4.1 MMOL/L — SIGNIFICANT CHANGE UP (ref 3.5–5)
POTASSIUM SERPL-SCNC: 3.5 MMOL/L — SIGNIFICANT CHANGE UP (ref 3.5–5)
POTASSIUM SERPL-SCNC: 4.1 MMOL/L — SIGNIFICANT CHANGE UP (ref 3.5–5)
PROT SERPL-MCNC: 5.3 G/DL — LOW (ref 6–8)
PROT SERPL-MCNC: 5.3 G/DL — LOW (ref 6–8)
RBC # BLD: 3.85 M/UL — LOW (ref 4.7–6.1)
RBC # BLD: 3.92 M/UL — LOW (ref 4.7–6.1)
RBC # FLD: 15.9 % — HIGH (ref 11.5–14.5)
RBC # FLD: 15.9 % — HIGH (ref 11.5–14.5)
SODIUM SERPL-SCNC: 142 MMOL/L — SIGNIFICANT CHANGE UP (ref 135–146)
SODIUM SERPL-SCNC: 144 MMOL/L — SIGNIFICANT CHANGE UP (ref 135–146)
WBC # BLD: 12.07 K/UL — HIGH (ref 4.8–10.8)
WBC # BLD: 12.7 K/UL — HIGH (ref 4.8–10.8)
WBC # FLD AUTO: 12.07 K/UL — HIGH (ref 4.8–10.8)
WBC # FLD AUTO: 12.7 K/UL — HIGH (ref 4.8–10.8)

## 2021-09-11 PROCEDURE — 71045 X-RAY EXAM CHEST 1 VIEW: CPT | Mod: 26

## 2021-09-11 PROCEDURE — 99232 SBSQ HOSP IP/OBS MODERATE 35: CPT

## 2021-09-11 RX ADMIN — Medication 10 UNIT(S): at 17:04

## 2021-09-11 RX ADMIN — Medication 81 MILLIGRAM(S): at 11:49

## 2021-09-11 RX ADMIN — CEFTRIAXONE 100 MILLIGRAM(S): 500 INJECTION, POWDER, FOR SOLUTION INTRAMUSCULAR; INTRAVENOUS at 08:15

## 2021-09-11 RX ADMIN — CHLORHEXIDINE GLUCONATE 1 APPLICATION(S): 213 SOLUTION TOPICAL at 05:26

## 2021-09-11 RX ADMIN — Medication 8: at 11:48

## 2021-09-11 RX ADMIN — Medication 500000 UNIT(S): at 17:04

## 2021-09-11 RX ADMIN — Medication 25 MILLIGRAM(S): at 05:26

## 2021-09-11 RX ADMIN — Medication 500000 UNIT(S): at 00:06

## 2021-09-11 RX ADMIN — ISOSORBIDE MONONITRATE 120 MILLIGRAM(S): 60 TABLET, EXTENDED RELEASE ORAL at 11:49

## 2021-09-11 RX ADMIN — Medication 6: at 17:04

## 2021-09-11 RX ADMIN — INSULIN GLARGINE 45 UNIT(S): 100 INJECTION, SOLUTION SUBCUTANEOUS at 22:06

## 2021-09-11 RX ADMIN — Medication 500000 UNIT(S): at 11:49

## 2021-09-11 RX ADMIN — Medication 500000 UNIT(S): at 23:17

## 2021-09-11 RX ADMIN — Medication 500000 UNIT(S): at 05:26

## 2021-09-11 RX ADMIN — Medication 25 MILLIGRAM(S): at 22:06

## 2021-09-11 RX ADMIN — Medication 2: at 07:39

## 2021-09-11 RX ADMIN — Medication 10 UNIT(S): at 11:48

## 2021-09-11 RX ADMIN — Medication 50 MILLIGRAM(S): at 11:49

## 2021-09-11 RX ADMIN — ATORVASTATIN CALCIUM 40 MILLIGRAM(S): 80 TABLET, FILM COATED ORAL at 22:06

## 2021-09-11 RX ADMIN — PANTOPRAZOLE SODIUM 40 MILLIGRAM(S): 20 TABLET, DELAYED RELEASE ORAL at 05:26

## 2021-09-11 RX ADMIN — APIXABAN 2.5 MILLIGRAM(S): 2.5 TABLET, FILM COATED ORAL at 17:04

## 2021-09-11 RX ADMIN — Medication 10 UNIT(S): at 07:39

## 2021-09-11 RX ADMIN — Medication 40 MILLIGRAM(S): at 05:26

## 2021-09-11 RX ADMIN — Medication 25 MILLIGRAM(S): at 13:29

## 2021-09-11 RX ADMIN — APIXABAN 2.5 MILLIGRAM(S): 2.5 TABLET, FILM COATED ORAL at 05:26

## 2021-09-11 RX ADMIN — AMLODIPINE BESYLATE 10 MILLIGRAM(S): 2.5 TABLET ORAL at 05:26

## 2021-09-11 NOTE — PROGRESS NOTE ADULT - ASSESSMENT
DIETER / ATN in nature d/t sepsis / possible JOSE contributing / oligo-anuric / creat up-trending / no improvement w/ iv hydration/ PNA / acute respiratory failure   # UO improving (last HD 9/4)  # creatinine trending down, [place] d/lopez  # BUN noted stable, on steroids   # BP better controlled   # check ph level   # remains on rocephine, and levaquin   # no acute indication for RRT   # will follow

## 2021-09-11 NOTE — PROGRESS NOTE ADULT - ASSESSMENT
IMPRESSION:    Acute hypoxemic resp failure on NC 5 L Better  Severe CAP/ Pneumonitis  Sepsis present on admission  Acute on Chronic renal failure off HD  HO A fib      PLAN:    CNS: avoid CNS depressant    HEENT:  Oral care    PULMONARY:  HOB @ 45 degrees, BIPAP/ HHFNC keep SAo2 88 to 92%, Aspiration precaution, trial  NC    CARDIOVASCULAR: Avoid overload. Eliquis, increase hydralazine to 50 q 8    GI: GI prophylaxis                                          Feeding po    RENAL:  F/u  lytes.  Correct as needed.  Accurate I/O, trend CMP.      INFECTIOUS DISEASE: abx per ID, prednisone 40 daily    HEMATOLOGICAL:  DVT prophylaxis.    ENDOCRINE:  Follow up FS.  Insulin protocol if needed.    CODE STATUS: FULL CODE      PT/ OT

## 2021-09-11 NOTE — PROGRESS NOTE ADULT - SUBJECTIVE AND OBJECTIVE BOX
Over Night Events: events noted, on 5 l NC, feels better      PHYSICAL EXAM    ICU Vital Signs Last 24 Hrs  T(C): 36.2 (11 Sep 2021 08:00), Max: 36.2 (10 Sep 2021 23:50)  T(F): 97.2 (11 Sep 2021 08:00), Max: 97.2 (11 Sep 2021 08:00)  HR: 97 (11 Sep 2021 08:00) (80 - 98)  BP: 163/79 (11 Sep 2021 08:00) (119/67 - 163/79)  BP(mean): 111 (11 Sep 2021 08:00) (70 - 111)  RR: 20 (11 Sep 2021 08:00) (18 - 98)  SpO2: 97% (11 Sep 2021 05:23) (96% - 100%)      General: ill looking  HEENT: CLARIBEL             Lymph Nodes: No cervical LN   Lungs: r side crackles  Cardiovascular: JUAN 2/6  Abdomen: Soft, Positive BS  Extremities: No clubbing   Skin: Warm  Neurological: Non focal       09-10-21 @ 07:01  -  09-11-21 @ 07:00  --------------------------------------------------------  IN:    IV PiggyBack: 150 mL    Oral Fluid: 450 mL  Total IN: 600 mL    OUT:    Indwelling Catheter - Urethral (mL): 600 mL    Voided (mL): 795 mL  Total OUT: 1395 mL    Total NET: -795 mL          LABS:                          11.5   12.07 )-----------( 496      ( 11 Sep 2021 05:39 )             34.3                                               09-11    144  |  99  |  96<HH>  ----------------------------<  171<H>  3.5   |  31  |  3.2<H>    Ca    9.0      11 Sep 2021 05:39  Phos  5.6     09-11  Mg     1.8     09-11    TPro  5.3<L>  /  Alb  2.5<L>  /  TBili  1.0  /  DBili  x   /  AST  52<H>  /  ALT  61<H>  /  AlkPhos  165<H>  09-11                                                                                           LIVER FUNCTIONS - ( 11 Sep 2021 05:39 )  Alb: 2.5 g/dL / Pro: 5.3 g/dL / ALK PHOS: 165 U/L / ALT: 61 U/L / AST: 52 U/L / GGT: x                                                                                                                                       MEDICATIONS  (STANDING):  allopurinol 50 milliGRAM(s) Oral daily  amLODIPine   Tablet 10 milliGRAM(s) Oral daily  apixaban 2.5 milliGRAM(s) Oral two times a day  aspirin  chewable 81 milliGRAM(s) Oral daily  atorvastatin 40 milliGRAM(s) Oral at bedtime  chlorhexidine 4% Liquid 1 Application(s) Topical <User Schedule>  dextrose 40% Gel 15 Gram(s) Oral once  dextrose 5%. 1000 milliLiter(s) (50 mL/Hr) IV Continuous <Continuous>  dextrose 5%. 1000 milliLiter(s) (100 mL/Hr) IV Continuous <Continuous>  dextrose 50% Injectable 25 Gram(s) IV Push once  dextrose 50% Injectable 12.5 Gram(s) IV Push once  dextrose 50% Injectable 25 Gram(s) IV Push once  dextrose 50% Injectable 50 milliLiter(s) IV Push every 15 minutes  glucagon  Injectable 1 milliGRAM(s) IntraMuscular once  hydrALAZINE 25 milliGRAM(s) Oral every 8 hours  insulin glargine Injectable (LANTUS) 45 Unit(s) SubCutaneous at bedtime  insulin lispro (ADMELOG) corrective regimen sliding scale   SubCutaneous three times a day before meals  insulin lispro Injectable (ADMELOG) 10 Unit(s) SubCutaneous three times a day before meals  isosorbide   mononitrate ER Tablet (IMDUR) 120 milliGRAM(s) Oral daily  levoFLOXacin IVPB      levoFLOXacin IVPB 250 milliGRAM(s) IV Intermittent every 24 hours  nystatin    Suspension [id number] Unit(s) Swish and Swallow every 6 hours  pantoprazole    Tablet 40 milliGRAM(s) Oral before breakfast  predniSONE   Tablet 40 milliGRAM(s) Oral daily    MEDICATIONS  (PRN):  acetaminophen   Tablet .. 650 milliGRAM(s) Oral every 6 hours PRN Temp greater or equal to 38C (100.4F), Mild Pain (1 - 3)      Xrays:    REVIEWED

## 2021-09-11 NOTE — PROGRESS NOTE ADULT - SUBJECTIVE AND OBJECTIVE BOX
seen and examined   no distress   24 h events noted         PAST HISTORY  --------------------------------------------------------------------------------  No significant changes to PMH, PSH, FHx, SHx, unless otherwise noted    ALLERGIES & MEDICATIONS  --------------------------------------------------------------------------------  Allergies    No Known Allergies    Intolerances      Standing Inpatient Medications  allopurinol 50 milliGRAM(s) Oral daily  amLODIPine   Tablet 10 milliGRAM(s) Oral daily  apixaban 2.5 milliGRAM(s) Oral two times a day  aspirin  chewable 81 milliGRAM(s) Oral daily  atorvastatin 40 milliGRAM(s) Oral at bedtime  cefTRIAXone   IVPB 1000 milliGRAM(s) IV Intermittent every 24 hours  chlorhexidine 4% Liquid 1 Application(s) Topical <User Schedule>  dextrose 40% Gel 15 Gram(s) Oral once  dextrose 5%. 1000 milliLiter(s) IV Continuous <Continuous>  dextrose 5%. 1000 milliLiter(s) IV Continuous <Continuous>  dextrose 50% Injectable 25 Gram(s) IV Push once  dextrose 50% Injectable 12.5 Gram(s) IV Push once  dextrose 50% Injectable 25 Gram(s) IV Push once  dextrose 50% Injectable 50 milliLiter(s) IV Push every 15 minutes  glucagon  Injectable 1 milliGRAM(s) IntraMuscular once  hydrALAZINE 25 milliGRAM(s) Oral every 8 hours  insulin glargine Injectable (LANTUS) 45 Unit(s) SubCutaneous at bedtime  insulin lispro (ADMELOG) corrective regimen sliding scale   SubCutaneous three times a day before meals  insulin lispro Injectable (ADMELOG) 10 Unit(s) SubCutaneous three times a day before meals  isosorbide   mononitrate ER Tablet (IMDUR) 120 milliGRAM(s) Oral daily  levoFLOXacin IVPB      levoFLOXacin IVPB 250 milliGRAM(s) IV Intermittent every 24 hours  nystatin    Suspension [id number] Unit(s) Swish and Swallow every 6 hours  pantoprazole    Tablet 40 milliGRAM(s) Oral before breakfast  predniSONE   Tablet 40 milliGRAM(s) Oral daily    PRN Inpatient Medications  acetaminophen   Tablet .. 650 milliGRAM(s) Oral every 6 hours PRN          VITALS/PHYSICAL EXAM  --------------------------------------------------------------------------------  T(C): 36.2 (09-11-21 @ 05:23), Max: 36.2 (09-10-21 @ 23:50)  HR: 96 (09-11-21 @ 05:23) (80 - 98)  BP: 149/71 (09-11-21 @ 05:23) (119/67 - 152/72)  RR: 20 (09-11-21 @ 05:23) (18 - 98)  SpO2: 97% (09-11-21 @ 05:23) (96% - 100%)  Wt(kg): --        09-09-21 @ 07:01  -  09-10-21 @ 07:00  --------------------------------------------------------  IN: 561 mL / OUT: 1400 mL / NET: -839 mL    09-10-21 @ 07:01  -  09-11-21 @ 06:44  --------------------------------------------------------  IN: 600 mL / OUT: 1395 mL / NET: -795 mL      Physical Exam:  	Gen: NAD  	Pulm:  B/L jose at bases   	CV: S1S2; no rub  	Abd: +distended  	    LABS/STUDIES  --------------------------------------------------------------------------------              11.5   12.07 >-----------<  496      [09-11-21 @ 05:39]              34.3     142  |  98  |  101  ----------------------------<  190      [09-11-21 @ 02:30]  4.1   |  32  |  3.0        Ca     9.1     [09-11-21 @ 02:30]    TPro  5.3  /  Alb  2.5  /  TBili  1.0  /  DBili  x   /  AST  51  /  ALT  63  /  AlkPhos  158  [09-11-21 @ 02:30]      Creatinine Trend:  SCr 3.0 [09-11 @ 02:30]  SCr 3.4 [09-10 @ 17:08]  SCr 3.4 [09-10 @ 05:41]  SCr 4.0 [09-09 @ 05:06]  SCr 4.6 [09-08 @ 05:00]    Urinalysis - [08-31-21 @ 18:38]      Color Yellow / Appearance Slightly Turbid / SG >1.050 / pH 6.0      Gluc Trace / Ketone Negative  / Bili Negative / Urobili <2 mg/dL       Blood Large / Protein 300 mg/dL / Leuk Est Negative / Nitrite Negative      RBC 9 / WBC 32 / Hyaline 20 / Gran 3 / Sq Epi  / Non Sq Epi 2 / Bacteria Negative      Lipid: chol 60, , HDL 17, LDL --      [09-01-21 @ 08:16]    HBsAb Nonreact      [09-02-21 @ 22:00]  HBsAg Nonreact      [09-02-21 @ 22:00]  HBcAb Nonreact      [09-02-21 @ 22:00]

## 2021-09-12 LAB
GLUCOSE BLDC GLUCOMTR-MCNC: 123 MG/DL — HIGH (ref 70–99)
GLUCOSE BLDC GLUCOMTR-MCNC: 209 MG/DL — HIGH (ref 70–99)
GLUCOSE BLDC GLUCOMTR-MCNC: 277 MG/DL — HIGH (ref 70–99)
GLUCOSE BLDC GLUCOMTR-MCNC: 284 MG/DL — HIGH (ref 70–99)

## 2021-09-12 PROCEDURE — 71045 X-RAY EXAM CHEST 1 VIEW: CPT | Mod: 26

## 2021-09-12 PROCEDURE — 99232 SBSQ HOSP IP/OBS MODERATE 35: CPT

## 2021-09-12 RX ORDER — HYDRALAZINE HCL 50 MG
50 TABLET ORAL EVERY 8 HOURS
Refills: 0 | Status: DISCONTINUED | OUTPATIENT
Start: 2021-09-12 | End: 2021-09-15

## 2021-09-12 RX ADMIN — Medication 6: at 11:17

## 2021-09-12 RX ADMIN — Medication 10 UNIT(S): at 11:18

## 2021-09-12 RX ADMIN — Medication 10 UNIT(S): at 07:34

## 2021-09-12 RX ADMIN — CHLORHEXIDINE GLUCONATE 1 APPLICATION(S): 213 SOLUTION TOPICAL at 05:12

## 2021-09-12 RX ADMIN — APIXABAN 2.5 MILLIGRAM(S): 2.5 TABLET, FILM COATED ORAL at 17:11

## 2021-09-12 RX ADMIN — Medication 50 MILLIGRAM(S): at 13:31

## 2021-09-12 RX ADMIN — ATORVASTATIN CALCIUM 40 MILLIGRAM(S): 80 TABLET, FILM COATED ORAL at 21:25

## 2021-09-12 RX ADMIN — Medication 500000 UNIT(S): at 17:11

## 2021-09-12 RX ADMIN — Medication 81 MILLIGRAM(S): at 11:19

## 2021-09-12 RX ADMIN — Medication 10 UNIT(S): at 17:11

## 2021-09-12 RX ADMIN — PANTOPRAZOLE SODIUM 40 MILLIGRAM(S): 20 TABLET, DELAYED RELEASE ORAL at 05:12

## 2021-09-12 RX ADMIN — INSULIN GLARGINE 45 UNIT(S): 100 INJECTION, SOLUTION SUBCUTANEOUS at 21:25

## 2021-09-12 RX ADMIN — Medication 50 MILLIGRAM(S): at 21:25

## 2021-09-12 RX ADMIN — Medication 25 MILLIGRAM(S): at 05:12

## 2021-09-12 RX ADMIN — Medication 500000 UNIT(S): at 11:18

## 2021-09-12 RX ADMIN — AMLODIPINE BESYLATE 10 MILLIGRAM(S): 2.5 TABLET ORAL at 05:12

## 2021-09-12 RX ADMIN — Medication 40 MILLIGRAM(S): at 05:12

## 2021-09-12 RX ADMIN — Medication 50 MILLIGRAM(S): at 11:19

## 2021-09-12 RX ADMIN — Medication 500000 UNIT(S): at 05:12

## 2021-09-12 RX ADMIN — ISOSORBIDE MONONITRATE 120 MILLIGRAM(S): 60 TABLET, EXTENDED RELEASE ORAL at 11:19

## 2021-09-12 RX ADMIN — Medication 6: at 17:10

## 2021-09-12 RX ADMIN — APIXABAN 2.5 MILLIGRAM(S): 2.5 TABLET, FILM COATED ORAL at 05:12

## 2021-09-12 NOTE — PROGRESS NOTE ADULT - ASSESSMENT
Patient is a 79 YO M w a PMH of A-fib (on home xarelto) CAD (s/p stent 2014),gout, Alcohol dependence (5-6 drinks/day), VINI (on home CPAP) addmitted for acute hypoxic respiratory failure secondary to SEVERE community acquired pneumonia. Patient was septic on admission (febrile, leukocytosis, tachycardia, hypoxic w identifiable source). Found to have HAGMA and UA + for WBC. Of note patient is s/p 2 x COVID vaccine. Patient was MRSA nares negative w no growth to date from lesion biopsy. Patient is clinically improving and doing well on his BiPAP at night. When i went into the room 9/6 morning BiPAP was on his cheek and he was maintaining O2 of 88-93%. Patient is hypokalemic 9/7 with an increasing BUN, CVVHD due today w/ Ramirez to be removed after. Patient was hypertensive to 187/80 w HR 76 overnight and responded well to Amlidipine 5. Home dose of Amlodipine 10 restarted today. Patient passed speech and swallow evaluation, note pending for diet order, NG tube pulled. Repeat chest XR showed stable RT consolidation, on physical exam no rhonchi or crackles, much improved from previous exams. Solumedrol 60 q12 started 9/7. Oral care also increased to q2hr  9/8 Patient was hypertensive to 181/70's, Hyralazine and Imdur started. Patient now 's. Was changed to HF NC at 5L and saturating well. Condom catheter was placed and UO was ~100cc/hr. Cr is downtrending, BUN uptrending, No HD today. AM glucose 412. Insulin drip started   9/9 insulin drip stopped, Lantus 45 started w Admelog 10 before meals, BP well controlled with hydralazine 25 and IMDUR 120. Patient appears well and is the most conversational and awake that I have seen thus far. Patient tolerating diet well. Increasing leukocytosis (afebrile no other signs of sepsis) likely secondary to Solumedrol, solumedrol d/c-ed and Prednisone 40mg once daily started.      Problem List:  #acute hypoxemic respiratory failure secondary to severe community acquired PMN complicated by Sepsis,   -STABLE WBC. Afebrile. NO GROWTH TO DATE   -negative MRSA, legionella and COVID -  -c/w ceftriaxone 1000mg once daily  -c/w levofloxacin 250 mg once daily   - today is day 13/14 of antibiotic duration, 4 more days  -d/c LINEZOLID   -blood cultures negative  -Repeat vBG once daily  -CXR once daily   -ORSA REPORTED BY INFECTION CONTROL, UNABLE TO FIND CULTURE THEY ARE FOLLOWING. THERE WAS A +PCR IN 2020 BUT NEGATIVE MRSA NARES SEPT 2 2021. ATTEMPTED TO CONTACT OFFICE FOR CLARIFICATION, UNABLE TO REACH THEM   -c/w BiPAP at night, NC by day   -c/w 40mg Prednisone once daily   -trend WBC     #HTN  Patient had BP as high as 189/90  -Hydralazine 25 q 8 and Imdur 120 started today  -SBP now 130's    #White plaque on tongue  -steroids use, open mouth, satelite lesions looks like Candidiasis   -nurse concerned for Candidiasis  -Nystatin 5 q 6 swish and swallow started  -follow up oral leasion  -oral hygene q 2hr     #Acute on Chronic renal failure oliguric  patient had HD 9/1, 1 L removed NOW S/P UDOL PLACEMENT RT IJV  -asymptomatic bacteruria   -f/u for signs/symptoms of UTI  -Repeat vBG once daily  -patient was alkalotic 9/5 and Bicarb drip was d/c  -held dialysis 9/4-9-9  -BUN uptrending, Cr downtrending, UO rapidly increasing all indicative of RESOLVING ATN      #Chronic venous stasis dermatitis   -keep leg wrapped  -elevate legs  -outpatient evaluation   -compression socks  -WOUND CULTURE TAKEN 9/5 F/U CULTURES NO GROWTH TO DATE    #Necrotic Sacral Wound  wound care following, sacral wound now necrotic  -wound care nurse note pending, will implement wound care instructions ASAP         PAST MEDICAL & SURGICAL HISTORY:  Atrial fibrillation    Coronary artery disease    Hyperlipidemia    Hypertension    Gout    Diabetes mellitus    VINI on CPAP    History of heart artery stent        #Misc  - DVT Prophylaxis: heparin   - GI Prophylaxis: pantoprazole   - Diet:  - Activity: OUT OF BED TO CHAIR   - IV Fluids:   - Code Status: full    Patient is a 81 YO M w a PMH of A-fib (on home xarelto) CAD (s/p stent 2014),gout, Alcohol dependence (5-6 drinks/day), VINI (on home CPAP) addmitted for acute hypoxic respiratory failure secondary to SEVERE community acquired pneumonia. Patient was septic on admission (febrile, leukocytosis, tachycardia, hypoxic w identifiable source). Found to have HAGMA and UA + for WBC.      Problem List:  #acute hypoxemic respiratory failure secondary to severe community acquired PMN complicated by Sepsis,   -STABLE WBC. Afebrile. NO GROWTH TO DATE   -negative MRSA, legionella and COVID -  -c/w ceftriaxone 1000mg once daily  -c/w levofloxacin 250 mg once daily   - today is day 13/14 of antibiotic duration, 4 more days  -d/c LINEZOLID   -blood cultures negative  -Repeat vBG once daily  -CXR once daily   -ORSA REPORTED BY INFECTION CONTROL, UNABLE TO FIND CULTURE THEY ARE FOLLOWING. THERE WAS A +PCR IN 2020 BUT NEGATIVE MRSA NARES SEPT 2 2021. ATTEMPTED TO CONTACT OFFICE FOR CLARIFICATION, UNABLE TO REACH THEM   -c/w BiPAP at night, NC by day   -c/w 40mg Prednisone once daily   -trend WBC     #HTN  Patient had BP as high as 189/90  -Hydralazine 50 q 8 and Imdur 120 started today    #White plaque on tongue  -steroids use, open mouth, satelite lesions looks like Candidiasis   -nurse concerned for Candidiasis  -Nystatin 5 q 6 swish and swallow started  -follow up oral leasion  -oral hygene q 2hr     #Acute on Chronic renal failure oliguric  patient had HD 9/1, 1 L removed NOW S/P UDOL PLACEMENT RT IJV  -asymptomatic bacteruria   -f/u for signs/symptoms of UTI  -Repeat vBG once daily  -patient was alkalotic 9/5 and Bicarb drip was d/c  -held dialysis 9/4-9-9  -BUN uptrending, Cr downtrending, UO rapidly increasing all indicative of RESOLVING ATN      #Chronic venous stasis dermatitis   -keep leg wrapped  -elevate legs  -outpatient evaluation   -compression socks  -WOUND CULTURE TAKEN 9/5 F/U CULTURES NO GROWTH TO DATE    #Necrotic Sacral Wound  wound care following, sacral wound now necrotic  -wound care nurse note pending, will implement wound care instructions ASAP     #A Fib  - Eliquis 2.5 BID     #Misc  - DVT Prophylaxis: heparin   - GI Prophylaxis: pantoprazole   - Diet:  - Activity: OUT OF BED TO CHAIR   - IV Fluids:   - Code Status: full

## 2021-09-12 NOTE — PROGRESS NOTE ADULT - ASSESSMENT
DIETER / ATN in nature d/t sepsis / possible JOSE contributing / oligo-anuric / creat up-trending / no improvement w/ iv hydration/ PNA / acute respiratory failure   # UO improving (last HD 9/4), creat stable, udall d/c'd  # sodium level up-trending, minimal po intake, give gentle iv hydration 1/2NS at 50cc/hr, stop after 1liter and assess volume/respiratory status, encourage po intake/hydration  # monitor urine output, please obtain daily weights  # replete K/Mg as needed  # BUN elevated, on steroids - downtrending  # BP better controlled   # check ph level   # PNA on levofloxacin   # no acute indication for RRT   # will follow

## 2021-09-12 NOTE — PROGRESS NOTE ADULT - SUBJECTIVE AND OBJECTIVE BOX
Over Night Events: events noted, on NC, Afebrile    PHYSICAL EXAM    ICU Vital Signs Last 24 Hrs  T(C): 36.1 (12 Sep 2021 08:00), Max: 36.6 (11 Sep 2021 12:50)  T(F): 97 (12 Sep 2021 08:00), Max: 97.9 (11 Sep 2021 12:50)  HR: 88 (12 Sep 2021 08:00) (82 - 100)  BP: 152/78 (12 Sep 2021 08:00) (127/71 - 176/77)  BP(mean): 109 (12 Sep 2021 08:00) (95 - 110)  RR: 20 (12 Sep 2021 08:00) (20 - 22)  SpO2: 100% (12 Sep 2021 04:45) (97% - 100%)      General:  ill looking  HEENT: CLARIBEL             Lymph Nodes: No cervical LN   Lungs: r side rhonchi  Cardiovascular: JUAN 2/6  Abdomen: Soft, Positive BS  Extremities: No clubbing   Skin: Warm  Neurological: Non focal       09-11-21 @ 07:01  -  09-12-21 @ 07:00  --------------------------------------------------------  IN:  Total IN: 0 mL    OUT:    Indwelling Catheter - Urethral (mL): 1700 mL  Total OUT: 1700 mL    Total NET: -1700 mL          LABS:                          11.5   12.07 )-----------( 496      ( 11 Sep 2021 05:39 )             34.3                                               09-11    144  |  99  |  96<HH>  ----------------------------<  171<H>  3.5   |  31  |  3.2<H>    Ca    9.0      11 Sep 2021 05:39  Phos  5.6     09-11  Mg     1.8     09-11    TPro  5.3<L>  /  Alb  2.5<L>  /  TBili  1.0  /  DBili  x   /  AST  52<H>  /  ALT  61<H>  /  AlkPhos  165<H>  09-11                                                                                           LIVER FUNCTIONS - ( 11 Sep 2021 05:39 )  Alb: 2.5 g/dL / Pro: 5.3 g/dL / ALK PHOS: 165 U/L / ALT: 61 U/L / AST: 52 U/L / GGT: x                                                                                                                                       MEDICATIONS  (STANDING):  allopurinol 50 milliGRAM(s) Oral daily  amLODIPine   Tablet 10 milliGRAM(s) Oral daily  apixaban 2.5 milliGRAM(s) Oral two times a day  aspirin  chewable 81 milliGRAM(s) Oral daily  atorvastatin 40 milliGRAM(s) Oral at bedtime  chlorhexidine 4% Liquid 1 Application(s) Topical <User Schedule>  dextrose 40% Gel 15 Gram(s) Oral once  dextrose 5%. 1000 milliLiter(s) (50 mL/Hr) IV Continuous <Continuous>  dextrose 5%. 1000 milliLiter(s) (100 mL/Hr) IV Continuous <Continuous>  dextrose 50% Injectable 50 milliLiter(s) IV Push every 15 minutes  dextrose 50% Injectable 25 Gram(s) IV Push once  dextrose 50% Injectable 12.5 Gram(s) IV Push once  dextrose 50% Injectable 25 Gram(s) IV Push once  glucagon  Injectable 1 milliGRAM(s) IntraMuscular once  hydrALAZINE 50 milliGRAM(s) Oral every 8 hours  insulin glargine Injectable (LANTUS) 45 Unit(s) SubCutaneous at bedtime  insulin lispro (ADMELOG) corrective regimen sliding scale   SubCutaneous three times a day before meals  insulin lispro Injectable (ADMELOG) 10 Unit(s) SubCutaneous three times a day before meals  isosorbide   mononitrate ER Tablet (IMDUR) 120 milliGRAM(s) Oral daily  levoFLOXacin IVPB      levoFLOXacin IVPB 250 milliGRAM(s) IV Intermittent every 24 hours  nystatin    Suspension [id number] Unit(s) Swish and Swallow every 6 hours  pantoprazole    Tablet 40 milliGRAM(s) Oral before breakfast  predniSONE   Tablet 40 milliGRAM(s) Oral daily    MEDICATIONS  (PRN):  acetaminophen   Tablet .. 650 milliGRAM(s) Oral every 6 hours PRN Temp greater or equal to 38C (100.4F), Mild Pain (1 - 3)      Xrays:                                                                                     ECHO

## 2021-09-12 NOTE — PROGRESS NOTE ADULT - SUBJECTIVE AND OBJECTIVE BOX
Nephrology Progress Note    MISTY BURLESON  MRN-[id number]  80y  Male    S:  Patient is seen and examined, events over the last 24h noted.    O:  Allergies:  No Known Allergies    Hospital Medications:   MEDICATIONS  (STANDING):  allopurinol 50 milliGRAM(s) Oral daily  amLODIPine   Tablet 10 milliGRAM(s) Oral daily  apixaban 2.5 milliGRAM(s) Oral two times a day  aspirin  chewable 81 milliGRAM(s) Oral daily  atorvastatin 40 milliGRAM(s) Oral at bedtime  hydrALAZINE 50 milliGRAM(s) Oral every 8 hours  insulin glargine Injectable (LANTUS) 45 Unit(s) SubCutaneous at bedtime  insulin lispro (ADMELOG) corrective regimen sliding scale   SubCutaneous three times a day before meals  insulin lispro Injectable (ADMELOG) 10 Unit(s) SubCutaneous three times a day before meals  isosorbide   mononitrate ER Tablet (IMDUR) 120 milliGRAM(s) Oral daily  levoFLOXacin IVPB 250 milliGRAM(s) IV Intermittent every 24 hours  nystatin    Suspension 799164 Unit(s) Swish and Swallow every 6 hours  pantoprazole    Tablet 40 milliGRAM(s) Oral before breakfast  predniSONE   Tablet 40 milliGRAM(s) Oral daily    MEDICATIONS  (PRN):  acetaminophen   Tablet .. 650 milliGRAM(s) Oral every 6 hours PRN Temp greater or equal to 38C (100.4F), Mild Pain (1 - 3)    Home Medications:  Actoplus Met 15 mg-500 mg oral tablet: 1 tab(s) orally 2 times a day (31 Aug 2021 22:08)  allopurinol: 150 milligram(s) orally once a day (31 Aug 2021 22:08)  amLODIPine 10 mg oral tablet: 1 tab(s) orally once a day (in the evening) (31 Aug 2021 22:08)  aspirin 81 mg oral tablet, chewable: 1 tab(s) orally once a day (31 Aug 2021 22:08)  atorvastatin 40 mg oral tablet: 1 tab(s) orally once a day (31 Aug 2021 22:08)  fosinopril 40 mg oral tablet: 1 tab(s) orally once a day (in the evening) (31 Aug 2021 22:08)  hydroCHLOROthiazide 12.5 mg oral tablet: 1 tab(s) orally once a day (31 Aug 2021 22:08)  isosorbide mononitrate 120 mg oral tablet, extended release: 1 tab(s) orally once a day (in the morning) (31 Aug 2021 22:08)  Xarelto 15 mg oral tablet: 1 tab(s) orally once a day (in the evening) (31 Aug 2021 22:08)      VITALS:  T(F): 97 (09-12-21 @ 08:00), Max: 97.9 (09-11-21 @ 12:50)  HR: 88 (09-12-21 @ 08:00)  BP: 152/78 (09-12-21 @ 08:00)  RR: 20 (09-12-21 @ 08:00)  SpO2: 100% (09-12-21 @ 04:45)  Wt(kg): --  I&O's Detail    11 Sep 2021 07:01  -  12 Sep 2021 07:00  --------------------------------------------------------  IN:  Total IN: 0 mL    OUT:    Indwelling Catheter - Urethral (mL): 1700 mL  Total OUT: 1700 mL    Total NET: -1700 mL        I&O's Summary    11 Sep 2021 07:01  -  12 Sep 2021 07:00  --------------------------------------------------------  IN: 0 mL / OUT: 1700 mL / NET: -1700 mL          PHYSICAL EXAM:  Gen: NAD on nc  Resp: decreased bs on R  Card: S1/S2  Abd: soft  Extremities: no edema  Vascular access:       LABS:    09-11    144  |  99  |  96<HH>  ----------------------------<  171<H>  3.5   |  31  |  3.2<H>    Ca    9.0      11 Sep 2021 05:39  Phos  5.6     09-11  Mg     1.8     09-11    TPro  5.3<L>  /  Alb  2.5<L>  /  TBili  1.0  /  DBili      /  AST  52<H>  /  ALT  61<H>  /  AlkPhos  165<H>  09-11    eGFR if Non African American: 17 mL/min/1.73M2 (09-11-21 @ 05:39)  eGFR if African American: 20 mL/min/1.73M2 (09-11-21 @ 05:39)    Phosphorus Level, Serum: 5.6 mg/dL (09-11-21 @ 05:39)  Phosphorus Level, Serum: 3.5 mg/dL (09-24-20 @ 16:55)                            11.5   12.07 )-----------( 496      ( 11 Sep 2021 05:39 )             34.3     Mean Cell Volume: 87.5 fL (09-11-21 @ 05:39)        Urine Studies:    Sodium, Random Urine: <20.0 mmoL/L (09-02 @ 12:10)  Chloride, Random Urine: 20 (09-02 @ 12:10)    Creatinine trend:  Creatinine, Serum: 3.2 mg/dL (09-11-21 @ 05:39)  Creatinine, Serum: 3.0 mg/dL (09-11-21 @ 02:30)  Creatinine, Serum: 3.4 mg/dL (09-10-21 @ 17:08)  Creatinine, Serum: 3.4 mg/dL (09-10-21 @ 05:41)  Creatinine, Serum: 4.0 mg/dL (09-09-21 @ 05:06)  Creatinine, Serum: 4.6 mg/dL (09-08-21 @ 05:00)

## 2021-09-12 NOTE — PROGRESS NOTE ADULT - ASSESSMENT
IMPRESSION:    Acute hypoxemic resp failure on NC Better  Severe CAP/ Pneumonitis  Sepsis present on admission  Acute on Chronic renal failure off HD  HO A fib      PLAN:    CNS: avoid CNS depressant    HEENT:  Oral care    PULMONARY:  HOB @ 45 degrees, BIPAP/ HHFNC keep SAo2 88 to 92%, Aspiration precaution, NC    CARDIOVASCULAR: Avoid overload. Eliquis, hydralazine to 50 q 8    GI: GI prophylaxis                                          Feeding po    RENAL:  F/u  lytes.  Correct as needed.  Accurate I/O, trend CMP.      INFECTIOUS DISEASE: prednisone 40 daily, DC ABX    HEMATOLOGICAL:  DVT prophylaxis.    ENDOCRINE:  Follow up FS.  Insulin protocol if needed.    CODE STATUS: FULL CODE      PT/ OT

## 2021-09-12 NOTE — PROGRESS NOTE ADULT - SUBJECTIVE AND OBJECTIVE BOX
Hospital Day:  12d    Subjective: Patient is a 80y old  Male who presents with a chief complaint of sob (11 Sep 2021 10:16)      Pt seen and evaluated at bedside.   Complaints:  Over the night Events:    Past Medical Hx:   Atrial fibrillation    Coronary artery disease    Hyperlipidemia    Hypertension    Gout    Diabetes mellitus    VINI on CPAP      Past Sx:  History of heart artery stent      Allergies:  No Known Allergies    Current Meds:   Standng Meds:  allopurinol 50 milliGRAM(s) Oral daily  amLODIPine   Tablet 10 milliGRAM(s) Oral daily  apixaban 2.5 milliGRAM(s) Oral two times a day  aspirin  chewable 81 milliGRAM(s) Oral daily  atorvastatin 40 milliGRAM(s) Oral at bedtime  chlorhexidine 4% Liquid 1 Application(s) Topical <User Schedule>  dextrose 40% Gel 15 Gram(s) Oral once  dextrose 5%. 1000 milliLiter(s) (50 mL/Hr) IV Continuous <Continuous>  dextrose 5%. 1000 milliLiter(s) (100 mL/Hr) IV Continuous <Continuous>  dextrose 50% Injectable 25 Gram(s) IV Push once  dextrose 50% Injectable 12.5 Gram(s) IV Push once  dextrose 50% Injectable 25 Gram(s) IV Push once  dextrose 50% Injectable 50 milliLiter(s) IV Push every 15 minutes  glucagon  Injectable 1 milliGRAM(s) IntraMuscular once  hydrALAZINE 25 milliGRAM(s) Oral every 8 hours  insulin glargine Injectable (LANTUS) 45 Unit(s) SubCutaneous at bedtime  insulin lispro (ADMELOG) corrective regimen sliding scale   SubCutaneous three times a day before meals  insulin lispro Injectable (ADMELOG) 10 Unit(s) SubCutaneous three times a day before meals  isosorbide   mononitrate ER Tablet (IMDUR) 120 milliGRAM(s) Oral daily  levoFLOXacin IVPB      levoFLOXacin IVPB 250 milliGRAM(s) IV Intermittent every 24 hours  nystatin    Suspension 126244 Unit(s) Swish and Swallow every 6 hours  pantoprazole    Tablet 40 milliGRAM(s) Oral before breakfast  predniSONE   Tablet 40 milliGRAM(s) Oral daily    PRN Meds:  acetaminophen   Tablet .. 650 milliGRAM(s) Oral every 6 hours PRN Temp greater or equal to 38C (100.4F), Mild Pain (1 - 3)      Vital Signs:   T(F): 97.2 (09-12-21 @ 04:45), Max: 97.9 (09-11-21 @ 12:50)  HR: 84 (09-12-21 @ 04:45) (82 - 100)  BP: 152/78 (09-12-21 @ 04:45) (127/71 - 176/77)  RR: 20 (09-12-21 @ 04:45) (20 - 22)  SpO2: 100% (09-12-21 @ 04:45) (97% - 100%)    Physical Exam:   GENERAL: NAD, Resting in bed  HEENT: NCAT  CHEST/LUNG: Clear to auscultation bilaterally; No wheezing or rubs.   HEART: Regular rate and rhythm; No murmurs, rubs, or gallops  ABDOMEN: Bowel sounds present; Soft, Nontender, Nondistended.   EXTREMITIES:  No clubbing, cyanosis, or edema  NERVOUS SYSTEM:  Alert & Oriented X3    FLUID BALANCE    09-09-21 @ 07:01  -  09-10-21 @ 07:00  --------------------------------------------------------  IN: 561 mL / OUT: 1400 mL / NET: -839 mL    09-10-21 @ 07:01  -  09-11-21 @ 07:00  --------------------------------------------------------  IN: 600 mL / OUT: 1395 mL / NET: -795 mL    09-11-21 @ 07:01  -  09-12-21 @ 05:03  --------------------------------------------------------  IN: 0 mL / OUT: 1100 mL / NET: -1100 mL        Labs:                         11.5   12.07 )-----------( 496      ( 11 Sep 2021 05:39 )             34.3       11 Sep 2021 05:39    144    |  99     |  96     ----------------------------<  171    3.5     |  31     |  3.2      Ca    9.0        11 Sep 2021 05:39  Phos  5.6       11 Sep 2021 05:39  Mg     1.8       11 Sep 2021 05:39    TPro  5.3    /  Alb  2.5    /  TBili  1.0    /  DBili  x      /  AST  52     /  ALT  61     /  AlkPhos  165    11 Sep 2021 05:39                                      Radiology:  Hospital Day:  12d    Subjective: Patient is a 80y old  Male who presents with a chief complaint of sob (11 Sep 2021 10:16)      Pt seen and evaluated at bedside. Noting no complaints, ROS negative. Was on BiPAP overnight. Saturating well on 5L NC.    Past Medical Hx:   Atrial fibrillation    Coronary artery disease    Hyperlipidemia    Hypertension    Gout    Diabetes mellitus    VINI on CPAP      Past Sx:  History of heart artery stent      Allergies:  No Known Allergies    Current Meds:   Standng Meds:  allopurinol 50 milliGRAM(s) Oral daily  amLODIPine   Tablet 10 milliGRAM(s) Oral daily  apixaban 2.5 milliGRAM(s) Oral two times a day  aspirin  chewable 81 milliGRAM(s) Oral daily  atorvastatin 40 milliGRAM(s) Oral at bedtime  chlorhexidine 4% Liquid 1 Application(s) Topical <User Schedule>  dextrose 40% Gel 15 Gram(s) Oral once  dextrose 5%. 1000 milliLiter(s) (50 mL/Hr) IV Continuous <Continuous>  dextrose 5%. 1000 milliLiter(s) (100 mL/Hr) IV Continuous <Continuous>  dextrose 50% Injectable 25 Gram(s) IV Push once  dextrose 50% Injectable 12.5 Gram(s) IV Push once  dextrose 50% Injectable 25 Gram(s) IV Push once  dextrose 50% Injectable 50 milliLiter(s) IV Push every 15 minutes  glucagon  Injectable 1 milliGRAM(s) IntraMuscular once  hydrALAZINE 25 milliGRAM(s) Oral every 8 hours  insulin glargine Injectable (LANTUS) 45 Unit(s) SubCutaneous at bedtime  insulin lispro (ADMELOG) corrective regimen sliding scale   SubCutaneous three times a day before meals  insulin lispro Injectable (ADMELOG) 10 Unit(s) SubCutaneous three times a day before meals  isosorbide   mononitrate ER Tablet (IMDUR) 120 milliGRAM(s) Oral daily  levoFLOXacin IVPB      levoFLOXacin IVPB 250 milliGRAM(s) IV Intermittent every 24 hours  nystatin    Suspension 574704 Unit(s) Swish and Swallow every 6 hours  pantoprazole    Tablet 40 milliGRAM(s) Oral before breakfast  predniSONE   Tablet 40 milliGRAM(s) Oral daily    PRN Meds:  acetaminophen   Tablet .. 650 milliGRAM(s) Oral every 6 hours PRN Temp greater or equal to 38C (100.4F), Mild Pain (1 - 3)      Vital Signs:   T(F): 97.2 (09-12-21 @ 04:45), Max: 97.9 (09-11-21 @ 12:50)  HR: 84 (09-12-21 @ 04:45) (82 - 100)  BP: 152/78 (09-12-21 @ 04:45) (127/71 - 176/77)  RR: 20 (09-12-21 @ 04:45) (20 - 22)  SpO2: 100% (09-12-21 @ 04:45) (97% - 100%)    Physical Exam:   GENERAL: NAD, Resting in bed  CHEST/LUNG: Clear to auscultation bilaterally; No wheezing or rubs.   HEART: Regular rate and rhythm; No murmurs, rubs, or gallops  ABDOMEN: Bowel sounds present; Soft, Nontender, Nondistended.   EXTREMITIES:  B/l LE edema       FLUID BALANCE    09-09-21 @ 07:01  -  09-10-21 @ 07:00  --------------------------------------------------------  IN: 561 mL / OUT: 1400 mL / NET: -839 mL    09-10-21 @ 07:01  -  09-11-21 @ 07:00  --------------------------------------------------------  IN: 600 mL / OUT: 1395 mL / NET: -795 mL    09-11-21 @ 07:01  -  09-12-21 @ 05:03  --------------------------------------------------------  IN: 0 mL / OUT: 1100 mL / NET: -1100 mL        Labs:                         11.5   12.07 )-----------( 496      ( 11 Sep 2021 05:39 )             34.3       11 Sep 2021 05:39    144    |  99     |  96     ----------------------------<  171    3.5     |  31     |  3.2      Ca    9.0        11 Sep 2021 05:39  Phos  5.6       11 Sep 2021 05:39  Mg     1.8       11 Sep 2021 05:39    TPro  5.3    /  Alb  2.5    /  TBili  1.0    /  DBili  x      /  AST  52     /  ALT  61     /  AlkPhos  165    11 Sep 2021 05:39                                      Radiology:

## 2021-09-13 LAB
ALBUMIN SERPL ELPH-MCNC: 2.9 G/DL — LOW (ref 3.5–5.2)
ALP SERPL-CCNC: 146 U/L — HIGH (ref 30–115)
ALT FLD-CCNC: 55 U/L — HIGH (ref 0–41)
ANION GAP SERPL CALC-SCNC: 11 MMOL/L — SIGNIFICANT CHANGE UP (ref 7–14)
AST SERPL-CCNC: 51 U/L — HIGH (ref 0–41)
BILIRUB SERPL-MCNC: 1.2 MG/DL — SIGNIFICANT CHANGE UP (ref 0.2–1.2)
BUN SERPL-MCNC: 86 MG/DL — CRITICAL HIGH (ref 10–20)
CALCIUM SERPL-MCNC: 8.2 MG/DL — LOW (ref 8.5–10.1)
CHLORIDE SERPL-SCNC: 97 MMOL/L — LOW (ref 98–110)
CO2 SERPL-SCNC: 31 MMOL/L — SIGNIFICANT CHANGE UP (ref 17–32)
CREAT SERPL-MCNC: 2.6 MG/DL — HIGH (ref 0.7–1.5)
GLUCOSE BLDC GLUCOMTR-MCNC: 122 MG/DL — HIGH (ref 70–99)
GLUCOSE BLDC GLUCOMTR-MCNC: 262 MG/DL — HIGH (ref 70–99)
GLUCOSE BLDC GLUCOMTR-MCNC: 262 MG/DL — HIGH (ref 70–99)
GLUCOSE BLDC GLUCOMTR-MCNC: 340 MG/DL — HIGH (ref 70–99)
GLUCOSE SERPL-MCNC: 98 MG/DL — SIGNIFICANT CHANGE UP (ref 70–99)
HCT VFR BLD CALC: 37.6 % — LOW (ref 42–52)
HGB BLD-MCNC: 12.2 G/DL — LOW (ref 14–18)
MCHC RBC-ENTMCNC: 29.2 PG — SIGNIFICANT CHANGE UP (ref 27–31)
MCHC RBC-ENTMCNC: 32.4 G/DL — SIGNIFICANT CHANGE UP (ref 32–37)
MCV RBC AUTO: 90 FL — SIGNIFICANT CHANGE UP (ref 80–94)
NRBC # BLD: 0 /100 WBCS — SIGNIFICANT CHANGE UP (ref 0–0)
PLATELET # BLD AUTO: 434 K/UL — HIGH (ref 130–400)
POTASSIUM SERPL-MCNC: 4.1 MMOL/L — SIGNIFICANT CHANGE UP (ref 3.5–5)
POTASSIUM SERPL-SCNC: 4.1 MMOL/L — SIGNIFICANT CHANGE UP (ref 3.5–5)
PROT SERPL-MCNC: 6.1 G/DL — SIGNIFICANT CHANGE UP (ref 6–8)
RBC # BLD: 4.18 M/UL — LOW (ref 4.7–6.1)
RBC # FLD: 16.1 % — HIGH (ref 11.5–14.5)
SODIUM SERPL-SCNC: 139 MMOL/L — SIGNIFICANT CHANGE UP (ref 135–146)
WBC # BLD: 9.69 K/UL — SIGNIFICANT CHANGE UP (ref 4.8–10.8)
WBC # FLD AUTO: 9.69 K/UL — SIGNIFICANT CHANGE UP (ref 4.8–10.8)

## 2021-09-13 PROCEDURE — 71045 X-RAY EXAM CHEST 1 VIEW: CPT | Mod: 26

## 2021-09-13 PROCEDURE — 99232 SBSQ HOSP IP/OBS MODERATE 35: CPT

## 2021-09-13 RX ADMIN — Medication 50 MILLIGRAM(S): at 05:49

## 2021-09-13 RX ADMIN — Medication 10 UNIT(S): at 16:55

## 2021-09-13 RX ADMIN — ATORVASTATIN CALCIUM 40 MILLIGRAM(S): 80 TABLET, FILM COATED ORAL at 21:05

## 2021-09-13 RX ADMIN — Medication 500000 UNIT(S): at 17:54

## 2021-09-13 RX ADMIN — INSULIN GLARGINE 45 UNIT(S): 100 INJECTION, SOLUTION SUBCUTANEOUS at 21:05

## 2021-09-13 RX ADMIN — Medication 10 UNIT(S): at 12:06

## 2021-09-13 RX ADMIN — Medication 81 MILLIGRAM(S): at 12:08

## 2021-09-13 RX ADMIN — Medication 500000 UNIT(S): at 23:40

## 2021-09-13 RX ADMIN — Medication 10 UNIT(S): at 08:08

## 2021-09-13 RX ADMIN — Medication 500000 UNIT(S): at 12:09

## 2021-09-13 RX ADMIN — Medication 50 MILLIGRAM(S): at 14:58

## 2021-09-13 RX ADMIN — PANTOPRAZOLE SODIUM 40 MILLIGRAM(S): 20 TABLET, DELAYED RELEASE ORAL at 06:01

## 2021-09-13 RX ADMIN — Medication 40 MILLIGRAM(S): at 05:49

## 2021-09-13 RX ADMIN — Medication 8: at 16:55

## 2021-09-13 RX ADMIN — AMLODIPINE BESYLATE 10 MILLIGRAM(S): 2.5 TABLET ORAL at 05:50

## 2021-09-13 RX ADMIN — Medication 500000 UNIT(S): at 05:50

## 2021-09-13 RX ADMIN — Medication 50 MILLIGRAM(S): at 12:08

## 2021-09-13 RX ADMIN — APIXABAN 2.5 MILLIGRAM(S): 2.5 TABLET, FILM COATED ORAL at 05:50

## 2021-09-13 RX ADMIN — Medication 50 MILLIGRAM(S): at 21:05

## 2021-09-13 RX ADMIN — CHLORHEXIDINE GLUCONATE 1 APPLICATION(S): 213 SOLUTION TOPICAL at 05:50

## 2021-09-13 RX ADMIN — Medication 6: at 12:05

## 2021-09-13 RX ADMIN — ISOSORBIDE MONONITRATE 120 MILLIGRAM(S): 60 TABLET, EXTENDED RELEASE ORAL at 12:09

## 2021-09-13 RX ADMIN — APIXABAN 2.5 MILLIGRAM(S): 2.5 TABLET, FILM COATED ORAL at 17:54

## 2021-09-13 NOTE — PROGRESS NOTE ADULT - SUBJECTIVE AND OBJECTIVE BOX
Over Night Events: events noted, on NC 5 l , bipap at night      PHYSICAL EXAM    ICU Vital Signs Last 24 Hrs  T(C): 36.7 (13 Sep 2021 00:14), Max: 36.7 (13 Sep 2021 00:14)  T(F): 98.1 (13 Sep 2021 00:14), Max: 98.1 (13 Sep 2021 00:14)  HR: 68 (13 Sep 2021 04:04) (68 - 94)  BP: 131/71 (13 Sep 2021 04:04) (131/71 - 166/75)  BP(mean): 95 (13 Sep 2021 04:04) (95 - 109)  RR: 17 (13 Sep 2021 04:04) (17 - 23)  SpO2: 100% (13 Sep 2021 04:04) (98% - 100%)      General: ill looking  HEENT: CLARIBEL             Lymph Nodes: No cervical LN   Lungs: r side rhonchui  Cardiovascular: Regular   Abdomen: Soft, Positive BS  Extremities: No clubbing   Skin: Warm  Neurological: Non focal       09-12-21 @ 07:01  -  09-13-21 @ 07:00  --------------------------------------------------------  IN:  Total IN: 0 mL    OUT:    Incontinent per Collection Bag (mL): 1400 mL  Total OUT: 1400 mL    Total NET: -1400 mL          LABS:                                                                                                                                                                                                                                                                           MEDICATIONS  (STANDING):  allopurinol 50 milliGRAM(s) Oral daily  amLODIPine   Tablet 10 milliGRAM(s) Oral daily  apixaban 2.5 milliGRAM(s) Oral two times a day  aspirin  chewable 81 milliGRAM(s) Oral daily  atorvastatin 40 milliGRAM(s) Oral at bedtime  chlorhexidine 4% Liquid 1 Application(s) Topical <User Schedule>  dextrose 40% Gel 15 Gram(s) Oral once  dextrose 5%. 1000 milliLiter(s) (50 mL/Hr) IV Continuous <Continuous>  dextrose 5%. 1000 milliLiter(s) (100 mL/Hr) IV Continuous <Continuous>  dextrose 50% Injectable 25 Gram(s) IV Push once  dextrose 50% Injectable 12.5 Gram(s) IV Push once  dextrose 50% Injectable 25 Gram(s) IV Push once  dextrose 50% Injectable 50 milliLiter(s) IV Push every 15 minutes  glucagon  Injectable 1 milliGRAM(s) IntraMuscular once  hydrALAZINE 50 milliGRAM(s) Oral every 8 hours  insulin glargine Injectable (LANTUS) 45 Unit(s) SubCutaneous at bedtime  insulin lispro (ADMELOG) corrective regimen sliding scale   SubCutaneous three times a day before meals  insulin lispro Injectable (ADMELOG) 10 Unit(s) SubCutaneous three times a day before meals  isosorbide   mononitrate ER Tablet (IMDUR) 120 milliGRAM(s) Oral daily  levoFLOXacin IVPB      levoFLOXacin IVPB 250 milliGRAM(s) IV Intermittent every 24 hours  nystatin    Suspension [id number] Unit(s) Swish and Swallow every 6 hours  pantoprazole    Tablet 40 milliGRAM(s) Oral before breakfast  predniSONE   Tablet 40 milliGRAM(s) Oral daily    MEDICATIONS  (PRN):  acetaminophen   Tablet .. 650 milliGRAM(s) Oral every 6 hours PRN Temp greater or equal to 38C (100.4F), Mild Pain (1 - 3)      Xrays:       reviewed

## 2021-09-13 NOTE — SWALLOW BEDSIDE ASSESSMENT ADULT - SLP GENERAL OBSERVATIONS
Pt received in bed awake, garbled speech, on HFNC 37L 50%O2. Pt confused, said he's been arrested
pt awake alert without c/o pain. pt on high-flow 50L/50%. 98% o2 saturation. +baseline cough however productive. Oral hygiene has improved since last SLP evaluation
Pt. received with improved mental status and alertness from past f/u, ox4, 4L o2 via NC.

## 2021-09-13 NOTE — SWALLOW BEDSIDE ASSESSMENT ADULT - ORAL PREPARATORY PHASE
Stroke education booklet/Stroke warning signs and symptoms/Signs and symptoms of stroke/Stroke support groups for patients, families, and friends/Need for follow up after discharge/Risk factors for stroke/Call 911 for stroke/Prescribed medications
Within functional limits
Within functional limits

## 2021-09-13 NOTE — SWALLOW BEDSIDE ASSESSMENT ADULT - NS SPL SWALLOW CLINIC TRIAL FT
+ toleration for thin liquids without overt s/s of aspiration/ penetration.
no po trials 2' poor secretion management and overall decreased awareness
suspected pharyngeal dysphagia

## 2021-09-13 NOTE — SWALLOW BEDSIDE ASSESSMENT ADULT - SWALLOW EVAL: RECOMMENDED DIET
NPO, non-oral means of nutrition and hydration
Dysphagia diet I puree consistency with Nectar-thickened liquids
Regular diet w/ thin liquids.

## 2021-09-13 NOTE — PROGRESS NOTE ADULT - ASSESSMENT
Patient is a 81 YO M w a PMH of A-fib (on home xarelto) CAD (s/p stent 2014),gout, Alcohol dependence (5-6 drinks/day), VINI (on home CPAP) addmitted for acute hypoxic respiratory failure secondary to severe community acquired pneumonia. Patient was septic on admission (febrile, leukocytosis, tachycardia, hypoxic w identifiable source). Found to have HAGMA and UA + for WBC.      #acute hypoxemic respiratory failure secondary to severe community acquired PMN complicated by Sepsis  -Stable WBC. Afebrile. Bcx No growth   -negative MRSA, legionella and COVID -  -had been on various antibiotics including ceftriaxone, levofloxacin, linezolid, all now D/c'd  -blood cultures negative  -most recent CXR 9/12 stable b/l opacities   -c/w BiPAP at night, NC by day   -c/w 40mg Prednisone once daily   -PT/OT consults placed today     #Chronic A Fib  - c/w Eliquis 2.5 BID     #HTN  -Patient had BP as high as 189/90  -Hydralazine 50 q 8 and Imdur 120     #White plaque on tongue  -steroids use, open mouth, satelite lesions looks like Candidiasis   -Nystatin 5 q 6 swish and swallow started  -follow up oral lesion  -oral hygiene q 2hr     #Acute on Chronic renal failure oliguric  -patient had HD thru [facility], last session 9/4, udall has since been d/c'd   -asymptomatic bacteruria   -patient was alkalotic 9/5 and Bicarb drip was d/c  -BUN uptrending, Cr downtrending, UO rapidly increasing all indicative of resolving `ATN    -Follow nephro recs    #Chronic venous stasis dermatitis   #LE wound  -outpatient evaluation for stasis dermatitis   -wound culture from 9/5 growing Serratia marcescens, had been on abx for pna that have good cross coverage as well  -Follow wound care recs    #Necrotic Sacral Wound  -wound care consulted, recs followed and wound care instructions ordered    #Misc  - DVT Prophylaxis: eliquis  - GI Prophylaxis: pantoprazole   - Activity: OOB  - Code Status: full

## 2021-09-13 NOTE — PROGRESS NOTE ADULT - SUBJECTIVE AND OBJECTIVE BOX
Nephrology progress note    THIS IS AN INCOMPLETE NOTE . FULL NOTE TO FOLLOW SHORTLY    Patient is seen and examined, events over the last 24 h noted .    Allergies:  No Known Allergies    Hospital Medications:   MEDICATIONS  (STANDING):  allopurinol 50 milliGRAM(s) Oral daily  amLODIPine   Tablet 10 milliGRAM(s) Oral daily  apixaban 2.5 milliGRAM(s) Oral two times a day  aspirin  chewable 81 milliGRAM(s) Oral daily  atorvastatin 40 milliGRAM(s) Oral at bedtime  chlorhexidine 4% Liquid 1 Application(s) Topical <User Schedule>  dextrose 40% Gel 15 Gram(s) Oral once  dextrose 5%. 1000 milliLiter(s) (50 mL/Hr) IV Continuous <Continuous>  dextrose 5%. 1000 milliLiter(s) (100 mL/Hr) IV Continuous <Continuous>  dextrose 50% Injectable 25 Gram(s) IV Push once  dextrose 50% Injectable 12.5 Gram(s) IV Push once  dextrose 50% Injectable 25 Gram(s) IV Push once  dextrose 50% Injectable 50 milliLiter(s) IV Push every 15 minutes  glucagon  Injectable 1 milliGRAM(s) IntraMuscular once  hydrALAZINE 50 milliGRAM(s) Oral every 8 hours  insulin glargine Injectable (LANTUS) 45 Unit(s) SubCutaneous at bedtime  insulin lispro (ADMELOG) corrective regimen sliding scale   SubCutaneous three times a day before meals  insulin lispro Injectable (ADMELOG) 10 Unit(s) SubCutaneous three times a day before meals  isosorbide   mononitrate ER Tablet (IMDUR) 120 milliGRAM(s) Oral daily  nystatin    Suspension 282006 Unit(s) Swish and Swallow every 6 hours  pantoprazole    Tablet 40 milliGRAM(s) Oral before breakfast  predniSONE   Tablet 40 milliGRAM(s) Oral daily        VITALS:  T(F): 97.1 (09-13-21 @ 07:00), Max: 98.1 (09-13-21 @ 00:14)  HR: 71 (09-13-21 @ 07:00)  BP: 140/77 (09-13-21 @ 07:00)  RR: 15 (09-13-21 @ 07:00)  SpO2: 100% (09-13-21 @ 07:00)  Wt(kg): --    09-11 @ 07:01  -  09-12 @ 07:00  --------------------------------------------------------  IN: 0 mL / OUT: 1700 mL / NET: -1700 mL    09-12 @ 07:01  -  09-13 @ 07:00  --------------------------------------------------------  IN: 0 mL / OUT: 1400 mL / NET: -1400 mL          PHYSICAL EXAM:  Constitutional: NAD  HEENT: anicteric sclera, oropharynx clear, MMM  Neck: No JVD  Respiratory: CTAB, no wheezes, rales or rhonchi  Cardiovascular: S1, S2, RRR  Gastrointestinal: BS+, soft, NT/ND  Extremities: No cyanosis or clubbing. No peripheral edema  :  No quintana.   Skin: No rashes    LABS:    Creatinine Trend: 3.2<--, 3.0<--, 3.4<--, 3.4<--, 4.0<--, 4.6<--                              12.2   9.69  )-----------( 434      ( 13 Sep 2021 08:14 )             37.6       Urine Studies:      RADIOLOGY & ADDITIONAL STUDIES:   Nephrology progress note  Patient is seen and examined, events over the last 24 h noted .  lying in bed comfortable     Allergies:  No Known Allergies    Hospital Medications:   MEDICATIONS  (STANDING):    allopurinol 50 milliGRAM(s) Oral daily  amLODIPine   Tablet 10 milliGRAM(s) Oral daily  apixaban 2.5 milliGRAM(s) Oral two times a day  aspirin  chewable 81 milliGRAM(s) Oral daily  atorvastatin 40 milliGRAM(s) Oral at bedtime  glucagon  Injectable 1 milliGRAM(s) IntraMuscular once  hydrALAZINE 50 milliGRAM(s) Oral every 8 hours  insulin glargine Injectable (LANTUS) 45 Unit(s) SubCutaneous at bedtime  insulin lispro (ADMELOG) corrective regimen sliding scale   SubCutaneous three times a day before meals  insulin lispro Injectable (ADMELOG) 10 Unit(s) SubCutaneous three times a day before meals  isosorbide   mononitrate ER Tablet (IMDUR) 120 milliGRAM(s) Oral daily  nystatin    Suspension [id number] Unit(s) Swish and Swallow every 6 hours  pantoprazole    Tablet 40 milliGRAM(s) Oral before breakfast  predniSONE   Tablet 40 milliGRAM(s) Oral daily        VITALS:  T(F): 97.1 (09-13-21 @ 07:00), Max: 98.1 (09-13-21 @ 00:14)  HR: 71 (09-13-21 @ 07:00)  BP: 140/77 (09-13-21 @ 07:00)  RR: 15 (09-13-21 @ 07:00)  SpO2: 100% (09-13-21 @ 07:00)  Wt(kg): --    09-11 @ 07:01  -  09-12 @ 07:00  --------------------------------------------------------  IN: 0 mL / OUT: 1700 mL / NET: -1700 mL    09-12 @ 07:01  -  09-13 @ 07:00  --------------------------------------------------------  IN: 0 mL / OUT: 1400 mL / NET: -1400 mL          PHYSICAL EXAM:  Constitutional: NAD  Neck: No JVD  Respiratory: CTAB, no wheezes, rales or rhonchi  Cardiovascular: S1, S2, RRR  Gastrointestinal: BS+, soft, NT/ND  Extremities: No cyanosis or clubbing. plus on edema   :  positive quintana   Skin: No rashes    LABS:  09-13    139  |  97<L>  |  86<HH>  ----------------------------<  98  4.1   |  31  |  2.6<H>    Ca    8.2<L>      13 Sep 2021 08:14    TPro  6.1  /  Alb  2.9<L>  /  TBili  1.2  /  DBili  x   /  AST  51<H>  /  ALT  55<H>  /  AlkPhos  146<H>  09-13      Creatinine Trend: 3.2<--, 3.0<--, 3.4<--, 3.4<--, 4.0<--, 4.6<--                              12.2   9.69  )-----------( 434      ( 13 Sep 2021 08:14 )             37.6       Urine Studies:      RADIOLOGY & ADDITIONAL STUDIES:

## 2021-09-13 NOTE — PROGRESS NOTE ADULT - ASSESSMENT
IMPRESSION:    Acute hypoxemic resp failure on NC Better  Severe CAP/ Pneumonitis  Sepsis present on admission  Acute on Chronic renal failure off HD  HO A fib      PLAN:    CNS: avoid CNS depressant    HEENT:  Oral care    PULMONARY:  HOB @ 45 degrees, BIPAP/ HHFNC keep SAo2 88 to 92%, Aspiration precaution, NC    CARDIOVASCULAR: Avoid overload. Eliquis, hydralazine to 50 q 8    GI: GI prophylaxis                                          Feeding po    RENAL:  F/u  lytes.  Correct as needed.  Accurate I/O, trend CMP.      INFECTIOUS DISEASE: prednisone 40 daily, DC ABX    HEMATOLOGICAL:  DVT prophylaxis. Elquis    ENDOCRINE:  Follow up FS.  Insulin protocol if needed.    CODE STATUS: FULL CODE      PT/ OT

## 2021-09-13 NOTE — PROGRESS NOTE ADULT - ASSESSMENT
DIETER / ATN in nature d/t sepsis / possible JOSE contributing / oligo-anuric / creat up-trending / no improvement w/ iv hydration/ PNA / acute respiratory failure   # UO improving (last HD 9/4), creat stable, udall d/c'd  # sodium level up-trending, minimal po intake, give gentle iv hydration 1/2NS at 50cc/hr, stop after 1liter and assess volume/respiratory status, encourage po intake/hydration  # monitor urine output, please obtain daily weights  # replete K/Mg as needed  # BUN elevated, on steroids - downtrending  # BP better controlled   # check ph level   # PNA on levofloxacin   # no acute indication for RRT   # will follow   DIETER / ATN in nature d/t sepsis / possible JOSE contributing / oligo-anuric / creat up-trending / no improvement w/ iv hydration/ PNA / acute respiratory failure   # UO improving (last HD 9/4), creat stable, udall d/c'd  # sodium level up-trending, minimal po intake, encourage po intake/hydration  # monitor urine output, please obtain daily weights  # replete K/Mg as needed- check Mg level   # BUN elevated, on steroids - downtrending  # BP better controlled   # check ph level   # PNA on levofloxacin   # no  indication for RRT   # will follow

## 2021-09-13 NOTE — PROGRESS NOTE ADULT - SUBJECTIVE AND OBJECTIVE BOX
MISTY BURLESON 80y Male  MRN#: [id number]     Hospital Day: 13d    Pt is currently admitted with the primary diagnosis of     SUBJECTIVE  No acute events overnight, pt seen and examined this am, no complaints.                                          ----------------------------------------------------------  OBJECTIVE  PAST MEDICAL & SURGICAL HISTORY  Atrial fibrillation    Coronary artery disease    Hyperlipidemia    Hypertension    Gout    Diabetes mellitus    VINI on CPAP    History of heart artery stent                                              -----------------------------------------------------------  ALLERGIES:  No Known Allergies                                            ------------------------------------------------------------    HOME MEDICATIONS  Home Medications:  Actoplus Met 15 mg-500 mg oral tablet: 1 tab(s) orally 2 times a day (31 Aug 2021 22:08)  allopurinol: 150 milligram(s) orally once a day (31 Aug 2021 22:08)  amLODIPine 10 mg oral tablet: 1 tab(s) orally once a day (in the evening) (31 Aug 2021 22:08)  aspirin 81 mg oral tablet, chewable: 1 tab(s) orally once a day (31 Aug 2021 22:08)  atorvastatin 40 mg oral tablet: 1 tab(s) orally once a day (31 Aug 2021 22:08)  fosinopril 40 mg oral tablet: 1 tab(s) orally once a day (in the evening) (31 Aug 2021 22:08)  hydroCHLOROthiazide 12.5 mg oral tablet: 1 tab(s) orally once a day (31 Aug 2021 22:08)  isosorbide mononitrate 120 mg oral tablet, extended release: 1 tab(s) orally once a day (in the morning) (31 Aug 2021 22:08)  Xarelto 15 mg oral tablet: 1 tab(s) orally once a day (in the evening) (31 Aug 2021 22:08)                           MEDICATIONS:  STANDING MEDICATIONS  allopurinol 50 milliGRAM(s) Oral daily  amLODIPine   Tablet 10 milliGRAM(s) Oral daily  apixaban 2.5 milliGRAM(s) Oral two times a day  aspirin  chewable 81 milliGRAM(s) Oral daily  atorvastatin 40 milliGRAM(s) Oral at bedtime  chlorhexidine 4% Liquid 1 Application(s) Topical <User Schedule>  dextrose 40% Gel 15 Gram(s) Oral once  dextrose 5%. 1000 milliLiter(s) IV Continuous <Continuous>  dextrose 5%. 1000 milliLiter(s) IV Continuous <Continuous>  dextrose 50% Injectable 25 Gram(s) IV Push once  dextrose 50% Injectable 12.5 Gram(s) IV Push once  dextrose 50% Injectable 25 Gram(s) IV Push once  dextrose 50% Injectable 50 milliLiter(s) IV Push every 15 minutes  glucagon  Injectable 1 milliGRAM(s) IntraMuscular once  hydrALAZINE 50 milliGRAM(s) Oral every 8 hours  insulin glargine Injectable (LANTUS) 45 Unit(s) SubCutaneous at bedtime  insulin lispro (ADMELOG) corrective regimen sliding scale   SubCutaneous three times a day before meals  insulin lispro Injectable (ADMELOG) 10 Unit(s) SubCutaneous three times a day before meals  isosorbide   mononitrate ER Tablet (IMDUR) 120 milliGRAM(s) Oral daily  nystatin    Suspension 701420 Unit(s) Swish and Swallow every 6 hours  pantoprazole    Tablet 40 milliGRAM(s) Oral before breakfast  predniSONE   Tablet 40 milliGRAM(s) Oral daily    PRN MEDICATIONS  acetaminophen   Tablet .. 650 milliGRAM(s) Oral every 6 hours PRN                                            ------------------------------------------------------------  VITAL SIGNS: Last 24 Hours  T(C): 36.2 (13 Sep 2021 07:00), Max: 36.7 (13 Sep 2021 00:14)  T(F): 97.1 (13 Sep 2021 07:00), Max: 98.1 (13 Sep 2021 00:14)  HR: 71 (13 Sep 2021 07:00) (68 - 94)  BP: 140/77 (13 Sep 2021 07:00) (131/71 - 166/75)  BP(mean): 103 (13 Sep 2021 07:00) (95 - 108)  RR: 15 (13 Sep 2021 07:00) (15 - 23)  SpO2: 100% (13 Sep 2021 07:00) (98% - 100%)      09-12-21 @ 07:01  -  09-13-21 @ 07:00  --------------------------------------------------------  IN: 0 mL / OUT: 1400 mL / NET: -1400 mL                                             --------------------------------------------------------------  LABS:                        12.2   9.69  )-----------( 434      ( 13 Sep 2021 08:14 )             37.6                                                                     -------------------------------------------------------------  RADIOLOGY:                                            --------------------------------------------------------------    PHYSICAL EXAM:  GENERAL: NAD, lying in bed comfortably  HEAD:  Atraumatic, Normocephalic  EYES:  conjunctiva and sclera clear  ENT: Moist mucous membranes  NECK: Supple  CHEST/LUNG: mild crackles, Unlabored respirations  HEART: Irregular rate   ABDOMEN: Soft, nontender, nondistended  EXTREMITIES:  No clubbing, cyanosis, or edema  NERVOUS SYSTEM:  A&Ox3                                               --------------------------------------------------------------       MISTY BURLESON 80y Male  MRN#: [id number]     Hospital Day: 13d    Pt admitted with sob    SUBJECTIVE  No acute events overnight, pt seen and examined this am, no complaints, satting well on 5L NC.                                           ----------------------------------------------------------  OBJECTIVE  PAST MEDICAL & SURGICAL HISTORY  Atrial fibrillation    Coronary artery disease    Hyperlipidemia    Hypertension    Gout    Diabetes mellitus    VINI on CPAP    History of heart artery stent                                              -----------------------------------------------------------  ALLERGIES:  No Known Allergies                                            ------------------------------------------------------------    HOME MEDICATIONS  Home Medications:  Actoplus Met 15 mg-500 mg oral tablet: 1 tab(s) orally 2 times a day (31 Aug 2021 22:08)  allopurinol: 150 milligram(s) orally once a day (31 Aug 2021 22:08)  amLODIPine 10 mg oral tablet: 1 tab(s) orally once a day (in the evening) (31 Aug 2021 22:08)  aspirin 81 mg oral tablet, chewable: 1 tab(s) orally once a day (31 Aug 2021 22:08)  atorvastatin 40 mg oral tablet: 1 tab(s) orally once a day (31 Aug 2021 22:08)  fosinopril 40 mg oral tablet: 1 tab(s) orally once a day (in the evening) (31 Aug 2021 22:08)  hydroCHLOROthiazide 12.5 mg oral tablet: 1 tab(s) orally once a day (31 Aug 2021 22:08)  isosorbide mononitrate 120 mg oral tablet, extended release: 1 tab(s) orally once a day (in the morning) (31 Aug 2021 22:08)  Xarelto 15 mg oral tablet: 1 tab(s) orally once a day (in the evening) (31 Aug 2021 22:08)                           MEDICATIONS:  STANDING MEDICATIONS  allopurinol 50 milliGRAM(s) Oral daily  amLODIPine   Tablet 10 milliGRAM(s) Oral daily  apixaban 2.5 milliGRAM(s) Oral two times a day  aspirin  chewable 81 milliGRAM(s) Oral daily  atorvastatin 40 milliGRAM(s) Oral at bedtime  chlorhexidine 4% Liquid 1 Application(s) Topical <User Schedule>  dextrose 40% Gel 15 Gram(s) Oral once  dextrose 5%. 1000 milliLiter(s) IV Continuous <Continuous>  dextrose 5%. 1000 milliLiter(s) IV Continuous <Continuous>  dextrose 50% Injectable 25 Gram(s) IV Push once  dextrose 50% Injectable 12.5 Gram(s) IV Push once  dextrose 50% Injectable 25 Gram(s) IV Push once  dextrose 50% Injectable 50 milliLiter(s) IV Push every 15 minutes  glucagon  Injectable 1 milliGRAM(s) IntraMuscular once  hydrALAZINE 50 milliGRAM(s) Oral every 8 hours  insulin glargine Injectable (LANTUS) 45 Unit(s) SubCutaneous at bedtime  insulin lispro (ADMELOG) corrective regimen sliding scale   SubCutaneous three times a day before meals  insulin lispro Injectable (ADMELOG) 10 Unit(s) SubCutaneous three times a day before meals  isosorbide   mononitrate ER Tablet (IMDUR) 120 milliGRAM(s) Oral daily  nystatin    Suspension 376694 Unit(s) Swish and Swallow every 6 hours  pantoprazole    Tablet 40 milliGRAM(s) Oral before breakfast  predniSONE   Tablet 40 milliGRAM(s) Oral daily    PRN MEDICATIONS  acetaminophen   Tablet .. 650 milliGRAM(s) Oral every 6 hours PRN                                            ------------------------------------------------------------  VITAL SIGNS: Last 24 Hours  T(C): 36.2 (13 Sep 2021 07:00), Max: 36.7 (13 Sep 2021 00:14)  T(F): 97.1 (13 Sep 2021 07:00), Max: 98.1 (13 Sep 2021 00:14)  HR: 71 (13 Sep 2021 07:00) (68 - 94)  BP: 140/77 (13 Sep 2021 07:00) (131/71 - 166/75)  BP(mean): 103 (13 Sep 2021 07:00) (95 - 108)  RR: 15 (13 Sep 2021 07:00) (15 - 23)  SpO2: 100% (13 Sep 2021 07:00) (98% - 100%)      09-12-21 @ 07:01  -  09-13-21 @ 07:00  --------------------------------------------------------  IN: 0 mL / OUT: 1400 mL / NET: -1400 mL                                             --------------------------------------------------------------  LABS:                        12.2   9.69  )-----------( 434      ( 13 Sep 2021 08:14 )             37.6                                                                     -------------------------------------------------------------  RADIOLOGY: < from: Xray Chest 1 View- PORTABLE-Routine (Xray Chest 1 View- PORTABLE-Routine in AM.) (09.12.21 @ 05:42) >  Impression:  Stable bilateral opacities, right greater than left. No definite pneumothorax.    < end of copied text >                                              --------------------------------------------------------------    PHYSICAL EXAM:  GENERAL: NAD, lying in bed comfortably  HEAD:  Atraumatic, Normocephalic  EYES:  conjunctiva and sclera clear  ENT: Moist mucous membranes  NECK: Supple  CHEST/LUNG: mild crackles, Unlabored respirations  HEART: Irregular rate   ABDOMEN: Soft, nontender, nondistended  EXTREMITIES:  LE edema b/l  NERVOUS SYSTEM:  A&Ox3                                               --------------------------------------------------------------

## 2021-09-13 NOTE — SWALLOW BEDSIDE ASSESSMENT ADULT - COMMENTS
F/u CXR (9/13) indicates unchanged R upper lobe consolidation and patchy opacities. Left lower lung atelectasis. + toleration for soft and regular solids without overt s/s of aspiration/ penetration.

## 2021-09-14 LAB
ALBUMIN SERPL ELPH-MCNC: 2.9 G/DL — LOW (ref 3.5–5.2)
ALP SERPL-CCNC: 136 U/L — HIGH (ref 30–115)
ALT FLD-CCNC: 61 U/L — HIGH (ref 0–41)
ANION GAP SERPL CALC-SCNC: 13 MMOL/L — SIGNIFICANT CHANGE UP (ref 7–14)
AST SERPL-CCNC: 58 U/L — HIGH (ref 0–41)
BILIRUB SERPL-MCNC: 1.1 MG/DL — SIGNIFICANT CHANGE UP (ref 0.2–1.2)
BUN SERPL-MCNC: 85 MG/DL — CRITICAL HIGH (ref 10–20)
CALCIUM SERPL-MCNC: 8 MG/DL — LOW (ref 8.5–10.1)
CHLORIDE SERPL-SCNC: 97 MMOL/L — LOW (ref 98–110)
CO2 SERPL-SCNC: 29 MMOL/L — SIGNIFICANT CHANGE UP (ref 17–32)
CREAT SERPL-MCNC: 2.8 MG/DL — HIGH (ref 0.7–1.5)
GLUCOSE BLDC GLUCOMTR-MCNC: 157 MG/DL — HIGH (ref 70–99)
GLUCOSE BLDC GLUCOMTR-MCNC: 223 MG/DL — HIGH (ref 70–99)
GLUCOSE BLDC GLUCOMTR-MCNC: 272 MG/DL — HIGH (ref 70–99)
GLUCOSE SERPL-MCNC: 77 MG/DL — SIGNIFICANT CHANGE UP (ref 70–99)
HCT VFR BLD CALC: 34 % — LOW (ref 42–52)
HGB BLD-MCNC: 11.3 G/DL — LOW (ref 14–18)
MAGNESIUM SERPL-MCNC: 1.9 MG/DL — SIGNIFICANT CHANGE UP (ref 1.8–2.4)
MCHC RBC-ENTMCNC: 29.6 PG — SIGNIFICANT CHANGE UP (ref 27–31)
MCHC RBC-ENTMCNC: 33.2 G/DL — SIGNIFICANT CHANGE UP (ref 32–37)
MCV RBC AUTO: 89 FL — SIGNIFICANT CHANGE UP (ref 80–94)
NRBC # BLD: 0 /100 WBCS — SIGNIFICANT CHANGE UP (ref 0–0)
PLATELET # BLD AUTO: 457 K/UL — HIGH (ref 130–400)
POTASSIUM SERPL-MCNC: 3.8 MMOL/L — SIGNIFICANT CHANGE UP (ref 3.5–5)
POTASSIUM SERPL-SCNC: 3.8 MMOL/L — SIGNIFICANT CHANGE UP (ref 3.5–5)
PROT SERPL-MCNC: 5.9 G/DL — LOW (ref 6–8)
RBC # BLD: 3.82 M/UL — LOW (ref 4.7–6.1)
RBC # FLD: 15.9 % — HIGH (ref 11.5–14.5)
SODIUM SERPL-SCNC: 139 MMOL/L — SIGNIFICANT CHANGE UP (ref 135–146)
WBC # BLD: 10.42 K/UL — SIGNIFICANT CHANGE UP (ref 4.8–10.8)
WBC # FLD AUTO: 10.42 K/UL — SIGNIFICANT CHANGE UP (ref 4.8–10.8)

## 2021-09-14 PROCEDURE — 99232 SBSQ HOSP IP/OBS MODERATE 35: CPT

## 2021-09-14 PROCEDURE — 71045 X-RAY EXAM CHEST 1 VIEW: CPT | Mod: 26

## 2021-09-14 RX ORDER — INSULIN GLARGINE 100 [IU]/ML
36 INJECTION, SOLUTION SUBCUTANEOUS AT BEDTIME
Refills: 0 | Status: DISCONTINUED | OUTPATIENT
Start: 2021-09-14 | End: 2021-09-17

## 2021-09-14 RX ADMIN — Medication 81 MILLIGRAM(S): at 11:18

## 2021-09-14 RX ADMIN — Medication 50 MILLIGRAM(S): at 05:03

## 2021-09-14 RX ADMIN — AMLODIPINE BESYLATE 10 MILLIGRAM(S): 2.5 TABLET ORAL at 05:03

## 2021-09-14 RX ADMIN — Medication 10 UNIT(S): at 17:15

## 2021-09-14 RX ADMIN — Medication 50 MILLIGRAM(S): at 21:00

## 2021-09-14 RX ADMIN — Medication 4: at 17:14

## 2021-09-14 RX ADMIN — Medication 500000 UNIT(S): at 05:03

## 2021-09-14 RX ADMIN — INSULIN GLARGINE 36 UNIT(S): 100 INJECTION, SOLUTION SUBCUTANEOUS at 21:00

## 2021-09-14 RX ADMIN — Medication 50 MILLIGRAM(S): at 16:11

## 2021-09-14 RX ADMIN — APIXABAN 2.5 MILLIGRAM(S): 2.5 TABLET, FILM COATED ORAL at 05:03

## 2021-09-14 RX ADMIN — CHLORHEXIDINE GLUCONATE 1 APPLICATION(S): 213 SOLUTION TOPICAL at 05:04

## 2021-09-14 RX ADMIN — Medication 10 UNIT(S): at 11:23

## 2021-09-14 RX ADMIN — APIXABAN 2.5 MILLIGRAM(S): 2.5 TABLET, FILM COATED ORAL at 17:14

## 2021-09-14 RX ADMIN — ATORVASTATIN CALCIUM 40 MILLIGRAM(S): 80 TABLET, FILM COATED ORAL at 21:00

## 2021-09-14 RX ADMIN — Medication 40 MILLIGRAM(S): at 05:03

## 2021-09-14 RX ADMIN — Medication 500000 UNIT(S): at 17:15

## 2021-09-14 RX ADMIN — Medication 6: at 11:22

## 2021-09-14 RX ADMIN — ISOSORBIDE MONONITRATE 120 MILLIGRAM(S): 60 TABLET, EXTENDED RELEASE ORAL at 11:20

## 2021-09-14 RX ADMIN — Medication 500000 UNIT(S): at 11:19

## 2021-09-14 RX ADMIN — Medication 500000 UNIT(S): at 23:23

## 2021-09-14 RX ADMIN — PANTOPRAZOLE SODIUM 40 MILLIGRAM(S): 20 TABLET, DELAYED RELEASE ORAL at 06:25

## 2021-09-14 RX ADMIN — Medication 50 MILLIGRAM(S): at 11:18

## 2021-09-14 NOTE — PROGRESS NOTE ADULT - ASSESSMENT
IMPRESSION:    Acute hypoxemic resp failure on NC   Severe CAP/ Pneumonitis  Sepsis present on admission  Acute on Chronic renal failure off HD stable  HO A fib on eliquis      PLAN:    CNS: avoid CNS depressant    HEENT:  Oral care    PULMONARY:  HOB @ 45 degrees, BIPAP/ HHFNC keep SAo2 88 to 92%, Aspiration precaution, NC    CARDIOVASCULAR: Avoid overload. Eliquis, hydralazine to 50 q 8    GI: GI prophylaxis                                          Feeding po    RENAL:  F/u  lytes.  Correct as needed.  Accurate I/O, trend CMP.  bladder scan, renal f/up    INFECTIOUS DISEASE: prednisone 30    HEMATOLOGICAL:  DVT prophylaxis. Elquis    ENDOCRINE:  Follow up FS.  Insulin protocol if needed.    CODE STATUS: FULL CODE      PT/ OT

## 2021-09-14 NOTE — PROGRESS NOTE ADULT - SUBJECTIVE AND OBJECTIVE BOX
MISTY BURLESON 80y Male  MRN#: [id number]     Hospital Day: 14d    Pt admitted with sob    SUBJECTIVE  No acute events overnight, pt seen and examined this am, no complaints. Satting well on 5L NC.                                         ----------------------------------------------------------  OBJECTIVE  PAST MEDICAL & SURGICAL HISTORY  Atrial fibrillation    Coronary artery disease    Hyperlipidemia    Hypertension    Gout    Diabetes mellitus    VINI on CPAP    History of heart artery stent                                              -----------------------------------------------------------  ALLERGIES:  No Known Allergies                                            ------------------------------------------------------------    HOME MEDICATIONS  Home Medications:  Actoplus Met 15 mg-500 mg oral tablet: 1 tab(s) orally 2 times a day (31 Aug 2021 22:08)  allopurinol: 150 milligram(s) orally once a day (31 Aug 2021 22:08)  amLODIPine 10 mg oral tablet: 1 tab(s) orally once a day (in the evening) (31 Aug 2021 22:08)  aspirin 81 mg oral tablet, chewable: 1 tab(s) orally once a day (31 Aug 2021 22:08)  atorvastatin 40 mg oral tablet: 1 tab(s) orally once a day (31 Aug 2021 22:08)  fosinopril 40 mg oral tablet: 1 tab(s) orally once a day (in the evening) (31 Aug 2021 22:08)  hydroCHLOROthiazide 12.5 mg oral tablet: 1 tab(s) orally once a day (31 Aug 2021 22:08)  isosorbide mononitrate 120 mg oral tablet, extended release: 1 tab(s) orally once a day (in the morning) (31 Aug 2021 22:08)  Xarelto 15 mg oral tablet: 1 tab(s) orally once a day (in the evening) (31 Aug 2021 22:08)                           MEDICATIONS:  STANDING MEDICATIONS  allopurinol 50 milliGRAM(s) Oral daily  amLODIPine   Tablet 10 milliGRAM(s) Oral daily  apixaban 2.5 milliGRAM(s) Oral two times a day  aspirin  chewable 81 milliGRAM(s) Oral daily  atorvastatin 40 milliGRAM(s) Oral at bedtime  chlorhexidine 4% Liquid 1 Application(s) Topical <User Schedule>  dextrose 40% Gel 15 Gram(s) Oral once  dextrose 5%. 1000 milliLiter(s) IV Continuous <Continuous>  dextrose 5%. 1000 milliLiter(s) IV Continuous <Continuous>  dextrose 50% Injectable 25 Gram(s) IV Push once  dextrose 50% Injectable 12.5 Gram(s) IV Push once  dextrose 50% Injectable 25 Gram(s) IV Push once  dextrose 50% Injectable 50 milliLiter(s) IV Push every 15 minutes  glucagon  Injectable 1 milliGRAM(s) IntraMuscular once  hydrALAZINE 50 milliGRAM(s) Oral every 8 hours  insulin glargine Injectable (LANTUS) 36 Unit(s) SubCutaneous at bedtime  insulin lispro (ADMELOG) corrective regimen sliding scale   SubCutaneous three times a day before meals  insulin lispro Injectable (ADMELOG) 10 Unit(s) SubCutaneous three times a day before meals  isosorbide   mononitrate ER Tablet (IMDUR) 120 milliGRAM(s) Oral daily  nystatin    Suspension 748347 Unit(s) Swish and Swallow every 6 hours  pantoprazole    Tablet 40 milliGRAM(s) Oral before breakfast  predniSONE   Tablet 30 milliGRAM(s) Oral daily    PRN MEDICATIONS  acetaminophen   Tablet .. 650 milliGRAM(s) Oral every 6 hours PRN                                            ------------------------------------------------------------  VITAL SIGNS: Last 24 Hours  T(C): 35.7 (14 Sep 2021 07:30), Max: 36.4 (14 Sep 2021 04:00)  T(F): 96.3 (14 Sep 2021 07:30), Max: 97.5 (14 Sep 2021 04:00)  HR: 90 (14 Sep 2021 07:30) (80 - 91)  BP: 146/67 (14 Sep 2021 07:30) (116/59 - 150/72)  BP(mean): 97 (14 Sep 2021 07:30) (79 - 99)  RR: 28 (14 Sep 2021 07:30) (18 - 28)  SpO2: 96% (14 Sep 2021 07:30) (96% - 100%)      09-13-21 @ 07:01  -  09-14-21 @ 07:00  --------------------------------------------------------  IN: 330 mL / OUT: 1100 mL / NET: -770 mL                                             --------------------------------------------------------------  LABS:                        11.3   10.42 )-----------( 457      ( 14 Sep 2021 05:12 )             34.0     09-14    139  |  97<L>  |  85<HH>  ----------------------------<  77  3.8   |  29  |  2.8<H>    Ca    8.0<L>      14 Sep 2021 05:12  Mg     1.9     09-14    TPro  5.9<L>  /  Alb  2.9<L>  /  TBili  1.1  /  DBili  x   /  AST  58<H>  /  ALT  61<H>  /  AlkPhos  136<H>  09-14                                                              -------------------------------------------------------------  RADIOLOGY:< from: Xray Chest 1 View- PORTABLE-Routine (Xray Chest 1 View- PORTABLE-Routine in AM.) (09.14.21 @ 06:05) >  Impression:    Stable bilateral opacities. No pleural effusion or pneumothorax.    < end of copied text >                                              --------------------------------------------------------------    PHYSICAL EXAM:  GENERAL: NAD, lying in bed comfortably  HEAD:  Atraumatic, Normocephalic  EYES:  conjunctiva and sclera clear  ENT: Moist mucous membranes  NECK: Supple  CHEST/LUNG: mild crackles, Unlabored respirations  HEART: Irregular rate   ABDOMEN: Soft, nontender, nondistended  EXTREMITIES:  2+ LE edema b/l  NERVOUS SYSTEM:  A&Ox3                                                   --------------------------------------------------------------

## 2021-09-14 NOTE — PROGRESS NOTE ADULT - ASSESSMENT
Patient is a 79 YO M w a PMH of A-fib (on home xarelto) CAD (s/p stent 2014),gout, Alcohol dependence (5-6 drinks/day), VINI (on home CPAP) admitted for acute hypoxic respiratory failure secondary to severe community acquired pneumonia. Patient was septic on admission (febrile, leukocytosis, tachycardia, hypoxic w identifiable source). Found to have HAGMA and UA + for WBC.      #acute hypoxemic respiratory failure secondary to severe community acquired PMN complicated by Sepsis  -Stable WBC. Afebrile. Bcx No growth   -negative MRSA, legionella and COVID -  -had been on various antibiotics including ceftriaxone, levofloxacin, linezolid, all now D/c'd  -blood cultures negative  -most recent CXR 9/14 stable b/l opacities   -c/w BiPAP at night, NC by day, currently on 5L NC  -decrease Prednisone to 30 once daily   -PT/OT   -Rehab consult for possible inpatient rehab     #Chronic A Fib  - c/w Eliquis 2.5 BID     #HTN  -c/w Hydralazine 50 q 8 and Imdur 120     #White plaque on tongue  -steroids use, open mouth, satelite lesions looks like Candidiasis   -Nystatin 5 q 6 swish and swallow started  -follow up oral lesion  -oral hygiene q 2hr     #Acute on Chronic renal failure oliguric  -patient had HD thru udall, last session 9/4, udall has since been d/c'd   -asymptomatic bacteruria   -patient was alkalotic 9/5 and on Bicarb drip was later d/c'd  -BUN uptrending, Cr downtrending, UO rapidly increasing all indicative of resolving ATN    -Follow nephro recs  -Cr minimally uptrending now, will do bladder scan to see if pt retaining     #Chronic venous stasis dermatitis   #LE wound  -wound culture from 9/5 growing Serratia marcescens, had been on abx for pna that have good cross coverage as well  -Follow wound care recs    #Necrotic Sacral Wound  -wound care consulted, recs followed and wound care instructions ordered    #Misc  - DVT Prophylaxis: eliquis  - GI Prophylaxis: pantoprazole   - Activity: OOB  - Code Status: full

## 2021-09-14 NOTE — OCCUPATIONAL THERAPY INITIAL EVALUATION ADULT - PERTINENT HX OF CURRENT PROBLEM, REHAB EVAL
79 YO M w a PMH of A-fib (on home xarelto) CAD (s/p stent 2014),gout, Alcohol dependence (5-6 drinks/day), VINI (on home CPAP) admitted for acute hypoxic respiratory failure secondary to severe community acquired pneumonia. Patient was septic on admission (febrile, leukocytosis, tachycardia, hypoxic w identifiable source). Found to have HAGMA and UA + for WBC

## 2021-09-14 NOTE — PROGRESS NOTE ADULT - SUBJECTIVE AND OBJECTIVE BOX
Over Night Events: events noted,  on 5 l/ no drips    PHYSICAL EXAM    ICU Vital Signs Last 24 Hrs  T(C): 36.4 (14 Sep 2021 04:00), Max: 36.4 (14 Sep 2021 04:00)  T(F): 97.5 (14 Sep 2021 04:00), Max: 97.5 (14 Sep 2021 04:00)  HR: 80 (14 Sep 2021 04:00) (80 - 91)  BP: 146/69 (14 Sep 2021 04:00) (116/59 - 150/72)  BP(mean): 99 (14 Sep 2021 04:00) (79 - 99)  RR: 20 (14 Sep 2021 04:00) (18 - 21)  SpO2: 100% (14 Sep 2021 04:00) (98% - 100%)      General: ill looking  HEENT: CLARIBEL             Lymph Nodes: No cervical LN   Lungs: Bilateral rhconhi  Cardiovascular: JUAN 2/6  Abdomen: Soft, Positive BS  Extremities: Swelling +  Skin: Warm  Neurological: Non focal       09-13-21 @ 07:01  -  09-14-21 @ 07:00  --------------------------------------------------------  IN:    Oral Fluid: 330 mL  Total IN: 330 mL    OUT:    Incontinent per Collection Bag (mL): 600 mL    Voided (mL): 500 mL  Total OUT: 1100 mL    Total NET: -770 mL          LABS:                          11.3   10.42 )-----------( 457      ( 14 Sep 2021 05:12 )             34.0                                               09-14    139  |  97<L>  |  85<HH>  ----------------------------<  77  3.8   |  29  |  2.8<H>    Ca    8.0<L>      14 Sep 2021 05:12  Mg     1.9     09-14    TPro  5.9<L>  /  Alb  2.9<L>  /  TBili  1.1  /  DBili  x   /  AST  58<H>  /  ALT  61<H>  /  AlkPhos  136<H>  09-14                                                                                           LIVER FUNCTIONS - ( 14 Sep 2021 05:12 )  Alb: 2.9 g/dL / Pro: 5.9 g/dL / ALK PHOS: 136 U/L / ALT: 61 U/L / AST: 58 U/L / GGT: x                                                                                                                                       MEDICATIONS  (STANDING):  allopurinol 50 milliGRAM(s) Oral daily  amLODIPine   Tablet 10 milliGRAM(s) Oral daily  apixaban 2.5 milliGRAM(s) Oral two times a day  aspirin  chewable 81 milliGRAM(s) Oral daily  atorvastatin 40 milliGRAM(s) Oral at bedtime  chlorhexidine 4% Liquid 1 Application(s) Topical <User Schedule>  dextrose 40% Gel 15 Gram(s) Oral once  dextrose 5%. 1000 milliLiter(s) (50 mL/Hr) IV Continuous <Continuous>  dextrose 5%. 1000 milliLiter(s) (100 mL/Hr) IV Continuous <Continuous>  dextrose 50% Injectable 12.5 Gram(s) IV Push once  dextrose 50% Injectable 25 Gram(s) IV Push once  dextrose 50% Injectable 25 Gram(s) IV Push once  dextrose 50% Injectable 50 milliLiter(s) IV Push every 15 minutes  glucagon  Injectable 1 milliGRAM(s) IntraMuscular once  hydrALAZINE 50 milliGRAM(s) Oral every 8 hours  insulin glargine Injectable (LANTUS) 45 Unit(s) SubCutaneous at bedtime  insulin lispro (ADMELOG) corrective regimen sliding scale   SubCutaneous three times a day before meals  insulin lispro Injectable (ADMELOG) 10 Unit(s) SubCutaneous three times a day before meals  isosorbide   mononitrate ER Tablet (IMDUR) 120 milliGRAM(s) Oral daily  nystatin    Suspension [id number] Unit(s) Swish and Swallow every 6 hours  pantoprazole    Tablet 40 milliGRAM(s) Oral before breakfast  predniSONE   Tablet 40 milliGRAM(s) Oral daily    MEDICATIONS  (PRN):  acetaminophen   Tablet .. 650 milliGRAM(s) Oral every 6 hours PRN Temp greater or equal to 38C (100.4F), Mild Pain (1 - 3)      Xrays:    reviewed

## 2021-09-14 NOTE — CONSULT NOTE ADULT - ASSESSMENT
IMPRESSION: Rehab of gait dysfunction / pneumonia    PRECAUTIONS: [   ] Cardiac  [   ] Respiratory  [   ] Seizures [   ] Contact Isolation  [   ] Droplet Isolation  [   ] Other    Weight Bearing Status:     RECOMMENDATION:    Out of Bed to Chair     DVT/Decubiti Prophylaxis    REHAB PLAN:     [ x   ] Bedside P/T 3-5 times a week   [    ]   Bedside O/T  2-3 times a week             [    ] Speech Therapy               [    ]  No Rehab Therapy Indicated   Conditioning/ROM                                    ADL  Bed Mobility                                               Conditioning/ROM  Transfers                                                     Bed Mobility  Sitting /Standing Balance                         Transfers                                        Gait Training                                               Sitting/Standing Balance  Stair Training [   ]Applicable                    Home equipment Eval                                                                        Splinting  [   ] Only      GOALS:   ADL   [    ]   Independent                    Transfers  [   x ] Independent                          Ambulation  [ x   ] Independent     [   x  ] With device                            [    ]  CG                                                         [    ]  CG                                                                  [    ] CG                            [    ] Min A                                                   [    ] Min A                                                              [    ] Min  A          DISCHARGE PLAN:   [    ]  Good candidate for Intensive Rehabilitation/Hospital based                                             Will tolerate 3hrs Intensive Rehab Daily                                       [  x   ]  Short Term Rehab in Skilled Nursing Facility                             vs          [   x  ]  Home with Outpatient or VN services                                         [     ]  Possible Candidate for Intensive Hospital based Rehab

## 2021-09-14 NOTE — PROGRESS NOTE ADULT - ASSESSMENT
DIETER / ATN in nature d/t sepsis / possible JOSE contributing / oligo-anuric / creat up-trending / no improvement w/ iv hydration/ PNA / acute respiratory failure   # UO improving (last HD 9/4), creat stable, [facility] d/c'd  #LE edema, lung opacities - start Lasix 40 mg po qd  # monitor urine output,   # replete K/Mg as needed- check Mg level   # BUN elevated, on steroids - downtrending  # BP better controlled - may decrease Hydralazine to 25 q8h since LAsix will be started to avoid hypotension  # check ph level   # PNA on levofloxacin   # no  indication for RRT   # will follow

## 2021-09-14 NOTE — OCCUPATIONAL THERAPY INITIAL EVALUATION ADULT - PLANNED THERAPY INTERVENTIONS, OT EVAL
ADL retraining/IADL retraining/balance training/bed mobility training/fine motor coordination training/parent/caregiver training.../strengthening/transfer training

## 2021-09-14 NOTE — PROGRESS NOTE ADULT - SUBJECTIVE AND OBJECTIVE BOX
Nephrology progress note    Patient is seen and examined in the am,  events over the last 24 h noted .  No complaints, severe LE edema  Allergies:  No Known Allergies    Hospital Medications:   MEDICATIONS  (STANDING):  allopurinol 50 milliGRAM(s) Oral daily  amLODIPine   Tablet 10 milliGRAM(s) Oral daily  apixaban 2.5 milliGRAM(s) Oral two times a day  aspirin  chewable 81 milliGRAM(s) Oral daily  atorvastatin 40 milliGRAM(s) Oral at bedtime  chlorhexidine 4% Liquid 1 Application(s) Topical <User Schedule>  dextrose 40% Gel 15 Gram(s) Oral once  dextrose 5%. 1000 milliLiter(s) (50 mL/Hr) IV Continuous <Continuous>  dextrose 5%. 1000 milliLiter(s) (100 mL/Hr) IV Continuous <Continuous>  dextrose 50% Injectable 25 Gram(s) IV Push once  dextrose 50% Injectable 12.5 Gram(s) IV Push once  dextrose 50% Injectable 25 Gram(s) IV Push once  dextrose 50% Injectable 50 milliLiter(s) IV Push every 15 minutes  glucagon  Injectable 1 milliGRAM(s) IntraMuscular once  hydrALAZINE 50 milliGRAM(s) Oral every 8 hours  insulin glargine Injectable (LANTUS) 36 Unit(s) SubCutaneous at bedtime  insulin lispro (ADMELOG) corrective regimen sliding scale   SubCutaneous three times a day before meals  insulin lispro Injectable (ADMELOG) 10 Unit(s) SubCutaneous three times a day before meals  isosorbide   mononitrate ER Tablet (IMDUR) 120 milliGRAM(s) Oral daily  nystatin    Suspension 416544 Unit(s) Swish and Swallow every 6 hours  pantoprazole    Tablet 40 milliGRAM(s) Oral before breakfast  predniSONE   Tablet 30 milliGRAM(s) Oral daily        VITALS:  T(F): 97.4 (09-14-21 @ 20:00), Max: 97.5 (09-14-21 @ 04:00)  HR: 90 (09-14-21 @ 20:00)  BP: 139/70 (09-14-21 @ 20:00)  RR: 20 (09-14-21 @ 20:00)  SpO2: 98% (09-14-21 @ 20:00)  Wt(kg): --    09-12 @ 07:01  -  09-13 @ 07:00  --------------------------------------------------------  IN: 0 mL / OUT: 1400 mL / NET: -1400 mL    09-13 @ 07:01 - 09-14 @ 07:00  --------------------------------------------------------  IN: 330 mL / OUT: 1100 mL / NET: -770 mL    09-14 @ 07:01  -  09-14 @ 21:14  --------------------------------------------------------  IN: 0 mL / OUT: 850 mL / NET: -850 mL          PHYSICAL EXAM:  Constitutional: NAD, on NC  HEENT: anicteric sclera  Neck: No JVD  Respiratory: CTA  Cardiovascular: S1, S2, RRR  Gastrointestinal: BS+, soft, NT/ND  Extremities: 2+ peripheral edema  Neurological: A/O x 3  : Has mariano.   Skin: No rashes  Vascular Access:    LABS:  09-14    139  |  97<L>  |  85<HH>  ----------------------------<  77  3.8   |  29  |  2.8<H>    Ca    8.0<L>      14 Sep 2021 05:12  Mg     1.9     09-14    TPro  5.9<L>  /  Alb  2.9<L>  /  TBili  1.1  /  DBili      /  AST  58<H>  /  ALT  61<H>  /  AlkPhos  136<H>  09-14                          11.3   10.42 )-----------( 457      ( 14 Sep 2021 05:12 )             34.0       Urine Studies:      RADIOLOGY & ADDITIONAL STUDIES:  < from: Xray Chest 1 View- PORTABLE-Routine (Xray Chest 1 View- PORTABLE-Routine in AM.) (09.14.21 @ 06:05) >  Stable bilateral opacities. No pleural effusion or pneumothorax.    < end of copied text >

## 2021-09-14 NOTE — CONSULT NOTE ADULT - SUBJECTIVE AND OBJECTIVE BOX
HPI:  PC:  Fall    PMHx: HTN, HLD, T2DM, Afib on Xarelto, CAD s/p stents placement (2014), gout, VINI on CPAP    HPI: 80M w/ PMHx as above BIBEMS for fall. Patient says he felt dizzy yesterday causing him to fall on to his bed. Denies any room spinning or pre-syncope. Unable to describe the dizziness to me. No HT, LOC, numbness, weakness, tingling, headache, vision changes, fever, cold/flu symptoms, CP. Patient endorses that he has been feeling SOB for the past 2 days and has been coughing for the past 1 week per daughter at bedside. Also states that he had diarrhea for the past 2 days, but not today (Of note Pt S/p Moderna Vaccine 2 doses).    ED Course: Febrile, tachycardic and hypoxic in ED. Labs revealed leukocytosis, HAGMA, elevated Trop 0.04, BNP 13k. UA positve for WBC. Trauma w/up neg. CXR and CT chest revealed confluent airspace consolidation in the RUL and to a lesser degree the RLL consistent with pneumonia.       PAST MEDICAL & SURGICAL HISTORY:  Atrial fibrillation    Coronary artery disease    Hyperlipidemia    Hypertension    Gout    Diabetes mellitus    VINI on CPAP    History of heart artery stent        Hospital Course:    TODAY'S SUBJECTIVE & REVIEW OF SYMPTOMS:     Constitutional WNL   Cardio WNL   Resp sob   GI WNL  Heme WNL  Endo WNL  Skin WNL  MSK WNL  Neuro WNL  Cognitive WNL  Psych WNL      MEDICATIONS  (STANDING):  allopurinol 50 milliGRAM(s) Oral daily  amLODIPine   Tablet 10 milliGRAM(s) Oral daily  apixaban 2.5 milliGRAM(s) Oral two times a day  aspirin  chewable 81 milliGRAM(s) Oral daily  atorvastatin 40 milliGRAM(s) Oral at bedtime  chlorhexidine 4% Liquid 1 Application(s) Topical <User Schedule>  dextrose 40% Gel 15 Gram(s) Oral once  dextrose 5%. 1000 milliLiter(s) (50 mL/Hr) IV Continuous <Continuous>  dextrose 5%. 1000 milliLiter(s) (100 mL/Hr) IV Continuous <Continuous>  dextrose 50% Injectable 25 Gram(s) IV Push once  dextrose 50% Injectable 12.5 Gram(s) IV Push once  dextrose 50% Injectable 25 Gram(s) IV Push once  dextrose 50% Injectable 50 milliLiter(s) IV Push every 15 minutes  glucagon  Injectable 1 milliGRAM(s) IntraMuscular once  hydrALAZINE 50 milliGRAM(s) Oral every 8 hours  insulin glargine Injectable (LANTUS) 36 Unit(s) SubCutaneous at bedtime  insulin lispro (ADMELOG) corrective regimen sliding scale   SubCutaneous three times a day before meals  insulin lispro Injectable (ADMELOG) 10 Unit(s) SubCutaneous three times a day before meals  isosorbide   mononitrate ER Tablet (IMDUR) 120 milliGRAM(s) Oral daily  nystatin    Suspension [id number] Unit(s) Swish and Swallow every 6 hours  pantoprazole    Tablet 40 milliGRAM(s) Oral before breakfast  predniSONE   Tablet 30 milliGRAM(s) Oral daily    MEDICATIONS  (PRN):  acetaminophen   Tablet .. 650 milliGRAM(s) Oral every 6 hours PRN Temp greater or equal to 38C (100.4F), Mild Pain (1 - 3)      FAMILY HISTORY:      Allergies    No Known Allergies    Intolerances        SOCIAL HISTORY:    [  ] Etoh  [  ] Smoking  [  ] Substance abuse     Home Environment:  [ x  ] Home Alone  [   ] Lives with Family  [   ] Home Health Aid    Dwelling:  [   ] Apartment  [ x  ] Private House  [   ] Adult Home  [   ] Skilled Nursing Facility      [   ] Short Term  [   ] Long Term  [ x  ] Stairs       Elevator [   ]    FUNCTIONAL STATUS PTA: (Check all that apply)  Ambulation: [  x  ]Independent    [   ] Dependent     [   ] Non-Ambulatory  Assistive Device: [   ] SA Cane  [   ]  Q Cane  [   ] Walker  [   ]  Wheelchair  ADL : [ x  ] Independent  [    ]  Dependent       Vital Signs Last 24 Hrs  T(C): 35.7 (14 Sep 2021 07:30), Max: 36.4 (14 Sep 2021 04:00)  T(F): 96.3 (14 Sep 2021 07:30), Max: 97.5 (14 Sep 2021 04:00)  HR: 90 (14 Sep 2021 07:30) (80 - 91)  BP: 146/67 (14 Sep 2021 07:30) (134/67 - 150/72)  BP(mean): 97 (14 Sep 2021 07:30) (93 - 99)  RR: 28 (14 Sep 2021 07:30) (18 - 28)  SpO2: 96% (14 Sep 2021 07:30) (96% - 100%)      PHYSICAL EXAM: Awake & Alert  GENERAL: NAD  HEAD:  Normocephalic  CHEST/LUNG: decreased BS lung bases  HEART: S1S2+  ABDOMEN: Soft, Nontender  EXTREMITIES:  no calf tenderness    NERVOUS SYSTEM:  Cranial Nerves 2-12 intact [   ] Abnormal  [   ]  ROM: WFL all extremities [  x ]  Abnormal [   ]  Motor Strength: WFL all extremities  [ x  ]  Abnormal [   ]  Sensation: intact to light touch [  x ] Abnormal [   ]    FUNCTIONAL STATUS:  Bed Mobility: Independent [   ]  Supervision [   ]  Needs Assistance [ x  ]  N/A [   ]  Transfers: Independent [   ]  Supervision [   ]  Needs Assistance [ x  ]  N/A [   ]   Ambulation: Independent [   ]  Supervision [   ]  Needs Assistance [   ]  N/A [   ]  ADL: Independent [   ] Requires Assistance [   ] N/A [   ]      LABS:                        11.3   10.42 )-----------( 457      ( 14 Sep 2021 05:12 )             34.0     09-14    139  |  97<L>  |  85<HH>  ----------------------------<  77  3.8   |  29  |  2.8<H>    Ca    8.0<L>      14 Sep 2021 05:12  Mg     1.9     09-14    TPro  5.9<L>  /  Alb  2.9<L>  /  TBili  1.1  /  DBili  x   /  AST  58<H>  /  ALT  61<H>  /  AlkPhos  136<H>  09-14          RADIOLOGY & ADDITIONAL STUDIES:

## 2021-09-15 LAB
ALBUMIN SERPL ELPH-MCNC: 2.8 G/DL — LOW (ref 3.5–5.2)
ALP SERPL-CCNC: 135 U/L — HIGH (ref 30–115)
ALT FLD-CCNC: 54 U/L — HIGH (ref 0–41)
ANION GAP SERPL CALC-SCNC: 11 MMOL/L — SIGNIFICANT CHANGE UP (ref 7–14)
AST SERPL-CCNC: 53 U/L — HIGH (ref 0–41)
BILIRUB SERPL-MCNC: 1 MG/DL — SIGNIFICANT CHANGE UP (ref 0.2–1.2)
BUN SERPL-MCNC: 88 MG/DL — CRITICAL HIGH (ref 10–20)
CALCIUM SERPL-MCNC: 7.8 MG/DL — LOW (ref 8.5–10.1)
CHLORIDE SERPL-SCNC: 98 MMOL/L — SIGNIFICANT CHANGE UP (ref 98–110)
CO2 SERPL-SCNC: 29 MMOL/L — SIGNIFICANT CHANGE UP (ref 17–32)
CREAT SERPL-MCNC: 2.7 MG/DL — HIGH (ref 0.7–1.5)
GLUCOSE BLDC GLUCOMTR-MCNC: 102 MG/DL — HIGH (ref 70–99)
GLUCOSE BLDC GLUCOMTR-MCNC: 107 MG/DL — HIGH (ref 70–99)
GLUCOSE BLDC GLUCOMTR-MCNC: 166 MG/DL — HIGH (ref 70–99)
GLUCOSE BLDC GLUCOMTR-MCNC: 187 MG/DL — HIGH (ref 70–99)
GLUCOSE BLDC GLUCOMTR-MCNC: 210 MG/DL — HIGH (ref 70–99)
GLUCOSE BLDC GLUCOMTR-MCNC: 290 MG/DL — HIGH (ref 70–99)
GLUCOSE BLDC GLUCOMTR-MCNC: 85 MG/DL — SIGNIFICANT CHANGE UP (ref 70–99)
GLUCOSE SERPL-MCNC: 92 MG/DL — SIGNIFICANT CHANGE UP (ref 70–99)
HCT VFR BLD CALC: 30.9 % — LOW (ref 42–52)
HGB BLD-MCNC: 10.2 G/DL — LOW (ref 14–18)
MAGNESIUM SERPL-MCNC: 2 MG/DL — SIGNIFICANT CHANGE UP (ref 1.8–2.4)
MCHC RBC-ENTMCNC: 29.3 PG — SIGNIFICANT CHANGE UP (ref 27–31)
MCHC RBC-ENTMCNC: 33 G/DL — SIGNIFICANT CHANGE UP (ref 32–37)
MCV RBC AUTO: 88.8 FL — SIGNIFICANT CHANGE UP (ref 80–94)
NRBC # BLD: 0 /100 WBCS — SIGNIFICANT CHANGE UP (ref 0–0)
PLATELET # BLD AUTO: 425 K/UL — HIGH (ref 130–400)
POTASSIUM SERPL-MCNC: 3.8 MMOL/L — SIGNIFICANT CHANGE UP (ref 3.5–5)
POTASSIUM SERPL-SCNC: 3.8 MMOL/L — SIGNIFICANT CHANGE UP (ref 3.5–5)
PROT SERPL-MCNC: 5.6 G/DL — LOW (ref 6–8)
RBC # BLD: 3.48 M/UL — LOW (ref 4.7–6.1)
RBC # FLD: 15.9 % — HIGH (ref 11.5–14.5)
SODIUM SERPL-SCNC: 138 MMOL/L — SIGNIFICANT CHANGE UP (ref 135–146)
WBC # BLD: 6.98 K/UL — SIGNIFICANT CHANGE UP (ref 4.8–10.8)
WBC # FLD AUTO: 6.98 K/UL — SIGNIFICANT CHANGE UP (ref 4.8–10.8)

## 2021-09-15 PROCEDURE — 71045 X-RAY EXAM CHEST 1 VIEW: CPT | Mod: 26

## 2021-09-15 PROCEDURE — 99232 SBSQ HOSP IP/OBS MODERATE 35: CPT

## 2021-09-15 RX ORDER — HYDRALAZINE HCL 50 MG
25 TABLET ORAL THREE TIMES A DAY
Refills: 0 | Status: DISCONTINUED | OUTPATIENT
Start: 2021-09-15 | End: 2021-09-16

## 2021-09-15 RX ORDER — FUROSEMIDE 40 MG
40 TABLET ORAL DAILY
Refills: 0 | Status: DISCONTINUED | OUTPATIENT
Start: 2021-09-15 | End: 2021-09-17

## 2021-09-15 RX ADMIN — INSULIN GLARGINE 36 UNIT(S): 100 INJECTION, SOLUTION SUBCUTANEOUS at 22:06

## 2021-09-15 RX ADMIN — Medication 2: at 17:51

## 2021-09-15 RX ADMIN — Medication 10 UNIT(S): at 08:10

## 2021-09-15 RX ADMIN — Medication 50 MILLIGRAM(S): at 05:00

## 2021-09-15 RX ADMIN — APIXABAN 2.5 MILLIGRAM(S): 2.5 TABLET, FILM COATED ORAL at 17:52

## 2021-09-15 RX ADMIN — Medication 81 MILLIGRAM(S): at 12:40

## 2021-09-15 RX ADMIN — APIXABAN 2.5 MILLIGRAM(S): 2.5 TABLET, FILM COATED ORAL at 05:00

## 2021-09-15 RX ADMIN — Medication 25 MILLIGRAM(S): at 13:07

## 2021-09-15 RX ADMIN — AMLODIPINE BESYLATE 10 MILLIGRAM(S): 2.5 TABLET ORAL at 05:00

## 2021-09-15 RX ADMIN — Medication 50 MILLIGRAM(S): at 12:40

## 2021-09-15 RX ADMIN — Medication 10 UNIT(S): at 12:38

## 2021-09-15 RX ADMIN — Medication 30 MILLIGRAM(S): at 05:00

## 2021-09-15 RX ADMIN — Medication 500000 UNIT(S): at 12:38

## 2021-09-15 RX ADMIN — Medication 6: at 13:09

## 2021-09-15 RX ADMIN — Medication 500000 UNIT(S): at 05:01

## 2021-09-15 RX ADMIN — ATORVASTATIN CALCIUM 40 MILLIGRAM(S): 80 TABLET, FILM COATED ORAL at 22:05

## 2021-09-15 RX ADMIN — CHLORHEXIDINE GLUCONATE 1 APPLICATION(S): 213 SOLUTION TOPICAL at 05:01

## 2021-09-15 RX ADMIN — Medication 40 MILLIGRAM(S): at 12:38

## 2021-09-15 RX ADMIN — ISOSORBIDE MONONITRATE 120 MILLIGRAM(S): 60 TABLET, EXTENDED RELEASE ORAL at 12:40

## 2021-09-15 RX ADMIN — Medication 10 UNIT(S): at 17:50

## 2021-09-15 RX ADMIN — PANTOPRAZOLE SODIUM 40 MILLIGRAM(S): 20 TABLET, DELAYED RELEASE ORAL at 06:01

## 2021-09-15 RX ADMIN — Medication 25 MILLIGRAM(S): at 21:59

## 2021-09-15 RX ADMIN — Medication 500000 UNIT(S): at 17:52

## 2021-09-15 NOTE — DIETITIAN INITIAL EVALUATION ADULT. - ADD RECOMMEND
Recommendation: Continue current nutrition regimen as tolerated. Check PO4 - if remains elevated, add no concentrated phosphorus diet modifier.

## 2021-09-15 NOTE — PROGRESS NOTE ADULT - ASSESSMENT
Patient is a 79 YO M w a PMH of A-fib (on home xarelto) CAD (s/p stent 2014),gout, Alcohol dependence (5-6 drinks/day), VINI (on home CPAP) admitted for acute hypoxic respiratory failure secondary to severe community acquired pneumonia. Patient was septic on admission (febrile, leukocytosis, tachycardia, hypoxic w identifiable source). Found to have HAGMA and UA + for WBC.      #acute hypoxemic respiratory failure secondary to severe community acquired PMN complicated by Sepsis  -Stable WBC. Afebrile. Bcx No growth   -negative MRSA, legionella and COVID -  -had been on various antibiotics including ceftriaxone, levofloxacin, linezolid, all now D/c'd  -blood cultures negative  -most recent CXR 9/14 stable b/l opacities   -c/w BiPAP at night, NC by day, currently on 3L NC  -c/w Prednisone to 30 once daily   -PT/OT   -Rehab consult for possible inpatient rehab     #Chronic A Fib  - c/w Eliquis 2.5 BID     #HTN  -c/w Hydralazine 50 q 8 and Imdur 120     #White plaque on tongue  -steroids use, open mouth, satelite lesions looks like Candidiasis   -Nystatin 5 q 6 swish and swallow started  -follow up oral lesion  -oral hygiene q 2hr     #Acute on Chronic renal failure oliguric  [facility] has since been d/c'd   -asymptomatic bacteruria   -patient was alkalotic 9/5 and on Bicarb drip was later d/c'd  -BUN uptrending, Cr downtrending, UO rapidly increasing all indicative of resolving ATN    -Follow nephro recs  -Cr minimally uptrending now, will do bladder scan to see if pt retaining     #Chronic venous stasis dermatitis   #LE wound  -wound culture from 9/5 growing Serratia marcescens, had been on abx for pna that have good cross coverage as well  -Follow wound care recs    #Necrotic Sacral Wound  -wound care consulted, recs followed and wound care instructions ordered    #Misc  - DVT Prophylaxis: eliquis  - GI Prophylaxis: pantoprazole   - Activity: OOB  - Code Status: full        Patient is a 81 YO M w a PMH of A-fib (on home xarelto) CAD (s/p stent 2014),gout, Alcohol dependence (5-6 drinks/day), VINI (on home CPAP) admitted for acute hypoxic respiratory failure secondary to severe community acquired pneumonia. Patient was septic on admission (febrile, leukocytosis, tachycardia, hypoxic w identifiable source). Found to have HAGMA and UA + for WBC.      #acute hypoxemic respiratory failure secondary to severe community acquired PMN complicated by Sepsis  -Stable WBC. Afebrile. Bcx No growth   -negative MRSA, legionella and COVID -  -had been on various antibiotics including ceftriaxone, levofloxacin, linezolid, all now D/c'd  -blood cultures negative  -most recent CXR 9/14 stable b/l opacities   -c/w BiPAP at night, NC by day, currently on 3L NC  -Tapering Prednisone, now 30 once daily   -PT/OT   -Rehab consulted, not candidate     #Chronic A Fib  - c/w Eliquis 2.5 BID     #HTN  -c/w Hydralazine 50 q 8 and Imdur 120     #White plaque on tongue  -steroids use, open mouth, satelite lesions looks like Candidiasis   -Nystatin 5 q 6 swish and swallow started  -follow up oral lesion  -oral hygiene q 2hr     #Acute on Chronic renal failure oliguric  -patient [facility] HD [facility], last session 9/4, [facility] has since been d/c'd   -asymptomatic bacteruria   -patient was alkalotic 9/5 and on Bicarb drip was later d/c'd  -BUN uptrending, Cr downtrending, UO rapidly increasing all indicative of resolving ATN    -Follow nephro recs  -Cr minimally uptrending now, will do bladder scan to see if pt retaining     #Chronic venous stasis dermatitis   #LE wound  -wound culture from 9/5 growing Serratia marcescens, had been on abx for pna that have good cross coverage as well  -Follow wound care recs    #Necrotic Sacral Wound  -wound care consulted, recs followed and wound care instructions ordered    #Misc  - DVT Prophylaxis: eliquis  - GI Prophylaxis: pantoprazole   - Activity: OOB  - Code Status: full        Patient is a 79 YO M w a PMH of A-fib (on home xarelto) CAD (s/p stent 2014),gout, Alcohol dependence (5-6 drinks/day), VINI (on home CPAP) admitted for acute hypoxic respiratory failure secondary to severe community acquired pneumonia. Patient was septic on admission (febrile, leukocytosis, tachycardia, hypoxic w identifiable source). Found to have HAGMA and UA + for WBC.      #acute hypoxemic respiratory failure secondary to severe community acquired PMN complicated by Sepsis  -Stable WBC. Afebrile. Bcx No growth   -negative MRSA, legionella and COVID -  -had been on various antibiotics including ceftriaxone, levofloxacin, linezolid, all now D/c'd  -blood cultures negative  -most recent CXR 9/14 stable b/l opacities   -c/w BiPAP at night, NC by day, currently on 3L NC  -Tapering Prednisone, now 30 once daily   -PT/OT   -Rehab consulted, not candidate for inpt rehab but will need rehab facility at discharge     #Chronic A Fib  - c/w Eliquis 2.5 BID     #HTN  -decreased Hydralazine to 25 q 8, to avoid hypotension now that adding lasix   -c/w Imdur 120     #White plaque on tongue  -steroids use, open mouth, satelite lesions looks like Candidiasis   -Nystatin 5 q 6 swish and swallow started  -follow up oral lesion  -oral hygiene q 2hr     #Acute on Chronic renal failure oliguric  [facility] has since been [facility]   -asymptomatic bacteruria   -patient was alkalotic 9/5 and on Bicarb drip was later d/c'd  -BUN uptrending, Cr downtrending, UO rapidly increasing all indicative of resolving ATN    -Follow nephro recs  -Cr minimally uptrending, Pt has been retaining without the need to urinate requiring intermittent straight caths, however, pt has been making good urine   -bladder scan q6, straight cath as needed   -Added lasix 40 PO daily as per renal     #Chronic venous stasis dermatitis   #LE wound  -wound culture from 9/5 growing Serratia marcescens, had been on abx for pna that have good cross coverage as well  -Follow wound care recs    #Necrotic Sacral Wound  -wound care consulted, recs followed and wound care instructions ordered    #Handoff: Pt status much improved, no longer on abx, no longer sob and on minimal oxygen, tapering prednisone. Renal function overall greatly improved, pt making good urine but still retaining requiring straight cath at times, added lasix 9/15 as per renal. Started working with PT.     #Misc  - DVT Prophylaxis: eliquis  - GI Prophylaxis: pantoprazole   -Diet regular   - Activity: OOB  - Code Status: full

## 2021-09-15 NOTE — PROGRESS NOTE ADULT - SUBJECTIVE AND OBJECTIVE BOX
Nephrology progress note    Patient is seen and examined, events over the last 24 h noted .  Denies SOB, on NC, severe LE edema  Allergies:  No Known Allergies    Hospital Medications:   MEDICATIONS  (STANDING):  allopurinol 50 milliGRAM(s) Oral daily  amLODIPine   Tablet 10 milliGRAM(s) Oral daily  apixaban 2.5 milliGRAM(s) Oral two times a day  aspirin  chewable 81 milliGRAM(s) Oral daily  atorvastatin 40 milliGRAM(s) Oral at bedtime  chlorhexidine 4% Liquid 1 Application(s) Topical <User Schedule>  dextrose 40% Gel 15 Gram(s) Oral once  dextrose 5%. 1000 milliLiter(s) (50 mL/Hr) IV Continuous <Continuous>  dextrose 5%. 1000 milliLiter(s) (100 mL/Hr) IV Continuous <Continuous>  dextrose 50% Injectable 25 Gram(s) IV Push once  dextrose 50% Injectable 12.5 Gram(s) IV Push once  dextrose 50% Injectable 25 Gram(s) IV Push once  dextrose 50% Injectable 50 milliLiter(s) IV Push every 15 minutes  furosemide    Tablet 40 milliGRAM(s) Oral daily  glucagon  Injectable 1 milliGRAM(s) IntraMuscular once  hydrALAZINE 25 milliGRAM(s) Oral three times a day  insulin glargine Injectable (LANTUS) 36 Unit(s) SubCutaneous at bedtime  insulin lispro (ADMELOG) corrective regimen sliding scale   SubCutaneous three times a day before meals  insulin lispro Injectable (ADMELOG) 10 Unit(s) SubCutaneous three times a day before meals  isosorbide   mononitrate ER Tablet (IMDUR) 120 milliGRAM(s) Oral daily  nystatin    Suspension [id number] Unit(s) Swish and Swallow every 6 hours  pantoprazole    Tablet 40 milliGRAM(s) Oral before breakfast  predniSONE   Tablet 30 milliGRAM(s) Oral daily        VITALS:  T(F): 96 (09-15-21 @ 17:00), Max: 97.5 (09-15-21 @ 00:00)  HR: 80 (09-15-21 @ 15:00)  BP: 139/63 (09-15-21 @ 15:00)  RR: 15 (09-15-21 @ 15:00)  SpO2: 98% (09-15-21 @ 12:00)  Wt(kg): --    09-13 @ 07:01 - 09-14 @ 07:00  --------------------------------------------------------  IN: 330 mL / OUT: 1100 mL / NET: -770 mL    09-14 @ 07:01  -  09-15 @ 07:00  --------------------------------------------------------  IN: 0 mL / OUT: 1900 mL / NET: -1900 mL          PHYSICAL EXAM:  Constitutional: NAD  HEENT: anicteric sclera  Neck: No JVD  Respiratory: decerased BS at bases  Cardiovascular: S1, S2, RRR  Gastrointestinal: BS+, soft, NT/ND  Extremities: 2-3+ peripheral edema  Neurological: A/O x 3  : No CVA tenderness. Has  mariano.   Skin: No rashes  Vascular Access:    LABS:  09-15    138  |  98  |  88<HH>  ----------------------------<  92  3.8   |  29  |  2.7<H>  Creatinine Trend: 2.7<--, 2.8<--, 2.6<--, 3.2<--, 3.0<--, 3.4<--    Ca    7.8<L>      15 Sep 2021 05:50  Mg     2.0     09-15    TPro  5.6<L>  /  Alb  2.8<L>  /  TBili  1.0  /  DBili      /  AST  53<H>  /  ALT  54<H>  /  AlkPhos  135<H>  09-15                          10.2   6.98  )-----------( 425      ( 15 Sep 2021 05:50 )             30.9       Urine Studies:      RADIOLOGY & ADDITIONAL STUDIES:

## 2021-09-15 NOTE — DIETITIAN INITIAL EVALUATION ADULT. - PERSON TAUGHT/METHOD
Reviewed current diet order. Discussed DM & Heart Healthy nutrition therapy concepts. D/c instruction - outpatient RD contact number.

## 2021-09-15 NOTE — DIETITIAN INITIAL EVALUATION ADULT. - PERTINENT LABORATORY DATA
9/15: RBC-3.48, H/H-10.2/30.9, BUN-88, creat-2.7, POCT gluc-166 (17:22) vs 210 (15:18) vs 290 (13:08) vs 187 (11:17) vs 102 (7:28); 9/1: [id number]-6.9%, cholesterol-60 (WDL), triglycerides-136 (WDL), HDL-17, LDL-12 (WDL) 9/15: RBC-3.48, H/H-10.2/30.9, BUN-88, creat-2.7, POCT gluc-166 (17:22) vs 210 (15:18) vs 290 (13:08) vs 187 (11:17) vs 102 (7:28), K-3.8 (WDL); 9/11: PO4-5.6; 9/1: [id number]-3.9%, cholesterol-60 (WDL), triglycerides-136 (WDL), HDL-17, LDL-12 (WDL)

## 2021-09-15 NOTE — DIETITIAN INITIAL EVALUATION ADULT. - PHYSCIAL ASSESSMENT
Alert. 2+ edema in B/L LE noted. Last BM 9/14. No N/V/D/C reported at this time. Skin: L Leg wound noted. Pt was followed by SLP earlier this admit d/t suspected dysphagia - was previously placed on pureed diet. Per latest SLP eval (9/13) - + toleration for regular, soft solids, thin liquids without overt s/s of aspiration/penetration. Upgraded to regular diet with thin liquids.

## 2021-09-15 NOTE — PROGRESS NOTE ADULT - ASSESSMENT
DIETER / ATN in nature d/t sepsis / possible JOSE contributing / oligo-anuric / creat up-trending / no improvement w/ iv hydration/ PNA / acute respiratory failure   # UO improving (last HD 9/4), creat stable, [facility] d/[facility]'d  #LE edema, lung opacities - start Lasix 40 mg po qd or even q12h  # monitor urine output,   # replete K/Mg as needed- check Mg level   # BUN elevated, on steroids - downtrending  # BP better controlled - avoid hypotension, marie need to d/c Amlodipine  # check ph level   # PNA on levofloxacin   d/w HS  # will follow

## 2021-09-15 NOTE — PROGRESS NOTE ADULT - ASSESSMENT
IMPRESSION:    Acute hypoxemic resp failure on NC improving  Severe CAP/ Pneumonitis  Sepsis present on admission  Acute on Chronic renal failure off HD stable  HO A fib on eliquis      PLAN:    CNS: avoid CNS depressant    HEENT:  Oral care    PULMONARY:  HOB @ 45 degrees, BIPAP/ HHFNC keep SAo2 88 to 92%, Aspiration precaution, NC    CARDIOVASCULAR: Avoid overload. Eliquis, hydralazine to 50 q 8    GI: GI prophylaxis                                          Feeding po    RENAL:  F/u  lytes.  Correct as needed.  Accurate I/O, trend CMP.  bladder scan q 6h, renal f/up    INFECTIOUS DISEASE: prednisone 30 daily    HEMATOLOGICAL:  DVT prophylaxis. Elquis    ENDOCRINE:  Follow up FS.  Insulin protocol if needed.    CODE STATUS: FULL CODE      PT/ OT

## 2021-09-15 NOTE — DIETITIAN INITIAL EVALUATION ADULT. - RD TO REMAIN AVAILABLE
Nutrition Goals: Pt to maintain tolerance & adherence to diet order, with at least 75% po intake maintained over next 10 days. Low nutrition risk observed at this time. Monitor: Energy intake, diet order, labs (electrolytes, glucose), BM, skin, wt./yes

## 2021-09-15 NOTE — DIETITIAN INITIAL EVALUATION ADULT. - ORAL INTAKE PTA/DIET HISTORY
Fair appetite & po intake PTP. Reportedly followed a low sugar, low salt diet PTP. NKFA. No supplements. No food preferences reported. No signs of muscle wasting/fat loss observed.  kg per report. No h/o unintentional wt loss reported.

## 2021-09-15 NOTE — DIETITIAN INITIAL EVALUATION ADULT. - PERTINENT MEDS FT
abx, insulin glargine, insulin lispro, lasix, prednisone, protonix, amlodipine, allopurinol, atorvastatin

## 2021-09-15 NOTE — PROGRESS NOTE ADULT - SUBJECTIVE AND OBJECTIVE BOX
Over Night Events: events noted, on 3 l nc      PHYSICAL EXAM    ICU Vital Signs Last 24 Hrs  T(C): 36.4 (15 Sep 2021 00:00), Max: 36.4 (15 Sep 2021 00:00)  T(F): 97.5 (15 Sep 2021 00:00), Max: 97.5 (15 Sep 2021 00:00)  HR: 60 (15 Sep 2021 04:30) (60 - 90)  BP: 101/58 (15 Sep 2021 04:30) (101/58 - 139/70)  BP(mean): 74 (15 Sep 2021 04:30) (74 - 96)  RR: 19 (15 Sep 2021 04:30) (18 - 26)  SpO2: 100% (15 Sep 2021 04:30) (98% - 100%)      General: ill looking  HEENT: CLARIBEL             Lymph Nodes: No cervical LN   Lungs: dec bs both bases  Cardiovascular: Regular   Abdomen: Soft, Positive BS  Extremities: No clubbing   Skin: Warm  Neurological: Non focal       09-14-21 @ 07:01  -  09-15-21 @ 07:00  --------------------------------------------------------  IN:  Total IN: 0 mL    OUT:    Intermittent Catheterization - Urethral (mL): 1400 mL    Voided (mL): 500 mL  Total OUT: 1900 mL    Total NET: -1900 mL          LABS:                          10.2   6.98  )-----------( 425      ( 15 Sep 2021 05:50 )             30.9                                               09-15    138  |  98  |  88<HH>  ----------------------------<  92  3.8   |  29  |  2.7<H>    Ca    7.8<L>      15 Sep 2021 05:50  Mg     2.0     09-15    TPro  5.6<L>  /  Alb  2.8<L>  /  TBili  1.0  /  DBili  x   /  AST  53<H>  /  ALT  54<H>  /  AlkPhos  135<H>  09-15                                                                                           LIVER FUNCTIONS - ( 15 Sep 2021 05:50 )  Alb: 2.8 g/dL / Pro: 5.6 g/dL / ALK PHOS: 135 U/L / ALT: 54 U/L / AST: 53 U/L / GGT: x                                                                                                                                       MEDICATIONS  (STANDING):  allopurinol 50 milliGRAM(s) Oral daily  amLODIPine   Tablet 10 milliGRAM(s) Oral daily  apixaban 2.5 milliGRAM(s) Oral two times a day  aspirin  chewable 81 milliGRAM(s) Oral daily  atorvastatin 40 milliGRAM(s) Oral at bedtime  chlorhexidine 4% Liquid 1 Application(s) Topical <User Schedule>  dextrose 40% Gel 15 Gram(s) Oral once  dextrose 5%. 1000 milliLiter(s) (50 mL/Hr) IV Continuous <Continuous>  dextrose 5%. 1000 milliLiter(s) (100 mL/Hr) IV Continuous <Continuous>  dextrose 50% Injectable 25 Gram(s) IV Push once  dextrose 50% Injectable 12.5 Gram(s) IV Push once  dextrose 50% Injectable 25 Gram(s) IV Push once  dextrose 50% Injectable 50 milliLiter(s) IV Push every 15 minutes  glucagon  Injectable 1 milliGRAM(s) IntraMuscular once  hydrALAZINE 50 milliGRAM(s) Oral every 8 hours  insulin glargine Injectable (LANTUS) 36 Unit(s) SubCutaneous at bedtime  insulin lispro (ADMELOG) corrective regimen sliding scale   SubCutaneous three times a day before meals  insulin lispro Injectable (ADMELOG) 10 Unit(s) SubCutaneous three times a day before meals  isosorbide   mononitrate ER Tablet (IMDUR) 120 milliGRAM(s) Oral daily  nystatin    Suspension 689441 Unit(s) Swish and Swallow every 6 hours  pantoprazole    Tablet 40 milliGRAM(s) Oral before breakfast  predniSONE   Tablet 30 milliGRAM(s) Oral daily    MEDICATIONS  (PRN):  acetaminophen   Tablet .. 650 milliGRAM(s) Oral every 6 hours PRN Temp greater or equal to 38C (100.4F), Mild Pain (1 - 3)

## 2021-09-15 NOTE — DIETITIAN INITIAL EVALUATION ADULT. - OTHER CALCULATIONS
Energy: 9402-3076 kcal/day (25-30 kcal/kg IBW) - off HD at this time. Protein: 65-80 g/day (0.8-1 g/kg IBW) - renal function considered; off HD at this time. Fluids: per LIP

## 2021-09-15 NOTE — PROGRESS NOTE ADULT - SUBJECTIVE AND OBJECTIVE BOX
MISTY BURLESON 80y Male  MRN#: [id number]     Hospital Day: 15d    Pt is currently admitted with the primary diagnosis of sob    SUBJECTIVE  No acute events overnight, pt seen and examined this am, no complaints. Pt has been retaining at times requiring straight cath, will do bladder scan q6.                                           ----------------------------------------------------------  OBJECTIVE  PAST MEDICAL & SURGICAL HISTORY  Atrial fibrillation    Coronary artery disease    Hyperlipidemia    Hypertension    Gout    Diabetes mellitus    VINI on CPAP    History of heart artery stent                                              -----------------------------------------------------------  ALLERGIES:  No Known Allergies                                            ------------------------------------------------------------    HOME MEDICATIONS  Home Medications:  Actoplus Met 15 mg-500 mg oral tablet: 1 tab(s) orally 2 times a day (31 Aug 2021 22:08)  allopurinol: 150 milligram(s) orally once a day (31 Aug 2021 22:08)  amLODIPine 10 mg oral tablet: 1 tab(s) orally once a day (in the evening) (31 Aug 2021 22:08)  aspirin 81 mg oral tablet, chewable: 1 tab(s) orally once a day (31 Aug 2021 22:08)  atorvastatin 40 mg oral tablet: 1 tab(s) orally once a day (31 Aug 2021 22:08)  fosinopril 40 mg oral tablet: 1 tab(s) orally once a day (in the evening) (31 Aug 2021 22:08)  hydroCHLOROthiazide 12.5 mg oral tablet: 1 tab(s) orally once a day (31 Aug 2021 22:08)  isosorbide mononitrate 120 mg oral tablet, extended release: 1 tab(s) orally once a day (in the morning) (31 Aug 2021 22:08)  Xarelto 15 mg oral tablet: 1 tab(s) orally once a day (in the evening) (31 Aug 2021 22:08)                           MEDICATIONS:  STANDING MEDICATIONS  allopurinol 50 milliGRAM(s) Oral daily  amLODIPine   Tablet 10 milliGRAM(s) Oral daily  apixaban 2.5 milliGRAM(s) Oral two times a day  aspirin  chewable 81 milliGRAM(s) Oral daily  atorvastatin 40 milliGRAM(s) Oral at bedtime  chlorhexidine 4% Liquid 1 Application(s) Topical <User Schedule>  dextrose 40% Gel 15 Gram(s) Oral once  dextrose 5%. 1000 milliLiter(s) IV Continuous <Continuous>  dextrose 5%. 1000 milliLiter(s) IV Continuous <Continuous>  dextrose 50% Injectable 50 milliLiter(s) IV Push every 15 minutes  dextrose 50% Injectable 25 Gram(s) IV Push once  dextrose 50% Injectable 12.5 Gram(s) IV Push once  dextrose 50% Injectable 25 Gram(s) IV Push once  furosemide    Tablet 40 milliGRAM(s) Oral daily  glucagon  Injectable 1 milliGRAM(s) IntraMuscular once  hydrALAZINE 25 milliGRAM(s) Oral three times a day  insulin glargine Injectable (LANTUS) 36 Unit(s) SubCutaneous at bedtime  insulin lispro (ADMELOG) corrective regimen sliding scale   SubCutaneous three times a day before meals  insulin lispro Injectable (ADMELOG) 10 Unit(s) SubCutaneous three times a day before meals  isosorbide   mononitrate ER Tablet (IMDUR) 120 milliGRAM(s) Oral daily  nystatin    Suspension 057265 Unit(s) Swish and Swallow every 6 hours  pantoprazole    Tablet 40 milliGRAM(s) Oral before breakfast  predniSONE   Tablet 30 milliGRAM(s) Oral daily    PRN MEDICATIONS  acetaminophen   Tablet .. 650 milliGRAM(s) Oral every 6 hours PRN                                            ------------------------------------------------------------  VITAL SIGNS: Last 24 Hours  T(C): 35.4 (15 Sep 2021 07:30), Max: 36.4 (15 Sep 2021 00:00)  T(F): 95.8 (15 Sep 2021 07:30), Max: 97.5 (15 Sep 2021 00:00)  HR: 85 (15 Sep 2021 07:30) (60 - 90)  BP: 122/64 (15 Sep 2021 07:30) (101/58 - 139/70)  BP(mean): 88 (15 Sep 2021 07:30) (74 - 96)  RR: 19 (15 Sep 2021 07:30) (18 - 26)  SpO2: 98% (15 Sep 2021 07:30) (98% - 100%)      09-14-21 @ 07:01  -  09-15-21 @ 07:00  --------------------------------------------------------  IN: 0 mL / OUT: 1900 mL / NET: -1900 mL                                             --------------------------------------------------------------  LABS:                        10.2   6.98  )-----------( 425      ( 15 Sep 2021 05:50 )             30.9     09-15    138  |  98  |  88<HH>  ----------------------------<  92  3.8   |  29  |  2.7<H>    Ca    7.8<L>      15 Sep 2021 05:50  Mg     2.0     09-15    TPro  5.6<L>  /  Alb  2.8<L>  /  TBili  1.0  /  DBili  x   /  AST  53<H>  /  ALT  54<H>  /  AlkPhos  135<H>  09-15                                                              -------------------------------------------------------------  RADIOLOGY:  < from: Xray Chest 1 View- PORTABLE-Routine (Xray Chest 1 View- PORTABLE-Routine in AM.) (09.14.21 @ 06:05) >  Impression:    Stable bilateral opacities. No pleural effusion or pneumothorax.    < end of copied text >                                            --------------------------------------------------------------    PHYSICAL EXAM:  GENERAL: NAD, lying in bed comfortably  HEAD:  Atraumatic, Normocephalic  EYES:  conjunctiva and sclera clear  ENT: Moist mucous membranes  NECK: Supple  CHEST/LUNG: decreased breath sounds. Unlabored respirations  HEART: Irregular rate and rhythm  ABDOMEN: Soft, nontender, nondistended  EXTREMITIES:  2+ edema in b/l LE  NERVOUS SYSTEM:  A&Ox3                                               --------------------------------------------------------------

## 2021-09-16 ENCOUNTER — APPOINTMENT (OUTPATIENT)
Dept: BURN CARE | Facility: CLINIC | Age: 81
End: 2021-09-16

## 2021-09-16 LAB
ALBUMIN SERPL ELPH-MCNC: 2.7 G/DL — LOW (ref 3.5–5.2)
ALP SERPL-CCNC: 148 U/L — HIGH (ref 30–115)
ALT FLD-CCNC: 49 U/L — HIGH (ref 0–41)
ANION GAP SERPL CALC-SCNC: 12 MMOL/L — SIGNIFICANT CHANGE UP (ref 7–14)
AST SERPL-CCNC: 57 U/L — HIGH (ref 0–41)
BILIRUB SERPL-MCNC: 0.9 MG/DL — SIGNIFICANT CHANGE UP (ref 0.2–1.2)
BUN SERPL-MCNC: 92 MG/DL — CRITICAL HIGH (ref 10–20)
CALCIUM SERPL-MCNC: 7.6 MG/DL — LOW (ref 8.5–10.1)
CHLORIDE SERPL-SCNC: 97 MMOL/L — LOW (ref 98–110)
CO2 SERPL-SCNC: 29 MMOL/L — SIGNIFICANT CHANGE UP (ref 17–32)
CREAT SERPL-MCNC: 2.8 MG/DL — HIGH (ref 0.7–1.5)
GLUCOSE BLDC GLUCOMTR-MCNC: 148 MG/DL — HIGH (ref 70–99)
GLUCOSE BLDC GLUCOMTR-MCNC: 170 MG/DL — HIGH (ref 70–99)
GLUCOSE BLDC GLUCOMTR-MCNC: 192 MG/DL — HIGH (ref 70–99)
GLUCOSE BLDC GLUCOMTR-MCNC: 237 MG/DL — HIGH (ref 70–99)
GLUCOSE BLDC GLUCOMTR-MCNC: 92 MG/DL — SIGNIFICANT CHANGE UP (ref 70–99)
GLUCOSE SERPL-MCNC: 81 MG/DL — SIGNIFICANT CHANGE UP (ref 70–99)
HCT VFR BLD CALC: 28.6 % — LOW (ref 42–52)
HGB BLD-MCNC: 9.5 G/DL — LOW (ref 14–18)
MAGNESIUM SERPL-MCNC: 2 MG/DL — SIGNIFICANT CHANGE UP (ref 1.8–2.4)
MCHC RBC-ENTMCNC: 29.6 PG — SIGNIFICANT CHANGE UP (ref 27–31)
MCHC RBC-ENTMCNC: 33.2 G/DL — SIGNIFICANT CHANGE UP (ref 32–37)
MCV RBC AUTO: 89.1 FL — SIGNIFICANT CHANGE UP (ref 80–94)
NRBC # BLD: 0 /100 WBCS — SIGNIFICANT CHANGE UP (ref 0–0)
PLATELET # BLD AUTO: 378 K/UL — SIGNIFICANT CHANGE UP (ref 130–400)
POTASSIUM SERPL-MCNC: 3.7 MMOL/L — SIGNIFICANT CHANGE UP (ref 3.5–5)
POTASSIUM SERPL-SCNC: 3.7 MMOL/L — SIGNIFICANT CHANGE UP (ref 3.5–5)
PROT SERPL-MCNC: 5.2 G/DL — LOW (ref 6–8)
RBC # BLD: 3.21 M/UL — LOW (ref 4.7–6.1)
RBC # FLD: 15.9 % — HIGH (ref 11.5–14.5)
SODIUM SERPL-SCNC: 138 MMOL/L — SIGNIFICANT CHANGE UP (ref 135–146)
WBC # BLD: 5.9 K/UL — SIGNIFICANT CHANGE UP (ref 4.8–10.8)
WBC # FLD AUTO: 5.9 K/UL — SIGNIFICANT CHANGE UP (ref 4.8–10.8)

## 2021-09-16 PROCEDURE — 99232 SBSQ HOSP IP/OBS MODERATE 35: CPT

## 2021-09-16 RX ORDER — HYDRALAZINE HCL 50 MG
50 TABLET ORAL THREE TIMES A DAY
Refills: 0 | Status: DISCONTINUED | OUTPATIENT
Start: 2021-09-16 | End: 2021-09-17

## 2021-09-16 RX ORDER — METOPROLOL TARTRATE 50 MG
12.5 TABLET ORAL
Refills: 0 | Status: DISCONTINUED | OUTPATIENT
Start: 2021-09-16 | End: 2021-09-18

## 2021-09-16 RX ADMIN — ATORVASTATIN CALCIUM 40 MILLIGRAM(S): 80 TABLET, FILM COATED ORAL at 21:14

## 2021-09-16 RX ADMIN — CHLORHEXIDINE GLUCONATE 1 APPLICATION(S): 213 SOLUTION TOPICAL at 06:07

## 2021-09-16 RX ADMIN — APIXABAN 2.5 MILLIGRAM(S): 2.5 TABLET, FILM COATED ORAL at 17:29

## 2021-09-16 RX ADMIN — APIXABAN 2.5 MILLIGRAM(S): 2.5 TABLET, FILM COATED ORAL at 05:39

## 2021-09-16 RX ADMIN — Medication 500000 UNIT(S): at 05:45

## 2021-09-16 RX ADMIN — Medication 2: at 11:35

## 2021-09-16 RX ADMIN — Medication 500000 UNIT(S): at 11:37

## 2021-09-16 RX ADMIN — ISOSORBIDE MONONITRATE 120 MILLIGRAM(S): 60 TABLET, EXTENDED RELEASE ORAL at 11:37

## 2021-09-16 RX ADMIN — Medication 25 MILLIGRAM(S): at 05:39

## 2021-09-16 RX ADMIN — Medication 50 MILLIGRAM(S): at 21:14

## 2021-09-16 RX ADMIN — Medication 10 UNIT(S): at 11:36

## 2021-09-16 RX ADMIN — INSULIN GLARGINE 36 UNIT(S): 100 INJECTION, SOLUTION SUBCUTANEOUS at 21:27

## 2021-09-16 RX ADMIN — Medication 12.5 MILLIGRAM(S): at 17:29

## 2021-09-16 RX ADMIN — Medication 10 UNIT(S): at 07:54

## 2021-09-16 RX ADMIN — Medication 50 MILLIGRAM(S): at 11:36

## 2021-09-16 RX ADMIN — Medication 10 UNIT(S): at 15:43

## 2021-09-16 RX ADMIN — Medication 30 MILLIGRAM(S): at 05:40

## 2021-09-16 RX ADMIN — Medication 500000 UNIT(S): at 17:29

## 2021-09-16 RX ADMIN — AMLODIPINE BESYLATE 10 MILLIGRAM(S): 2.5 TABLET ORAL at 05:39

## 2021-09-16 RX ADMIN — Medication 81 MILLIGRAM(S): at 11:36

## 2021-09-16 RX ADMIN — PANTOPRAZOLE SODIUM 40 MILLIGRAM(S): 20 TABLET, DELAYED RELEASE ORAL at 06:08

## 2021-09-16 RX ADMIN — Medication 4: at 15:42

## 2021-09-16 RX ADMIN — Medication 50 MILLIGRAM(S): at 15:17

## 2021-09-16 RX ADMIN — Medication 40 MILLIGRAM(S): at 05:41

## 2021-09-16 NOTE — CHART NOTE - NSCHARTNOTEFT_GEN_A_CORE
HPI:  PC:  Fall    PMHx: HTN, HLD, T2DM, Afib on Xarelto, CAD s/p stents placement (2014), gout, VINI on CPAP    HPI: 80M w/ PMHx as above BIBEMS for fall. Patient says he felt dizzy yesterday causing him to fall on to his bed. Denies any room spinning or pre-syncope. Unable to describe the dizziness to me. No HT, LOC, numbness, weakness, tingling, headache, vision changes, fever, cold/flu symptoms, CP. Patient endorses that he has been feeling SOB for the past 2 days and has been coughing for the past 1 week per daughter at bedside. Also states that he had diarrhea for the past 2 days, but not today (Of note Pt S/p Moderna Vaccine 2 doses).    ED Course: Febrile, tachycardic and hypoxic in ED. Labs revealed leukocytosis, HAGMA, elevated Trop 0.04, BNP 13k. UA positive for WBC. Trauma w/up neg. CXR and CT chest revealed confluent airspace consolidation in the RUL and to a lesser degree the RLL consistent with pneumonia. (31 Aug 2021 22:29)    Hospital Course:    Patient admitted to CEU for acute hypoxic respiratory failure 2/2 extensive multilobar pneumonia, most confluent in right upper lobe vs CHF exacerbation with elevated BNP. Patient was treated with course of antibiotics (Rocephin and Levaquin). Patient's hospital course was complicated by elevated CK, possible rhabdomyomas and DIETER with significant rising Cr. Patient had urgent [place] placed and underwent HD as per nephro. Patient upgraded to ICU. Patient underwent few sessions of HD. Creatinine downtrending, [place] d/lopez, last HD session 9/4. Repeat chest xray showed stable bilateral opacities, [place] d/lopez. Patient downgraded to Vent/SICU.     Patient is a 79 YO M w a PMH of A-fib (on home xarelto) CAD (s/p stent 2014),gout, Alcohol dependence (5-6 drinks/day), VINI (on home CPAP) admitted for acute hypoxic respiratory failure secondary to severe community acquired pneumonia. Patient was septic on admission (febrile, leukocytosis, tachycardia, hypoxic w identifiable source). Found to have HAGMA and UA + for WBC.      # Acute hypoxemic respiratory failure secondary to severe community acquired PMN complicated by Sepsis  - Stable WBC. Afebrile. Bcx No growth   - negative MRSA, legionella and COVID -  - had been on various antibiotics including ceftriaxone, levofloxacin, linezolid, all now D/c'd  - blood cultures negative  - most recent CXR 9/14 stable b/l opacities   - c/w BiPAP at night, NC by day, currently on 3L NC  - Tapering Prednisone, now 30 once daily   - PT/OT consulted  - Rehab consulted, not candidate for inpt rehab but will need rehab facility at discharge     # Chronic A Fib  - Added metoprolol 12.5mg BID (9/16)  - c/w Eliquis 2.5 BID     # HTN  - increased Hydralazine to 50 q 8  - c/w Imdur 120     # White plaque on tongue  - steroids use, open mouth, satellite lesions looks like Candidiasis   - Nystatin 5 q 6 swish and swallow started  - follow up oral lesion  - oral hygiene q 2hr     # Acute on Chronic renal failure oliguric  - patient had HD thru [facility], last session 9/4, [facility] has since been d/c'd   - asymptomatic bacteruria   - patient was alkalotic 9/5 and on Bicarb drip was later d/c'd  - BUN uptrending, Cr downtrending, UO rapidly increasing all indicative of resolving ATN    - Follow nephro recs  - Cr stable currently, Ramirez in place  - On lasix 40 PO daily as per renal     # Chronic venous stasis dermatitis   # LE wound  - wound culture from 9/5 growing Serratia marcescens, had been on abx for pna that have good cross coverage as well  - Follow wound care recs    # Necrotic Sacral Wound  - wound care consulted, recs followed and wound care instructions ordered    # Handoff: tapering prednisone 30mg. [place] placed back. Started working with PT.    #Misc  - DVT Prophylaxis: eliquis  - GI Prophylaxis: pantoprazole   -Diet regular   - Activity: OOB  - Code Status: full   - Dispo: Downgrade to floor, dc planning HPI:  PC:  Fall    PMHx: HTN, HLD, T2DM, Afib on Xarelto, CAD s/p stents placement (2014), gout, VINI on CPAP    HPI: 80M w/ PMHx as above BIBEMS for fall. Patient says he felt dizzy yesterday causing him to fall on to his bed. Denies any room spinning or pre-syncope. Unable to describe the dizziness to me. No HT, LOC, numbness, weakness, tingling, headache, vision changes, fever, cold/flu symptoms, CP. Patient endorses that he has been feeling SOB for the past 2 days and has been coughing for the past 1 week per daughter at bedside. Also states that he had diarrhea for the past 2 days, but not today (Of note Pt S/p Moderna Vaccine 2 doses).    ED Course: Febrile, tachycardic and hypoxic in ED. Labs revealed leukocytosis, HAGMA, elevated Trop 0.04, BNP 13k. UA positive for WBC. Trauma w/up neg. CXR and CT chest revealed confluent airspace consolidation in the RUL and to a lesser degree the RLL consistent with pneumonia. (31 Aug 2021 22:29)    Hospital Course:    Patient admitted to CEU for acute hypoxic respiratory failure 2/2 extensive multilobar pneumonia, most confluent in right upper lobe vs CHF exacerbation with elevated BNP. Patient was treated with course of antibiotics (Rocephin and Levaquin). Patient's hospital course was complicated by elevated CK, possible rhabdomyomas and DIETER with significant rising Cr. Patient had urgent [place] placed and underwent HD as per nephro. Patient upgraded to ICU. Patient underwent few sessions of HD. Creatinine downtrending, patient urine output rapidly increasing indicating resolving ATN. [place] d/lopez, last HD session 9/4. Repeat chest xray showed stable bilateral opacities. Finished course of Levaquin and Ceftriaxone. Patient downgraded to Vent/SICU. Pt currently has quintana in place, producing adequate urine. Pt tolerating BIPAP well at night and on NC 3L. On prednisone 30mg daily. Working with PT/OT. Downgrade to floor and discharge planning.     # Acute hypoxemic respiratory failure secondary to severe community acquired PMN complicated by Sepsis  - Stable WBC. Afebrile. Bcx No growth   - negative MRSA, legionella and COVID -  - had been on various antibiotics including ceftriaxone, levofloxacin, linezolid, all now D/c'd  - blood cultures negative  - most recent CXR 9/14 stable b/l opacities   - c/w BiPAP at night, NC by day, currently on 3L NC  - Tapering Prednisone, now 30 once daily   - PT/OT consulted  - Rehab consulted, not candidate for inpt rehab but will need rehab facility at discharge     # Chronic A Fib  - Added metoprolol 12.5mg BID (9/16)  - c/w Eliquis 2.5 BID     # HTN  - increased Hydralazine to 50 q 8  - c/w Imdur 120     # White plaque on tongue  - steroids use, open mouth, satellite lesions looks like Candidiasis   - Nystatin 5 q 6 swish and swallow started  - follow up oral lesion  - oral hygiene q 2hr     # Acute on Chronic renal failure oliguric  - patient had HD thru [facility], last session 9/4, [facility] has since been d/c'd   - asymptomatic bacteruria   - patient was alkalotic 9/5 and on Bicarb drip was later d/c'd  - BUN uptrending, Cr downtrending, UO rapidly increasing all indicative of resolving ATN    - Follow nephro recs  - Cr stable currently, Quintana in place  - On lasix 40 PO daily as per renal     # Chronic venous stasis dermatitis   # LE wound  - wound culture from 9/5 growing Serratia marcescens, had been on abx for pna that have good cross coverage as well  - Follow wound care recs    # Necrotic Sacral Wound  - wound care consulted, recs followed and wound care instructions ordered    # Handoff: tapering prednisone 30mg. Quint[place] placed back. Started working with PT.    #Misc  - DVT Prophylaxis: eliquis  - GI Prophylaxis: pantoprazole   -Diet regular   - Activity: OOB  - Code Status: full   - Dispo: Downgrade to floor, dc planning HPI:  PC:  Fall    PMHx: HTN, HLD, T2DM, Afib on Xarelto, CAD s/p stents placement (2014), gout, VINI on CPAP    HPI: 80M w/ PMHx as above BIBEMS for fall. Patient says he felt dizzy yesterday causing him to fall on to his bed. Denies any room spinning or pre-syncope. Unable to describe the dizziness to me. No HT, LOC, numbness, weakness, tingling, headache, vision changes, fever, cold/flu symptoms, CP. Patient endorses that he has been feeling SOB for the past 2 days and has been coughing for the past 1 week per daughter at bedside. Also states that he had diarrhea for the past 2 days, but not today (Of note Pt S/p Moderna Vaccine 2 doses).    ED Course: Febrile, tachycardic and hypoxic in ED. Labs revealed leukocytosis, HAGMA, elevated Trop 0.04, BNP 13k. UA positive for WBC. Trauma w/up neg. CXR and CT chest revealed confluent airspace consolidation in the RUL and to a lesser degree the RLL consistent with pneumonia. (31 Aug 2021 22:29)    Hospital Course:    Patient admitted to CEU for acute hypoxic respiratory failure 2/2 extensive multilobar pneumonia, most confluent in right upper lobe vs CHF exacerbation with elevated BNP. Patient was treated with course of antibiotics (Rocephin and Levaquin). Patient's hospital course was complicated by elevated CK, possible rhabdomyolysis and DIETER with significant rising Cr. Patient had urgent [place] placed and underwent HD as per nephro. Patient upgraded to ICU. Patient underwent few sessions of HD. Creatinine downtrending, patient urine output rapidly increasing indicating resolving ATN. [place] d/lopez, last HD session 9/4. Repeat chest xray showed stable bilateral opacities. Finished course of Levaquin and Ceftriaxone. Patient downgraded to Vent/SICU. Pt currently has quintana in place, producing adequate urine. Pt tolerating BIPAP well at night and on NC 3L. On prednisone 30mg daily. Working with PT/OT. Downgrade to floor and discharge planning.     # Acute hypoxemic respiratory failure secondary to severe community acquired PMN complicated by Sepsis  - Stable WBC. Afebrile. Bcx No growth   - negative MRSA, legionella and COVID -  - had been on various antibiotics including ceftriaxone, levofloxacin, linezolid, all now D/c'd  - blood cultures negative  - most recent CXR 9/14 stable b/l opacities   - c/w BiPAP at night, NC by day, currently on 3L NC  - Tapering Prednisone, now 30 once daily   - PT/OT consulted  - Rehab consulted, not candidate for inpt rehab but will need rehab facility at discharge     # Chronic A Fib  - Added metoprolol 12.5mg BID (9/16)  - c/w Eliquis 2.5 BID     # HTN  - increased Hydralazine to 50 q 8  - c/w Imdur 120     # White plaque on tongue  - steroids use, open mouth, satellite lesions looks like Candidiasis   - Nystatin 5 q 6 swish and swallow started  - follow up oral lesion  - oral hygiene q 2hr     # Acute on Chronic renal failure oliguric  - patient had HD thru [facility], last session 9/4, [facility] has since been d/c'd   - asymptomatic bacteruria   - patient was alkalotic 9/5 and on Bicarb drip was later d/c'd  - BUN uptrending, Cr downtrending, UO rapidly increasing all indicative of resolving ATN    - Follow nephro recs  - Cr stable currently, Quintana in place  - On lasix 40 PO daily as per renal     # Chronic venous stasis dermatitis   # LE wound  - wound culture from 9/5 growing Serratia marcescens, had been on abx for pna that have good cross coverage as well  - Follow wound care recs    # Necrotic Sacral Wound  - wound care consulted, recs followed and wound care instructions ordered    # Handoff: tapering prednisone 30mg. [place] placed back. Started working with PT.    #Misc  - DVT Prophylaxis: eliquis  - GI Prophylaxis: pantoprazole   -Diet regular   - Activity: OOB  - Code Status: full   - Dispo: Downgrade to floor, dc planning

## 2021-09-16 NOTE — PROGRESS NOTE ADULT - SUBJECTIVE AND OBJECTIVE BOX
Nephrology progress note    THIS IS AN INCOMPLETE NOTE . FULL NOTE TO FOLLOW SHORTLY    Patient is seen and examined, events over the last 24 h noted .    Allergies:  No Known Allergies    Hospital Medications:   MEDICATIONS  (STANDING):  allopurinol 50 milliGRAM(s) Oral daily  amLODIPine   Tablet 10 milliGRAM(s) Oral daily  apixaban 2.5 milliGRAM(s) Oral two times a day  aspirin  chewable 81 milliGRAM(s) Oral daily  atorvastatin 40 milliGRAM(s) Oral at bedtime  chlorhexidine 4% Liquid 1 Application(s) Topical <User Schedule>  dextrose 40% Gel 15 Gram(s) Oral once  dextrose 5%. 1000 milliLiter(s) (50 mL/Hr) IV Continuous <Continuous>  dextrose 5%. 1000 milliLiter(s) (100 mL/Hr) IV Continuous <Continuous>  dextrose 50% Injectable 25 Gram(s) IV Push once  dextrose 50% Injectable 12.5 Gram(s) IV Push once  dextrose 50% Injectable 25 Gram(s) IV Push once  dextrose 50% Injectable 50 milliLiter(s) IV Push every 15 minutes  furosemide    Tablet 40 milliGRAM(s) Oral daily  glucagon  Injectable 1 milliGRAM(s) IntraMuscular once  hydrALAZINE 50 milliGRAM(s) Oral three times a day  insulin glargine Injectable (LANTUS) 36 Unit(s) SubCutaneous at bedtime  insulin lispro (ADMELOG) corrective regimen sliding scale   SubCutaneous three times a day before meals  insulin lispro Injectable (ADMELOG) 10 Unit(s) SubCutaneous three times a day before meals  isosorbide   mononitrate ER Tablet (IMDUR) 120 milliGRAM(s) Oral daily  nystatin    Suspension 162681 Unit(s) Swish and Swallow every 6 hours  pantoprazole    Tablet 40 milliGRAM(s) Oral before breakfast  predniSONE   Tablet 30 milliGRAM(s) Oral daily        VITALS:  T(F): 95.8 (09-16-21 @ 07:30), Max: 97.2 (09-15-21 @ 20:00)  HR: 93 (09-16-21 @ 07:30)  BP: 161/72 (09-16-21 @ 07:30)  RR: 28 (09-16-21 @ 07:30)  SpO2: 98% (09-16-21 @ 07:30)  Wt(kg): --    09-14 @ 07:01  -  09-15 @ 07:00  --------------------------------------------------------  IN: 0 mL / OUT: 1900 mL / NET: -1900 mL    09-15 @ 07:01 - 09-16 @ 07:00  --------------------------------------------------------  IN: 0 mL / OUT: 1500 mL / NET: -1500 mL          PHYSICAL EXAM:  Constitutional: NAD  HEENT: anicteric sclera, oropharynx clear, MMM  Neck: No JVD  Respiratory: CTAB, no wheezes, rales or rhonchi  Cardiovascular: S1, S2, RRR  Gastrointestinal: BS+, soft, NT/ND  Extremities: No cyanosis or clubbing. No peripheral edema  :  No quintana.   Skin: No rashes    LABS:  09-16    138  |  97<L>  |  92<HH>  ----------------------------<  81  3.7   |  29  |  2.8<H>    Ca    7.6<L>      16 Sep 2021 05:01  Mg     2.0     09-16    TPro  5.2<L>  /  Alb  2.7<L>  /  TBili  0.9  /  DBili      /  AST  57<H>  /  ALT  49<H>  /  AlkPhos  148<H>  09-16                          9.5    5.90  )-----------( 378      ( 16 Sep 2021 05:01 )             28.6       Urine Studies:      RADIOLOGY & ADDITIONAL STUDIES:   Nephrology progress note  Patient is seen and examined, events over the last 24 h noted .  lying in bed comfortable     Allergies:  No Known Allergies    Hospital Medications:   MEDICATIONS  (STANDING):    allopurinol 50 milliGRAM(s) Oral daily  amLODIPine   Tablet 10 milliGRAM(s) Oral daily  apixaban 2.5 milliGRAM(s) Oral two times a day  aspirin  chewable 81 milliGRAM(s) Oral daily  atorvastatin 40 milliGRAM(s) Oral at bedtime  furosemide    Tablet 40 milliGRAM(s) Oral daily  glucagon  Injectable 1 milliGRAM(s) IntraMuscular once  hydrALAZINE 50 milliGRAM(s) Oral three times a day  insulin glargine Injectable (LANTUS) 36 Unit(s) SubCutaneous at bedtime  insulin lispro (ADMELOG) corrective regimen sliding scale   SubCutaneous three times a day before meals  insulin lispro Injectable (ADMELOG) 10 Unit(s) SubCutaneous three times a day before meals  isosorbide   mononitrate ER Tablet (IMDUR) 120 milliGRAM(s) Oral daily  nystatin    Suspension 175805 Unit(s) Swish and Swallow every 6 hours  pantoprazole    Tablet 40 milliGRAM(s) Oral before breakfast  predniSONE   Tablet 30 milliGRAM(s) Oral daily        VITALS:  T(F): 95.8 (09-16-21 @ 07:30), Max: 97.2 (09-15-21 @ 20:00)  HR: 93 (09-16-21 @ 07:30)  BP: 161/72 (09-16-21 @ 07:30)  RR: 28 (09-16-21 @ 07:30)  SpO2: 98% (09-16-21 @ 07:30)      09-14 @ 07:01  -  09-15 @ 07:00  --------------------------------------------------------  IN: 0 mL / OUT: 1900 mL / NET: -1900 mL    09-15 @ 07:01  -  09-16 @ 07:00  --------------------------------------------------------  IN: 0 mL / OUT: 1500 mL / NET: -1500 mL          PHYSICAL EXAM:  Constitutional: NAD  Neck: No JVD  Respiratory: CTAB,   Cardiovascular: S1, S2, RRR  Gastrointestinal: BS+, soft, NT/ND  Extremities: No cyanosis or clubbing. plus 2 pitting edema   :  No quintana.   Skin: No rashes    LABS:  09-16    138  |  97<L>  |  92<HH>  ----------------------------<  81  3.7   |  29  |  2.8<H>    Ca    7.6<L>      16 Sep 2021 05:01  Mg     2.0     09-16    TPro  5.2<L>  /  Alb  2.7<L>  /  TBili  0.9  /  DBili      /  AST  57<H>  /  ALT  49<H>  /  AlkPhos  148<H>  09-16                          9.5    5.90  )-----------( 378      ( 16 Sep 2021 05:01 )             28.6       Urine Studies:      RADIOLOGY & ADDITIONAL STUDIES:

## 2021-09-16 NOTE — PROGRESS NOTE ADULT - SUBJECTIVE AND OBJECTIVE BOX
MISTY BURLESON 80y Male  MRN#: [id number]     SUBJECTIVE  Patient is a 80y old Male who presents with a chief complaint of SOB (15 Sep 2021 17:33)        Today is hospital day 16d, and this morning he is lying in bed without distress.   No acute overnight events.     OBJECTIVE  PAST MEDICAL & SURGICAL HISTORY  Atrial fibrillation    Coronary artery disease    Hyperlipidemia    Hypertension    Gout    Diabetes mellitus    VINI on CPAP    History of heart artery stent      ALLERGIES:  No Known Allergies    MEDICATIONS:  STANDING MEDICATIONS  allopurinol 50 milliGRAM(s) Oral daily  amLODIPine   Tablet 10 milliGRAM(s) Oral daily  apixaban 2.5 milliGRAM(s) Oral two times a day  aspirin  chewable 81 milliGRAM(s) Oral daily  atorvastatin 40 milliGRAM(s) Oral at bedtime  chlorhexidine 4% Liquid 1 Application(s) Topical <User Schedule>  dextrose 40% Gel 15 Gram(s) Oral once  dextrose 5%. 1000 milliLiter(s) IV Continuous <Continuous>  dextrose 5%. 1000 milliLiter(s) IV Continuous <Continuous>  dextrose 50% Injectable 25 Gram(s) IV Push once  dextrose 50% Injectable 12.5 Gram(s) IV Push once  dextrose 50% Injectable 25 Gram(s) IV Push once  dextrose 50% Injectable 50 milliLiter(s) IV Push every 15 minutes  furosemide    Tablet 40 milliGRAM(s) Oral daily  glucagon  Injectable 1 milliGRAM(s) IntraMuscular once  hydrALAZINE 50 milliGRAM(s) Oral three times a day  insulin glargine Injectable (LANTUS) 36 Unit(s) SubCutaneous at bedtime  insulin lispro (ADMELOG) corrective regimen sliding scale   SubCutaneous three times a day before meals  insulin lispro Injectable (ADMELOG) 10 Unit(s) SubCutaneous three times a day before meals  isosorbide   mononitrate ER Tablet (IMDUR) 120 milliGRAM(s) Oral daily  metoprolol tartrate 12.5 milliGRAM(s) Oral two times a day  nystatin    Suspension 781000 Unit(s) Swish and Swallow every 6 hours  pantoprazole    Tablet 40 milliGRAM(s) Oral before breakfast  predniSONE   Tablet 30 milliGRAM(s) Oral daily    PRN MEDICATIONS  acetaminophen   Tablet .. 650 milliGRAM(s) Oral every 6 hours PRN    HOME MEDICATIONS  Home Medications:  Actoplus Met 15 mg-500 mg oral tablet: 1 tab(s) orally 2 times a day (31 Aug 2021 22:08)  allopurinol: 150 milligram(s) orally once a day (31 Aug 2021 22:08)  amLODIPine 10 mg oral tablet: 1 tab(s) orally once a day (in the evening) (31 Aug 2021 22:08)  aspirin 81 mg oral tablet, chewable: 1 tab(s) orally once a day (31 Aug 2021 22:08)  atorvastatin 40 mg oral tablet: 1 tab(s) orally once a day (31 Aug 2021 22:08)  fosinopril 40 mg oral tablet: 1 tab(s) orally once a day (in the evening) (31 Aug 2021 22:08)  hydroCHLOROthiazide 12.5 mg oral tablet: 1 tab(s) orally once a day (31 Aug 2021 22:08)  isosorbide mononitrate 120 mg oral tablet, extended release: 1 tab(s) orally once a day (in the morning) (31 Aug 2021 22:08)  Xarelto 15 mg oral tablet: 1 tab(s) orally once a day (in the evening) (31 Aug 2021 22:08)      LABS:                        9.5    5.90  )-----------( 378      ( 16 Sep 2021 05:01 )             28.6     09-16    138  |  97<L>  |  92<HH>  ----------------------------<  81  3.7   |  29  |  2.8<H>    Ca    7.6<L>      16 Sep 2021 05:01  Mg     2.0     09-16    TPro  5.2<L>  /  Alb  2.7<L>  /  TBili  0.9  /  DBili  x   /  AST  57<H>  /  ALT  49<H>  /  AlkPhos  148<H>  09-16    LIVER FUNCTIONS - ( 16 Sep 2021 05:01 )  Alb: 2.7 g/dL / Pro: 5.2 g/dL / ALK PHOS: 148 U/L / ALT: 49 U/L / AST: 57 U/L / GGT: x                         CAPILLARY BLOOD GLUCOSE      POCT Blood Glucose.: 92 mg/dL (16 Sep 2021 07:22)      PHYSICAL EXAM:  T(C): 35.4 (09-16-21 @ 07:30), Max: 36.2 (09-15-21 @ 20:00)  HR: 93 (09-16-21 @ 07:30) (75 - 93)  BP: 161/72 (09-16-21 @ 07:30) (119/57 - 165/79)  RR: 28 (09-16-21 @ 07:30) (15 - 28)  SpO2: 98% (09-16-21 @ 07:30) (98% - 100%)    GENERAL: NAD, condition improving, 80y  EENT: EOMI, conjunctiva and sclera clear, No nasal obstruction or discharge  RESPIRATORY: Clear to auscultation bilaterally; No wheeze or crackles  CARDIOVASCULAR: Regular rate and rhythm; No murmurs, rubs, or gallops, no pitting edema  GASTROINTESTINAL: Abdomen Soft, Nontender, Nondistended  MUSCULOSKELETAL:  No cyanosis, extremities grossly symmetrical  PSYCH: AAOx3, affect appropriate  NEUROLOGY: non-focal, cognition grossly intact, MAEE    ADMISSION SUMMARY  Patient is a 80y old Male who presents with a chief complaint of SOB (15 Sep 2021 17:33)    MISTY BURLESON 80y Male  MRN#: [id number]     SUBJECTIVE  Patient is a 80y old Male who presents with a chief complaint of SOB (15 Sep 2021 17:33)      Today is hospital day 16d, and this morning he is lying in bed without distress.   No acute overnight events.     OBJECTIVE  PAST MEDICAL & SURGICAL HISTORY  Atrial fibrillation    Coronary artery disease    Hyperlipidemia    Hypertension    Gout    Diabetes mellitus    VINI on CPAP    History of heart artery stent      ALLERGIES:  No Known Allergies    MEDICATIONS:  STANDING MEDICATIONS  allopurinol 50 milliGRAM(s) Oral daily  amLODIPine   Tablet 10 milliGRAM(s) Oral daily  apixaban 2.5 milliGRAM(s) Oral two times a day  aspirin  chewable 81 milliGRAM(s) Oral daily  atorvastatin 40 milliGRAM(s) Oral at bedtime  chlorhexidine 4% Liquid 1 Application(s) Topical <User Schedule>  dextrose 40% Gel 15 Gram(s) Oral once  dextrose 5%. 1000 milliLiter(s) IV Continuous <Continuous>  dextrose 5%. 1000 milliLiter(s) IV Continuous <Continuous>  dextrose 50% Injectable 25 Gram(s) IV Push once  dextrose 50% Injectable 12.5 Gram(s) IV Push once  dextrose 50% Injectable 25 Gram(s) IV Push once  dextrose 50% Injectable 50 milliLiter(s) IV Push every 15 minutes  furosemide    Tablet 40 milliGRAM(s) Oral daily  glucagon  Injectable 1 milliGRAM(s) IntraMuscular once  hydrALAZINE 50 milliGRAM(s) Oral three times a day  insulin glargine Injectable (LANTUS) 36 Unit(s) SubCutaneous at bedtime  insulin lispro (ADMELOG) corrective regimen sliding scale   SubCutaneous three times a day before meals  insulin lispro Injectable (ADMELOG) 10 Unit(s) SubCutaneous three times a day before meals  isosorbide   mononitrate ER Tablet (IMDUR) 120 milliGRAM(s) Oral daily  metoprolol tartrate 12.5 milliGRAM(s) Oral two times a day  nystatin    Suspension 186756 Unit(s) Swish and Swallow every 6 hours  pantoprazole    Tablet 40 milliGRAM(s) Oral before breakfast  predniSONE   Tablet 30 milliGRAM(s) Oral daily    PRN MEDICATIONS  acetaminophen   Tablet .. 650 milliGRAM(s) Oral every 6 hours PRN    HOME MEDICATIONS  Home Medications:  Actoplus Met 15 mg-500 mg oral tablet: 1 tab(s) orally 2 times a day (31 Aug 2021 22:08)  allopurinol: 150 milligram(s) orally once a day (31 Aug 2021 22:08)  amLODIPine 10 mg oral tablet: 1 tab(s) orally once a day (in the evening) (31 Aug 2021 22:08)  aspirin 81 mg oral tablet, chewable: 1 tab(s) orally once a day (31 Aug 2021 22:08)  atorvastatin 40 mg oral tablet: 1 tab(s) orally once a day (31 Aug 2021 22:08)  fosinopril 40 mg oral tablet: 1 tab(s) orally once a day (in the evening) (31 Aug 2021 22:08)  hydroCHLOROthiazide 12.5 mg oral tablet: 1 tab(s) orally once a day (31 Aug 2021 22:08)  isosorbide mononitrate 120 mg oral tablet, extended release: 1 tab(s) orally once a day (in the morning) (31 Aug 2021 22:08)  Xarelto 15 mg oral tablet: 1 tab(s) orally once a day (in the evening) (31 Aug 2021 22:08)      LABS:                        9.5    5.90  )-----------( 378      ( 16 Sep 2021 05:01 )             28.6     09-16    138  |  97<L>  |  92<HH>  ----------------------------<  81  3.7   |  29  |  2.8<H>    Ca    7.6<L>      16 Sep 2021 05:01  Mg     2.0     09-16    TPro  5.2<L>  /  Alb  2.7<L>  /  TBili  0.9  /  DBili  x   /  AST  57<H>  /  ALT  49<H>  /  AlkPhos  148<H>  09-16    LIVER FUNCTIONS - ( 16 Sep 2021 05:01 )  Alb: 2.7 g/dL / Pro: 5.2 g/dL / ALK PHOS: 148 U/L / ALT: 49 U/L / AST: 57 U/L / GGT: x                         CAPILLARY BLOOD GLUCOSE      POCT Blood Glucose.: 92 mg/dL (16 Sep 2021 07:22)      PHYSICAL EXAM:  T(C): 35.4 (09-16-21 @ 07:30), Max: 36.2 (09-15-21 @ 20:00)  HR: 93 (09-16-21 @ 07:30) (75 - 93)  BP: 161/72 (09-16-21 @ 07:30) (119/57 - 165/79)  RR: 28 (09-16-21 @ 07:30) (15 - 28)  SpO2: 98% (09-16-21 @ 07:30) (98% - 100%)    GENERAL: NAD, condition improving, 80y  EENT: EOMI, conjunctiva and sclera clear, No nasal obstruction or discharge  RESPIRATORY: Clear to auscultation bilaterally; No wheeze or crackles  CARDIOVASCULAR: Irregular rate and rhythm; No murmurs, rubs, or gallops, +2 pitting edema  GASTROINTESTINAL: Abdomen Soft, Nontender, Nondistended, +BS  MUSCULOSKELETAL:  No cyanosis, extremities grossly symmetrical  PSYCH: AAOx3, affect appropriate  NEUROLOGY: non-focal, cognition grossly intact, MAEE    ADMISSION SUMMARY  Patient is a 80y old Male who presents with a chief complaint of SOB (15 Sep 2021 17:33)

## 2021-09-16 NOTE — PROGRESS NOTE ADULT - SUBJECTIVE AND OBJECTIVE BOX
Patient is a 80y old  Male who presents with a chief complaint of SOB (15 Sep 2021 17:33)        Over Night Events:  off pressors, on 4liters NC, lisa quintana  used niv overnight    ROS:     All ROS are negative except HPI         PHYSICAL EXAM    ICU Vital Signs Last 24 Hrs  T(C): 36.2 (16 Sep 2021 00:03), Max: 36.2 (15 Sep 2021 20:00)  T(F): 97.2 (16 Sep 2021 00:03), Max: 97.2 (15 Sep 2021 20:00)  HR: 77 (16 Sep 2021 04:00) (75 - 86)  BP: 165/79 (16 Sep 2021 04:00) (119/57 - 165/79)  BP(mean): 113 (16 Sep 2021 04:00) (82 - 113)  ABP: --  ABP(mean): --  RR: 20 (16 Sep 2021 04:00) (15 - 25)  SpO2: 100% (16 Sep 2021 04:00) (98% - 100%)      CONSTITUTIONAL:  Well nourished.  NAD    ENT:   Airway patent,   Mouth with normal mucosa.   No thrush    EYES:   Pupils equal,   Round and reactive to light.    CARDIAC:   Normal rate,   Regular rhythm.    No edema      Vascular:  Normal systolic impulse  No Carotid bruits    RESPIRATORY:   No wheezing  Bilateral BS  Normal chest expansion  Not tachypneic,  No use of accessory muscles    GASTROINTESTINAL:  Abdomen soft,   Non-tender,   No guarding,   + BS    MUSCULOSKELETAL:   Range of motion is not limited,  No clubbing, cyanosis    NEUROLOGICAL:   Alert and oriented   No motor  deficits.    SKIN:   Skin normal color for race,   Warm and dry and intact.   No evidence of rash.    PSYCHIATRIC:   Normal mood and affect.   No apparent risk to self or others.    HEMATOLOGICAL:  No cervical  lymphadenopathy.  no inguinal lymphadenopathy      09-15-21 @ 07:01  -  09-16-21 @ 07:00  --------------------------------------------------------  IN:  Total IN: 0 mL    OUT:    Indwelling Catheter - Urethral (mL): 300 mL    Ureteral Catheter (mL): 800 mL    Voided (mL): 400 mL  Total OUT: 1500 mL    Total NET: -1500 mL          LABS:                            9.5    5.90  )-----------( 378      ( 16 Sep 2021 05:01 )             28.6                                               09-16    138  |  97<L>  |  92<HH>  ----------------------------<  81  3.7   |  29  |  2.8<H>    Ca    7.6<L>      16 Sep 2021 05:01  Mg     2.0     09-16    TPro  5.2<L>  /  Alb  2.7<L>  /  TBili  0.9  /  DBili  x   /  AST  57<H>  /  ALT  49<H>  /  AlkPhos  148<H>  09-16                                                                                           LIVER FUNCTIONS - ( 16 Sep 2021 05:01 )  Alb: 2.7 g/dL / Pro: 5.2 g/dL / ALK PHOS: 148 U/L / ALT: 49 U/L / AST: 57 U/L / GGT: x                                                                                                                                       MEDICATIONS  (STANDING):  allopurinol 50 milliGRAM(s) Oral daily  amLODIPine   Tablet 10 milliGRAM(s) Oral daily  apixaban 2.5 milliGRAM(s) Oral two times a day  aspirin  chewable 81 milliGRAM(s) Oral daily  atorvastatin 40 milliGRAM(s) Oral at bedtime  chlorhexidine 4% Liquid 1 Application(s) Topical <User Schedule>  dextrose 40% Gel 15 Gram(s) Oral once  dextrose 5%. 1000 milliLiter(s) (100 mL/Hr) IV Continuous <Continuous>  dextrose 5%. 1000 milliLiter(s) (50 mL/Hr) IV Continuous <Continuous>  dextrose 50% Injectable 50 milliLiter(s) IV Push every 15 minutes  dextrose 50% Injectable 25 Gram(s) IV Push once  dextrose 50% Injectable 12.5 Gram(s) IV Push once  dextrose 50% Injectable 25 Gram(s) IV Push once  furosemide    Tablet 40 milliGRAM(s) Oral daily  glucagon  Injectable 1 milliGRAM(s) IntraMuscular once  hydrALAZINE 25 milliGRAM(s) Oral three times a day  insulin glargine Injectable (LANTUS) 36 Unit(s) SubCutaneous at bedtime  insulin lispro (ADMELOG) corrective regimen sliding scale   SubCutaneous three times a day before meals  insulin lispro Injectable (ADMELOG) 10 Unit(s) SubCutaneous three times a day before meals  isosorbide   mononitrate ER Tablet (IMDUR) 120 milliGRAM(s) Oral daily  nystatin    Suspension 069155 Unit(s) Swish and Swallow every 6 hours  pantoprazole    Tablet 40 milliGRAM(s) Oral before breakfast  predniSONE   Tablet 30 milliGRAM(s) Oral daily    MEDICATIONS  (PRN):  acetaminophen   Tablet .. 650 milliGRAM(s) Oral every 6 hours PRN Temp greater or equal to 38C (100.4F), Mild Pain (1 - 3)      New X-rays reviewed:                                                                                  ECHO    CXR interpreted by me:       Patient is a 80y old  Male who presents with a chief complaint of SOB (15 Sep 2021 17:33)        Over Night Events:  off pressors, on 4liters NC, sp mariano  used niv overnight        PHYSICAL EXAM    ICU Vital Signs Last 24 Hrs  T(C): 36.2 (16 Sep 2021 00:03), Max: 36.2 (15 Sep 2021 20:00)  T(F): 97.2 (16 Sep 2021 00:03), Max: 97.2 (15 Sep 2021 20:00)  HR: 77 (16 Sep 2021 04:00) (75 - 86)  BP: 165/79 (16 Sep 2021 04:00) (119/57 - 165/79)  BP(mean): 113 (16 Sep 2021 04:00) (82 - 113)  RR: 20 (16 Sep 2021 04:00) (15 - 25)  SpO2: 100% (16 Sep 2021 04:00) (98% - 100%)      CONSTITUTIONAL:  ill looking    ENT:   Airway patent,   Mouth with normal mucosa.   No thrush    EYES:   Pupils equal,   Round and reactive to light.    CARDIAC:   JUAN 2/6      RESPIRATORY:   No wheezing  Bilateral BS  Normal chest expansion  Not tachypneic,  No use of accessory muscles    GASTROINTESTINAL:  Abdomen soft,   Non-tender,   No guarding,   + BS    MUSCULOSKELETAL:   Range of motion is not limited,  No clubbing, cyanosis    NEUROLOGICAL:   Alert and oriented   No motor  deficits.    SKIN:   Skin normal color for race,   Warm and dry and intact.   No evidence of rash.        09-15-21 @ 07:01  -  09-16-21 @ 07:00  --------------------------------------------------------  IN:  Total IN: 0 mL    OUT:    Indwelling Catheter - Urethral (mL): 300 mL    Ureteral Catheter (mL): 800 mL    Voided (mL): 400 mL  Total OUT: 1500 mL    Total NET: -1500 mL          LABS:                            9.5    5.90  )-----------( 378      ( 16 Sep 2021 05:01 )             28.6                                               09-16    138  |  97<L>  |  92<HH>  ----------------------------<  81  3.7   |  29  |  2.8<H>    Ca    7.6<L>      16 Sep 2021 05:01  Mg     2.0     09-16    TPro  5.2<L>  /  Alb  2.7<L>  /  TBili  0.9  /  DBili  x   /  AST  57<H>  /  ALT  49<H>  /  AlkPhos  148<H>  09-16                                                                                           LIVER FUNCTIONS - ( 16 Sep 2021 05:01 )  Alb: 2.7 g/dL / Pro: 5.2 g/dL / ALK PHOS: 148 U/L / ALT: 49 U/L / AST: 57 U/L / GGT: x                                                                                                                                       MEDICATIONS  (STANDING):  allopurinol 50 milliGRAM(s) Oral daily  amLODIPine   Tablet 10 milliGRAM(s) Oral daily  apixaban 2.5 milliGRAM(s) Oral two times a day  aspirin  chewable 81 milliGRAM(s) Oral daily  atorvastatin 40 milliGRAM(s) Oral at bedtime  chlorhexidine 4% Liquid 1 Application(s) Topical <User Schedule>  dextrose 40% Gel 15 Gram(s) Oral once  dextrose 5%. 1000 milliLiter(s) (100 mL/Hr) IV Continuous <Continuous>  dextrose 5%. 1000 milliLiter(s) (50 mL/Hr) IV Continuous <Continuous>  dextrose 50% Injectable 50 milliLiter(s) IV Push every 15 minutes  dextrose 50% Injectable 25 Gram(s) IV Push once  dextrose 50% Injectable 12.5 Gram(s) IV Push once  dextrose 50% Injectable 25 Gram(s) IV Push once  furosemide    Tablet 40 milliGRAM(s) Oral daily  glucagon  Injectable 1 milliGRAM(s) IntraMuscular once  hydrALAZINE 25 milliGRAM(s) Oral three times a day  insulin glargine Injectable (LANTUS) 36 Unit(s) SubCutaneous at bedtime  insulin lispro (ADMELOG) corrective regimen sliding scale   SubCutaneous three times a day before meals  insulin lispro Injectable (ADMELOG) 10 Unit(s) SubCutaneous three times a day before meals  isosorbide   mononitrate ER Tablet (IMDUR) 120 milliGRAM(s) Oral daily  nystatin    Suspension [id number] Unit(s) Swish and Swallow every 6 hours  pantoprazole    Tablet 40 milliGRAM(s) Oral before breakfast  predniSONE   Tablet 30 milliGRAM(s) Oral daily    MEDICATIONS  (PRN):  acetaminophen   Tablet .. 650 milliGRAM(s) Oral every 6 hours PRN Temp greater or equal to 38C (100.4F), Mild Pain (1 - 3)      New X-rays reviewed:                                                                                  ECHO    CXR interpreted by me:

## 2021-09-16 NOTE — PROGRESS NOTE ADULT - ASSESSMENT
IMPRESSION:    Acute hypoxemic resp failure on NC improving  Severe CAP/ Pneumonitis  Sepsis present on admission  Acute on Chronic renal failure off HD stable-stable  HO A fib on eliquis      PLAN:    CNS: avoid CNS depressant    HEENT:  Oral care    PULMONARY:  HOB @ 45 degrees, BIPAP during sleep, wean O2 as tolerated. keep SAo2 88 to 92%, Aspiration precaution, NC    CARDIOVASCULAR: Avoid overload. Eliquis, increase to  hydralazine to 50 q 8    GI: GI prophylaxis ,       Feeding po    RENAL:  F/u  lytes.  Correct as needed.  Accurate I/O, trend CMP.  renal f/up    INFECTIOUS DISEASE: prednisone 30 daily    HEMATOLOGICAL:  DVT prophylaxis. Elquis    ENDOCRINE:  Follow up FS.  Insulin protocol if needed.    CODE STATUS: FULL CODE      PT/ OT  dowgrade to floor  d/c planning     IMPRESSION:    Acute hypoxemic resp failure on NC improving  Severe CAP/ Pneumonitis  Sepsis present on admission  Acute on Chronic renal failure off HD stable  HO A fib on eliquis      PLAN:    CNS: avoid CNS depressant    HEENT:  Oral care    PULMONARY:  HOB @ 45 degrees, BIPAP during sleep, wean O2 as tolerated. keep SAo2 88 to 92%, Aspiration precaution, NC    CARDIOVASCULAR: Avoid overload. Eliquis, increase to  hydralazine to 50 q 8    GI: GI prophylaxis ,       Feeding po    RENAL:  F/u  lytes.  Correct as needed.  Accurate I/O, trend CMP.  renal f/up    INFECTIOUS DISEASE: prednisone 30 daily    HEMATOLOGICAL:  DVT prophylaxis. Elquis    ENDOCRINE:  Follow up FS.  Insulin protocol if needed.    CODE STATUS: FULL CODE      PT/ OT  dowgrade to floor  d/c planning  SP LACY REPLACED FOR RETENTION/ FLOMAX

## 2021-09-16 NOTE — PROGRESS NOTE ADULT - ASSESSMENT
DIETER / ATN in nature d/t sepsis / possible JOSE contributing / oligo-anuric / creat up-trending / no improvement w/ iv hydration/ PNA / acute respiratory failure   # UO improving (last HD 9/4), creat stable, udall d/c'd  # LE edema, lung opacities - cont  Lasix 40 mg po qd   # monitor urine output,   # replete K/Mg as needed- Mg level ok now   # BUN elevated, on steroids - downtrending  # BP better controlled - avoid hypotension,  # check ph level   # PNA on levofloxacin   # will follow

## 2021-09-16 NOTE — PROGRESS NOTE ADULT - ASSESSMENT
Patient is a 81 YO M w a PMH of A-fib (on home xarelto) CAD (s/p stent 2014),gout, Alcohol dependence (5-6 drinks/day), VINI (on home CPAP) admitted for acute hypoxic respiratory failure secondary to severe community acquired pneumonia. Patient was septic on admission (febrile, leukocytosis, tachycardia, hypoxic w identifiable source). Found to have HAGMA and UA + for WBC.      #acute hypoxemic respiratory failure secondary to severe community acquired PMN complicated by Sepsis  -Stable WBC. Afebrile. Bcx No growth   -negative MRSA, legionella and COVID -  -had been on various antibiotics including ceftriaxone, levofloxacin, linezolid, all now D/c'd  -blood cultures negative  -most recent CXR 9/14 stable b/l opacities   -c/w BiPAP at night, NC by day, currently on 3L NC  -Tapering Prednisone, now 30 once daily   -PT/OT   -Rehab consulted, not candidate for inpt rehab but will need rehab facility at discharge     #Chronic A Fib  - c/w Eliquis 2.5 BID     #HTN  -decreased Hydralazine to 25 q 8, to avoid hypotension now that adding lasix   -c/w Imdur 120     #White plaque on tongue  -steroids use, open mouth, satellite lesions looks like Candidiasis   -Nystatin 5 q 6 swish and swallow started  -follow up oral lesion  -oral hygiene q 2hr     #Acute on Chronic renal failure oliguric  -patient had HD thru [facility], last session 9/4, [facility] has since been d/c'd   -asymptomatic bacteruria   -patient was alkalotic 9/5 and on Bicarb drip was later d/c'd  -BUN uptrending, Cr downtrending, UO rapidly increasing all indicative of resolving ATN    -Follow nephro recs  -Cr minimally uptrending, Pt has been retaining without the need to urinate requiring intermittent straight caths, however, pt has been making good urine   -bladder scan q6, straight cath as needed   -Added lasix 40 PO daily as per renal     #Chronic venous stasis dermatitis   #LE wound  -wound culture from 9/5 growing Serratia marcescens, had been on abx for pna that have good cross coverage as well  -Follow wound care recs    #Necrotic Sacral Wound  -wound care consulted, recs followed and wound care instructions ordered    #Handoff: Pt status much improved, no longer on abx, no longer sob and on minimal oxygen, tapering prednisone. Renal function overall greatly improved, pt making good urine but still retaining requiring straight cath at times, added lasix 9/15 as per renal. Started working with PT.     #Misc  - DVT Prophylaxis: eliquis  - GI Prophylaxis: pantoprazole   -Diet regular   - Activity: OOB  - Code Status: full   - Dispo: Downgrade to floor, dc planning Patient is a 79 YO M w a PMH of A-fib (on home xarelto) CAD (s/p stent 2014),gout, Alcohol dependence (5-6 drinks/day), VINI (on home CPAP) admitted for acute hypoxic respiratory failure secondary to severe community acquired pneumonia. Patient was septic on admission (febrile, leukocytosis, tachycardia, hypoxic w identifiable source). Found to have HAGMA and UA + for WBC.      # Acute hypoxemic respiratory failure secondary to severe community acquired PMN complicated by Sepsis  - Stable WBC. Afebrile. Bcx No growth   - negative MRSA, legionella and COVID -  - had been on various antibiotics including ceftriaxone, levofloxacin, linezolid, all now D/c'd  - blood cultures negative  - most recent CXR 9/14 stable b/l opacities   - c/w BiPAP at night, NC by day, currently on 3L NC  - Tapering Prednisone, now 30 once daily   - PT/OT consulted  - Rehab consulted, not candidate for inpt rehab but will need rehab facility at discharge     # Chronic A Fib  - Added metoprolol 12.5mg BID (9/16)  - c/w Eliquis 2.5 BID     # HTN  - increased Hydralazine to 50 q 8  - c/w Imdur 120     # White plaque on tongue  - steroids use, open mouth, satellite lesions looks like Candidiasis   - Nystatin 5 q 6 swish and swallow started  - follow up oral lesion  - oral hygiene q 2hr     # Acute on Chronic renal failure oliguric  - patient had HD thru [facility], last session 9/4, [facility] has since been d/c'd   - asymptomatic bacteruria   - patient was alkalotic 9/5 and on Bicarb drip was later d/c'd  - BUN uptrending, Cr downtrending, UO rapidly increasing all indicative of resolving ATN    - Follow nephro recs  - Cr stable currently, Ramirez in place  - On lasix 40 PO daily as per renal     # Chronic venous stasis dermatitis   # LE wound  - wound culture from 9/5 growing Serratia marcescens, had been on abx for pna that have good cross coverage as well  - Follow wound care recs    # Necrotic Sacral Wound  - wound care consulted, recs followed and wound care instructions ordered    # Handoff: tapering prednisone 30mg. Ramirez placed back. Started working with PT.     #Misc  - DVT Prophylaxis: eliquis  - GI Prophylaxis: pantoprazole   -Diet regular   - Activity: OOB  - Code Status: full   - Dispo: Downgrade to [facility], dc planning

## 2021-09-17 ENCOUNTER — TRANSCRIPTION ENCOUNTER (OUTPATIENT)
Age: 81
End: 2021-09-17

## 2021-09-17 LAB
ALBUMIN SERPL ELPH-MCNC: 2.9 G/DL — LOW (ref 3.5–5.2)
ALP SERPL-CCNC: 144 U/L — HIGH (ref 30–115)
ALT FLD-CCNC: 53 U/L — HIGH (ref 0–41)
ANION GAP SERPL CALC-SCNC: 13 MMOL/L — SIGNIFICANT CHANGE UP (ref 7–14)
AST SERPL-CCNC: 54 U/L — HIGH (ref 0–41)
BILIRUB SERPL-MCNC: 0.9 MG/DL — SIGNIFICANT CHANGE UP (ref 0.2–1.2)
BUN SERPL-MCNC: 86 MG/DL — CRITICAL HIGH (ref 10–20)
CALCIUM SERPL-MCNC: 8 MG/DL — LOW (ref 8.5–10.1)
CHLORIDE SERPL-SCNC: 97 MMOL/L — LOW (ref 98–110)
CO2 SERPL-SCNC: 28 MMOL/L — SIGNIFICANT CHANGE UP (ref 17–32)
CREAT SERPL-MCNC: 2.5 MG/DL — HIGH (ref 0.7–1.5)
GLUCOSE BLDC GLUCOMTR-MCNC: 147 MG/DL — HIGH (ref 70–99)
GLUCOSE BLDC GLUCOMTR-MCNC: 192 MG/DL — HIGH (ref 70–99)
GLUCOSE BLDC GLUCOMTR-MCNC: 270 MG/DL — HIGH (ref 70–99)
GLUCOSE BLDC GLUCOMTR-MCNC: 327 MG/DL — HIGH (ref 70–99)
GLUCOSE BLDC GLUCOMTR-MCNC: 66 MG/DL — LOW (ref 70–99)
GLUCOSE SERPL-MCNC: 122 MG/DL — HIGH (ref 70–99)
HCT VFR BLD CALC: 31.1 % — LOW (ref 42–52)
HGB BLD-MCNC: 10.2 G/DL — LOW (ref 14–18)
MAGNESIUM SERPL-MCNC: 1.9 MG/DL — SIGNIFICANT CHANGE UP (ref 1.8–2.4)
MCHC RBC-ENTMCNC: 29.8 PG — SIGNIFICANT CHANGE UP (ref 27–31)
MCHC RBC-ENTMCNC: 32.8 G/DL — SIGNIFICANT CHANGE UP (ref 32–37)
MCV RBC AUTO: 90.9 FL — SIGNIFICANT CHANGE UP (ref 80–94)
NRBC # BLD: 0 /100 WBCS — SIGNIFICANT CHANGE UP (ref 0–0)
PLATELET # BLD AUTO: 358 K/UL — SIGNIFICANT CHANGE UP (ref 130–400)
POTASSIUM SERPL-MCNC: 4.1 MMOL/L — SIGNIFICANT CHANGE UP (ref 3.5–5)
POTASSIUM SERPL-SCNC: 4.1 MMOL/L — SIGNIFICANT CHANGE UP (ref 3.5–5)
PROT SERPL-MCNC: 5.6 G/DL — LOW (ref 6–8)
RBC # BLD: 3.42 M/UL — LOW (ref 4.7–6.1)
RBC # FLD: 16 % — HIGH (ref 11.5–14.5)
SODIUM SERPL-SCNC: 138 MMOL/L — SIGNIFICANT CHANGE UP (ref 135–146)
WBC # BLD: 6.97 K/UL — SIGNIFICANT CHANGE UP (ref 4.8–10.8)
WBC # FLD AUTO: 6.97 K/UL — SIGNIFICANT CHANGE UP (ref 4.8–10.8)

## 2021-09-17 PROCEDURE — 99232 SBSQ HOSP IP/OBS MODERATE 35: CPT

## 2021-09-17 PROCEDURE — 99233 SBSQ HOSP IP/OBS HIGH 50: CPT

## 2021-09-17 PROCEDURE — 71045 X-RAY EXAM CHEST 1 VIEW: CPT | Mod: 26

## 2021-09-17 RX ORDER — INSULIN GLARGINE 100 [IU]/ML
30 INJECTION, SOLUTION SUBCUTANEOUS AT BEDTIME
Refills: 0 | Status: DISCONTINUED | OUTPATIENT
Start: 2021-09-17 | End: 2021-09-18

## 2021-09-17 RX ORDER — HYDRALAZINE HCL 50 MG
25 TABLET ORAL THREE TIMES A DAY
Refills: 0 | Status: DISCONTINUED | OUTPATIENT
Start: 2021-09-17 | End: 2021-09-18

## 2021-09-17 RX ORDER — TAMSULOSIN HYDROCHLORIDE 0.4 MG/1
0.4 CAPSULE ORAL DAILY
Refills: 0 | Status: DISCONTINUED | OUTPATIENT
Start: 2021-09-17 | End: 2021-09-18

## 2021-09-17 RX ORDER — INSULIN GLARGINE 100 [IU]/ML
24 INJECTION, SOLUTION SUBCUTANEOUS AT BEDTIME
Refills: 0 | Status: DISCONTINUED | OUTPATIENT
Start: 2021-09-17 | End: 2021-09-17

## 2021-09-17 RX ORDER — FUROSEMIDE 40 MG
40 TABLET ORAL
Refills: 0 | Status: DISCONTINUED | OUTPATIENT
Start: 2021-09-17 | End: 2021-09-18

## 2021-09-17 RX ADMIN — AMLODIPINE BESYLATE 10 MILLIGRAM(S): 2.5 TABLET ORAL at 05:35

## 2021-09-17 RX ADMIN — CHLORHEXIDINE GLUCONATE 1 APPLICATION(S): 213 SOLUTION TOPICAL at 05:37

## 2021-09-17 RX ADMIN — Medication 500000 UNIT(S): at 12:33

## 2021-09-17 RX ADMIN — Medication 12.5 MILLIGRAM(S): at 17:57

## 2021-09-17 RX ADMIN — Medication 30 MILLIGRAM(S): at 05:35

## 2021-09-17 RX ADMIN — APIXABAN 2.5 MILLIGRAM(S): 2.5 TABLET, FILM COATED ORAL at 05:36

## 2021-09-17 RX ADMIN — Medication 10 UNIT(S): at 12:18

## 2021-09-17 RX ADMIN — Medication 10 UNIT(S): at 17:54

## 2021-09-17 RX ADMIN — PANTOPRAZOLE SODIUM 40 MILLIGRAM(S): 20 TABLET, DELAYED RELEASE ORAL at 05:36

## 2021-09-17 RX ADMIN — ATORVASTATIN CALCIUM 40 MILLIGRAM(S): 80 TABLET, FILM COATED ORAL at 21:25

## 2021-09-17 RX ADMIN — APIXABAN 2.5 MILLIGRAM(S): 2.5 TABLET, FILM COATED ORAL at 17:57

## 2021-09-17 RX ADMIN — Medication 50 MILLIGRAM(S): at 05:36

## 2021-09-17 RX ADMIN — Medication 40 MILLIGRAM(S): at 18:03

## 2021-09-17 RX ADMIN — ISOSORBIDE MONONITRATE 120 MILLIGRAM(S): 60 TABLET, EXTENDED RELEASE ORAL at 12:32

## 2021-09-17 RX ADMIN — Medication 50 MILLIGRAM(S): at 12:32

## 2021-09-17 RX ADMIN — Medication 81 MILLIGRAM(S): at 12:32

## 2021-09-17 RX ADMIN — Medication 25 MILLIGRAM(S): at 21:25

## 2021-09-17 RX ADMIN — Medication 500000 UNIT(S): at 00:07

## 2021-09-17 RX ADMIN — Medication 2: at 12:18

## 2021-09-17 RX ADMIN — TAMSULOSIN HYDROCHLORIDE 0.4 MILLIGRAM(S): 0.4 CAPSULE ORAL at 12:33

## 2021-09-17 RX ADMIN — Medication 500000 UNIT(S): at 17:57

## 2021-09-17 RX ADMIN — Medication 500000 UNIT(S): at 05:37

## 2021-09-17 RX ADMIN — Medication 6: at 18:03

## 2021-09-17 RX ADMIN — Medication 12.5 MILLIGRAM(S): at 05:36

## 2021-09-17 RX ADMIN — Medication 40 MILLIGRAM(S): at 05:36

## 2021-09-17 RX ADMIN — Medication 50 MILLIGRAM(S): at 13:35

## 2021-09-17 RX ADMIN — INSULIN GLARGINE 30 UNIT(S): 100 INJECTION, SOLUTION SUBCUTANEOUS at 21:25

## 2021-09-17 NOTE — PROGRESS NOTE ADULT - SUBJECTIVE AND OBJECTIVE BOX
MISTY BURLESON 80y Male  MRN#: [id number]   CODE STATUS:________    Hospital Day: 17d    Pt is currently admitted with the primary diagnosis of     SUBJECTIVE  Hospital Course  HPI: 80M w/ PMHx as above BIBEMS for fall. Patient says he felt dizzy priror to his admission causing him to fall on to his bed. Denies any room spinning or pre-syncope. Patient endorses that he has been feeling SOB for the past 2 days prior to admission and had been coughing for 1 week per daughter at bedside. (Of note Pt S/p Moderna Vaccine 2 doses).    ED Course: Febrile, tachycardic and hypoxic in ED. Labs revealed leukocytosis, HAGMA, elevated Trop 0.04, BNP 13k. CXR and CT chest revealed confluent airspace consolidation in the RUL and to a lesser degree the RLL consistent with pneumonia. (31 Aug 2021 22:29)    Hospital Course:    Patient admitted to CEU for acute hypoxic respiratory failure 2/2 extensive multilobar pneumonia, most confluent in right upper lobe vs CHF exacerbation with elevated BNP. Patient was treated with course of antibiotics (Rocephin and Levaquin for 10 days total, last day 9/13 and 9/11 respectively). Patient's hospital course was complicated by elevated CK, possible rhabdomyolysis and DIETER with significant rising Cr. Patient had urgent [place]on placed and underwent HD as per nephro. Patient upgraded to ICU. Patient underwent few sessions of HD. Creatinine downtrending, patient urine output rapidly increasing indicating resolving ATN (DIETER / ATN in nature d/t sepsis / possible JOSE contributing). Weston d/lopez, last HD session 9/4. Repeat chest xray showed stable bilateral opacities. Finished course of Levaquin and Ceftriaxone. Patient downgraded to Vent/SICU. Pt currently has quintana in place, producing adequate urine. Pt tolerating BIPAP well at night and on NC 3L (not on home O2). On prednisone 30mg daily. Working with PT/OT.      Overnight events     Subjective complaints     Present Today:   - Quintana:  No [  ], Yes [   ] : Indication:     - Type of IV Access:       .. CVC/Piccline:  No [  ], Yes [   ] : Indication:       .. Midline: No [  ], Yes [   ] : Indication:                                              OBJECTIVE  PAST MEDICAL & SURGICAL HISTORY  Atrial fibrillation    Coronary artery disease    Hyperlipidemia    Hypertension    Gout    Diabetes mellitus    VINI on CPAP    History of heart artery stent                                                ALLERGIES:  No Known Allergies                           HOME MEDICATIONS  Home Medications:  Actoplus Met 15 mg-500 mg oral tablet: 1 tab(s) orally 2 times a day (31 Aug 2021 22:08)  allopurinol: 150 milligram(s) orally once a day (31 Aug 2021 22:08)  amLODIPine 10 mg oral tablet: 1 tab(s) orally once a day (in the evening) (31 Aug 2021 22:08)  aspirin 81 mg oral tablet, chewable: 1 tab(s) orally once a day (31 Aug 2021 22:08)  atorvastatin 40 mg oral tablet: 1 tab(s) orally once a day (31 Aug 2021 22:08)  fosinopril 40 mg oral tablet: 1 tab(s) orally once a day (in the evening) (31 Aug 2021 22:08)  hydroCHLOROthiazide 12.5 mg oral tablet: 1 tab(s) orally once a day (31 Aug 2021 22:08)  isosorbide mononitrate 120 mg oral tablet, extended release: 1 tab(s) orally once a day (in the morning) (31 Aug 2021 22:08)  Xarelto 15 mg oral tablet: 1 tab(s) orally once a day (in the evening) (31 Aug 2021 22:08)                           MEDICATIONS:  STANDING MEDICATIONS  allopurinol 50 milliGRAM(s) Oral daily  amLODIPine   Tablet 10 milliGRAM(s) Oral daily  apixaban 2.5 milliGRAM(s) Oral two times a day  aspirin  chewable 81 milliGRAM(s) Oral daily  atorvastatin 40 milliGRAM(s) Oral at bedtime  chlorhexidine 4% Liquid 1 Application(s) Topical <User Schedule>  dextrose 40% Gel 15 Gram(s) Oral once  dextrose 5%. 1000 milliLiter(s) IV Continuous <Continuous>  dextrose 5%. 1000 milliLiter(s) IV Continuous <Continuous>  dextrose 50% Injectable 25 Gram(s) IV Push once  dextrose 50% Injectable 12.5 Gram(s) IV Push once  dextrose 50% Injectable 25 Gram(s) IV Push once  dextrose 50% Injectable 50 milliLiter(s) IV Push every 15 minutes  furosemide    Tablet 40 milliGRAM(s) Oral two times a day  glucagon  Injectable 1 milliGRAM(s) IntraMuscular once  hydrALAZINE 25 milliGRAM(s) Oral three times a day  insulin glargine Injectable (LANTUS) 30 Unit(s) SubCutaneous at bedtime  insulin lispro (ADMELOG) corrective regimen sliding scale   SubCutaneous three times a day before meals  insulin lispro Injectable (ADMELOG) 10 Unit(s) SubCutaneous three times a day before meals  isosorbide   mononitrate ER Tablet (IMDUR) 120 milliGRAM(s) Oral daily  metoprolol tartrate 12.5 milliGRAM(s) Oral two times a day  nystatin    Suspension 716596 Unit(s) Swish and Swallow every 6 hours  pantoprazole    Tablet 40 milliGRAM(s) Oral before breakfast  predniSONE   Tablet 30 milliGRAM(s) Oral daily  tamsulosin 0.4 milliGRAM(s) Oral daily    PRN MEDICATIONS  acetaminophen   Tablet .. 650 milliGRAM(s) Oral every 6 hours PRN                                            ------------------------------------------------------------  VITAL SIGNS: Last 24 Hours  T(C): 36.2 (17 Sep 2021 13:16), Max: 36.4 (16 Sep 2021 20:00)  T(F): 97.2 (17 Sep 2021 13:16), Max: 97.6 (16 Sep 2021 20:00)  HR: 79 (17 Sep 2021 13:16) (77 - 96)  BP: 120/57 (17 Sep 2021 13:16) (120/57 - 153/72)  BP(mean): 106 (16 Sep 2021 22:08) (98 - 106)  RR: 18 (17 Sep 2021 13:16) (13 - 24)  SpO2: 96% (17 Sep 2021 00:18) (95% - 98%)      09-16-21 @ 07:01  -  09-17-21 @ 07:00  --------------------------------------------------------  IN: 1500 mL / OUT: 2360 mL / NET: -860 mL    09-17-21 @ 07:01  -  09-17-21 @ 15:40  --------------------------------------------------------  IN: 0 mL / OUT: 1000 mL / NET: -1000 mL                                               LABS:                        10.2   6.97  )-----------( 358      ( 17 Sep 2021 08:58 )             31.1     09-17    138  |  97<L>  |  86<HH>  ----------------------------<  122<H>  4.1   |  28  |  2.5<H>    Ca    8.0<L>      17 Sep 2021 08:58  Mg     1.9     09-17    TPro  5.6<L>  /  Alb  2.9<L>  /  TBili  0.9  /  DBili  x   /  AST  54<H>  /  ALT  53<H>  /  AlkPhos  144<H>  09-17                                                              RADIOLOGY:        PHYSICAL EXAM:  GENERAL: NAD, lying in bed comfortably on NC  EYES: conjunctiva and sclera clear  ENT: Moist mucous membranes  NECK: Supple, No JVD  CHEST/LUNG: Clear to auscultation bilaterally; No rales, rhonchi, wheezing, or rubs. Unlabored respirations  HEART: Regular rate and rhythm; No murmurs, rubs, or gallops  ABDOMEN: BSx4; Soft, nontender, nondistended  EXTREMITIES:  1+ edema  NERVOUS SYSTEM:  A&Ox3, no focal deficits   SKIN: No rashes or lesions

## 2021-09-17 NOTE — PROGRESS NOTE ADULT - ASSESSMENT
DIETER / ATN in nature d/t sepsis / possible JOSE contributing /  -creat trending down on LAsix  # UO improving (last HD 9/4), creat stable, [facility] d/c'd  # LE edema, lung opacities worsening 9/17 - increase Lasix 40 mg po q12  RUL consolidation - PNA ? completed Levaquin  # replete K/Mg as needed- Mg level ok now   # BUN elevated, on steroids - downtrending  # BP - avoid hypotension, may decrease Hydralazine to 25 mgtid  # will follow

## 2021-09-17 NOTE — PROGRESS NOTE ADULT - ASSESSMENT
IMPRESSION:    Acute hypoxemic resp failure on NC improving  Severe CAP/ Pneumonitis  Sepsis present on admission  Acute on Chronic renal failure off HD stable  HO A fib on eliquis      PLAN:    CNS: avoid CNS depressant    HEENT:  Oral care    PULMONARY:  HOB @ 45 degrees, BIPAP during sleep, wean O2 as tolerated. keep SAo2 88 to 92%, Aspiration precaution, NC    CARDIOVASCULAR: Avoid overload. Eliquis, increase to  hydralazine to 50 q 8    GI: GI prophylaxis ,       Feeding po    RENAL:  F/u  lytes.  Correct as needed.  Accurate I/O, trend CMP.  renal f/up    INFECTIOUS DISEASE: prednisone 30 daily for 1 week, than 20 fro 1 week than 10 for 1 week than dc OP f/u    HEMATOLOGICAL:  DVT prophylaxis. Elquis    ENDOCRINE:  Follow up FS.  Insulin protocol if needed.    CODE STATUS: FULL CODE      PT/ OT    Floor

## 2021-09-17 NOTE — PROGRESS NOTE ADULT - SUBJECTIVE AND OBJECTIVE BOX
Nephrology progress note    Patient is seen and examined, events over the last 24 h noted .  Kwasi LEO, has LE edema, on NC  Allergies:  No Known Allergies    Hospital Medications:   MEDICATIONS  (STANDING):  allopurinol 50 milliGRAM(s) Oral daily  amLODIPine   Tablet 10 milliGRAM(s) Oral daily  apixaban 2.5 milliGRAM(s) Oral two times a day  aspirin  chewable 81 milliGRAM(s) Oral daily  atorvastatin 40 milliGRAM(s) Oral at bedtime  chlorhexidine 4% Liquid 1 Application(s) Topical <User Schedule>  dextrose 40% Gel 15 Gram(s) Oral once  dextrose 5%. 1000 milliLiter(s) (100 mL/Hr) IV Continuous <Continuous>  dextrose 5%. 1000 milliLiter(s) (50 mL/Hr) IV Continuous <Continuous>  dextrose 50% Injectable 25 Gram(s) IV Push once  dextrose 50% Injectable 12.5 Gram(s) IV Push once  dextrose 50% Injectable 25 Gram(s) IV Push once  dextrose 50% Injectable 50 milliLiter(s) IV Push every 15 minutes  furosemide    Tablet 40 milliGRAM(s) Oral daily  glucagon  Injectable 1 milliGRAM(s) IntraMuscular once  hydrALAZINE 50 milliGRAM(s) Oral three times a day  insulin glargine Injectable (LANTUS) 30 Unit(s) SubCutaneous at bedtime  insulin lispro (ADMELOG) corrective regimen sliding scale   SubCutaneous three times a day before meals  insulin lispro Injectable (ADMELOG) 10 Unit(s) SubCutaneous three times a day before meals  isosorbide   mononitrate ER Tablet (IMDUR) 120 milliGRAM(s) Oral daily  metoprolol tartrate 12.5 milliGRAM(s) Oral two times a day  nystatin    Suspension [id number] Unit(s) Swish and Swallow every 6 hours  pantoprazole    Tablet 40 milliGRAM(s) Oral before breakfast  predniSONE   Tablet 30 milliGRAM(s) Oral daily  tamsulosin 0.4 milliGRAM(s) Oral daily        VITALS:  T(F): 97.2 (09-17-21 @ 13:16), Max: 97.6 (09-16-21 @ 20:00)  HR: 79 (09-17-21 @ 13:16)  BP: 120/57 (09-17-21 @ 13:16)  RR: 18 (09-17-21 @ 13:16)  SpO2: 96% (09-17-21 @ 00:18)  Wt(kg): --    09-15 @ 07:01  -  09-16 @ 07:00  --------------------------------------------------------  IN: 0 mL / OUT: 1500 mL / NET: -1500 mL    09-16 @ 07:01  -  09-17 @ 07:00  --------------------------------------------------------  IN: 1500 mL / OUT: 2360 mL / NET: -860 mL    09-17 @ 07:01  -  09-17 @ 13:43  --------------------------------------------------------  IN: 0 mL / OUT: 600 mL / NET: -600 mL          PHYSICAL EXAM:  Constitutional: NAD  HEENT: anicteric sclera  Neck: No JVD  Respiratory: CTA  Cardiovascular: S1, S2, RRR  Gastrointestinal: BS+, soft, NT/ND  Extremities: 2+ peripheral edema  Neurological: A/O x 3  : has mariano.   Skin: No rashes  Vascular Access:    LABS:  09-17    138  |  97<L>  |  86<HH>  ----------------------------<  122<H>  4.1   |  28  |  2.5<H>  Creatinine Trend: 2.5<--, 2.8<--, 2.7<--, 2.8<--, 2.6<--, 3.2<--    Ca    8.0<L>      17 Sep 2021 08:58  Mg     1.9     09-17    TPro  5.6<L>  /  Alb  2.9<L>  /  TBili  0.9  /  DBili      /  AST  54<H>  /  ALT  53<H>  /  AlkPhos  144<H>  09-17                          10.2   6.97  )-----------( 358      ( 17 Sep 2021 08:58 )             31.1       Urine Studies:      RADIOLOGY & ADDITIONAL STUDIES:  < from: Xray Chest 1 View- PORTABLE-Routine (Xray Chest 1 View- PORTABLE-Routine in AM.) (09.17.21 @ 06:22) >    Increased consolidation right upper lobe.    Increased left basilar reticular opacity. No pneumothorax    < end of copied text >

## 2021-09-17 NOTE — PROGRESS NOTE ADULT - SUBJECTIVE AND OBJECTIVE BOX
Over Night Events: events noted, pn 2 L NC, afebrile    PHYSICAL EXAM    ICU Vital Signs Last 24 Hrs  T(C): 36.2 (17 Sep 2021 05:36), Max: 36.4 (16 Sep 2021 20:00)  T(F): 97.2 (17 Sep 2021 05:36), Max: 97.6 (16 Sep 2021 20:00)  HR: 77 (17 Sep 2021 05:36) (77 - 96)  BP: 134/58 (17 Sep 2021 05:36) (133/76 - 161/72)  BP(mean): 106 (16 Sep 2021 22:08) (98 - 106)  RR: 18 (17 Sep 2021 05:36) (13 - 28)  SpO2: 96% (17 Sep 2021 00:18) (95% - 98%)       General: ill looking  HEENT: CLARIBEL             Lymph Nodes: No cervical LN   Lungs: dec bs r side  Cardiovascular: JUAN 2/6  Abdomen: Soft, Positive BS  Extremities: No clubbing   Skin: Warm  Neurological: Non focal       09-16-21 @ 07:01  -  09-17-21 @ 07:00  --------------------------------------------------------  IN:    Oral Fluid: 600 mL  Total IN: 600 mL    OUT:    Ureteral Catheter (mL): 2260 mL    Voided (mL): 100 mL  Total OUT: 2360 mL    Total NET: -1760 mL          LABS:                          9.5    5.90  )-----------( 378      ( 16 Sep 2021 05:01 )             28.6                                               09-16    138  |  97<L>  |  92<HH>  ----------------------------<  81  3.7   |  29  |  2.8<H>    Ca    7.6<L>      16 Sep 2021 05:01  Mg     2.0     09-16    TPro  5.2<L>  /  Alb  2.7<L>  /  TBili  0.9  /  DBili  x   /  AST  57<H>  /  ALT  49<H>  /  AlkPhos  148<H>  09-16                                                                                           LIVER FUNCTIONS - ( 16 Sep 2021 05:01 )  Alb: 2.7 g/dL / Pro: 5.2 g/dL / ALK PHOS: 148 U/L / ALT: 49 U/L / AST: 57 U/L / GGT: x                                                                                                                                       MEDICATIONS  (STANDING):  allopurinol 50 milliGRAM(s) Oral daily  amLODIPine   Tablet 10 milliGRAM(s) Oral daily  apixaban 2.5 milliGRAM(s) Oral two times a day  aspirin  chewable 81 milliGRAM(s) Oral daily  atorvastatin 40 milliGRAM(s) Oral at bedtime  chlorhexidine 4% Liquid 1 Application(s) Topical <User Schedule>  dextrose 40% Gel 15 Gram(s) Oral once  dextrose 5%. 1000 milliLiter(s) (50 mL/Hr) IV Continuous <Continuous>  dextrose 5%. 1000 milliLiter(s) (100 mL/Hr) IV Continuous <Continuous>  dextrose 50% Injectable 50 milliLiter(s) IV Push every 15 minutes  dextrose 50% Injectable 25 Gram(s) IV Push once  dextrose 50% Injectable 12.5 Gram(s) IV Push once  dextrose 50% Injectable 25 Gram(s) IV Push once  furosemide    Tablet 40 milliGRAM(s) Oral daily  glucagon  Injectable 1 milliGRAM(s) IntraMuscular once  hydrALAZINE 50 milliGRAM(s) Oral three times a day  insulin glargine Injectable (LANTUS) 36 Unit(s) SubCutaneous at bedtime  insulin lispro (ADMELOG) corrective regimen sliding scale   SubCutaneous three times a day before meals  insulin lispro Injectable (ADMELOG) 10 Unit(s) SubCutaneous three times a day before meals  isosorbide   mononitrate ER Tablet (IMDUR) 120 milliGRAM(s) Oral daily  metoprolol tartrate 12.5 milliGRAM(s) Oral two times a day  nystatin    Suspension [id number] Unit(s) Swish and Swallow every 6 hours  pantoprazole    Tablet 40 milliGRAM(s) Oral before breakfast  predniSONE   Tablet 30 milliGRAM(s) Oral daily    MEDICATIONS  (PRN):  acetaminophen   Tablet .. 650 milliGRAM(s) Oral every 6 hours PRN Temp greater or equal to 38C (100.4F), Mild Pain (1 - 3)

## 2021-09-17 NOTE — PROGRESS NOTE ADULT - REASON FOR ADMISSION
Community acquired pneumonia
Community acquired pneumonia
Pneumonia
sob
Community acquired pneumonia
Fall
Fall
Pneumonia
SOB
Fall
sob

## 2021-09-17 NOTE — PROGRESS NOTE ADULT - ASSESSMENT
ASSESSMENT & PLAN    Patient is a 81 YO M w a PMH of A-fib (on home xarelto) CAD (s/p stent 2014),gout, Alcohol dependence (5-6 drinks/day), VINI (on home CPAP) admitted for acute hypoxic respiratory failure secondary to severe community acquired pneumonia. Patient was septic on admission (febrile, leukocytosis, tachycardia, hypoxic w identifiable source). Found to have HAGMA and UA + for WBC.      # Acute hypoxemic respiratory failure secondary to severe community acquired PMN complicated by Sepsis  - Stable WBC. Afebrile. Bcx No growth   - negative MRSA, legionella and COVID -  - had been on various antibiotics including ceftriaxone, levofloxacin, linezolid, all now D/c'd  - Last took Rocephin and Levaquin for 10 days total, last day 9/13 and 9/11 respectively  - blood cultures negative  - most recent CXR 9/14 stable b/l opacities   - c/w BiPAP at night, NC by day, currently on 2L NC  - Tapering Prednisone, now 30 once daily   - prednisone 30 daily for 1 week (started 9/14), than 20 fro 1 week than 10 for 1 week then dc OP f/u with Pulm  - PT/OT consulted  - Rehab consulted, not candidate for inpt rehab but will need rehab facility at discharge   - Will likely be discharged home  - Measure O2 @ rest and ambulation and NC requirements on each    # Chronic A Fib  - Added metoprolol 12.5mg BID (9/16)  - c/w Eliquis 2.5 BID     # HTN  - decreased Hydralazine back to 25 q 8  - c/w Imdur 120     # White plaque on tongue  - steroids use, open mouth, satellite lesions looks like Candidiasis   - Nystatin 5 q 6 swish and swallow started  - follow up oral lesion  - oral hygiene q 2hr     # Acute on Chronic renal failure oliguric  - patient had HD thru [facility], last session 9/4, [facility] has since been d/c'd   - patient was alkalotic 9/5 and on Bicarb drip was later d/c'd  - BUN uptrending, Cr downtrending, UO rapidly increasing all indicative of resolving ATN    - Follow nephro recs  - Cr improving currently, Quintana in place to be removed today for trial of void  - start flomax  - Increased lasix to 40 PO bid as per renal     # Chronic venous stasis dermatitis   # LE wound  - wound culture from 9/5 growing Serratia marcescens, had been on abx for pna that have good cross coverage as well  - Follow wound care recs    # Necrotic Sacral Wound  - wound care consulted, recs followed and wound care instructions ordered    # Handoff: tapering prednisone 30mg. Quintana taken out today after flomax. Started working with PT.     #Misc  - DVT Prophylaxis: eliquis  - GI Prophylaxis: pantoprazole   -Diet regular   - Activity: OOB  - Code Status: full   - Dispo: Downgrade to floor, dc planning                                                                              ----------------------------------------------------  # DVT prophylaxis apixaban    # GI prophylaxis pantoprazole    # Diet consistent carb    # Activity Score (AM-PAC)    # Code status     # Disposition                                                                              --------------------------------------------------------    # Handoff   quintana removal trial

## 2021-09-17 NOTE — DISCHARGE NOTE NURSING/CASE MANAGEMENT/SOCIAL WORK - NSSCNAMETXT_GEN_ALL_CORE
[facility] home  [facility] home [facility]  [As Noted in HPI] : as noted in HPI [Negative] : Heme/Lymph

## 2021-09-17 NOTE — DISCHARGE NOTE NURSING/CASE MANAGEMENT/SOCIAL WORK - PATIENT PORTAL LINK FT
You can access the FollowMyHealth Patient Portal offered by [facility] by registering at the following website: [email or web address]. By joining [facility]’s FollowMyHealth portal, you will also be able to view your health information using other applications (apps) compatible with our system.

## 2021-09-18 ENCOUNTER — TRANSCRIPTION ENCOUNTER (OUTPATIENT)
Age: 81
End: 2021-09-18

## 2021-09-18 VITALS
SYSTOLIC BLOOD PRESSURE: 139 MMHG | DIASTOLIC BLOOD PRESSURE: 64 MMHG | RESPIRATION RATE: 18 BRPM | TEMPERATURE: 97 F | HEART RATE: 72 BPM

## 2021-09-18 LAB
ALBUMIN SERPL ELPH-MCNC: 2.8 G/DL — LOW (ref 3.5–5.2)
ALP SERPL-CCNC: 184 U/L — HIGH (ref 30–115)
ALT FLD-CCNC: 66 U/L — HIGH (ref 0–41)
ANION GAP SERPL CALC-SCNC: 14 MMOL/L — SIGNIFICANT CHANGE UP (ref 7–14)
AST SERPL-CCNC: 74 U/L — HIGH (ref 0–41)
BASOPHILS # BLD AUTO: 0.01 K/UL — SIGNIFICANT CHANGE UP (ref 0–0.2)
BASOPHILS NFR BLD AUTO: 0.2 % — SIGNIFICANT CHANGE UP (ref 0–1)
BILIRUB SERPL-MCNC: 0.7 MG/DL — SIGNIFICANT CHANGE UP (ref 0.2–1.2)
BUN SERPL-MCNC: 84 MG/DL — CRITICAL HIGH (ref 10–20)
CALCIUM SERPL-MCNC: 7.9 MG/DL — LOW (ref 8.5–10.1)
CHLORIDE SERPL-SCNC: 100 MMOL/L — SIGNIFICANT CHANGE UP (ref 98–110)
CO2 SERPL-SCNC: 26 MMOL/L — SIGNIFICANT CHANGE UP (ref 17–32)
CREAT SERPL-MCNC: 2.4 MG/DL — HIGH (ref 0.7–1.5)
EOSINOPHIL # BLD AUTO: 0.2 K/UL — SIGNIFICANT CHANGE UP (ref 0–0.7)
EOSINOPHIL NFR BLD AUTO: 3.7 % — SIGNIFICANT CHANGE UP (ref 0–8)
GLUCOSE BLDC GLUCOMTR-MCNC: 272 MG/DL — HIGH (ref 70–99)
GLUCOSE BLDC GLUCOMTR-MCNC: 94 MG/DL — SIGNIFICANT CHANGE UP (ref 70–99)
GLUCOSE SERPL-MCNC: 95 MG/DL — SIGNIFICANT CHANGE UP (ref 70–99)
HCT VFR BLD CALC: 27.2 % — LOW (ref 42–52)
HGB BLD-MCNC: 9 G/DL — LOW (ref 14–18)
IMM GRANULOCYTES NFR BLD AUTO: 0.6 % — HIGH (ref 0.1–0.3)
LYMPHOCYTES # BLD AUTO: 1.1 K/UL — LOW (ref 1.2–3.4)
LYMPHOCYTES # BLD AUTO: 20.3 % — LOW (ref 20.5–51.1)
MAGNESIUM SERPL-MCNC: 1.8 MG/DL — SIGNIFICANT CHANGE UP (ref 1.8–2.4)
MCHC RBC-ENTMCNC: 29.3 PG — SIGNIFICANT CHANGE UP (ref 27–31)
MCHC RBC-ENTMCNC: 33.1 G/DL — SIGNIFICANT CHANGE UP (ref 32–37)
MCV RBC AUTO: 88.6 FL — SIGNIFICANT CHANGE UP (ref 80–94)
MONOCYTES # BLD AUTO: 0.69 K/UL — HIGH (ref 0.1–0.6)
MONOCYTES NFR BLD AUTO: 12.7 % — HIGH (ref 1.7–9.3)
NEUTROPHILS # BLD AUTO: 3.4 K/UL — SIGNIFICANT CHANGE UP (ref 1.4–6.5)
NEUTROPHILS NFR BLD AUTO: 62.5 % — SIGNIFICANT CHANGE UP (ref 42.2–75.2)
NRBC # BLD: 0 /100 WBCS — SIGNIFICANT CHANGE UP (ref 0–0)
PHOSPHATE SERPL-MCNC: 4.4 MG/DL — SIGNIFICANT CHANGE UP (ref 2.1–4.9)
PLATELET # BLD AUTO: 324 K/UL — SIGNIFICANT CHANGE UP (ref 130–400)
POTASSIUM SERPL-MCNC: 3.5 MMOL/L — SIGNIFICANT CHANGE UP (ref 3.5–5)
POTASSIUM SERPL-SCNC: 3.5 MMOL/L — SIGNIFICANT CHANGE UP (ref 3.5–5)
PROT SERPL-MCNC: 5.3 G/DL — LOW (ref 6–8)
RBC # BLD: 3.07 M/UL — LOW (ref 4.7–6.1)
RBC # FLD: 16 % — HIGH (ref 11.5–14.5)
SODIUM SERPL-SCNC: 140 MMOL/L — SIGNIFICANT CHANGE UP (ref 135–146)
WBC # BLD: 5.43 K/UL — SIGNIFICANT CHANGE UP (ref 4.8–10.8)
WBC # FLD AUTO: 5.43 K/UL — SIGNIFICANT CHANGE UP (ref 4.8–10.8)

## 2021-09-18 PROCEDURE — 99233 SBSQ HOSP IP/OBS HIGH 50: CPT

## 2021-09-18 RX ORDER — ATORVASTATIN CALCIUM 80 MG/1
1 TABLET, FILM COATED ORAL
Qty: 0 | Refills: 0 | DISCHARGE

## 2021-09-18 RX ORDER — HYDRALAZINE HCL 50 MG
1 TABLET ORAL
Qty: 0 | Refills: 0 | DISCHARGE
Start: 2021-09-18 | End: 2021-10-17

## 2021-09-18 RX ORDER — AMLODIPINE BESYLATE 2.5 MG/1
1 TABLET ORAL
Qty: 0 | Refills: 0 | DISCHARGE
Start: 2021-09-18

## 2021-09-18 RX ORDER — ASPIRIN/CALCIUM CARB/MAGNESIUM 324 MG
1 TABLET ORAL
Qty: 0 | Refills: 0 | DISCHARGE
Start: 2021-09-18

## 2021-09-18 RX ORDER — APIXABAN 2.5 MG/1
1 TABLET, FILM COATED ORAL
Qty: 60 | Refills: 0
Start: 2021-09-18 | End: 2021-10-17

## 2021-09-18 RX ORDER — FOSINOPRIL SODIUM 10 MG/1
1 TABLET ORAL
Qty: 0 | Refills: 0 | DISCHARGE

## 2021-09-18 RX ORDER — METOPROLOL TARTRATE 50 MG
0.5 TABLET ORAL
Qty: 30 | Refills: 0
Start: 2021-09-18 | End: 2021-10-17

## 2021-09-18 RX ORDER — FUROSEMIDE 40 MG
1 TABLET ORAL
Qty: 60 | Refills: 0
Start: 2021-09-18 | End: 2021-10-17

## 2021-09-18 RX ORDER — TAMSULOSIN HYDROCHLORIDE 0.4 MG/1
1 CAPSULE ORAL
Qty: 30 | Refills: 0
Start: 2021-09-18 | End: 2021-10-17

## 2021-09-18 RX ORDER — ISOSORBIDE MONONITRATE 60 MG/1
1 TABLET, EXTENDED RELEASE ORAL
Qty: 0 | Refills: 0 | DISCHARGE

## 2021-09-18 RX ORDER — HYDRALAZINE HCL 50 MG
1 TABLET ORAL
Qty: 90 | Refills: 0
Start: 2021-09-18 | End: 2021-10-17

## 2021-09-18 RX ORDER — METOPROLOL TARTRATE 50 MG
12.5 TABLET ORAL
Qty: 60 | Refills: 0
Start: 2021-09-18 | End: 2021-10-17

## 2021-09-18 RX ORDER — ATORVASTATIN CALCIUM 80 MG/1
1 TABLET, FILM COATED ORAL
Qty: 0 | Refills: 0 | DISCHARGE
Start: 2021-09-18

## 2021-09-18 RX ORDER — ASPIRIN/CALCIUM CARB/MAGNESIUM 324 MG
1 TABLET ORAL
Qty: 0 | Refills: 0 | DISCHARGE

## 2021-09-18 RX ORDER — AMLODIPINE BESYLATE 2.5 MG/1
1 TABLET ORAL
Qty: 0 | Refills: 0 | DISCHARGE

## 2021-09-18 RX ORDER — ISOSORBIDE MONONITRATE 60 MG/1
1 TABLET, EXTENDED RELEASE ORAL
Qty: 0 | Refills: 0 | DISCHARGE
Start: 2021-09-18

## 2021-09-18 RX ADMIN — ISOSORBIDE MONONITRATE 120 MILLIGRAM(S): 60 TABLET, EXTENDED RELEASE ORAL at 11:40

## 2021-09-18 RX ADMIN — Medication 25 MILLIGRAM(S): at 13:13

## 2021-09-18 RX ADMIN — Medication 10 UNIT(S): at 12:18

## 2021-09-18 RX ADMIN — Medication 500000 UNIT(S): at 11:40

## 2021-09-18 RX ADMIN — APIXABAN 2.5 MILLIGRAM(S): 2.5 TABLET, FILM COATED ORAL at 06:43

## 2021-09-18 RX ADMIN — CHLORHEXIDINE GLUCONATE 1 APPLICATION(S): 213 SOLUTION TOPICAL at 06:43

## 2021-09-18 RX ADMIN — Medication 25 MILLIGRAM(S): at 06:44

## 2021-09-18 RX ADMIN — AMLODIPINE BESYLATE 10 MILLIGRAM(S): 2.5 TABLET ORAL at 06:43

## 2021-09-18 RX ADMIN — Medication 12.5 MILLIGRAM(S): at 06:42

## 2021-09-18 RX ADMIN — Medication 81 MILLIGRAM(S): at 11:40

## 2021-09-18 RX ADMIN — Medication 40 MILLIGRAM(S): at 06:42

## 2021-09-18 RX ADMIN — Medication 50 MILLIGRAM(S): at 12:44

## 2021-09-18 RX ADMIN — Medication 6: at 12:18

## 2021-09-18 RX ADMIN — TAMSULOSIN HYDROCHLORIDE 0.4 MILLIGRAM(S): 0.4 CAPSULE ORAL at 11:43

## 2021-09-18 RX ADMIN — Medication 500000 UNIT(S): at 06:43

## 2021-09-18 RX ADMIN — PANTOPRAZOLE SODIUM 40 MILLIGRAM(S): 20 TABLET, DELAYED RELEASE ORAL at 06:44

## 2021-09-18 RX ADMIN — Medication 30 MILLIGRAM(S): at 06:43

## 2021-09-18 NOTE — DISCHARGE NOTE PROVIDER - NSCORESITESY/N_GEN_A_CORE_RD
----- Message from AKOSUA Story MD sent at 7/3/2018  5:13 PM CDT -----  Regarding: DORINDA Scruggs,    Please PA for sternal keloid excision and steroid injection. Plan to do this either in clinic or ASC.    Let me know once you hear back and I will place orders.    So Hereida   No

## 2021-09-18 NOTE — PROGRESS NOTE ADULT - SUBJECTIVE AND OBJECTIVE BOX
Nephrology progress note    THIS IS AN INCOMPLETE NOTE . FULL NOTE TO FOLLOW SHORTLY    Patient is seen and examined, events over the last 24 h noted .    Allergies:  No Known Allergies    Hospital Medications:   MEDICATIONS  (STANDING):  allopurinol 50 milliGRAM(s) Oral daily  amLODIPine   Tablet 10 milliGRAM(s) Oral daily  apixaban 2.5 milliGRAM(s) Oral two times a day  aspirin  chewable 81 milliGRAM(s) Oral daily  atorvastatin 40 milliGRAM(s) Oral at bedtime  chlorhexidine 4% Liquid 1 Application(s) Topical <User Schedule>  dextrose 40% Gel 15 Gram(s) Oral once  dextrose 5%. 1000 milliLiter(s) (50 mL/Hr) IV Continuous <Continuous>  dextrose 5%. 1000 milliLiter(s) (100 mL/Hr) IV Continuous <Continuous>  dextrose 50% Injectable 50 milliLiter(s) IV Push every 15 minutes  dextrose 50% Injectable 25 Gram(s) IV Push once  dextrose 50% Injectable 12.5 Gram(s) IV Push once  dextrose 50% Injectable 25 Gram(s) IV Push once  furosemide    Tablet 40 milliGRAM(s) Oral two times a day  glucagon  Injectable 1 milliGRAM(s) IntraMuscular once  hydrALAZINE 25 milliGRAM(s) Oral three times a day  insulin glargine Injectable (LANTUS) 30 Unit(s) SubCutaneous at bedtime  insulin lispro (ADMELOG) corrective regimen sliding scale   SubCutaneous three times a day before meals  insulin lispro Injectable (ADMELOG) 10 Unit(s) SubCutaneous three times a day before meals  isosorbide   mononitrate ER Tablet (IMDUR) 120 milliGRAM(s) Oral daily  metoprolol tartrate 12.5 milliGRAM(s) Oral two times a day  nystatin    Suspension [id number] Unit(s) Swish and Swallow every 6 hours  pantoprazole    Tablet 40 milliGRAM(s) Oral before breakfast  predniSONE   Tablet 30 milliGRAM(s) Oral daily  tamsulosin 0.4 milliGRAM(s) Oral daily        VITALS:  T(F): 96.8 (09-18-21 @ 05:05), Max: 97.2 (09-17-21 @ 13:16)  HR: 74 (09-18-21 @ 05:05)  BP: 140/65 (09-18-21 @ 05:05)  RR: 18 (09-18-21 @ 05:05)  SpO2: 99% (09-18-21 @ 05:05)  Wt(kg): --    09-16 @ 07:01  -  09-17 @ 07:00  --------------------------------------------------------  IN: 1500 mL / OUT: 2360 mL / NET: -860 mL    09-17 @ 07:01  -  09-18 @ 07:00  --------------------------------------------------------  IN: 0 mL / OUT: 1500 mL / NET: -1500 mL          PHYSICAL EXAM:  Constitutional: NAD  HEENT: anicteric sclera, oropharynx clear, MMM  Neck: No JVD  Respiratory: CTAB, no wheezes, rales or rhonchi  Cardiovascular: S1, S2, RRR  Gastrointestinal: BS+, soft, NT/ND  Extremities: No cyanosis or clubbing. No peripheral edema  :  No quintana.   Skin: No rashes    LABS:  09-17    138  |  97<L>  |  86<HH>  ----------------------------<  122<H>  4.1   |  28  |  2.5<H>    Ca    8.0<L>      17 Sep 2021 08:58  Mg     1.9     09-17    TPro  5.6<L>  /  Alb  2.9<L>  /  TBili  0.9  /  DBili      /  AST  54<H>  /  ALT  53<H>  /  AlkPhos  144<H>  09-17                          9.0    5.43  )-----------( 324      ( 18 Sep 2021 05:58 )             27.2       Urine Studies:      RADIOLOGY & ADDITIONAL STUDIES:   Nephrology progress note    Patient is seen and examined, events over the last 24 h noted .  lying in bed comfortable   no events     Allergies:  No Known Allergies    Hospital Medications:   MEDICATIONS  (STANDING):  allopurinol 50 milliGRAM(s) Oral daily  amLODIPine   Tablet 10 milliGRAM(s) Oral daily  apixaban 2.5 milliGRAM(s) Oral two times a day  aspirin  chewable 81 milliGRAM(s) Oral daily  atorvastatin 40 milliGRAM(s) Oral at bedtime  furosemide    Tablet 40 milliGRAM(s) Oral two times a day  glucagon  Injectable 1 milliGRAM(s) IntraMuscular once  hydrALAZINE 25 milliGRAM(s) Oral three times a day  insulin glargine Injectable (LANTUS) 30 Unit(s) SubCutaneous at bedtime  insulin lispro (ADMELOG) corrective regimen sliding scale   SubCutaneous three times a day before meals  insulin lispro Injectable (ADMELOG) 10 Unit(s) SubCutaneous three times a day before meals  isosorbide   mononitrate ER Tablet (IMDUR) 120 milliGRAM(s) Oral daily  metoprolol tartrate 12.5 milliGRAM(s) Oral two times a day  nystatin    Suspension [id number] Unit(s) Swish and Swallow every 6 hours  pantoprazole    Tablet 40 milliGRAM(s) Oral before breakfast  predniSONE   Tablet 30 milliGRAM(s) Oral daily  tamsulosin 0.4 milliGRAM(s) Oral daily        VITALS:  T(F): 96.8 (09-18-21 @ 05:05), Max: 97.2 (09-17-21 @ 13:16)  HR: 74 (09-18-21 @ 05:05)  BP: 140/65 (09-18-21 @ 05:05)  RR: 18 (09-18-21 @ 05:05)  SpO2: 99% (09-18-21 @ 05:05)      09-16 @ 07:01  -  09-17 @ 07:00  --------------------------------------------------------  IN: 1500 mL / OUT: 2360 mL / NET: -860 mL    09-17 @ 07:01  -  09-18 @ 07:00  --------------------------------------------------------  IN: 0 mL / OUT: 1500 mL / NET: -1500 mL          PHYSICAL EXAM:  Constitutional: NAD  Neck: No JVD  Respiratory: CTAB,   Cardiovascular: S1, S2, RRR  Gastrointestinal: BS+, soft, NT/ND  Extremities: No cyanosis or clubbing. plus one edema left > right   :  No quintana.   Skin: No rashes    LABS:  09-17    138  |  97<L>  |  86<HH>  ----------------------------<  122<H>  4.1   |  28  |  2.5<H>    Ca    8.0<L>      17 Sep 2021 08:58  Mg     1.9     09-17    TPro  5.6<L>  /  Alb  2.9<L>  /  TBili  0.9  /  DBili      /  AST  54<H>  /  ALT  53<H>  /  AlkPhos  144<H>  09-17                          9.0    5.43  )-----------( 324      ( 18 Sep 2021 05:58 )             27.2       Urine Studies:      RADIOLOGY & ADDITIONAL STUDIES:

## 2021-09-18 NOTE — DISCHARGE NOTE PROVIDER - NSDCMRMEDTOKEN_GEN_ALL_CORE_FT
Actoplus Met 15 mg-500 mg oral tablet: 1 tab(s) orally 2 times a day  allopurinol: 150 milligram(s) orally once a day  amLODIPine 10 mg oral tablet: 1 tab(s) orally once a day  aspirin 81 mg oral tablet, chewable: 1 tab(s) orally once a day  atorvastatin 40 mg oral tablet: 1 tab(s) orally once a day (at bedtime)  isosorbide mononitrate 120 mg oral tablet, extended release: 1 tab(s) orally once a day  Xarelto 15 mg oral tablet: 1 tab(s) orally once a day (in the evening)   Actoplus Met 15 mg-500 mg oral tablet: 1 tab(s) orally 2 times a day  allopurinol: 150 milligram(s) orally once a day  amLODIPine 10 mg oral tablet: 1 tab(s) orally once a day  aspirin 81 mg oral tablet, chewable: 1 tab(s) orally once a day  atorvastatin 40 mg oral tablet: 1 tab(s) orally once a day (at bedtime)  furosemide 40 mg oral tablet: 1 tab(s) orally 2 times a day  hydrALAZINE 25 mg oral tablet: 1 tab(s) orally 3 times a day  isosorbide mononitrate 120 mg oral tablet, extended release: 1 tab(s) orally once a day  metoprolol tartrate 25 mg oral tablet: 12.5 milligram(s) orally 2 times a day   predniSONE 10 mg oral tablet: 3 tab(s) orally once a day x 2 days (until 9/20/2021 inclusive)  2 tab(s) orally once a day x 7 days  1 tab(s) orally once a day x 7 days  tamsulosin 0.4 mg oral capsule: 1 cap(s) orally once a day  Xarelto 15 mg oral tablet: 1 tab(s) orally once a day (in the evening)   Actoplus Met 15 mg-500 mg oral tablet: 1 tab(s) orally 2 times a day  allopurinol: 150 milligram(s) orally once a day  amLODIPine 10 mg oral tablet: 1 tab(s) orally once a day  aspirin 81 mg oral tablet, chewable: 1 tab(s) orally once a day  atorvastatin 40 mg oral tablet: 1 tab(s) orally once a day (at bedtime)  furosemide 40 mg oral tablet: 1 tab(s) orally 2 times a day  hydrALAZINE 25 mg oral tablet: 1 tab(s) orally 3 times a day  isosorbide mononitrate 120 mg oral tablet, extended release: 1 tab(s) orally once a day  metoprolol tartrate 25 mg oral tablet: 0.5 tab(s) orally 2 times a day   predniSONE 10 mg oral tablet: 3 tab(s) orally once a day x 2 days (until 9/20/2021 inclusive)  2 tab(s) orally once a day x 7 days  1 tab(s) orally once a day x 7 days  tamsulosin 0.4 mg oral capsule: 1 cap(s) orally once a day  Xarelto 15 mg oral tablet: 1 tab(s) orally once a day (in the evening)

## 2021-09-18 NOTE — DISCHARGE NOTE PROVIDER - PROVIDER TOKENS
PROVIDER:[TOKEN:[[id number]]],PROVIDER:[TOKEN:[[id number]]],PROVIDER:[TOKEN:[[id number]]]

## 2021-09-18 NOTE — PROGRESS NOTE ADULT - ASSESSMENT
DIETER / ATN in nature d/t sepsis / possible JOSE contributing /  -creat trending down on LAsix  # UO improving (last HD 9/4), creat stable, betsy d/c'd  # LE edema, lung opacities worsening 9/17 - continue lasix current   RUL consolidation - PNA ? completed Levaquin  # replete K/Mg as needed- Mg level ok now   # BUN elevated, on steroids - downtrending  # BP - avoid hypotension, keep current meds   # will follow

## 2021-09-18 NOTE — PROGRESS NOTE ADULT - ASSESSMENT
Patient is a 79 YO M w a PMH of A-fib (on home xarelto) CAD (s/p stent 2014),gout, Alcohol dependence (5-6 drinks/day), VINI (on home CPAP) admitted for acute hypoxic respiratory failure secondary to severe community acquired pneumonia. Patient was septic on admission (febrile, leukocytosis, tachycardia, hypoxic w identifiable source). Found to have HAGMA and UA + for WBC.      # Acute hypoxemic respiratory failure secondary to severe community acquired PNA   complicated by Sepsis  - Stable WBC. Afebrile. Bcx No growth   - negative MRSA, legionella and COVID -  - had been on various antibiotics including ceftriaxone, levofloxacin, linezolid, all now D/c'd  - Last took Rocephin and Levaquin for 10 days total, last day 9/13 and 9/11 respectively  - blood cultures negative  - most recent CXR 9/14 stable b/l opacities   - c/w BiPAP at night, sPO2 94% on RA while ambulating.   No need for home O2  - Tapering Prednisone, now 30 once daily   - prednisone 30 daily for 1 week (started 9/14), than 20 fro 1 week than 10 for 1 week then dc OP f/u with Pulm    # Chronic A Fib  - Added metoprolol 12.5mg BID (9/16)  - c/w Eliquis 2.5 BID     # HTN  - decreased Hydralazine back to 25 q 8  - c/w Imdur 120     # White plaque on tongue  - steroids use, open mouth, satellite lesions looks like Candidiasis   - Nystatin 5 q 6 swish and swallow started  - follow up oral lesion  - oral hygiene q 2hr     # Acute on Chronic renal failure oliguric  - patient had HD thru [place], last session 9/4, udall has since been d/c'd   - patient was alkalotic 9/5 and on Bicarb drip was later d/c'd  - BUN uptrending, Cr downtrending, UO rapidly increasing all indicative of resolving ATN    - Follow nephro recs  - Cr improving currently, Passed TOV  - ON  flomax  - Increased lasix to 40 PO bid as per renal     # Chronic venous stasis dermatitis   # LE wound  - wound culture from 9/5 growing Serratia marcescens, had been on abx for pna that have good cross coverage as well  - Follow wound care recs    # Necrotic Sacral Wound  - wound care consulted, recs followed and wound care instructions ordered    #Misc  - DVT Prophylaxis: eliquis  - GI Prophylaxis: pantoprazole   -Diet regular   - Activity: OOB  - Code Status: full   - Dispo: Downgrade to [facility] [place] planning                                                                          ----------------------------------------------------  # DVT prophylaxis apixaban    # GI prophylaxis pantoprazole    # Diet consistent carb         D/C planning today.                                                                        --------------------------------------------------------

## 2021-09-18 NOTE — PROGRESS NOTE ADULT - SUBJECTIVE AND OBJECTIVE BOX
MISTY BURLESON  80y  Male      Patient is a 80y old  Male who presents with a chief complaint of sob.      INTERVAL HPI/OVERNIGHT EVENTS:      ******************************* REVIEW OF SYSTEMS:**********************************************    All other review of systems negative    *********************** VITALS ******************************************    T(F): 96 (09-18-21 @ 13:11)  HR: 80 (09-18-21 @ 13:11) (74 - 84)  BP: 124/62 (09-18-21 @ 13:11) (124/62 - 140/65)  RR: 18 (09-18-21 @ 05:05) (18 - 18)  SpO2: 98% (09-18-21 @ 07:50) (98% - 99%)    09-17-21 @ 07:01  -  09-18-21 @ 07:00  --------------------------------------------------------  IN: 0 mL / OUT: 1500 mL / NET: -1500 mL    09-18-21 @ 07:01  -  09-18-21 @ 13:18  --------------------------------------------------------  IN: 0 mL / OUT: 340 mL / NET: -340 mL            09-17-21 @ 07:01  -  09-18-21 @ 07:00  --------------------------------------------------------  IN: 0 mL / OUT: 1500 mL / NET: -1500 mL    09-18-21 @ 07:01  -  09-18-21 @ 13:18  --------------------------------------------------------  IN: 0 mL / OUT: 340 mL / NET: -340 mL        ******************************** PHYSICAL EXAM:**************************************************  GENERAL: NAD    PSYCH: no agitation, baseline mentation  HEENT:     NERVOUS SYSTEM:  Alert & Oriented X3,     PULMONARY: GEMA, CTA    CARDIOVASCULAR: S1S2 RRR    GI: Soft, NT, ND; BS present.    EXTREMITIES: LE edema     LYMPH: No lymphadenopathy noted    SKIN: No rashes or lesions      **************************** LABS *******************************************************                          9.0    5.43  )-----------( 324      ( 18 Sep 2021 05:58 )             27.2     09-18    140  |  100  |  84<HH>  ----------------------------<  95  3.5   |  26  |  2.4<H>    Ca    7.9<L>      18 Sep 2021 05:58  Phos  4.4     09-18  Mg     1.8     09-18    TPro  5.3<L>  /  Alb  2.8<L>  /  TBili  0.7  /  DBili  x   /  AST  74<H>  /  ALT  66<H>  /  AlkPhos  184<H>  09-18          Lactate Trend        CAPILLARY BLOOD GLUCOSE      POCT Blood Glucose.: 272 mg/dL (18 Sep 2021 11:57)          **************************Active Medications *******************************************  No Known Allergies      acetaminophen   Tablet .. 650 milliGRAM(s) Oral every 6 hours PRN  allopurinol 50 milliGRAM(s) Oral daily  amLODIPine   Tablet 10 milliGRAM(s) Oral daily  apixaban 2.5 milliGRAM(s) Oral two times a day  aspirin  chewable 81 milliGRAM(s) Oral daily  atorvastatin 40 milliGRAM(s) Oral at bedtime  chlorhexidine 4% Liquid 1 Application(s) Topical <User Schedule>  dextrose 40% Gel 15 Gram(s) Oral once  dextrose 5%. 1000 milliLiter(s) IV Continuous <Continuous>  dextrose 5%. 1000 milliLiter(s) IV Continuous <Continuous>  dextrose 50% Injectable 25 Gram(s) IV Push once  dextrose 50% Injectable 12.5 Gram(s) IV Push once  dextrose 50% Injectable 25 Gram(s) IV Push once  dextrose 50% Injectable 50 milliLiter(s) IV Push every 15 minutes  furosemide    Tablet 40 milliGRAM(s) Oral two times a day  glucagon  Injectable 1 milliGRAM(s) IntraMuscular once  hydrALAZINE 25 milliGRAM(s) Oral three times a day  insulin glargine Injectable (LANTUS) 30 Unit(s) SubCutaneous at bedtime  insulin lispro (ADMELOG) corrective regimen sliding scale   SubCutaneous three times a day before meals  insulin lispro Injectable (ADMELOG) 10 Unit(s) SubCutaneous three times a day before meals  isosorbide   mononitrate ER Tablet (IMDUR) 120 milliGRAM(s) Oral daily  metoprolol tartrate 12.5 milliGRAM(s) Oral two times a day  nystatin    Suspension [id number] Unit(s) Swish and Swallow every 6 hours  pantoprazole    Tablet 40 milliGRAM(s) Oral before breakfast  predniSONE   Tablet 30 milliGRAM(s) Oral daily  tamsulosin 0.4 milliGRAM(s) Oral daily      ***************************************************  RADIOLOGY & ADDITIONAL TESTS:    Imaging Personally Reviewed:  [ ] YES  [ ] NO    HEALTH ISSUES - PROBLEM Dx:

## 2021-09-18 NOTE — DISCHARGE NOTE PROVIDER - CARE PROVIDER_API CALL
Onofre Bennett)  Critical Care Medicine; Internal Medicine; Pulmonary Disease; Sleep Medicine  [address] [place] [address]  Phone: [phone]  Fax: [phone]  Follow Up Time:     Brad Rose)  Internal Medicine; Nephrology  [address] [place] [address]  Phone: [phone]  Fax: [phone]  Follow Up Time:     Murphy Erazo  CARDIOVASCULAR DISEASE  [address], [address]  [place] [address]  Phone: [phone]  Fax: [phone]  Follow Up Time:

## 2021-09-18 NOTE — DISCHARGE NOTE PROVIDER - CARE PROVIDERS DIRECT ADDRESSES
,[email or web address]

## 2021-09-18 NOTE — DISCHARGE NOTE PROVIDER - NSDCCPCAREPLAN_GEN_ALL_CORE_FT
PRINCIPAL DISCHARGE DIAGNOSIS  Diagnosis: Pneumonia  Assessment and Plan of Treatment: Pneumonia is an infection in your lungs caused by bacteria, viruses, fungi, or parasites. You can become infected if you come in contact with someone who is sick. You can get pneumonia if you recently had surgery or needed a ventilator to help you breathe. Pneumonia can also be caused by accidentally inhaling saliva or small pieces of food. Pneumonia may cause mild symptoms, or it can be severe and life-threatening.  DISCHARGE INSTRUCTIONS:  Seek care immediately if:  You cough up blood, have a fast heartbeat, increased fatigue, confusion, chest pain, shortness of breath, lips turn blue, your symptioms get worse 48 hours after strating antibiotics, Fever above 100.4, decreased appetite, nausea vomiting.  Antibiotics treat pneumonia caused by bacteria.  Acetaminophen decreases pain and fever.  Acetaminophen can cause liver damage if not taken correctly. Do not use more than 4 grams (4,000 milligrams) total of acetaminophen in one day.  NSAIDS decrease inflammation. Ask a doctor before taking.   Manage your symptoms:  Rest as needed. Rest often throughout the day. Alternate times of activity with times of rest.  Drink liquids as directed.   Prevent pneumonia:  Prevent the spread of germs. Wash your hands often with soap and water. Use gel hand cleanser when there is no soap and water available.   Limit alcohol. Women should limit alcohol to 1 drink a day. Men should limit alcohol to 2 drinks a day. A drink of alcohol is 12 ounces of beer, 5 ounces of wine, or 1½ ounces of liquor.  Ask about vaccines. You may need a vaccine to help prevent pneumonia. Get an influenza (flu) vaccine every year as soon as it becomes available.      SECONDARY DISCHARGE DIAGNOSES  Diagnosis: Sepsis  Assessment and Plan of Treatment: Sepsis is a serious condition that occurs when the body overreacts to an infection. It is also called systemic inflammatory response syndrome (SIRS) with infection. An infection is usually caused by bacteria that attack the body. The body's defense system normally fights off infection within the affected body part. With sepsis, the body overreacts and causes symptoms to occur throughout the body. This leads to uncontrolled and widespread inflammation and clotting in small blood vessels. Blood flow to different body parts decreases and may lead to organ failure. Sepsis requires immediate treatment.  AFTER YOU LEAVE:  Follow up with your healthcare provider as directed:  Write down your questions so you remember to ask them during your visits.  Prevent infection:  The following are ways that you can help prevent infection, which can lead to sepsis:  Ask about vaccines: Vaccines can decrease your risk of getting certain infections, such as the flu or pneumonia. Ask your healthcare provider if you should get a flu or pneumonia vaccine, and when to get the vaccine.  Avoid the spread of germs:  Wash your hands often with soap and water. Carry germ-killing gel with you. You can use the gel to clean your hands when there is no soap and water available.  Do not touch your eyes, nose, or mouth unless you have washed your hands first.  Always cover your mouth when you cough. Cough into a tissue or your shirtsleeve so you do not spread germs from your hands.  Try to avoid people who have a cold or the flu. If you are sick, stay away from others as much as possible.  Contact your healthcare provider if:  You have a fever.  You have questions or concerns about your condition or care.  Seek care immediately or call 911 if:  You have increased swelling in your legs, feet, or abdomen.  You are short of breath or you cough up blood.  You have a fast heart rate and your chest hurts.  You feel so dizzy that you have trouble standing up.  Your lips or fingernails are blue.    Diagnosis: DIETER (acute kidney injury)  Assessment and Plan of Treatment: During your hospital stay you were found to have an Acute Kidney Injury. The exact cause of this injury is unknown but it is likely that you were septic, causing lack of blood flow to your kidneys and causing somekidney injury. The Blood levels have improved.  Follow up with your PMD regarding this issue and if there is any further assessment needed to follow up and or medication adjustments needed.  Continue with the lasix as directed for now.  SEEK IMMEDIATE MEDICAL CARE IF YOU HAVE ANY OF THE FOLLOWING SYMPTOMS: chest pain, shortness of breath, abdominal pain, sweating, vomiting, blood in vomit/bowel movements/urine, dizziness/lightheadedness, weakness or numbness to face/arm/leg, trouble speaking or understanding, facial droop.

## 2021-09-18 NOTE — DISCHARGE NOTE PROVIDER - HOSPITAL COURSE
HPI: 80M w/ PMHx as above BIBEMS for fall. Patient says he felt dizzy priror to his admission causing him to fall on to his bed. Denies any room spinning or pre-syncope. Patient endorses that he had been feeling SOB for the past 2 days prior to admission and had been coughing for 1 week per daughter at bedside. (Of note Pt S/p Moderna Vaccine 2 doses).    ED Course: Febrile, tachycardic and hypoxic in ED. Labs revealed leukocytosis, HAGMA, elevated Trop 0.04, BNP 13k. CXR and CT chest revealed confluent airspace consolidation in the RUL and to a lesser degree the RLL consistent with pneumonia. (31 Aug 2021 22:29)    Hospital Course:    Patient admitted to CEU for acute hypoxic respiratory failure 2/2 extensive multilobar pneumonia, most confluent in right upper lobe vs CHF exacerbation with elevated BNP. Patient was treated with course of antibiotics (Rocephin and Levaquin for 10 days total, last day 9/13 and 9/11 respectively). Patient's hospital course was complicated by elevated CK, possible rhabdomyolysis and DIETER with significant rising Cr. Patient had urgent Orma placed and underwent HD as per nephro. Patient upgraded to ICU. Patient underwent few sessions of HD. Creatinine downtrending, patient urine output rapidly increasing indicating resolving ATN (DIETER / ATN in nature d/t sepsis / possible JOSE contributing). Orma d/lopez, last HD session 9/4. Repeat chest xray showed stable bilateral opacities. Finished course of Levaquin and Ceftriaxone (10 days total). Patient downgraded to Vent/SICU. Pt had quintana in place, producing adequate urine, passed voiding trial post quintana removal. Pt tolerating BIPAP well at night and on NC 3L (not on home O2). On prednisone 30mg daily, to be tapered as outpatient.    Patient did not need O2 on ambulation (SpO2 93%)

## 2021-09-18 NOTE — PROGRESS NOTE ADULT - PROVIDER SPECIALTY LIST ADULT
Critical Care
Internal Medicine
Nephrology
Cardiology
Critical Care
Internal Medicine
Nephrology
Pulmonology
Pulmonology
Critical Care
Hospitalist
Hospitalist
Internal Medicine
Internal Medicine
Nephrology
Pulmonology
Critical Care
Critical Care
Internal Medicine
Internal Medicine
Pulmonology

## 2021-09-19 LAB
FOLATE SERPL-MCNC: 9.7 NG/ML — SIGNIFICANT CHANGE UP
VIT B12 SERPL-MCNC: >2000 PG/ML — HIGH (ref 232–1245)

## 2021-09-22 DIAGNOSIS — E87.0 HYPEROSMOLALITY AND HYPERNATREMIA: ICD-10-CM

## 2021-09-22 DIAGNOSIS — J18.9 PNEUMONIA, UNSPECIFIED ORGANISM: ICD-10-CM

## 2021-09-22 DIAGNOSIS — I48.20 CHRONIC ATRIAL FIBRILLATION, UNSPECIFIED: ICD-10-CM

## 2021-09-22 DIAGNOSIS — E78.5 HYPERLIPIDEMIA, UNSPECIFIED: ICD-10-CM

## 2021-09-22 DIAGNOSIS — Z79.4 LONG TERM (CURRENT) USE OF INSULIN: ICD-10-CM

## 2021-09-22 DIAGNOSIS — Z95.5 PRESENCE OF CORONARY ANGIOPLASTY IMPLANT AND GRAFT: ICD-10-CM

## 2021-09-22 DIAGNOSIS — I12.9 HYPERTENSIVE CHRONIC KIDNEY DISEASE WITH STAGE 1 THROUGH STAGE 4 CHRONIC KIDNEY DISEASE, OR UNSPECIFIED CHRONIC KIDNEY DISEASE: ICD-10-CM

## 2021-09-22 DIAGNOSIS — I87.2 VENOUS INSUFFICIENCY (CHRONIC) (PERIPHERAL): ICD-10-CM

## 2021-09-22 DIAGNOSIS — M10.9 GOUT, UNSPECIFIED: ICD-10-CM

## 2021-09-22 DIAGNOSIS — N18.4 CHRONIC KIDNEY DISEASE, STAGE 4 (SEVERE): ICD-10-CM

## 2021-09-22 DIAGNOSIS — G47.33 OBSTRUCTIVE SLEEP APNEA (ADULT) (PEDIATRIC): ICD-10-CM

## 2021-09-22 DIAGNOSIS — Z20.822 CONTACT WITH AND (SUSPECTED) EXPOSURE TO COVID-19: ICD-10-CM

## 2021-09-22 DIAGNOSIS — M62.82 RHABDOMYOLYSIS: ICD-10-CM

## 2021-09-22 DIAGNOSIS — Z79.01 LONG TERM (CURRENT) USE OF ANTICOAGULANTS: ICD-10-CM

## 2021-09-22 DIAGNOSIS — E66.9 OBESITY, UNSPECIFIED: ICD-10-CM

## 2021-09-22 DIAGNOSIS — E11.22 TYPE 2 DIABETES MELLITUS WITH DIABETIC CHRONIC KIDNEY DISEASE: ICD-10-CM

## 2021-09-22 DIAGNOSIS — N17.0 ACUTE KIDNEY FAILURE WITH TUBULAR NECROSIS: ICD-10-CM

## 2021-09-22 DIAGNOSIS — I21.A1 MYOCARDIAL INFARCTION TYPE 2: ICD-10-CM

## 2021-09-22 DIAGNOSIS — A41.9 SEPSIS, UNSPECIFIED ORGANISM: ICD-10-CM

## 2021-09-22 DIAGNOSIS — I25.10 ATHEROSCLEROTIC HEART DISEASE OF NATIVE CORONARY ARTERY WITHOUT ANGINA PECTORIS: ICD-10-CM

## 2021-10-07 ENCOUNTER — APPOINTMENT (OUTPATIENT)
Dept: BURN CARE | Facility: CLINIC | Age: 81
End: 2021-10-07
Payer: MEDICARE

## 2021-10-07 ENCOUNTER — OUTPATIENT (OUTPATIENT)
Dept: OUTPATIENT SERVICES | Facility: HOSPITAL | Age: 81
LOS: 1 days | Discharge: HOME | End: 2021-10-07

## 2021-10-07 DIAGNOSIS — Z95.5 PRESENCE OF CORONARY ANGIOPLASTY IMPLANT AND GRAFT: Chronic | ICD-10-CM

## 2021-10-07 PROCEDURE — 99213 OFFICE O/P EST LOW 20 MIN: CPT

## 2021-10-13 DIAGNOSIS — S81.809A UNSPECIFIED OPEN WOUND, UNSPECIFIED LOWER LEG, INITIAL ENCOUNTER: ICD-10-CM
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
## 2021-10-13 DIAGNOSIS — T14.90XA INJURY, UNSPECIFIED, INITIAL ENCOUNTER: ICD-10-CM

## 2021-10-13 DIAGNOSIS — S81.802A UNSPECIFIED OPEN WOUND, LEFT LOWER LEG, INITIAL ENCOUNTER: ICD-10-CM

## 2021-10-20 ENCOUNTER — APPOINTMENT (OUTPATIENT)
Age: 81
End: 2021-10-20
Payer: MEDICARE

## 2021-10-20 VITALS
DIASTOLIC BLOOD PRESSURE: 60 MMHG | HEIGHT: 71 IN | SYSTOLIC BLOOD PRESSURE: 120 MMHG | WEIGHT: 240 LBS | HEART RATE: 71 BPM | OXYGEN SATURATION: 97 % | BODY MASS INDEX: 33.6 KG/M2 | RESPIRATION RATE: 12 BRPM

## 2021-10-20 PROCEDURE — 99214 OFFICE O/P EST MOD 30 MIN: CPT | Mod: 25

## 2021-10-20 PROCEDURE — 71046 X-RAY EXAM CHEST 2 VIEWS: CPT

## 2021-10-20 NOTE — ASSESSMENT
[FreeTextEntry1] : SP recent therapy for severe CAP vs inflammatory pneumonitis, improved \par HO VINI SP Significant weight loss

## 2021-10-20 NOTE — REASON FOR VISIT
[Follow-Up - From Hospitalization] : a follow-up visit after a recent hospitalization [Abnormal CXR/ Chest CT] : an abnormal CXR/ chest CT [Sleep Apnea] : sleep apnea [Pneumonia] : pneumonia

## 2021-10-20 NOTE — HISTORY OF PRESENT ILLNESS
[Follow-Up - Routine Clinic] : a routine clinic follow-up of [None] : No associated symptoms are reported [CPAP] : CPAP [Good Compliance] : good compliance with treatment [Poor Tolerance] : poor tolerance of treatment [Fair Symptom Control] : fair symptom control [Poor Symptom Control] : poor symptom control [de-identified] : CPAP

## 2021-10-20 NOTE — PROCEDURE
[FreeTextEntry1] : CXR PA and Lateral \par The costophrenic and cardiophrenic angles are sharp\par The faheem parenchyma shows no infiltrates, consolidations, or nodules \par The Mediastinum is within normal limits\par No pleural effusions\par

## 2021-10-28 ENCOUNTER — OUTPATIENT (OUTPATIENT)
Dept: OUTPATIENT SERVICES | Facility: HOSPITAL | Age: 81
LOS: 1 days | Discharge: HOME | End: 2021-10-28

## 2021-10-28 ENCOUNTER — APPOINTMENT (OUTPATIENT)
Dept: BURN CARE | Facility: CLINIC | Age: 81
End: 2021-10-28
Payer: MEDICARE

## 2021-10-28 DIAGNOSIS — Z95.5 PRESENCE OF CORONARY ANGIOPLASTY IMPLANT AND GRAFT: Chronic | ICD-10-CM

## 2021-10-28 PROCEDURE — 99213 OFFICE O/P EST LOW 20 MIN: CPT

## 2021-11-06 ENCOUNTER — TRANSCRIPTION ENCOUNTER (OUTPATIENT)
Age: 81
End: 2021-11-06

## 2021-11-08 ENCOUNTER — LABORATORY RESULT (OUTPATIENT)
Age: 81
End: 2021-11-08

## 2021-11-08 ENCOUNTER — OUTPATIENT (OUTPATIENT)
Dept: OUTPATIENT SERVICES | Facility: HOSPITAL | Age: 81
LOS: 1 days | Discharge: HOME | End: 2021-11-08

## 2021-11-08 DIAGNOSIS — Z95.5 PRESENCE OF CORONARY ANGIOPLASTY IMPLANT AND GRAFT: Chronic | ICD-10-CM

## 2021-11-08 DIAGNOSIS — Z11.59 ENCOUNTER FOR SCREENING FOR OTHER VIRAL DISEASES: ICD-10-CM

## 2021-11-11 ENCOUNTER — OUTPATIENT (OUTPATIENT)
Dept: OUTPATIENT SERVICES | Facility: HOSPITAL | Age: 81
LOS: 1 days | Discharge: HOME | End: 2021-11-11
Payer: MEDICARE

## 2021-11-11 DIAGNOSIS — Z95.5 PRESENCE OF CORONARY ANGIOPLASTY IMPLANT AND GRAFT: Chronic | ICD-10-CM

## 2021-11-11 PROCEDURE — 95811 POLYSOM 6/>YRS CPAP 4/> PARM: CPT | Mod: 26

## 2021-11-12 DIAGNOSIS — G47.33 OBSTRUCTIVE SLEEP APNEA (ADULT) (PEDIATRIC): ICD-10-CM

## 2021-12-02 ENCOUNTER — APPOINTMENT (OUTPATIENT)
Dept: BURN CARE | Facility: CLINIC | Age: 81
End: 2021-12-02
Payer: MEDICARE

## 2021-12-02 ENCOUNTER — OUTPATIENT (OUTPATIENT)
Dept: OUTPATIENT SERVICES | Facility: HOSPITAL | Age: 81
LOS: 1 days | Discharge: HOME | End: 2021-12-02

## 2021-12-02 DIAGNOSIS — Z95.5 PRESENCE OF CORONARY ANGIOPLASTY IMPLANT AND GRAFT: Chronic | ICD-10-CM

## 2021-12-02 PROCEDURE — 99213 OFFICE O/P EST LOW 20 MIN: CPT

## 2021-12-20 ENCOUNTER — APPOINTMENT (OUTPATIENT)
Age: 81
End: 2021-12-20
Payer: MEDICARE

## 2021-12-20 VITALS
BODY MASS INDEX: 33.05 KG/M2 | OXYGEN SATURATION: 97 % | WEIGHT: 244 LBS | SYSTOLIC BLOOD PRESSURE: 140 MMHG | HEIGHT: 72 IN | HEART RATE: 67 BPM | DIASTOLIC BLOOD PRESSURE: 80 MMHG

## 2021-12-20 DIAGNOSIS — J18.9 PNEUMONIA, UNSPECIFIED ORGANISM: ICD-10-CM

## 2021-12-20 DIAGNOSIS — R91.8 OTHER NONSPECIFIC ABNORMAL FINDING OF LUNG FIELD: ICD-10-CM

## 2021-12-20 DIAGNOSIS — G47.33 OBSTRUCTIVE SLEEP APNEA (ADULT) (PEDIATRIC): ICD-10-CM

## 2021-12-20 PROCEDURE — 71046 X-RAY EXAM CHEST 2 VIEWS: CPT

## 2021-12-20 PROCEDURE — 99214 OFFICE O/P EST MOD 30 MIN: CPT | Mod: 25

## 2021-12-22 NOTE — HISTORY OF PRESENT ILLNESS
[Follow-Up - Routine Clinic] : a routine clinic follow-up of [None] : No associated symptoms are reported [CPAP] : CPAP [Good Compliance] : good compliance with treatment [Poor Tolerance] : poor tolerance of treatment [Fair Symptom Control] : fair symptom control [Poor Symptom Control] : poor symptom control [Excessive Daytime Sleepiness] : excessive daytime sleepiness [Snoring] : snoring [Unrefreshing Sleep] : unrefreshing sleep [Currently Experiencing] : The patient is currently experiencing symptoms. [de-identified] : CPAP

## 2021-12-22 NOTE — ASSESSMENT
[FreeTextEntry1] : SP recent therapy for severe CAP vs inflammatory pneumonitis, improved \par HO VINI SP Significant weight loss \par Repeat NPSG with Mild VINI optimum CPAP 7 cm H2O.  Awaiting CPAP delivery

## 2021-12-30 ENCOUNTER — OUTPATIENT (OUTPATIENT)
Dept: OUTPATIENT SERVICES | Facility: HOSPITAL | Age: 81
LOS: 1 days | Discharge: HOME | End: 2021-12-30

## 2021-12-30 ENCOUNTER — APPOINTMENT (OUTPATIENT)
Dept: BURN CARE | Facility: CLINIC | Age: 81
End: 2021-12-30
Payer: MEDICARE

## 2021-12-30 DIAGNOSIS — Z95.5 PRESENCE OF CORONARY ANGIOPLASTY IMPLANT AND GRAFT: Chronic | ICD-10-CM

## 2021-12-30 PROCEDURE — 99213 OFFICE O/P EST LOW 20 MIN: CPT

## 2021-12-31 NOTE — REASON FOR VISIT
[Revisit] : revisit [Were you seen in the Emergency Room?] : seen in the emergency room [Were you admitted to the burn center at [facility]?] : not admitted to the burn center at [facility]

## 2021-12-31 NOTE — PHYSICAL EXAM
[Healing] : healing [Size%: ______] : Size: [unfilled]% [Infected?] : Infected: No [3] : 3 out of 10 [Normal] : normal [Small] : small  [] : no [de-identified] : traumatic hematoma right lower leg-resolved \par  \par left leg wound - pink healing granulation with thick yellow exudate  \par   [TWNoteComboBox1] : xeroform

## 2021-12-31 NOTE — ASSESSMENT
[FreeTextEntry1] : traumatic hematoma right lower leg--resolved\par \par \par Mechanical debridement of exudate performed -wit forceps  to pink base . pt horacio well \par \par  continue local wound care  [Wound Care] : wound care

## 2021-12-31 NOTE — HISTORY OF PRESENT ILLNESS
[Did you have an operation on your burn/wound injury?] : Did you have an operation on your burn/wound injury? No [Did this injury occur on the job?] : Did this injury occur on the job? No [de-identified] : traumatic wound and hematoma right leg --> blood thinners [de-identified] : traumatic wound and hematoma left leg healing .Continuing local wound care . No concerns

## 2022-02-10 ENCOUNTER — OUTPATIENT (OUTPATIENT)
Dept: OUTPATIENT SERVICES | Facility: HOSPITAL | Age: 82
LOS: 1 days | Discharge: HOME | End: 2022-02-10

## 2022-02-10 ENCOUNTER — APPOINTMENT (OUTPATIENT)
Dept: BURN CARE | Facility: CLINIC | Age: 82
End: 2022-02-10
Payer: MEDICARE

## 2022-02-10 DIAGNOSIS — Z95.5 PRESENCE OF CORONARY ANGIOPLASTY IMPLANT AND GRAFT: Chronic | ICD-10-CM

## 2022-02-10 PROCEDURE — 99213 OFFICE O/P EST LOW 20 MIN: CPT

## 2022-02-21 NOTE — ED ADULT NURSE NOTE - CCCP TRG CHIEF CMPLNT
Large Joint Aspiration/Injection: R subacromial bursa    Date/Time: 2/8/2022 9:15 AM  Performed by: GEREMIAS Espinal MD  Authorized by: GEREMIAS Espinal MD     Consent Done?:  Yes (Verbal)  Indications:  Pain  Site marked: the procedure site was marked    Timeout: prior to procedure the correct patient, procedure, and site was verified    Prep: patient was prepped and draped in usual sterile fashion      Local anesthesia used?: Yes    Local anesthetic:  Co-phenylcaine spray (0.2% Naropin)  Anesthetic total (ml):  4      Details:  Needle Size:  22 G  Ultrasonic Guidance for needle placement?: No    Approach:  Posterior  Location:  Shoulder  Site:  R subacromial bursa  Medications:  40 mg triamcinolone acetonide 40 mg/mL  Patient tolerance:  Patient tolerated the procedure well with no immediate complications  Tendon Sheath    Date/Time: 2/8/2022 9:15 AM  Performed by: GEREMIAS Espinal MD  Authorized by: GEREMIAS Espinal MD     Consent Done?:  Yes (Verbal)  Indications:  Pain  Site marked: the procedure site was marked    Timeout: prior to procedure the correct patient, procedure, and site was verified    Prep: patient was prepped and draped in usual sterile fashion      Anesthesia:  Local infiltration  Local anesthetic: 0.2% Naropin.  Anesthetic total (ml):  4    Location:  Shoulder  Site:  R bicep tendon  Ultrasonic guidance for needle placement?: Yes    Needle size:  22 G  Medications:  40 mg triamcinolone acetonide 40 mg/mL    Additional Comments: The ultrasound was used to gain visualization over the proximal biceps groove.  A representative picture was taken and saved showing the biceps groove and anatomy along with the needle introduced into the biceps sheath for the steroid injection.        
fall

## 2022-03-15 NOTE — PROGRESS NOTE ADULT - ASSESSMENT
DIETER / ATN in nature d/t sepsis / possible JOSE contributing / oligo-anuric / creat up-trending / no improvement w/ iv hydration/ PNA / acute respiratory failure   # UO improving   # hold HD today , last treatment was on saturday   # if BP remains elevated , increase amlodipine to 10   # check ph level   #correct k to 4   # remains on rocephine, and levaquin   # will follow  no lesions,  no deformities

## 2022-03-24 ENCOUNTER — OUTPATIENT (OUTPATIENT)
Dept: OUTPATIENT SERVICES | Facility: HOSPITAL | Age: 82
LOS: 1 days | Discharge: HOME | End: 2022-03-24

## 2022-03-24 ENCOUNTER — APPOINTMENT (OUTPATIENT)
Dept: BURN CARE | Facility: CLINIC | Age: 82
End: 2022-03-24
Payer: MEDICARE

## 2022-03-24 DIAGNOSIS — S81.802A UNSPECIFIED OPEN WOUND, LEFT LOWER LEG, INITIAL ENCOUNTER: ICD-10-CM

## 2022-03-24 DIAGNOSIS — Z95.5 PRESENCE OF CORONARY ANGIOPLASTY IMPLANT AND GRAFT: Chronic | ICD-10-CM

## 2022-03-24 DIAGNOSIS — R60.0 LOCALIZED EDEMA: ICD-10-CM

## 2022-03-24 PROCEDURE — 99212 OFFICE O/P EST SF 10 MIN: CPT

## 2022-03-24 NOTE — ASSESSMENT
[FreeTextEntry1] : traumatic hematoma right lower leg--> healed\par \par left lower leg wound --> healing chronic wound \par chronic edema \par new surrounding superficial maceration and weeping \par \par Advised to avoid tape to area \par Discussed compression \par Advised pt consider Vascular surgery eval  for compression sock recommendations \par Also advised that dressing be changed if wet to avoid maceration \par Pt states his friend can apply compression wrap  [Wound Care] : wound care

## 2022-03-24 NOTE — HISTORY OF PRESENT ILLNESS
[Did you have an operation on your burn/wound injury?] : Did you have an operation on your burn/wound injury? No [Did this injury occur on the job?] : Did this injury occur on the job? No [de-identified] : traumatic wound and hematoma right leg --> blood thinners [de-identified] : traumatic wound and hematoma left leg healing \par has had weeping `  4 weeks , note skin redness\par not using compression

## 2022-03-24 NOTE — REASON FOR VISIT
[Revisit] : revisit [Were you seen in the Emergency Room?] : seen in the emergency room [Were you admitted to the burn center at [facility]?] : not admitted to the burn center at [facility]

## 2022-03-24 NOTE — PHYSICAL EXAM
[Healing] : healing [Size%: ______] : Size: [unfilled]% [Infected?] : Infected: No [3] : 3 out of 10 [Abnormal] : abnormal [Large] : medium [] : no [de-identified] : traumatic hematoma right lower leg--> healed\par BLE swelling L> R \par left lower leg wound pink open wound - scattered abrasions and superficial macerated weeping open skin ~ 3 x 4 cm with erythema  no cellulitis  [TWNoteComboBox1] : xeroform

## 2022-03-24 NOTE — REASON FOR VISIT
[Revisit] : revisit [Were you seen in the Emergency Room?] : seen in the emergency room [Were you admitted to the burn center at [facility]?] : not admitted to the burn center at [facility]

## 2022-03-24 NOTE — PHYSICAL EXAM
[Healing] : healing [Size%: ______] : Size: [unfilled]% [Infected?] : Infected: No [3] : 3 out of 10 [Abnormal] : abnormal [Large] : medium [] : no [de-identified] : traumatic hematoma right lower leg--> healed\par BLE swelling L> R \par left lower leg wound pink open wound - scattered abrasions and superficial macerated weeping open skin ~ 3 x 4 cm with erythema  no cellulitis  [TWNoteComboBox1] : xeroform

## 2022-03-24 NOTE — HISTORY OF PRESENT ILLNESS
[Did you have an operation on your burn/wound injury?] : Did you have an operation on your burn/wound injury? No [Did this injury occur on the job?] : Did this injury occur on the job? No [de-identified] : traumatic wound and hematoma right leg --> blood thinners [de-identified] : traumatic wound and hematoma left leg healing \par has had weeping `  4 weeks , note skin redness\par not using compression

## 2022-04-21 ENCOUNTER — APPOINTMENT (OUTPATIENT)
Dept: BURN CARE | Facility: CLINIC | Age: 82
End: 2022-04-21
Payer: MEDICARE

## 2022-04-21 ENCOUNTER — OUTPATIENT (OUTPATIENT)
Dept: OUTPATIENT SERVICES | Facility: HOSPITAL | Age: 82
LOS: 1 days | Discharge: HOME | End: 2022-04-21

## 2022-04-21 DIAGNOSIS — Z95.5 PRESENCE OF CORONARY ANGIOPLASTY IMPLANT AND GRAFT: Chronic | ICD-10-CM

## 2022-04-21 PROCEDURE — 99212 OFFICE O/P EST SF 10 MIN: CPT

## 2022-04-27 NOTE — PRE-OP CHECKLIST - HEIGHT IN FEET
normal appearance , without tenderness upon palpation , no deformities , trachea midline , Thyroid normal size , no thyroid nodules , no masses , no JVD , thyroid nontender 5

## 2022-04-28 ENCOUNTER — OUTPATIENT (OUTPATIENT)
Dept: OUTPATIENT SERVICES | Facility: HOSPITAL | Age: 82
LOS: 1 days | Discharge: HOME | End: 2022-04-28
Payer: MEDICARE

## 2022-04-28 ENCOUNTER — RESULT REVIEW (OUTPATIENT)
Age: 82
End: 2022-04-28

## 2022-04-28 ENCOUNTER — TRANSCRIPTION ENCOUNTER (OUTPATIENT)
Age: 82
End: 2022-04-28

## 2022-04-28 VITALS
SYSTOLIC BLOOD PRESSURE: 136 MMHG | HEIGHT: 72 IN | HEART RATE: 67 BPM | TEMPERATURE: 97 F | WEIGHT: 240.08 LBS | RESPIRATION RATE: 18 BRPM | DIASTOLIC BLOOD PRESSURE: 65 MMHG

## 2022-04-28 VITALS
HEART RATE: 52 BPM | SYSTOLIC BLOOD PRESSURE: 147 MMHG | OXYGEN SATURATION: 100 % | RESPIRATION RATE: 12 BRPM | DIASTOLIC BLOOD PRESSURE: 66 MMHG | TEMPERATURE: 97 F

## 2022-04-28 DIAGNOSIS — Z95.5 PRESENCE OF CORONARY ANGIOPLASTY IMPLANT AND GRAFT: Chronic | ICD-10-CM

## 2022-04-28 PROCEDURE — 88305 TISSUE EXAM BY PATHOLOGIST: CPT | Mod: 26

## 2022-04-28 NOTE — CHART NOTE - NSCHARTNOTEFT_GEN_A_CORE
PACU ANESTHESIA ADMISSION NOTE      Procedure: colonoscopy, EMR  Post op diagnosis:  anemia/GIB      __x__  Patent Airway    ____  Full return of protective reflexes    ____  Full recovery from anesthesia / back to baseline     Vitals:  see anesthesia record      Mental Status:  __x__ Awake   _____ Alert   _____ Drowsy   _____ Sedated    Nausea/Vomiting:  _x___ NO  ______Yes,   See Post - Op Orders          Pain Scale (0-10):  _____    Treatment: ____ None    ___x_ See Post - Op/PCA Orders    Post - Operative Fluids:   ____ Oral   __x__ See Post - Op Orders    Plan: Discharge:   _x___Home       _____Floor     _____Critical Care    _____  Other:_________________    Comments:  Uneventful intraoperative course. No anesthesia issues or complications noted. Patient stable upon arrival to PACU. Report given to RN. Discharge when criteria met.

## 2022-04-28 NOTE — ASU DISCHARGE PLAN (ADULT/PEDIATRIC) - NS MD DC FALL RISK RISK
For information on Fall & Injury Prevention, visit: [email or web address] OR  [email or web address] OR  https://www.cdc.gov/steadi/patient.html

## 2022-04-28 NOTE — ASU PATIENT PROFILE, ADULT - FALL HARM RISK - UNIVERSAL INTERVENTIONS
Bed in lowest position, wheels locked, appropriate side rails in place/Call bell, personal items and telephone in reach/Instruct patient to call for assistance before getting out of bed or chair/Non-slip footwear when patient is out of bed/[place] to call system/Physically safe environment - no spills, clutter or unnecessary equipment/Purposeful Proactive Rounding/Room/bathroom lighting operational, light cord in reach

## 2022-04-29 LAB — SURGICAL PATHOLOGY STUDY: SIGNIFICANT CHANGE UP

## 2022-05-02 ENCOUNTER — APPOINTMENT (OUTPATIENT)
Age: 82
End: 2022-05-02

## 2022-05-02 DIAGNOSIS — I10 ESSENTIAL (PRIMARY) HYPERTENSION: ICD-10-CM

## 2022-05-02 DIAGNOSIS — G47.33 OBSTRUCTIVE SLEEP APNEA (ADULT) (PEDIATRIC): ICD-10-CM

## 2022-05-02 DIAGNOSIS — D64.9 ANEMIA, UNSPECIFIED: ICD-10-CM

## 2022-05-02 DIAGNOSIS — M10.9 GOUT, UNSPECIFIED: ICD-10-CM

## 2022-05-02 DIAGNOSIS — D12.2 BENIGN NEOPLASM OF ASCENDING COLON: ICD-10-CM

## 2022-05-02 DIAGNOSIS — I25.10 ATHEROSCLEROTIC HEART DISEASE OF NATIVE CORONARY ARTERY WITHOUT ANGINA PECTORIS: ICD-10-CM

## 2022-05-02 DIAGNOSIS — I48.91 UNSPECIFIED ATRIAL FIBRILLATION: ICD-10-CM

## 2022-05-02 DIAGNOSIS — D12.3 BENIGN NEOPLASM OF TRANSVERSE COLON: ICD-10-CM

## 2022-05-02 DIAGNOSIS — K57.30 DIVERTICULOSIS OF LARGE INTESTINE WITHOUT PERFORATION OR ABSCESS WITHOUT BLEEDING: ICD-10-CM

## 2022-05-02 DIAGNOSIS — E11.9 TYPE 2 DIABETES MELLITUS WITHOUT COMPLICATIONS: ICD-10-CM

## 2022-05-02 DIAGNOSIS — K64.8 OTHER HEMORRHOIDS: ICD-10-CM

## 2022-05-02 DIAGNOSIS — Z79.82 LONG TERM (CURRENT) USE OF ASPIRIN: ICD-10-CM

## 2022-05-02 DIAGNOSIS — E78.5 HYPERLIPIDEMIA, UNSPECIFIED: ICD-10-CM

## 2022-05-02 DIAGNOSIS — Z79.01 LONG TERM (CURRENT) USE OF ANTICOAGULANTS: ICD-10-CM

## 2022-05-02 DIAGNOSIS — Z99.89 DEPENDENCE ON OTHER ENABLING MACHINES AND DEVICES: ICD-10-CM

## 2022-05-02 DIAGNOSIS — Z95.5 PRESENCE OF CORONARY ANGIOPLASTY IMPLANT AND GRAFT: ICD-10-CM

## 2022-05-14 ENCOUNTER — INPATIENT (INPATIENT)
Facility: HOSPITAL | Age: 82
LOS: 2 days | Discharge: HOME | End: 2022-05-17
Attending: HOSPITALIST | Admitting: HOSPITALIST
Payer: MEDICARE

## 2022-05-14 VITALS
RESPIRATION RATE: 16 BRPM | DIASTOLIC BLOOD PRESSURE: 55 MMHG | SYSTOLIC BLOOD PRESSURE: 103 MMHG | HEIGHT: 72 IN | HEART RATE: 74 BPM | OXYGEN SATURATION: 99 % | WEIGHT: 240.08 LBS | TEMPERATURE: 96 F

## 2022-05-14 DIAGNOSIS — Z95.5 PRESENCE OF CORONARY ANGIOPLASTY IMPLANT AND GRAFT: Chronic | ICD-10-CM

## 2022-05-14 LAB
ALBUMIN SERPL ELPH-MCNC: 4 G/DL — SIGNIFICANT CHANGE UP (ref 3.5–5.2)
ALP SERPL-CCNC: 67 U/L — SIGNIFICANT CHANGE UP (ref 30–115)
ALT FLD-CCNC: 13 U/L — SIGNIFICANT CHANGE UP (ref 0–41)
ANION GAP SERPL CALC-SCNC: 13 MMOL/L — SIGNIFICANT CHANGE UP (ref 7–14)
APTT BLD: 34.6 SEC — SIGNIFICANT CHANGE UP (ref 27–39.2)
AST SERPL-CCNC: 36 U/L — SIGNIFICANT CHANGE UP (ref 0–41)
BASOPHILS # BLD AUTO: 0.04 K/UL — SIGNIFICANT CHANGE UP (ref 0–0.2)
BASOPHILS NFR BLD AUTO: 0.5 % — SIGNIFICANT CHANGE UP (ref 0–1)
BILIRUB SERPL-MCNC: 0.4 MG/DL — SIGNIFICANT CHANGE UP (ref 0.2–1.2)
BUN SERPL-MCNC: 42 MG/DL — HIGH (ref 10–20)
CALCIUM SERPL-MCNC: 8.9 MG/DL — SIGNIFICANT CHANGE UP (ref 8.5–10.1)
CHLORIDE SERPL-SCNC: 104 MMOL/L — SIGNIFICANT CHANGE UP (ref 98–110)
CO2 SERPL-SCNC: 20 MMOL/L — SIGNIFICANT CHANGE UP (ref 17–32)
CREAT SERPL-MCNC: 2 MG/DL — HIGH (ref 0.7–1.5)
EGFR: 33 ML/MIN/1.73M2 — LOW
EOSINOPHIL # BLD AUTO: 0.15 K/UL — SIGNIFICANT CHANGE UP (ref 0–0.7)
EOSINOPHIL NFR BLD AUTO: 1.8 % — SIGNIFICANT CHANGE UP (ref 0–8)
GLUCOSE BLDC GLUCOMTR-MCNC: 141 MG/DL — HIGH (ref 70–99)
GLUCOSE SERPL-MCNC: 192 MG/DL — HIGH (ref 70–99)
HCT VFR BLD CALC: 25.8 % — LOW (ref 42–52)
HCT VFR BLD CALC: 30.1 % — LOW (ref 42–52)
HGB BLD-MCNC: 8.5 G/DL — LOW (ref 14–18)
HGB BLD-MCNC: 9.9 G/DL — LOW (ref 14–18)
IMM GRANULOCYTES NFR BLD AUTO: 0.4 % — HIGH (ref 0.1–0.3)
INR BLD: 1.17 RATIO — SIGNIFICANT CHANGE UP (ref 0.65–1.3)
LACTATE SERPL-SCNC: 1.6 MMOL/L — SIGNIFICANT CHANGE UP (ref 0.7–2)
LYMPHOCYTES # BLD AUTO: 1.59 K/UL — SIGNIFICANT CHANGE UP (ref 1.2–3.4)
LYMPHOCYTES # BLD AUTO: 18.6 % — LOW (ref 20.5–51.1)
MAGNESIUM SERPL-MCNC: 1.9 MG/DL — SIGNIFICANT CHANGE UP (ref 1.8–2.4)
MCHC RBC-ENTMCNC: 30.8 PG — SIGNIFICANT CHANGE UP (ref 27–31)
MCHC RBC-ENTMCNC: 30.8 PG — SIGNIFICANT CHANGE UP (ref 27–31)
MCHC RBC-ENTMCNC: 32.9 G/DL — SIGNIFICANT CHANGE UP (ref 32–37)
MCHC RBC-ENTMCNC: 32.9 G/DL — SIGNIFICANT CHANGE UP (ref 32–37)
MCV RBC AUTO: 93.5 FL — SIGNIFICANT CHANGE UP (ref 80–94)
MCV RBC AUTO: 93.8 FL — SIGNIFICANT CHANGE UP (ref 80–94)
MONOCYTES # BLD AUTO: 0.51 K/UL — SIGNIFICANT CHANGE UP (ref 0.1–0.6)
MONOCYTES NFR BLD AUTO: 6 % — SIGNIFICANT CHANGE UP (ref 1.7–9.3)
NEUTROPHILS # BLD AUTO: 6.24 K/UL — SIGNIFICANT CHANGE UP (ref 1.4–6.5)
NEUTROPHILS NFR BLD AUTO: 72.7 % — SIGNIFICANT CHANGE UP (ref 42.2–75.2)
NRBC # BLD: 0 /100 WBCS — SIGNIFICANT CHANGE UP (ref 0–0)
NRBC # BLD: 0 /100 WBCS — SIGNIFICANT CHANGE UP (ref 0–0)
PLATELET # BLD AUTO: 228 K/UL — SIGNIFICANT CHANGE UP (ref 130–400)
PLATELET # BLD AUTO: 264 K/UL — SIGNIFICANT CHANGE UP (ref 130–400)
POTASSIUM SERPL-MCNC: 5.1 MMOL/L — HIGH (ref 3.5–5)
POTASSIUM SERPL-SCNC: 5.1 MMOL/L — HIGH (ref 3.5–5)
PROT SERPL-MCNC: 6.8 G/DL — SIGNIFICANT CHANGE UP (ref 6–8)
PROTHROM AB SERPL-ACNC: 13.4 SEC — HIGH (ref 9.95–12.87)
RBC # BLD: 2.76 M/UL — LOW (ref 4.7–6.1)
RBC # BLD: 3.21 M/UL — LOW (ref 4.7–6.1)
RBC # FLD: 14.3 % — SIGNIFICANT CHANGE UP (ref 11.5–14.5)
RBC # FLD: 14.4 % — SIGNIFICANT CHANGE UP (ref 11.5–14.5)
SARS-COV-2 RNA SPEC QL NAA+PROBE: SIGNIFICANT CHANGE UP
SODIUM SERPL-SCNC: 137 MMOL/L — SIGNIFICANT CHANGE UP (ref 135–146)
TROPONIN T SERPL-MCNC: 0.02 NG/ML — HIGH
WBC # BLD: 8.36 K/UL — SIGNIFICANT CHANGE UP (ref 4.8–10.8)
WBC # BLD: 8.56 K/UL — SIGNIFICANT CHANGE UP (ref 4.8–10.8)
WBC # FLD AUTO: 8.36 K/UL — SIGNIFICANT CHANGE UP (ref 4.8–10.8)
WBC # FLD AUTO: 8.56 K/UL — SIGNIFICANT CHANGE UP (ref 4.8–10.8)

## 2022-05-14 PROCEDURE — 99285 EMERGENCY DEPT VISIT HI MDM: CPT

## 2022-05-14 PROCEDURE — 93010 ELECTROCARDIOGRAM REPORT: CPT

## 2022-05-14 RX ORDER — ISOSORBIDE MONONITRATE 60 MG/1
120 TABLET, EXTENDED RELEASE ORAL DAILY
Refills: 0 | Status: DISCONTINUED | OUTPATIENT
Start: 2022-05-14 | End: 2022-05-17

## 2022-05-14 RX ORDER — PIOGLITAZONE HCL AND METFORMIN HCL 850; 15 MG/1; MG/1
1 TABLET ORAL
Qty: 0 | Refills: 0 | DISCHARGE

## 2022-05-14 RX ORDER — AMLODIPINE BESYLATE 2.5 MG/1
10 TABLET ORAL DAILY
Refills: 0 | Status: DISCONTINUED | OUTPATIENT
Start: 2022-05-14 | End: 2022-05-17

## 2022-05-14 RX ORDER — ASPIRIN/CALCIUM CARB/MAGNESIUM 324 MG
81 TABLET ORAL DAILY
Refills: 0 | Status: DISCONTINUED | OUTPATIENT
Start: 2022-05-14 | End: 2022-05-17

## 2022-05-14 RX ORDER — PANTOPRAZOLE SODIUM 20 MG/1
40 TABLET, DELAYED RELEASE ORAL DAILY
Refills: 0 | Status: DISCONTINUED | OUTPATIENT
Start: 2022-05-14 | End: 2022-05-17

## 2022-05-14 RX ORDER — ATORVASTATIN CALCIUM 80 MG/1
40 TABLET, FILM COATED ORAL AT BEDTIME
Refills: 0 | Status: DISCONTINUED | OUTPATIENT
Start: 2022-05-14 | End: 2022-05-17

## 2022-05-14 RX ORDER — ACETAMINOPHEN 500 MG
650 TABLET ORAL EVERY 6 HOURS
Refills: 0 | Status: DISCONTINUED | OUTPATIENT
Start: 2022-05-14 | End: 2022-05-17

## 2022-05-14 RX ORDER — CHLORHEXIDINE GLUCONATE 213 G/1000ML
1 SOLUTION TOPICAL
Refills: 0 | Status: DISCONTINUED | OUTPATIENT
Start: 2022-05-14 | End: 2022-05-17

## 2022-05-14 RX ORDER — SODIUM CHLORIDE 9 MG/ML
500 INJECTION, SOLUTION INTRAVENOUS ONCE
Refills: 0 | Status: DISCONTINUED | OUTPATIENT
Start: 2022-05-14 | End: 2022-05-17

## 2022-05-14 RX ORDER — ALLOPURINOL 300 MG
150 TABLET ORAL DAILY
Refills: 0 | Status: DISCONTINUED | OUTPATIENT
Start: 2022-05-14 | End: 2022-05-17

## 2022-05-14 RX ORDER — SOD SULF/SODIUM/NAHCO3/KCL/PEG
4000 SOLUTION, RECONSTITUTED, ORAL ORAL ONCE
Refills: 0 | Status: COMPLETED | OUTPATIENT
Start: 2022-05-15 | End: 2022-05-15

## 2022-05-14 RX ADMIN — ATORVASTATIN CALCIUM 40 MILLIGRAM(S): 80 TABLET, FILM COATED ORAL at 21:50

## 2022-05-14 NOTE — ED PROVIDER NOTE - PHYSICAL EXAMINATION
CONST: NAD  EYES: Sclera and conjunctiva pale.   ENT: No nasal discharge. Oropharynx normal appearing  NECK: Non-tender, no meningeal signs. normal ROM. supple   CARD: S1 S2; No jvd  RESP: Equal BS B/L, No wheezes, rhonchi or rales. No distress  GI: Soft, non-tender, non-distended. no cva tenderness. normal BS. BRBPR on DOLLY  MS: Normal ROM in all extremities. pulses 2 +.  SKIN: Warm, dry, no acute rashes. Good turgor  NEURO: A&Ox3, No focal deficits. Strength 5/5 with no sensory deficits.

## 2022-05-14 NOTE — H&P ADULT - ASSESSMENT
80yo M w/ pmhx of HTN, HLD, T2DM, Afib on Xarelto, CAD s/p stents placement (2014), gout, VINI on CPAP, LLE lymphedema, CKD presents to ED for LGIB.    #LGIB   #chronic anemia  - likely post-polypectomy bleeding   - Baseline Hb 9-10 admitted with hb of 9.9  - colonoscopy 4/28 4 cm polyp in transverse colon s/p EMR 5 endoclips (pathology TA), one ascending colon polyp TA, one hepatic flexure polyp TA  - Xarelto last dose thursday 5/12   - trend cbc and maintain active type and screen  - GI saw patient in ED:     - colonoscopy monday     - clear liquid diet     - prep tomorrow (golytely, dulcolax)  - CTA if any hemodynamic instability (Hypotensive and tachycardic)    #afib  - hold xarelto as above  - not on any rate control meds, used to be on lopressor 12.5mg bid    #HTN  - BP soft on admission  - hold hydralazine 25mg bid, hctz 12.5 qd, fosinopril 40mg daily    #CAD s/p stents  - c/w asa 81mg daily, imdur 120mg daily    #T2DM  - on pioglitazone at home  - monitor FS AC/HS and start insulin regimen if glucose persistently > 180    #CKD  - avoid nephrotoxic medications  - Cr 2.0, appears stable and better than baseline   - hx of ATN w/ possible JOSE (previous admission 9/2021 for sepsis due to multilocular pna requiring udall for HD)    #gout: c/w allopurinol  #VINI on CPAP: f/u o/p pulm   #LLE edema: f/u o/p vascular    DVT ppx: SCD  GI ppx: protonix  Diet: clear liquid  Code Status: full code  Dispo: from home; pending colonscopy monday

## 2022-05-14 NOTE — ED PROVIDER NOTE - CLINICAL SUMMARY MEDICAL DECISION MAKING FREE TEXT BOX
81-year-old man with a past medical history of A. fib on Xarelto skipped his dose yesterday and today diabetes gout hypertension hyperlipidemia CAD presents here for bloody stools.  Patient states he had a colonoscopy performed by Dr. Maravilla 2 weeks ago where he had a polyp removed yesterday he went to have a bowel movement noticed a lot of blood.  This morning patient got up went to the bathroom noticed another large bloody bowel movement and felt presyncopal.   VS reviewed labs obtained and reviewed ekg obtained and reviewed, gi consulted patient admitted.

## 2022-05-14 NOTE — PATIENT PROFILE ADULT - FALL HARM RISK - HARM RISK INTERVENTIONS

## 2022-05-14 NOTE — H&P ADULT - NSHPLABSRESULTS_GEN_ALL_CORE
LABS:  cret                        9.9    8.56  )-----------( 264      ( 14 May 2022 08:36 )             30.1     05-14    137  |  104  |  42<H>  ----------------------------<  192<H>  5.1<H>   |  20  |  2.0<H>    Ca    8.9      14 May 2022 08:36  Mg     1.9     05-14    TPro  6.8  /  Alb  4.0  /  TBili  0.4  /  DBili  x   /  AST  36  /  ALT  13  /  AlkPhos  67  05-14    PT/INR - ( 14 May 2022 08:36 )   PT: 13.40 sec;   INR: 1.17 ratio         PTT - ( 14 May 2022 08:36 )  PTT:34.6 sec

## 2022-05-14 NOTE — ED PROVIDER NOTE - OBJECTIVE STATEMENT
81y M pmh Afib on xarelto, DM, Gout, HTN, HLD, CAD presents for eval of bloody stools. Pt has noted lots of blood in his stools x2 day, after pt had bm today became lightheaded and near syncopal. He had a colonoscopy by Dr. Maravilla x2wks ago with polypectomy. Denies fever, ha, cp, sob, weakness, numbness, hematuria, n/v

## 2022-05-14 NOTE — ED PROVIDER NOTE - NS ED ROS FT
Constitutional: (-) fever  Eyes/ENT: (-) blurry vision, (-) epistaxis  Cardiovascular: (+) near syncope, (-) chest pain  Respiratory: (-) cough, (-) shortness of breath  Gastrointestinal: (+) bloody bm, (-) vomiting, (-) diarrhea  : (-) dysuria, (-) hematuria  Musculoskeletal: (-) neck pain, (-) back pain, (-) joint pain  Integumentary: (-) rash, (-) edema  Neurological: (-) headache, (-) altered mental status  Allergic/Immunologic: (-) pruritus

## 2022-05-14 NOTE — ED PROVIDER NOTE - NS ED ATTENDING STATEMENT MOD
This was a shared visit with the TANG. I reviewed and verified the documentation and independently performed the documented:

## 2022-05-14 NOTE — ED PROVIDER NOTE - ATTENDING APP SHARED VISIT CONTRIBUTION OF CARE
81-year-old man with a past medical history of A. fib on Xarelto skipped his dose yesterday and today diabetes gout hypertension hyperlipidemia CAD presents here for bloody stools.  Patient states he had a colonoscopy performed by Dr. Maravilla 2 weeks ago where he had a polyp removed yesterday he went to have a bowel movement noticed a lot of blood.  This morning patient got up went to the bathroom noticed another large bloody bowel movement and felt presyncopal.  No fevers chills cough chest pain shortness of breath nausea or vomiting  .  CONSTITUTIONAL: WA / WN / NAD  HEAD: NCAT  EYES: PERRL; EOMI; pale conjunctiva  ENT: Normal pharynx; mucous membranes pink/moist, no erythema.  NECK: Supple; no meningeal signs  CARD: IRR; nl S1/S2; no M/R/G.   RESP: Respiratory rate and effort are normal; breath sounds clear and equal bilaterally.  ABD: Soft, NT ND   RECTAL: chaperoned by ELIZABETH Dumont: + bright red blood on DOLLY  MSK/EXT: No gross deformities; full range of motion.  SKIN: Warm and dry;   NEURO: AAOx3  PSYCH: Memory Intact, Normal Affect

## 2022-05-14 NOTE — H&P ADULT - HISTORY OF PRESENT ILLNESS
80yo M w/ pmhx of HTN, HLD, T2DM, Afib on Xarelto, CAD s/p stents placement (2014), gout, VINI on CPAP, LLE lymphedema presents to ED for LGIB. Pt had polypectomy/colonscopy about 2 weeks ago. Two days ago patient noted red blood in stool. Denies any associated abdominal pain. However, endorses feeling lightheaded.       Pt has noted lots of blood in his stools since last night total 3 episodes more than cupful painless, after pt had bm today became lightheaded and near syncopal. He had a colonoscopy by Dr. Maravilla x2wks ago with polypectomy.     82yo M w/ pmhx of HTN, HLD, T2DM, Afib on Xarelto, CAD s/p stents placement (2014), gout, VINI on CPAP, LLE lymphedema, CKD presents to ED for LGIB. Pt had polypectomy/colonscopy about 2 weeks ago. Two days ago patient noted red blood in stool. Denies any associated abdominal pain. However, endorses feeling lightheaded.     ED course:   vitals: /55, HR 74, T 96, O2 99% RA  labs: hgb 9.9, Cr 2.0  progress: GI saw patient in ED and rec colonoscopy monday (prep tomorrow) and to continue to hold xarelto

## 2022-05-14 NOTE — CONSULT NOTE ADULT - ASSESSMENT
#)rectal bleed   81y M pmh Afib on xarelto last dose 5/12 , DM, Gout, HTN, HLD, CAD presents for eval of bloody stools. Pt has noted lots of blood in his stools since last night total 3 episodes more than cupful painless, after pt had bm today became lightheaded and near syncopal. He had a colonoscopy by Dr. Maravilla x2wks ago with polypectomy.    #)Hematochezia r/o polypectomy bleed  -Hemodynamically stable  -Baseline Hb 9-10 admitted with hb of 9.9  -colonoscopy 4/28 4 cm polyp in transverse colon s/p EMR 5 endoclips (pathology TA), one ascending colon polyp TA, one hepatic flexure polyp TA  -Xarelto last dose thursday 5/12 GFR 33, creatinine 2    Rec:  clear liquid diet  Maintain active type and screen  Adequate fluid resuscitation (Keep SBP > 90)  Trend CBC and keep Hg > 8  will plan for Colonoscopy on monday (off xarelto for 4 days)  Bowel prep tomorrow evening with golytely and duclolax, NPO after midnight 5/15 PM  CTA if any hemodynamic instability (Hypotensive and tachycardic)

## 2022-05-14 NOTE — CONSULT NOTE ADULT - SUBJECTIVE AND OBJECTIVE BOX
Gastroenterology Consultation:    Patient is a 81y old  Male who presents with a chief complaint of     Admitted on: 05-14-22  HPI:      Prior EGD:  Prior Colonoscopy:      PAST MEDICAL & SURGICAL HISTORY:  Atrial fibrillation      Coronary artery disease      Hyperlipidemia      Hypertension      Gout      Diabetes mellitus      VINI on CPAP      History of heart artery stent          FAMILY HISTORY:      Social History:  Tobacco:  Alcohol:  Drugs:    Home Medications:  Actoplus Met 15 mg-500 mg oral tablet: 1 tab(s) orally 2 times a day (28 Apr 2022 08:05)  allopurinol: 150 milligram(s) orally once a day (28 Apr 2022 08:05)  amLODIPine 10 mg oral tablet: 1 tab(s) orally once a day (28 Apr 2022 08:05)  aspirin 81 mg oral tablet, chewable: 1 tab(s) orally once a day (28 Apr 2022 08:05)  atorvastatin 40 mg oral tablet: 1 tab(s) orally once a day (at bedtime) (28 Apr 2022 08:05)  isosorbide mononitrate 120 mg oral tablet, extended release: 1 tab(s) orally once a day (28 Apr 2022 08:05)  Xarelto 15 mg oral tablet: 1 tab(s) orally once a day (in the evening) (28 Apr 2022 08:06)    MEDICATIONS  (STANDING):    MEDICATIONS  (PRN):      Allergies  No Known Allergies      Review of Systems:   Constitutional:  No Fever, No Chills  ENT/Mouth:  No Hearing Changes,  No Difficulty Swallowing  Eyes:  No Eye Pain, No Vision Changes  Cardiovascular:  No Chest Pain, No Palpitations  Respiratory:  No Cough, No Dyspnea  Gastrointestinal:  As described in HPI  Musculoskeletal:  No Joint Swelling, No Back Pain  Skin:  No Skin Lesions, No Jaundice  Neuro:  No Syncope, No Dizziness  Heme/Lymph:  No Bruising, No Bleeding.          Physical Examination:  T(C): 35.6 (05-14-22 @ 08:12), Max: 35.6 (05-14-22 @ 08:12)  HR: 74 (05-14-22 @ 08:12) (74 - 74)  BP: 103/55 (05-14-22 @ 08:12) (103/55 - 103/55)  RR: 16 (05-14-22 @ 08:12) (16 - 16)  SpO2: 99% (05-14-22 @ 08:12) (99% - 99%)  Height (cm): 182.9 (05-14-22 @ 08:12)  Weight (kg): 108.9 (05-14-22 @ 08:12)      Constitutional: No acute distress.  Eyes:. Conjunctivae are clear, Sclera is non-icteric.  Ears Nose and Throat: The external ears are normal appearing,  Oral mucosa is pink and moist.  Respiratory:  No signs of respiratory distress. Lung sounds are clear bilaterally.  Cardiovascular:  S1 S2, Regular rate and rhythm.  GI: Abdomen is soft, symmetric, and non-tender without distention. There are no visible lesions or scars. Bowel sounds are present and normoactive in all four quadrants. No masses, hepatomegaly, or splenomegaly are noted.   Neuro: No Tremor, No involuntary movements  Skin: No rashes, No Jaundice.          Data:                        9.9    8.56  )-----------( 264      ( 14 May 2022 08:36 )             30.1     Hgb Trend:  9.9  05-14-22 @ 08:36      05-14    137  |  104  |  42<H>  ----------------------------<  192<H>  5.1<H>   |  20  |  2.0<H>    Ca    8.9      14 May 2022 08:36  Mg     1.9     05-14    TPro  6.8  /  Alb  4.0  /  TBili  0.4  /  DBili  x   /  AST  36  /  ALT  13  /  AlkPhos  67  05-14    Liver panel trend:  TBili 0.4   /   AST 36   /   ALT 13   /   AlkP 67   /   Tptn 6.8   /   Alb 4.0    /   DBili --      05-14      PT/INR - ( 14 May 2022 08:36 )   PT: 13.40 sec;   INR: 1.17 ratio         PTT - ( 14 May 2022 08:36 )  PTT:34.6 sec        Radiology:       Gastroenterology Consultation:    Patient is a 81y old  Male who presents with a chief complaint of     Admitted on: 05-14-22  HPI:  81y M pmh Afib on xarelto last dose 5/12 , DM, Gout, HTN, HLD, CAD presents for eval of bloody stools. Pt has noted lots of blood in his stools since last night total 3 episodes more than cupful painless, after pt had bm today became lightheaded and near syncopal. He had a colonoscopy by Dr. Maravilla x2wks ago with polypectomy.    Prior EGD: none on chart   Prior Colonoscopy: < from: Colonoscopy (04.28.22 @ 08:15) >  Impressions:    Internal hemorrhoids.    Severe diverticulosis of the the left side of the colon.    Polyp in the proximal ascending colon. (Polypectomy, Endoclip).    Polyps in the hepatic flexure. (Polypectomy).    Polyp (4 cm) in the distal transverse colon. (Polypectomy, Endoclip).     < end of copied text >        PAST MEDICAL & SURGICAL HISTORY:  Atrial fibrillation      Coronary artery disease      Hyperlipidemia      Hypertension      Gout      Diabetes mellitus      VINI on CPAP      History of heart artery stent          FAMILY HISTORY:  no family h/o GI cancers    Social History:  Tobacco: N  Alcohol: N  Drugs: N    Home Medications:  Actoplus Met 15 mg-500 mg oral tablet: 1 tab(s) orally 2 times a day (28 Apr 2022 08:05)  allopurinol: 150 milligram(s) orally once a day (28 Apr 2022 08:05)  amLODIPine 10 mg oral tablet: 1 tab(s) orally once a day (28 Apr 2022 08:05)  aspirin 81 mg oral tablet, chewable: 1 tab(s) orally once a day (28 Apr 2022 08:05)  atorvastatin 40 mg oral tablet: 1 tab(s) orally once a day (at bedtime) (28 Apr 2022 08:05)  isosorbide mononitrate 120 mg oral tablet, extended release: 1 tab(s) orally once a day (28 Apr 2022 08:05)  Xarelto 15 mg oral tablet: 1 tab(s) orally once a day (in the evening) (28 Apr 2022 08:06)    MEDICATIONS  (STANDING):    MEDICATIONS  (PRN):      Allergies  No Known Allergies      Review of Systems:   Constitutional:  No Fever, No Chills  ENT/Mouth:  No Hearing Changes,  No Difficulty Swallowing  Eyes:  No Eye Pain, No Vision Changes  Cardiovascular:  No Chest Pain, No Palpitations  Respiratory:  No Cough, No Dyspnea  Gastrointestinal:  As described in HPI  Musculoskeletal:  No Joint Swelling, No Back Pain  Skin:  No Skin Lesions, No Jaundice  Neuro:  No Syncope, No Dizziness  Heme/Lymph:  No Bruising, No Bleeding.          Physical Examination:  T(C): 35.6 (05-14-22 @ 08:12), Max: 35.6 (05-14-22 @ 08:12)  HR: 74 (05-14-22 @ 08:12) (74 - 74)  BP: 103/55 (05-14-22 @ 08:12) (103/55 - 103/55)  RR: 16 (05-14-22 @ 08:12) (16 - 16)  SpO2: 99% (05-14-22 @ 08:12) (99% - 99%)  Height (cm): 182.9 (05-14-22 @ 08:12)  Weight (kg): 108.9 (05-14-22 @ 08:12)      Constitutional: No acute distress.  Eyes:. Conjunctivae are clear, Sclera is non-icteric.  Ears Nose and Throat: The external ears are normal appearing,  Oral mucosa is pink and moist.  Respiratory:  No signs of respiratory distress. Lung sounds are clear bilaterally.  Cardiovascular:  S1 S2, Regular rate and rhythm.  GI: Abdomen is soft, symmetric, and non-tender without distention. There are no visible lesions or scars. Bowel sounds are present and normoactive in all four quadrants. No masses, hepatomegaly, or splenomegaly are noted.   Neuro: No Tremor, No involuntary movements  Skin: No rashes, No Jaundice.          Data:                        9.9    8.56  )-----------( 264      ( 14 May 2022 08:36 )             30.1     Hgb Trend:  9.9  05-14-22 @ 08:36      05-14    137  |  104  |  42<H>  ----------------------------<  192<H>  5.1<H>   |  20  |  2.0<H>    Ca    8.9      14 May 2022 08:36  Mg     1.9     05-14    TPro  6.8  /  Alb  4.0  /  TBili  0.4  /  DBili  x   /  AST  36  /  ALT  13  /  AlkPhos  67  05-14    Liver panel trend:  TBili 0.4   /   AST 36   /   ALT 13   /   AlkP 67   /   Tptn 6.8   /   Alb 4.0    /   DBili --      05-14      PT/INR - ( 14 May 2022 08:36 )   PT: 13.40 sec;   INR: 1.17 ratio         PTT - ( 14 May 2022 08:36 )  PTT:34.6 sec        Radiology:

## 2022-05-14 NOTE — ED ADULT NURSE NOTE - CAS EDN DISCHARGE INTERVENTIONS
PT Plan of Care Note                                   4/25/2018    Admitting patient complaint: spinal stenosis lumbar region with radiculopathy     PT Discharge:      ASSESSMENT:           Patient has met all goals and is safe with mobility completion.  Patient has no further concerns at this time and therefore skilled acute physical therapy is no longer required.     Recommendation for Discharge: PT: Home, OP therapy, Other (comment) (OP PT per MD protocol)        Recommendations for Discharge: OT: Home           After today's session this therapist is recommending the above location for optimal discharge.  This recommendation is supported by assessment.        Education  Learner: patient and patient's family  Readiness to Learn: acceptance  Learning Method: Verbal, Written and Demonstration  Learning Evaluation: Verbalizes understanding and Demonstrates understanding  Teaching Content: Precautions, Positioning, Safety Awareness, Functional Mobility, HEP importance and use of cold for pain control  Please reference the patient education activity for further information regarding the patient's learning assessment.    Collaboration: Carmen Aguilar RN updated on pt successfully completing all goals this session with tolerable pain.  RN aware pt ready to discharge home with family members from PT perspective.    PT Activity Tolerance  PT Activity Tolerance  Activity Tolerance: 2:1 Activity to rest (04/25/18 1048)  Vital Signs: not assessed (04/24/18 1820)  Activity Tolerance Comments: No signs/symptoms of activity intolerance noted.  Pain tolerable throughout session. (04/25/18 1048)    Precautions  Precautions  Back Brace: Yes;When out of bed (04/25/18 1048)  Lumbar Precautions: Yes (04/25/18 1048)  Other Precautions: fall precautions (04/25/18 1048)  Precautions Comments: Fall precautions (04/24/18 1136)    Pain  Pain Assessment Tool: Numeric rating scale 0-10 (04/25/18 1048)  Pain Type: Acute pain (04/25/18  1048)    Pain evaluation at rest  Comfort/Function (Pain) SCORE at Rest: 2 (04/25/18 1048)    Pain evaluation with activity  Comfort/Function (Pain) SCORE with Activity: 3 (04/25/18 1048)    Therapy Interventions for pain  Comforting Interventions Adult: Ice pack(s);Relaxation/rest/sleep (04/25/18 1048)  Therapy Pain Evaluation: Pain level acceptable after intervention, continue plan (04/25/18 1048)  Pain Intervention Evaluation Comments: Pain remained tolerable throughout session. RN updated and will continue to monitor.  Cold therapy place to lumbar spine at end of session (04/25/18 1048)    Cognition  Overall Cognitive Status: Within Functional Limits (04/24/18 1820)    Bed Mobility  Supine to Sit: Modified Independent (04/25/18 1048)  Sit to Supine: Modified Independent (04/25/18 1048)  Bed Mobility Comments: Modified independent from flat bed without siderail using proper log roll technique.  Pain tolerable per pt report.  Increased time needed to complete transitions with good awareness of lumbar precautions. (04/25/18 1048)    Transfers  Sit to Stand: Modified Independent (04/25/18 1048)  Stand to Sit: Modified Independent (04/25/18 1048)  Stand Pivot Transfers: Modified Independent (04/25/18 1048)  Toilet Transfers: Minimal Assist (Min) (04/23/18 1457)  Assistive Device/: 4-wheeled walker;Gait Belt;1 Person (04/25/18 1048)  Transfer Comments 1: Sit<>stand from edge of bed x 1, armchair x 3 with cues for safe hand placement x 2 and carryover noted after training given.  Pt aware of back precautions and maintained with all transfers.  Pain tolerable per pt report.  Pt aware of need to lock 4WW brakes with all transfers.  Son observed transfers, training on hand placement and education on locking of 4WW brakes.  Slight increase in time to complete transfers (04/25/18 1048)    Gait  Gait  Gait Assistance: Modified Independent (04/25/18 1048)  Assistive Device/: 4-wheeled walker;1  Person;Gait Belt (04/25/18 1048)  Ambulation Distance (Feet): 240 Feet (04/25/18 1048)  Pattern: Within functional limits (04/24/18 1820)  Ambulation Surface: Tile (04/25/18 1048)  Gait Comments 1: Pt demonstrated safe management of 4WW throughout ambulation trial with reports of tolerable pain.  Good upright posture noted throughout with no LOB or unsteadiness noted on turning, short distance retroambulation.   (04/25/18 1048),     Stairs Mobility  Stair Management Assistance: Supervision (Supv) (04/25/18 1048)  Stair Management Technique: One rail R;Step to pattern;Forwards;With gait belt;With cane;Other (comment) (using railing as would use doorframe to enter home) (04/25/18 1048)  Stairs Mobility Comments: Pt demonstrated safe management of stairs with supervision for safety only.  Pt demonstrated safe management of cane and proper use of railing with upright posture and tolerable pain.  Son observed stairs and feels pt will manage stairs well in home setting. (04/25/18 1048)    Equipment  PT/OT Mobility Equipment for Discharge: has single point cane, 4WW (04/25/18 1048)  PT/OT ADL Equipment for Discharge: Has a different type reacher, long handled shoe horn, issued reacher and sock aid. (04/24/18 9662)    Discharge Goals  Bed Mobility Discharge Goal: Sit<>supine, repositioning in flat bed modified independent with tolerable pain   Transfer Discharge Goal: Sit<>stand, pivot transfers using 4WW modified independently with tolerable pain   Ambulation Discharge Goal: Ambulate 150 feet modified independently using 4WW with tolerable pain   Stairs Discharge Goal: Complete 2 steps with environmental supports and cane, supervision from family members; 7 steps with railing and cane, supervision to safely access all areas of home    Therapeutic Exercise Discharge Goal: Complete HEP using written instructions modified independently with tolerable pain   Other Discharge Goal 1: Complete all functional tasks with good  awareness and following of lumbar precautions   Other Discharge Goal 2:      Plan: skilled therapy to continue for:  Treatment Plan for Next Session: D/C skilled PT as goals met with tolerable pain  Treatment/Interventions: Functional transfer training, Strengthening, Endurance training, Bed mobility, Gait training, Equipment eval/education, Stairs retraining, Safety Education, Neuromuscular re-education     Frequency Comments: P1 bedside    Recommendations for Discharge  Recommendation for Discharge: PT: Home;OP therapy;Other (comment) (OP PT per MD protocol) (04/25/18 5574)    PT identified Barriers to Discharge  PT Identified Barriers to Discharge: none     Please reference PT Assess/Treat/Goals and Pain information in Doc Flow for further details.                                   IV intact

## 2022-05-14 NOTE — H&P ADULT - NSHPPHYSICALEXAM_GEN_ALL_CORE
PHYSICAL EXAM:  GENERAL: NAD, lying in bed comfortably  HEAD:  Atraumatic, Normocephalic  EYES: EOMI, PERRLA, conjunctiva and sclera clear  ENT: Moist mucous membranes  NECK: Supple, No JVD  CHEST/LUNG: Clear to auscultation bilaterally; No rales, rhonchi, wheezing, or rubs. Unlabored respirations  HEART: Regular rate and rhythm; No murmurs, rubs, or gallops  ABDOMEN: Bowel sounds present; Soft, Nontender, Nondistended. No hepatomegally  EXTREMITIES:  LLE nonpitting edema  NERVOUS SYSTEM:  Alert & Oriented X3, speech clear. No deficits   MSK: FROM all 4 extremities, full and equal strength  SKIN: No rashes or lesions

## 2022-05-14 NOTE — ED PROVIDER NOTE - PROGRESS NOTE DETAILS
SR: patient evaluated - gi consulted, stated no need for CTA unless patient becomes hypotensive and tachycardic.

## 2022-05-15 ENCOUNTER — TRANSCRIPTION ENCOUNTER (OUTPATIENT)
Age: 82
End: 2022-05-15

## 2022-05-15 LAB
ALBUMIN SERPL ELPH-MCNC: 3.5 G/DL — SIGNIFICANT CHANGE UP (ref 3.5–5.2)
ALP SERPL-CCNC: 50 U/L — SIGNIFICANT CHANGE UP (ref 30–115)
ALT FLD-CCNC: 10 U/L — SIGNIFICANT CHANGE UP (ref 0–41)
ANION GAP SERPL CALC-SCNC: 10 MMOL/L — SIGNIFICANT CHANGE UP (ref 7–14)
AST SERPL-CCNC: 16 U/L — SIGNIFICANT CHANGE UP (ref 0–41)
BASOPHILS # BLD AUTO: 0.04 K/UL — SIGNIFICANT CHANGE UP (ref 0–0.2)
BASOPHILS # BLD AUTO: 0.05 K/UL — SIGNIFICANT CHANGE UP (ref 0–0.2)
BASOPHILS NFR BLD AUTO: 0.6 % — SIGNIFICANT CHANGE UP (ref 0–1)
BASOPHILS NFR BLD AUTO: 0.8 % — SIGNIFICANT CHANGE UP (ref 0–1)
BILIRUB SERPL-MCNC: 0.4 MG/DL — SIGNIFICANT CHANGE UP (ref 0.2–1.2)
BLD GP AB SCN SERPL QL: SIGNIFICANT CHANGE UP
BUN SERPL-MCNC: 33 MG/DL — HIGH (ref 10–20)
CALCIUM SERPL-MCNC: 8.8 MG/DL — SIGNIFICANT CHANGE UP (ref 8.5–10.1)
CHLORIDE SERPL-SCNC: 110 MMOL/L — SIGNIFICANT CHANGE UP (ref 98–110)
CO2 SERPL-SCNC: 21 MMOL/L — SIGNIFICANT CHANGE UP (ref 17–32)
CREAT SERPL-MCNC: 1.6 MG/DL — HIGH (ref 0.7–1.5)
EGFR: 43 ML/MIN/1.73M2 — LOW
EOSINOPHIL # BLD AUTO: 0.19 K/UL — SIGNIFICANT CHANGE UP (ref 0–0.7)
EOSINOPHIL # BLD AUTO: 0.24 K/UL — SIGNIFICANT CHANGE UP (ref 0–0.7)
EOSINOPHIL NFR BLD AUTO: 2.9 % — SIGNIFICANT CHANGE UP (ref 0–8)
EOSINOPHIL NFR BLD AUTO: 3.9 % — SIGNIFICANT CHANGE UP (ref 0–8)
GLUCOSE BLDC GLUCOMTR-MCNC: 145 MG/DL — HIGH (ref 70–99)
GLUCOSE BLDC GLUCOMTR-MCNC: 157 MG/DL — HIGH (ref 70–99)
GLUCOSE BLDC GLUCOMTR-MCNC: 163 MG/DL — HIGH (ref 70–99)
GLUCOSE BLDC GLUCOMTR-MCNC: 174 MG/DL — HIGH (ref 70–99)
GLUCOSE SERPL-MCNC: 123 MG/DL — HIGH (ref 70–99)
HCT VFR BLD CALC: 23.4 % — LOW (ref 42–52)
HCT VFR BLD CALC: 23.6 % — LOW (ref 42–52)
HCT VFR BLD CALC: 24.8 % — LOW (ref 42–52)
HGB BLD-MCNC: 7.5 G/DL — LOW (ref 14–18)
HGB BLD-MCNC: 7.7 G/DL — LOW (ref 14–18)
HGB BLD-MCNC: 8.3 G/DL — LOW (ref 14–18)
IMM GRANULOCYTES NFR BLD AUTO: 0.3 % — SIGNIFICANT CHANGE UP (ref 0.1–0.3)
IMM GRANULOCYTES NFR BLD AUTO: 0.5 % — HIGH (ref 0.1–0.3)
LYMPHOCYTES # BLD AUTO: 1.4 K/UL — SIGNIFICANT CHANGE UP (ref 1.2–3.4)
LYMPHOCYTES # BLD AUTO: 1.42 K/UL — SIGNIFICANT CHANGE UP (ref 1.2–3.4)
LYMPHOCYTES # BLD AUTO: 21.4 % — SIGNIFICANT CHANGE UP (ref 20.5–51.1)
LYMPHOCYTES # BLD AUTO: 22.7 % — SIGNIFICANT CHANGE UP (ref 20.5–51.1)
MAGNESIUM SERPL-MCNC: 1.7 MG/DL — LOW (ref 1.8–2.4)
MCHC RBC-ENTMCNC: 30.1 PG — SIGNIFICANT CHANGE UP (ref 27–31)
MCHC RBC-ENTMCNC: 30.3 PG — SIGNIFICANT CHANGE UP (ref 27–31)
MCHC RBC-ENTMCNC: 30.7 PG — SIGNIFICANT CHANGE UP (ref 27–31)
MCHC RBC-ENTMCNC: 32.1 G/DL — SIGNIFICANT CHANGE UP (ref 32–37)
MCHC RBC-ENTMCNC: 32.6 G/DL — SIGNIFICANT CHANGE UP (ref 32–37)
MCHC RBC-ENTMCNC: 33.5 G/DL — SIGNIFICANT CHANGE UP (ref 32–37)
MCV RBC AUTO: 91.9 FL — SIGNIFICANT CHANGE UP (ref 80–94)
MCV RBC AUTO: 92.9 FL — SIGNIFICANT CHANGE UP (ref 80–94)
MCV RBC AUTO: 94 FL — SIGNIFICANT CHANGE UP (ref 80–94)
MONOCYTES # BLD AUTO: 0.57 K/UL — SIGNIFICANT CHANGE UP (ref 0.1–0.6)
MONOCYTES # BLD AUTO: 0.58 K/UL — SIGNIFICANT CHANGE UP (ref 0.1–0.6)
MONOCYTES NFR BLD AUTO: 8.6 % — SIGNIFICANT CHANGE UP (ref 1.7–9.3)
MONOCYTES NFR BLD AUTO: 9.4 % — HIGH (ref 1.7–9.3)
NEUTROPHILS # BLD AUTO: 3.87 K/UL — SIGNIFICANT CHANGE UP (ref 1.4–6.5)
NEUTROPHILS # BLD AUTO: 4.4 K/UL — SIGNIFICANT CHANGE UP (ref 1.4–6.5)
NEUTROPHILS NFR BLD AUTO: 62.7 % — SIGNIFICANT CHANGE UP (ref 42.2–75.2)
NEUTROPHILS NFR BLD AUTO: 66.2 % — SIGNIFICANT CHANGE UP (ref 42.2–75.2)
NRBC # BLD: 0 /100 WBCS — SIGNIFICANT CHANGE UP (ref 0–0)
PLATELET # BLD AUTO: 206 K/UL — SIGNIFICANT CHANGE UP (ref 130–400)
PLATELET # BLD AUTO: 207 K/UL — SIGNIFICANT CHANGE UP (ref 130–400)
PLATELET # BLD AUTO: 209 K/UL — SIGNIFICANT CHANGE UP (ref 130–400)
POTASSIUM SERPL-MCNC: 4 MMOL/L — SIGNIFICANT CHANGE UP (ref 3.5–5)
POTASSIUM SERPL-SCNC: 4 MMOL/L — SIGNIFICANT CHANGE UP (ref 3.5–5)
PROT SERPL-MCNC: 5.3 G/DL — LOW (ref 6–8)
RBC # BLD: 2.49 M/UL — LOW (ref 4.7–6.1)
RBC # BLD: 2.54 M/UL — LOW (ref 4.7–6.1)
RBC # BLD: 2.7 M/UL — LOW (ref 4.7–6.1)
RBC # FLD: 14.1 % — SIGNIFICANT CHANGE UP (ref 11.5–14.5)
RBC # FLD: 14.2 % — SIGNIFICANT CHANGE UP (ref 11.5–14.5)
RBC # FLD: 14.3 % — SIGNIFICANT CHANGE UP (ref 11.5–14.5)
SODIUM SERPL-SCNC: 141 MMOL/L — SIGNIFICANT CHANGE UP (ref 135–146)
WBC # BLD: 6.17 K/UL — SIGNIFICANT CHANGE UP (ref 4.8–10.8)
WBC # BLD: 6.64 K/UL — SIGNIFICANT CHANGE UP (ref 4.8–10.8)
WBC # BLD: 7.08 K/UL — SIGNIFICANT CHANGE UP (ref 4.8–10.8)
WBC # FLD AUTO: 6.17 K/UL — SIGNIFICANT CHANGE UP (ref 4.8–10.8)
WBC # FLD AUTO: 6.64 K/UL — SIGNIFICANT CHANGE UP (ref 4.8–10.8)
WBC # FLD AUTO: 7.08 K/UL — SIGNIFICANT CHANGE UP (ref 4.8–10.8)

## 2022-05-15 PROCEDURE — 99223 1ST HOSP IP/OBS HIGH 75: CPT | Mod: 1L

## 2022-05-15 PROCEDURE — 99223 1ST HOSP IP/OBS HIGH 75: CPT

## 2022-05-15 RX ADMIN — Medication 4000 MILLILITER(S): at 17:39

## 2022-05-15 RX ADMIN — Medication 150 MILLIGRAM(S): at 12:55

## 2022-05-15 RX ADMIN — ATORVASTATIN CALCIUM 40 MILLIGRAM(S): 80 TABLET, FILM COATED ORAL at 21:31

## 2022-05-15 RX ADMIN — Medication 81 MILLIGRAM(S): at 12:55

## 2022-05-15 RX ADMIN — PANTOPRAZOLE SODIUM 40 MILLIGRAM(S): 20 TABLET, DELAYED RELEASE ORAL at 12:56

## 2022-05-15 RX ADMIN — Medication 5 MILLIGRAM(S): at 20:47

## 2022-05-15 RX ADMIN — ISOSORBIDE MONONITRATE 120 MILLIGRAM(S): 60 TABLET, EXTENDED RELEASE ORAL at 12:54

## 2022-05-15 RX ADMIN — AMLODIPINE BESYLATE 10 MILLIGRAM(S): 2.5 TABLET ORAL at 05:44

## 2022-05-15 RX ADMIN — CHLORHEXIDINE GLUCONATE 1 APPLICATION(S): 213 SOLUTION TOPICAL at 05:44

## 2022-05-15 NOTE — CHART NOTE - NSCHARTNOTEFT_GEN_A_CORE
We are following patient for rectal bleed  no BM since yesterday AM   hemodynamically stable  drop in hb   repeat CBC at 11 transfuse to keep Hb>8  active type and screen  2 18 gauge    Recs:   Scheduled for colonoscopy by advance GI team tomorrow  bowel prep today   NPO after midnight We are following patient for rectal bleed  no BM since yesterday AM   hemodynamically stable  drop in hb   repeat CBC at 11 transfuse to keep Hb>8  active type and screen  2 18 gauge    Recs:   Scheduled for colonoscopy by advance GI team tomorrow  bowel prep today   NPO after midnight  replete electrolytes as needed

## 2022-05-16 ENCOUNTER — RESULT REVIEW (OUTPATIENT)
Age: 82
End: 2022-05-16

## 2022-05-16 LAB
ALBUMIN SERPL ELPH-MCNC: 3.3 G/DL — LOW (ref 3.5–5.2)
ALP SERPL-CCNC: 52 U/L — SIGNIFICANT CHANGE UP (ref 30–115)
ALT FLD-CCNC: 10 U/L — SIGNIFICANT CHANGE UP (ref 0–41)
ANION GAP SERPL CALC-SCNC: 12 MMOL/L — SIGNIFICANT CHANGE UP (ref 7–14)
APTT BLD: 31.8 SEC — SIGNIFICANT CHANGE UP (ref 27–39.2)
AST SERPL-CCNC: 19 U/L — SIGNIFICANT CHANGE UP (ref 0–41)
BASOPHILS # BLD AUTO: 0.03 K/UL — SIGNIFICANT CHANGE UP (ref 0–0.2)
BASOPHILS NFR BLD AUTO: 0.5 % — SIGNIFICANT CHANGE UP (ref 0–1)
BILIRUB SERPL-MCNC: 0.6 MG/DL — SIGNIFICANT CHANGE UP (ref 0.2–1.2)
BLD GP AB SCN SERPL QL: SIGNIFICANT CHANGE UP
BUN SERPL-MCNC: 20 MG/DL — SIGNIFICANT CHANGE UP (ref 10–20)
CALCIUM SERPL-MCNC: 8.7 MG/DL — SIGNIFICANT CHANGE UP (ref 8.5–10.1)
CHLORIDE SERPL-SCNC: 107 MMOL/L — SIGNIFICANT CHANGE UP (ref 98–110)
CO2 SERPL-SCNC: 21 MMOL/L — SIGNIFICANT CHANGE UP (ref 17–32)
CREAT SERPL-MCNC: 1.4 MG/DL — SIGNIFICANT CHANGE UP (ref 0.7–1.5)
EGFR: 50 ML/MIN/1.73M2 — LOW
EOSINOPHIL # BLD AUTO: 0.23 K/UL — SIGNIFICANT CHANGE UP (ref 0–0.7)
EOSINOPHIL NFR BLD AUTO: 3.7 % — SIGNIFICANT CHANGE UP (ref 0–8)
GLUCOSE BLDC GLUCOMTR-MCNC: 144 MG/DL — HIGH (ref 70–99)
GLUCOSE BLDC GLUCOMTR-MCNC: 162 MG/DL — HIGH (ref 70–99)
GLUCOSE BLDC GLUCOMTR-MCNC: 179 MG/DL — HIGH (ref 70–99)
GLUCOSE SERPL-MCNC: 127 MG/DL — HIGH (ref 70–99)
HCT VFR BLD CALC: 23.3 % — LOW (ref 42–52)
HGB BLD-MCNC: 7.8 G/DL — LOW (ref 14–18)
IMM GRANULOCYTES NFR BLD AUTO: 0.3 % — SIGNIFICANT CHANGE UP (ref 0.1–0.3)
INR BLD: 1.19 RATIO — SIGNIFICANT CHANGE UP (ref 0.65–1.3)
LYMPHOCYTES # BLD AUTO: 1.3 K/UL — SIGNIFICANT CHANGE UP (ref 1.2–3.4)
LYMPHOCYTES # BLD AUTO: 20.7 % — SIGNIFICANT CHANGE UP (ref 20.5–51.1)
MAGNESIUM SERPL-MCNC: 1.7 MG/DL — LOW (ref 1.8–2.4)
MCHC RBC-ENTMCNC: 30.8 PG — SIGNIFICANT CHANGE UP (ref 27–31)
MCHC RBC-ENTMCNC: 33.5 G/DL — SIGNIFICANT CHANGE UP (ref 32–37)
MCV RBC AUTO: 92.1 FL — SIGNIFICANT CHANGE UP (ref 80–94)
MONOCYTES # BLD AUTO: 0.64 K/UL — HIGH (ref 0.1–0.6)
MONOCYTES NFR BLD AUTO: 10.2 % — HIGH (ref 1.7–9.3)
NEUTROPHILS # BLD AUTO: 4.07 K/UL — SIGNIFICANT CHANGE UP (ref 1.4–6.5)
NEUTROPHILS NFR BLD AUTO: 64.6 % — SIGNIFICANT CHANGE UP (ref 42.2–75.2)
NRBC # BLD: 0 /100 WBCS — SIGNIFICANT CHANGE UP (ref 0–0)
PLATELET # BLD AUTO: 198 K/UL — SIGNIFICANT CHANGE UP (ref 130–400)
POTASSIUM SERPL-MCNC: 4.3 MMOL/L — SIGNIFICANT CHANGE UP (ref 3.5–5)
POTASSIUM SERPL-SCNC: 4.3 MMOL/L — SIGNIFICANT CHANGE UP (ref 3.5–5)
PROT SERPL-MCNC: 5.2 G/DL — LOW (ref 6–8)
PROTHROM AB SERPL-ACNC: 13.7 SEC — HIGH (ref 9.95–12.87)
RBC # BLD: 2.53 M/UL — LOW (ref 4.7–6.1)
RBC # FLD: 14.4 % — SIGNIFICANT CHANGE UP (ref 11.5–14.5)
SODIUM SERPL-SCNC: 140 MMOL/L — SIGNIFICANT CHANGE UP (ref 135–146)
WBC # BLD: 6.29 K/UL — SIGNIFICANT CHANGE UP (ref 4.8–10.8)
WBC # FLD AUTO: 6.29 K/UL — SIGNIFICANT CHANGE UP (ref 4.8–10.8)

## 2022-05-16 PROCEDURE — 99233 SBSQ HOSP IP/OBS HIGH 50: CPT

## 2022-05-16 PROCEDURE — 88305 TISSUE EXAM BY PATHOLOGIST: CPT | Mod: 26

## 2022-05-16 PROCEDURE — 45380 COLONOSCOPY AND BIOPSY: CPT | Mod: 1L,XU

## 2022-05-16 PROCEDURE — 45382 COLONOSCOPY W/CONTROL BLEED: CPT | Mod: 1L

## 2022-05-16 RX ORDER — MAGNESIUM SULFATE 500 MG/ML
2 VIAL (ML) INJECTION ONCE
Refills: 0 | Status: COMPLETED | OUTPATIENT
Start: 2022-05-16 | End: 2022-05-16

## 2022-05-16 RX ORDER — AMLODIPINE BESYLATE 2.5 MG/1
5 TABLET ORAL ONCE
Refills: 0 | Status: DISCONTINUED | OUTPATIENT
Start: 2022-05-16 | End: 2022-05-17

## 2022-05-16 RX ADMIN — AMLODIPINE BESYLATE 10 MILLIGRAM(S): 2.5 TABLET ORAL at 05:24

## 2022-05-16 RX ADMIN — PANTOPRAZOLE SODIUM 40 MILLIGRAM(S): 20 TABLET, DELAYED RELEASE ORAL at 12:36

## 2022-05-16 RX ADMIN — Medication 81 MILLIGRAM(S): at 12:36

## 2022-05-16 RX ADMIN — ISOSORBIDE MONONITRATE 120 MILLIGRAM(S): 60 TABLET, EXTENDED RELEASE ORAL at 12:38

## 2022-05-16 RX ADMIN — CHLORHEXIDINE GLUCONATE 1 APPLICATION(S): 213 SOLUTION TOPICAL at 05:24

## 2022-05-16 RX ADMIN — Medication 150 MILLIGRAM(S): at 12:36

## 2022-05-16 RX ADMIN — ATORVASTATIN CALCIUM 40 MILLIGRAM(S): 80 TABLET, FILM COATED ORAL at 22:07

## 2022-05-16 RX ADMIN — Medication 25 GRAM(S): at 12:35

## 2022-05-16 NOTE — PRE-OP CHECKLIST - ASSESSMENT, HISTORY & PHYSICAL COMPLETED AND ON MEDICAL RECORD
Faxed 2/1/17  
Reason for Call:  Form, our goal is to have forms completed with 72 hours, however, some forms may require a visit or additional information.    Type of letter, form or note:  medical    Who is the form from?: Home care    Where did the form come from: form was faxed in    What clinic location was the form placed at?: [facility]    Where the form was placed: 's Box: Dickson Reich MD    What number is listed as a contact on the form?: Fax # [phone]       Additional comments: OT 1 Week 1, 2 Week 1 (Taken Date: 11/26/16)    Call taken on 2/1/2017 at 10:41 AM by Abdias Santoyo      
done

## 2022-05-16 NOTE — CHART NOTE - NSCHARTNOTEFT_GEN_A_CORE
s/p colonoscopy    Impression:  	Moderate diverticulosis of the sigmoid colon.  	Polyps (3 mm) in the transverse colon. (Polypectomy).  	No blood noted in whole colon and terminal ileum.  	Previous endoclips noted at the previous polypectomy site in Transverse colon. Ulceration noted at the site. We used hot forceps biopsy for soft coagulation of the site. Then 3 endoclips were deployed for               the purpose of hemostasis.      Plan:    Clear liquid diet today and then advance the diet tomorrow    May resume anticoagulation tomorrow    Await pathology results    Follow up as outpatient with Dr. Maravilla

## 2022-05-16 NOTE — CHART NOTE - NSCHARTNOTEFT_GEN_A_CORE
PACU ANESTHESIA ADMISSION NOTE      Procedure: colonoscopy  Post op diagnosis:      ____  Intubated  TV:______       Rate: ______      FiO2: ______    _x___  Patent Airway    _x___  Full return of protective reflexes    _x___  Full recovery from anesthesia / back to baseline status    Vitals:  T(C): 36  HR: 64  BP: 112/65  RR: 17  SpO2: 98%    Mental Status:  _x___ Awake   _____ Alert   __x___ Drowsy   _____ Sedated    Nausea/Vomiting:  _x___  NO       ______Yes,   See Post - Op Orders         Pain Scale (0-10):  __0___    Treatment: _x___ None    ____ See Post - Op/PCA Orders    Post - Operative Fluids:   __x__ Oral   ____ See Post - Op Orders    Plan: Discharge:   ____Home       _x____Floor     _____Critical Care    _____  Other:_________________    Comments:  No anesthesia issues or complications noted.  Discharge when criteria met.

## 2022-05-16 NOTE — PROGRESS NOTE ADULT - ASSESSMENT
81 year old male with PMH of HTN, HLD, T2DM, Atrial Fibrillation on Xarelto, CAD s/p stents placement (2014), gout, VINI on CPAP, LLE lymphedema, CKD presents to ED for LGIB. Pt had colonoscopy with polypectomy 2 weeks ago. Two days ago patient noted red blood in stool. Denies any associated abdominal pain. However, endorses feeling lightheaded. In the ED /55, HR 74, T 96, O2 99% RA  labs: hgb 9.9,     A/P:   Lower GI bleeding:   Acute blood loss anemia:   bloody stool, hematochezia.   Recent colonoscopy and polypectomy, patient takes Xarelto.  colonoscopy 4/28 4 cm polyp in transverse colon s/p EMR 5 endoclips (pathology TA), one ascending colon polyp TA, one hepatic flexure polyp TA   Hb dropped to 7.5 from 9.9 s/p 1 packed RBC transfusion, will give another RBC today.   Xarelto on hold.   GI plan for Colonoscopy today.   monitor CBC, transfuse to keep Hb>8    Chronic Atrial Fibrillation:   Rate is controlled  Xarelto on hold.     CAD s/p PCI in 2104  Continue ASA, Imdur and Lipitor.     CKD stage 3  Cr  1.4, previous baseline around 2.     HTN: Continue Amlodipine and Imdur.     #Progress Note Handoff:  Pending (specify): Colonoscopy.  Family discussion:  Disposition: Home.

## 2022-05-17 ENCOUNTER — TRANSCRIPTION ENCOUNTER (OUTPATIENT)
Age: 82
End: 2022-05-17

## 2022-05-17 VITALS — HEART RATE: 67 BPM | DIASTOLIC BLOOD PRESSURE: 57 MMHG | SYSTOLIC BLOOD PRESSURE: 115 MMHG

## 2022-05-17 LAB
ALBUMIN SERPL ELPH-MCNC: 3.4 G/DL — LOW (ref 3.5–5.2)
ALP SERPL-CCNC: 53 U/L — SIGNIFICANT CHANGE UP (ref 30–115)
ALT FLD-CCNC: 10 U/L — SIGNIFICANT CHANGE UP (ref 0–41)
ANION GAP SERPL CALC-SCNC: 10 MMOL/L — SIGNIFICANT CHANGE UP (ref 7–14)
AST SERPL-CCNC: 19 U/L — SIGNIFICANT CHANGE UP (ref 0–41)
BASOPHILS # BLD AUTO: 0.02 K/UL — SIGNIFICANT CHANGE UP (ref 0–0.2)
BASOPHILS NFR BLD AUTO: 0.3 % — SIGNIFICANT CHANGE UP (ref 0–1)
BILIRUB SERPL-MCNC: 0.7 MG/DL — SIGNIFICANT CHANGE UP (ref 0.2–1.2)
BUN SERPL-MCNC: 12 MG/DL — SIGNIFICANT CHANGE UP (ref 10–20)
CALCIUM SERPL-MCNC: 8.6 MG/DL — SIGNIFICANT CHANGE UP (ref 8.5–10.1)
CHLORIDE SERPL-SCNC: 106 MMOL/L — SIGNIFICANT CHANGE UP (ref 98–110)
CO2 SERPL-SCNC: 22 MMOL/L — SIGNIFICANT CHANGE UP (ref 17–32)
CREAT SERPL-MCNC: 1.3 MG/DL — SIGNIFICANT CHANGE UP (ref 0.7–1.5)
EGFR: 55 ML/MIN/1.73M2 — LOW
EOSINOPHIL # BLD AUTO: 0.21 K/UL — SIGNIFICANT CHANGE UP (ref 0–0.7)
EOSINOPHIL NFR BLD AUTO: 3.2 % — SIGNIFICANT CHANGE UP (ref 0–8)
GLUCOSE BLDC GLUCOMTR-MCNC: 121 MG/DL — HIGH (ref 70–99)
GLUCOSE BLDC GLUCOMTR-MCNC: 165 MG/DL — HIGH (ref 70–99)
GLUCOSE SERPL-MCNC: 111 MG/DL — HIGH (ref 70–99)
HCT VFR BLD CALC: 23.1 % — LOW (ref 42–52)
HGB BLD-MCNC: 7.7 G/DL — LOW (ref 14–18)
IMM GRANULOCYTES NFR BLD AUTO: 0.3 % — SIGNIFICANT CHANGE UP (ref 0.1–0.3)
LYMPHOCYTES # BLD AUTO: 1.15 K/UL — LOW (ref 1.2–3.4)
LYMPHOCYTES # BLD AUTO: 17.5 % — LOW (ref 20.5–51.1)
MAGNESIUM SERPL-MCNC: 1.9 MG/DL — SIGNIFICANT CHANGE UP (ref 1.8–2.4)
MCHC RBC-ENTMCNC: 31 PG — SIGNIFICANT CHANGE UP (ref 27–31)
MCHC RBC-ENTMCNC: 33.3 G/DL — SIGNIFICANT CHANGE UP (ref 32–37)
MCV RBC AUTO: 93.1 FL — SIGNIFICANT CHANGE UP (ref 80–94)
MONOCYTES # BLD AUTO: 0.68 K/UL — HIGH (ref 0.1–0.6)
MONOCYTES NFR BLD AUTO: 10.3 % — HIGH (ref 1.7–9.3)
NEUTROPHILS # BLD AUTO: 4.5 K/UL — SIGNIFICANT CHANGE UP (ref 1.4–6.5)
NEUTROPHILS NFR BLD AUTO: 68.4 % — SIGNIFICANT CHANGE UP (ref 42.2–75.2)
NRBC # BLD: 0 /100 WBCS — SIGNIFICANT CHANGE UP (ref 0–0)
PLATELET # BLD AUTO: 216 K/UL — SIGNIFICANT CHANGE UP (ref 130–400)
POTASSIUM SERPL-MCNC: 4.1 MMOL/L — SIGNIFICANT CHANGE UP (ref 3.5–5)
POTASSIUM SERPL-SCNC: 4.1 MMOL/L — SIGNIFICANT CHANGE UP (ref 3.5–5)
PROT SERPL-MCNC: 5.4 G/DL — LOW (ref 6–8)
RBC # BLD: 2.48 M/UL — LOW (ref 4.7–6.1)
RBC # FLD: 14.1 % — SIGNIFICANT CHANGE UP (ref 11.5–14.5)
SODIUM SERPL-SCNC: 138 MMOL/L — SIGNIFICANT CHANGE UP (ref 135–146)
WBC # BLD: 6.58 K/UL — SIGNIFICANT CHANGE UP (ref 4.8–10.8)
WBC # FLD AUTO: 6.58 K/UL — SIGNIFICANT CHANGE UP (ref 4.8–10.8)

## 2022-05-17 PROCEDURE — 99239 HOSP IP/OBS DSCHRG MGMT >30: CPT

## 2022-05-17 RX ORDER — RIVAROXABAN 15 MG-20MG
15 KIT ORAL DAILY
Refills: 0 | Status: DISCONTINUED | OUTPATIENT
Start: 2022-05-17 | End: 2022-05-17

## 2022-05-17 RX ORDER — ISOSORBIDE MONONITRATE 60 MG/1
1 TABLET, EXTENDED RELEASE ORAL
Qty: 0 | Refills: 0 | DISCHARGE
Start: 2022-05-17

## 2022-05-17 RX ORDER — RIVAROXABAN 15 MG-20MG
20 KIT ORAL DAILY
Refills: 0 | Status: DISCONTINUED | OUTPATIENT
Start: 2022-05-17 | End: 2022-05-17

## 2022-05-17 RX ADMIN — Medication 150 MILLIGRAM(S): at 12:57

## 2022-05-17 RX ADMIN — PANTOPRAZOLE SODIUM 40 MILLIGRAM(S): 20 TABLET, DELAYED RELEASE ORAL at 12:58

## 2022-05-17 RX ADMIN — ISOSORBIDE MONONITRATE 120 MILLIGRAM(S): 60 TABLET, EXTENDED RELEASE ORAL at 12:59

## 2022-05-17 RX ADMIN — Medication 81 MILLIGRAM(S): at 12:58

## 2022-05-17 RX ADMIN — CHLORHEXIDINE GLUCONATE 1 APPLICATION(S): 213 SOLUTION TOPICAL at 05:21

## 2022-05-17 RX ADMIN — AMLODIPINE BESYLATE 10 MILLIGRAM(S): 2.5 TABLET ORAL at 05:21

## 2022-05-17 RX ADMIN — RIVAROXABAN 15 MILLIGRAM(S): KIT at 12:58

## 2022-05-17 NOTE — DISCHARGE NOTE NURSING/CASE MANAGEMENT/SOCIAL WORK - NSDCPEFALRISK_GEN_ALL_CORE
For information on Fall & Injury Prevention, visit: [email or web address] OR  [email or web address] OR  https://www.cdc.gov/steadi/patient.html

## 2022-05-17 NOTE — PROGRESS NOTE ADULT - ASSESSMENT
81 year old male with PMH of HTN, HLD, T2DM, Atrial Fibrillation on Xarelto, CAD s/p stents placement (2014), gout, VINI on CPAP, LLE lymphedema, CKD presents to ED for LGIB. Pt had colonoscopy with polypectomy 2 weeks ago. Two days ago patient noted red blood in stool. Denies any associated abdominal pain. However, endorses feeling lightheaded. In the ED /55, HR 74, T 96, O2 99% RA  labs: hgb 9.9,     A/P:   Lower GI bleeding:   Acute blood loss anemia:   bloody stool, hematochezia.   Recent colonoscopy and polypectomy, patient takes Xarelto.  colonoscopy 4/28 4 cm polyp in transverse colon s/p EMR 5 endoclips (pathology TA), one ascending colon polyp TA, one hepatic flexure polyp TA   Hb dropped to 7.5 from 9.9 s/p 1 packed RBC transfusion, will give another RBC today.   Xarelto on hold.   Colonoscopy   monitor CBC, transfuse to keep Hb>8    Chronic Atrial Fibrillation:   Rate is controlled  Xarelto on hold.     CAD s/p PCI in 2104  Continue ASA, Imdur and Lipitor.     CKD stage 3  Cr  1.4, previous baseline around 2.     HTN: Continue Amlodipine and Imdur.     #Progress Note Handoff:  Pending (specify):   Family discussion:  Disposition: Home.

## 2022-05-17 NOTE — DISCHARGE NOTE PROVIDER - NSDCFUSCHEDAPPT_GEN_ALL_CORE_FT
[facility]  [address] [facility]  Scheduled Appointment: 06/23/2022

## 2022-05-17 NOTE — PROGRESS NOTE ADULT - SUBJECTIVE AND OBJECTIVE BOX
MISTY BURLESON 81y Male  MRN#: [id number]   CODE STATUS: Full code    Hospital Day: 2d    Pt is currently admitted with the primary diagnosis of bleeding per rectum    SUBJECTIVE    Subjective complaints   Patient reports having bright blood per rectum yesterday  Present Today:   - Ramirez:  No [ x ], Yes [   ] : Indication:     - Type of IV Access:       .. CVC/Piccline:  No [ x ], Yes [   ] : Indication:       .. Midline: No [  x], Yes [   ] : Indication:                                             ----------------------------------------------------------  OBJECTIVE  PAST MEDICAL & SURGICAL HISTORY  Atrial fibrillation    Coronary artery disease    Hyperlipidemia    Hypertension    Gout    Diabetes mellitus    VINI on CPAP    History of heart artery stent                                              -----------------------------------------------------------  ALLERGIES:  No Known Allergies                                            ------------------------------------------------------------    HOME MEDICATIONS  Home Medications:  allopurinol: 150 milligram(s) orally once a day (14 May 2022 13:03)  amLODIPine 10 mg oral tablet: 1 tab(s) orally once a day (14 May 2022 13:03)  Aspirin Enteric Coated 81 mg oral delayed release tablet: 1 tab(s) orally once a day (14 May 2022 13:03)  atorvastatin 40 mg oral tablet: 1 tab(s) orally once a day (at bedtime) (14 May 2022 13:03)  fosinopril 40 mg oral tablet: 1 tab(s) orally once a day (14 May 2022 13:03)  hydrALAZINE 25 mg oral tablet: 1 tab(s) orally 2 times a day (14 May 2022 13:03)  hydroCHLOROthiazide 12.5 mg oral capsule: 1 cap(s) orally once a day (14 May 2022 13:03)  isosorbide mononitrate 120 mg oral tablet, extended release: 1 tab(s) orally once a day (14 May 2022 13:03)  pioglitazone 30 mg oral tablet: 1 tab(s) orally once a day (14 May 2022 13:03)  Xarelto 15 mg oral tablet: 1 tab(s) orally once a day (in the evening) (14 May 2022 13:03)                           MEDICATIONS:  STANDING MEDICATIONS  allopurinol 150 milliGRAM(s) Oral daily  amLODIPine   Tablet 10 milliGRAM(s) Oral daily  aspirin enteric coated 81 milliGRAM(s) Oral daily  atorvastatin 40 milliGRAM(s) Oral at bedtime  chlorhexidine 4% Liquid 1 Application(s) Topical <User Schedule>  isosorbide   mononitrate ER Tablet (IMDUR) 120 milliGRAM(s) Oral daily  lactated ringers Bolus 500 milliLiter(s) IV Bolus once  pantoprazole  Injectable 40 milliGRAM(s) IV Push daily    PRN MEDICATIONS  acetaminophen     Tablet .. 650 milliGRAM(s) Oral every 6 hours PRN                                            ------------------------------------------------------------  VITAL SIGNS: Last 24 Hours  T(C): 36.6 (16 May 2022 15:42), Max: 36.6 (15 May 2022 17:14)  T(F): 97.9 (16 May 2022 15:42), Max: 97.9 (15 May 2022 17:14)  HR: 76 (16 May 2022 15:42) (60 - 80)  BP: 133/58 (16 May 2022 15:42) (127/60 - 161/69)  BP(mean): --  RR: 18 (16 May 2022 15:42) (18 - 20)  SpO2: 97% (16 May 2022 07:59) (97% - 97%)      05-15-22 @ 07:01  -  05-16-22 @ 07:00  --------------------------------------------------------  IN: 1046 mL / OUT: 0 mL / NET: 1046 mL    05-16-22 @ 07:01  -  05-16-22 @ 15:59  --------------------------------------------------------  IN: 0 mL / OUT: 0 mL / NET: 0 mL                                             --------------------------------------------------------------  LABS:                        7.8    6.29  )-----------( 198      ( 16 May 2022 05:59 )             23.3     05-16    140  |  107  |  20  ----------------------------<  127<H>  4.3   |  21  |  1.4    Ca    8.7      16 May 2022 05:59  Mg     1.7     05-16    TPro  5.2<L>  /  Alb  3.3<L>  /  TBili  0.6  /  DBili  x   /  AST  19  /  ALT  10  /  AlkPhos  52  05-16    PT/INR - ( 16 May 2022 01:20 )   PT: 13.70 sec;   INR: 1.19 ratio         PTT - ( 16 May 2022 01:20 )  PTT:31.8 sec                                                          -------------------------------------------------------------  RADIOLOGY:                                            --------------------------------------------------------------    PHYSICAL EXAM:  General: alert oriented  HEENT: non remarkable  LUNGS: clear air sounds  HEART: normal s1 s2  ABDOMEN: soft non tender  EXT: no edema                          --------------------------------------------------------------    ASSESSMENT & PLAN  81 year old male with PMH of HTN, HLD, T2DM, Atrial Fibrillation on Xarelto, CAD s/p stents placement (2014), gout, VINI on CPAP, LLE lymphedema, CKD presents to ED for LGIB. Pt had colonoscopy with polypectomy 2 weeks ago. Two days ago patient noted red blood in stool. Denies any associated abdominal pain. However, endorses feeling lightheaded. In the ED /55, HR 74, T 96, O2 99% RA  labs: hgb 9.9,     A/P:   Lower GI bleeding:   Acute blood loss anemia:   bloody stool, hematochezia.   Recent colonoscopy and polypectomy, patient takes Xarelto.  colonoscopy 4/28 4 cm polyp in transverse colon s/p EMR 5 endoclips (pathology TA), one ascending colon polyp TA, one hepatic flexure polyp TA   Hb dropped to 7.5 from 9.9 s/p 1 packed RBC transfusion, will give another RBC today.   Xarelto on hold.   GI plan for Colonoscopy today.   monitor CBC, transfuse to keep Hb>8  Colonoscopy today    # Chronic Atrial Fibrillation:   Rate is controlled  Xarelto on hold.      CAD s/p PCI in 2104  Continue ASA, Imdur and Lipitor.     # CKD stage 3  Cr  1.4, previous baseline around 2.     #HTN: Continue Amlodipine and Imdur.     #Progress Note Handoff:  Pending (specify):   Family discussion:  Disposition: Home.                                
MISTY BURLESON 81y Male  MRN#: [id number]   CODE STATUS: Full code    Hospital Day: 1d    Pt is currently admitted with the primary diagnosis of red blood in stools    SUBJECTIVE    Subjective complaints   Patient says she feels slightly better  Present Today:   - James:  No [ x ], Yes [   ] : Indication:     - Type of IV Access:       .. CVC/Piccline:  No [x  ], Yes [   ] : Indication:       .. Midline: No [x  ], Yes [   ] : Indication:                                             ----------------------------------------------------------  OBJECTIVE  PAST MEDICAL & SURGICAL HISTORY  Atrial fibrillation    Coronary artery disease    Hyperlipidemia    Hypertension    Gout    Diabetes mellitus    VINI on CPAP    History of heart artery stent                                              -----------------------------------------------------------  ALLERGIES:  No Known Allergies                                            ------------------------------------------------------------    HOME MEDICATIONS  Home Medications:  allopurinol: 150 milligram(s) orally once a day (14 May 2022 13:03)  amLODIPine 10 mg oral tablet: 1 tab(s) orally once a day (14 May 2022 13:03)  Aspirin Enteric Coated 81 mg oral delayed release tablet: 1 tab(s) orally once a day (14 May 2022 13:03)  atorvastatin 40 mg oral tablet: 1 tab(s) orally once a day (at bedtime) (14 May 2022 13:03)  fosinopril 40 mg oral tablet: 1 tab(s) orally once a day (14 May 2022 13:03)  hydrALAZINE 25 mg oral tablet: 1 tab(s) orally 2 times a day (14 May 2022 13:03)  hydroCHLOROthiazide 12.5 mg oral capsule: 1 cap(s) orally once a day (14 May 2022 13:03)  isosorbide mononitrate 120 mg oral tablet, extended release: 1 tab(s) orally once a day (14 May 2022 13:03)  pioglitazone 30 mg oral tablet: 1 tab(s) orally once a day (14 May 2022 13:03)  Xarelto 15 mg oral tablet: 1 tab(s) orally once a day (in the evening) (14 May 2022 13:03)                           MEDICATIONS:  STANDING MEDICATIONS  allopurinol 150 milliGRAM(s) Oral daily  amLODIPine   Tablet 10 milliGRAM(s) Oral daily  aspirin enteric coated 81 milliGRAM(s) Oral daily  atorvastatin 40 milliGRAM(s) Oral at bedtime  bisacodyl 20 milliGRAM(s) Oral once  chlorhexidine 4% Liquid 1 Application(s) Topical <User Schedule>  isosorbide   mononitrate ER Tablet (IMDUR) 120 milliGRAM(s) Oral daily  lactated ringers Bolus 500 milliLiter(s) IV Bolus once  pantoprazole  Injectable 40 milliGRAM(s) IV Push daily  polyethylene glycol/electrolyte Solution. 4000 milliLiter(s) Oral once    PRN MEDICATIONS  acetaminophen     Tablet .. 650 milliGRAM(s) Oral every 6 hours PRN                                            ------------------------------------------------------------  VITAL SIGNS: Last 24 Hours  T(C): 36.5 (15 May 2022 05:08), Max: 36.7 (14 May 2022 16:30)  T(F): 97.7 (15 May 2022 05:08), Max: 98.1 (14 May 2022 20:00)  HR: 67 (15 May 2022 05:08) (67 - 99)  BP: 130/60 (15 May 2022 05:08) (103/55 - 157/69)  BP(mean): --  RR: 18 (15 May 2022 05:08) (16 - 18)  SpO2: 97% (14 May 2022 16:30) (97% - 99%)                                             --------------------------------------------------------------  LABS:                        8.5    8.36  )-----------( 228      ( 14 May 2022 17:36 )             25.8     05-14    137  |  104  |  42<H>  ----------------------------<  192<H>  5.1<H>   |  20  |  2.0<H>    Ca    8.9      14 May 2022 08:36  Mg     1.9     05-14    TPro  6.8  /  Alb  4.0  /  TBili  0.4  /  DBili  x   /  AST  36  /  ALT  13  /  AlkPhos  67  05-14    PT/INR - ( 14 May 2022 08:36 )   PT: 13.40 sec;   INR: 1.17 ratio         PTT - ( 14 May 2022 08:36 )  PTT:34.6 sec      Troponin T, Serum: 0.02 ng/mL *H* (05-14-22 @ 08:36)  Lactate, Blood: 1.6 mmol/L (05-14-22 @ 08:36)          CARDIAC MARKERS ( 14 May 2022 08:36 )  x     / 0.02 ng/mL / x     / x     / x                                                  -------------------------------------------------------------  RADIOLOGY:                                            --------------------------------------------------------------    PHYSICAL EXAM:  General: alert oriented  HEENT: non remarkable  LUNGS: clear air sounds   HEART: normal s1 s2  ABDOMEN: soft   EXT: no edema                                             --------------------------------------------------------------    ASSESSMENT & PLAN  82yo M w/ pmhx of HTN, HLD, T2DM, Afib on Xarelto, CAD s/p stents placement (2014), gout, VINI on CPAP, LLE lymphedema, CKD presents to ED for LGIB.    #LGIB   #chronic anemia  - likely post-polypectomy bleeding   - Baseline Hb 9-10 admitted with hb of 9.9  - colonoscopy 4/28 4 cm polyp in transverse colon s/p EMR 5 endoclips (pathology TA), one ascending colon polyp TA, one hepatic flexure polyp TA  - Xarelto last dose thursday 5/12   - trend cbc and maintain active type and screen  - GI saw patient in ED:     - colonoscopy monday     - clear liquid diet     - prep tomorrow (golytely, dulcolax)  - CTA if any hemodynamic instability (Hypotensive and tachycardic)    #afib  - hold xarelto as above  - not on any rate control meds, used to be on lopressor 12.5mg bid    #HTN  - BP soft on admission  - hold hydralazine 25mg bid, hctz 12.5 qd, fosinopril 40mg daily    #CAD s/p stents  - c/w asa 81mg daily, imdur 120mg daily    #T2DM  - on pioglitazone at home  - monitor FS AC/HS and start insulin regimen if glucose persistently > 180    #CKD  - avoid nephrotoxic medications  - Cr 2.0, appears stable and better than baseline   - hx of ATN w/ possible JOSE (previous admission 9/2021 for sepsis due to multilocular pna requiring udall for HD)    #gout: c/w allopurinol  #VINI on CPAP: f/u o/p pulm   #LLE edema: f/u o/p vascular    DVT ppx: SCD  GI ppx: protonix  Diet: clear liquid  Code Status: full code  Dispo: from home; pending colonscopy monday                                  
  MISTY BURLESON  81y  Male      Patient is a 81y old  Male who presents with a chief complaint of LGIB (17 May 2022 13:55)      INTERVAL HPI/OVERNIGHT EVENTS:  he feels ok, no bowel movement   Vital Signs Last 24 Hrs  T(C): 36.4 (17 May 2022 13:08), Max: 36.6 (16 May 2022 15:42)  T(F): 97.6 (17 May 2022 13:08), Max: 97.9 (16 May 2022 15:42)  HR: 80 (17 May 2022 13:08) (54 - 80)  BP: 186/81 (17 May 2022 13:08) (125/81 - 186/81)  BP(mean): --  RR: 18 (17 May 2022 13:08) (18 - 18)  SpO2: 99% (16 May 2022 18:25) (97% - 99%)      05-16-22 @ 07:01  -  05-17-22 @ 07:00  --------------------------------------------------------  IN: 0 mL / OUT: 0 mL / NET: 0 mL    05-17-22 @ 07:01  -  05-17-22 @ 14:26  --------------------------------------------------------  IN: 798 mL / OUT: 0 mL / NET: 798 mL            Consultant(s) Notes Reviewed:  [x ] YES  [ ] NO          MEDICATIONS  (STANDING):  allopurinol 150 milliGRAM(s) Oral daily  amLODIPine   Tablet 5 milliGRAM(s) Oral once  amLODIPine   Tablet 10 milliGRAM(s) Oral daily  aspirin enteric coated 81 milliGRAM(s) Oral daily  atorvastatin 40 milliGRAM(s) Oral at bedtime  chlorhexidine 4% Liquid 1 Application(s) Topical <User Schedule>  isosorbide   mononitrate ER Tablet (IMDUR) 120 milliGRAM(s) Oral daily  lactated ringers Bolus 500 milliLiter(s) IV Bolus once  pantoprazole  Injectable 40 milliGRAM(s) IV Push daily  rivaroxaban 15 milliGRAM(s) Oral daily    MEDICATIONS  (PRN):  acetaminophen     Tablet .. 650 milliGRAM(s) Oral every 6 hours PRN Temp greater or equal to 38C (100.4F), Mild Pain (1 - 3)      LABS                          7.7    6.58  )-----------( 216      ( 17 May 2022 05:08 )             23.1     05-17    138  |  106  |  12  ----------------------------<  111<H>  4.1   |  22  |  1.3    Ca    8.6      17 May 2022 05:08  Mg     1.9     05-17    TPro  5.4<L>  /  Alb  3.4<L>  /  TBili  0.7  /  DBili  x   /  AST  19  /  ALT  10  /  AlkPhos  53  05-17        PT/INR - ( 16 May 2022 01:20 )   PT: 13.70 sec;   INR: 1.19 ratio         PTT - ( 16 May 2022 01:20 )  PTT:31.8 sec  Lactate Trend  05-14 @ 08:36 Lactate:1.6         CAPILLARY BLOOD GLUCOSE      POCT Blood Glucose.: 165 mg/dL (17 May 2022 11:30)        RADIOLOGY & ADDITIONAL TESTS:    Imaging Personally Reviewed:  [ ] YES  [ ] NO    HEALTH ISSUES - PROBLEM Dx:          PHYSICAL EXAM:  GENERAL: NAD, well-developed.  HEAD:  Atraumatic, Normocephalic.  EYES: EOMI, PERRLA, conjunctiva and sclera clear.  NECK: Supple, No JVD.  CHEST/LUNG: Clear to auscultation bilaterally; No wheeze.  HEART: Irregular rate and rhythm; S1 S2.   ABDOMEN: Soft, Nontender, Nondistended; Bowel sounds present.  EXTREMITIES: Left leg edema chronic > Right.   PSYCH: AAOx3.  NEUROLOGY: non-focal.  SKIN: No rashes or lesions.
  SARAH BETH MISTY  81y  Male      Patient is a 81y old  Male who presents with a chief complaint of LGIB (15 May 2022 06:25)      INTERVAL HPI/OVERNIGHT EVENTS:  He feels ok, no bloody bowel movement today.   Vital Signs Last 24 Hrs  T(C): 36.3 (16 May 2022 04:56), Max: 36.6 (15 May 2022 17:14)  T(F): 97.4 (16 May 2022 04:56), Max: 97.9 (15 May 2022 17:14)  HR: 65 (16 May 2022 04:56) (65 - 80)  BP: 144/65 (16 May 2022 04:56) (127/60 - 144/65)  BP(mean): --  RR: 18 (16 May 2022 04:56) (18 - 20)  SpO2: 97% (16 May 2022 07:59) (97% - 97%)      05-15-22 @ 07:01  -  05-16-22 @ 07:00  --------------------------------------------------------  IN: 1046 mL / OUT: 0 mL / NET: 1046 mL    05-16-22 @ 07:01  -  05-16-22 @ 13:15  --------------------------------------------------------  IN: 0 mL / OUT: 0 mL / NET: 0 mL            Consultant(s) Notes Reviewed:  [x ] YES  [ ] NO          MEDICATIONS  (STANDING):  allopurinol 150 milliGRAM(s) Oral daily  amLODIPine   Tablet 10 milliGRAM(s) Oral daily  aspirin enteric coated 81 milliGRAM(s) Oral daily  atorvastatin 40 milliGRAM(s) Oral at bedtime  chlorhexidine 4% Liquid 1 Application(s) Topical <User Schedule>  isosorbide   mononitrate ER Tablet (IMDUR) 120 milliGRAM(s) Oral daily  lactated ringers Bolus 500 milliLiter(s) IV Bolus once  pantoprazole  Injectable 40 milliGRAM(s) IV Push daily    MEDICATIONS  (PRN):  acetaminophen     Tablet .. 650 milliGRAM(s) Oral every 6 hours PRN Temp greater or equal to 38C (100.4F), Mild Pain (1 - 3)      LABS                          7.8    6.29  )-----------( 198      ( 16 May 2022 05:59 )             23.3     05-16    140  |  107  |  20  ----------------------------<  127<H>  4.3   |  21  |  1.4    Ca    8.7      16 May 2022 05:59  Mg     1.7     05-16    TPro  5.2<L>  /  Alb  3.3<L>  /  TBili  0.6  /  DBili  x   /  AST  19  /  ALT  10  /  AlkPhos  52  05-16        PT/INR - ( 16 May 2022 01:20 )   PT: 13.70 sec;   INR: 1.19 ratio         PTT - ( 16 May 2022 01:20 )  PTT:31.8 sec  Lactate Trend  05-14 @ 08:36 Lactate:1.6         CAPILLARY BLOOD GLUCOSE      POCT Blood Glucose.: 162 mg/dL (16 May 2022 11:15)        RADIOLOGY & ADDITIONAL TESTS:    Imaging Personally Reviewed:  [ ] YES  [ ] NO    HEALTH ISSUES - PROBLEM Dx:          PHYSICAL EXAM:  GENERAL: NAD, well-developed.  HEAD:  Atraumatic, Normocephalic.  EYES: EOMI, PERRLA, conjunctiva and sclera clear.  NECK: Supple, No JVD.  CHEST/LUNG: Clear to auscultation bilaterally; No wheeze.  HEART: Irregular rate and rhythm; S1 S2.   ABDOMEN: Soft, Nontender, Nondistended; Bowel sounds present.  EXTREMITIES: Left leg edema chronic > Right.   PSYCH: AAOx3.  NEUROLOGY: non-focal.  SKIN: No rashes or lesions.
Patient is a 80 y/o gentleman with PMHx of Afib on xarelto last dose 5/12 , DM, Gout, HTN, HLD, CAD presents for evaluation of bloody stools. Patient noticed after recent polypectomy. Patient had C-scope 5/16 with intervention from polypectomy site to further stop bleeding. Patient tolerated procedure well, no stools overnight, no abdominal pain.         PAST MEDICAL & SURGICAL HISTORY:  Atrial fibrillation  Coronary artery disease  Hyperlipidemia  Hypertension  Gout  Diabetes mellitus  VNII on CPAP  History of heart artery stent        Home Medications:  Actoplus Met 15 mg-500 mg oral tablet: 1 tab(s) orally 2 times a day (28 Apr 2022 08:05)  allopurinol: 150 milligram(s) orally once a day (28 Apr 2022 08:05)  amLODIPine 10 mg oral tablet: 1 tab(s) orally once a day (28 Apr 2022 08:05)  aspirin 81 mg oral tablet, chewable: 1 tab(s) orally once a day (28 Apr 2022 08:05)  atorvastatin 40 mg oral tablet: 1 tab(s) orally once a day (at bedtime) (28 Apr 2022 08:05)  isosorbide mononitrate 120 mg oral tablet, extended release: 1 tab(s) orally once a day (28 Apr 2022 08:05)  Xarelto 15 mg oral tablet: 1 tab(s) orally once a day (in the evening) (28 Apr 2022 08:06)        Allergies  No Known Allergies      Review of Systems:   Constitutional:  No Fever, No Chills  ENT/Mouth:  No Hearing Changes,  No Difficulty Swallowing  Eyes:  No Eye Pain, No Vision Changes  Cardiovascular:  No Chest Pain, No Palpitations  Respiratory:  No Cough, No Dyspnea  Gastrointestinal:  As described in HPI  Musculoskeletal:  No Joint Swelling, No Back Pain  Skin:  No Skin Lesions, No Jaundice  Neuro:  No Syncope, No Dizziness  Heme/Lymph:  No Bruising, No Bleeding.          Vital Signs   T(F): 97.9 (17 May 2022 04:11), Max: 97.9 (16 May 2022 15:42)  HR: 71 (17 May 2022 04:11) (54 - 76)  BP: 131/63 (17 May 2022 04:11) (125/81 - 169/72)  RR: 18 (17 May 2022 04:11) (18 - 18)  SpO2: 99% (16 May 2022 18:25) (97% - 99%)  Constitutional: No acute distress.  Eyes:. Conjunctivae are clear, Sclera is non-icteric.  Ears Nose and Throat: The external ears are normal appearing,  Oral mucosa is pink and moist.  Respiratory:  No signs of respiratory distress. Lung sounds are clear bilaterally.  Cardiovascular:  S1 S2, Regular rate and rhythm.  GI: Abdomen is soft, symmetric, and non-tender without distention. There are no visible lesions or scars. Bowel sounds are present and normoactive in all four quadrants. No masses, hepatomegaly, or splenomegaly are noted.   Neuro: No Tremor, No involuntary movements  Skin: No rashes, No Jaundice.      Labs:                        7.7    6.58  )-----------( 216      ( 17 May 2022 05:08 )             23.1     05-17    138  |  106  |  12  ----------------------------<  111<H>  4.1   |  22  |  1.3    Ca    8.6      17 May 2022 05:08  Mg     1.9     05-17    TPro

## 2022-05-17 NOTE — PROGRESS NOTE ADULT - ASSESSMENT
Patient is a 82 y/o gentleman with PMHx of Afib on xarelto last dose 5/12 , DM, Gout, HTN, HLD, CAD presents for evaluation of bloody stools. Patient noticed after recent polypectomy. Patient had C-scope 5/16 with intervention from polypectomy site to further stop bleeding. Patient tolerated procedure well, no stools overnight, no abdominal pain. Labs this morning appropriate. Can start clear liquid diet, can resume AC this evening. If tolerating diet can advance to GI soft and discharge in the afternoon.     Hematochezia / Polypectomy bleed  - Hemodynamically stable  - S/P C-scope with intervention on bleeding polypectomy site   - Without stools overnight, stable Hgb  - Can start clear liquids, can advance to GI soft for lunch  - Would like him on GI soft diet for two days after discharge than may resume regular diet  - May start AC this evening  - Can discharge this afternoon if without any issues

## 2022-05-17 NOTE — SBIRT NOTE ADULT - NSSBIRTALCPOSREINDET_GEN_A_CORE
AUDIT score -- 4 / Low Risk range   Pt. conveyed awareness and insight into current alcohol use, and was provided with psychoeducation about the impact that alcohol use can have on health and overall functioning. SW encouraged pt to remain aware of moderate alcohol use to sustain low risk of negative health complications from drinking

## 2022-05-17 NOTE — DISCHARGE NOTE PROVIDER - HOSPITAL COURSE
81 year old male with PMH of HTN, HLD, T2DM, Atrial Fibrillation on Xarelto, CAD s/p stents placement (2014), gout, VINI on CPAP, LLE lymphedema, CKD presents to ED for LGIB. Pt had colonoscopy with polypectomy 2 weeks ago. Two days ago patient noted red blood in stool. Denies any associated abdominal pain. However, endorses feeling lightheaded. In the ED /55, HR 74, T 96, O2 99% RA  labs: hgb 9.9,     A/P:   #Lower GI bleeding:   bloody stool, hematochezia.   Recent colonoscopy and polypectomy, patient takes Xarelto.  colonoscopy 4/28 4 cm polyp in transverse colon s/p EMR 5 endoclips (pathology TA), one ascending colon polyp TA, one hepatic flexure polyp TA   Hb dropped to 7.5 from 9.9 s/p 1 packed RBC transfusion    Colonoscopy: small ulcerations that were clipped    # Chronic Atrial Fibrillation:   Rate is controlled  Xarelto can be resumed     CAD s/p PCI in 2104  Continue ASA, Imdur and Lipitor.     # CKD stage 3  Cr  1.4, previous baseline around 2.     #HTN: Continue Amlodipine and Imdur.

## 2022-05-17 NOTE — DISCHARGE NOTE PROVIDER - CARE PROVIDER_API CALL
Naren Bose)  Gastroenterology; Internal Medicine  [address] [place] [address]  Phone: [phone]  Fax: [phone]  Follow Up Time: 2 weeks

## 2022-05-17 NOTE — DISCHARGE NOTE NURSING/CASE MANAGEMENT/SOCIAL WORK - PATIENT PORTAL LINK FT
You can access the FollowMyHealth Patient Portal offered by [facility] by registering at the following website: [email or web address]. By joining [employer]’s FollowMyHealth portal, you will also be able to view your health information using other applications (apps) compatible with our system.

## 2022-05-17 NOTE — DISCHARGE NOTE PROVIDER - NSDCMRMEDTOKEN_GEN_ALL_CORE_FT
allopurinol: 150 milligram(s) orally once a day  amLODIPine 10 mg oral tablet: 1 tab(s) orally once a day  Aspirin Enteric Coated 81 mg oral delayed release tablet: 1 tab(s) orally once a day  atorvastatin 40 mg oral tablet: 1 tab(s) orally once a day (at bedtime)  fosinopril 40 mg oral tablet: 1 tab(s) orally once a day  hydrALAZINE 25 mg oral tablet: 1 tab(s) orally 2 times a day  hydroCHLOROthiazide 12.5 mg oral capsule: 1 cap(s) orally once a day  isosorbide mononitrate 120 mg oral tablet, extended release: 1 tab(s) orally once a day  pioglitazone 30 mg oral tablet: 1 tab(s) orally once a day  Xarelto 15 mg oral tablet: 1 tab(s) orally once a day (in the evening)

## 2022-05-18 LAB — SURGICAL PATHOLOGY STUDY: SIGNIFICANT CHANGE UP

## 2022-05-23 DIAGNOSIS — D62 ACUTE POSTHEMORRHAGIC ANEMIA: ICD-10-CM

## 2022-05-23 DIAGNOSIS — Z79.01 LONG TERM (CURRENT) USE OF ANTICOAGULANTS: ICD-10-CM

## 2022-05-23 DIAGNOSIS — I48.20 CHRONIC ATRIAL FIBRILLATION, UNSPECIFIED: ICD-10-CM

## 2022-05-23 DIAGNOSIS — Z79.84 LONG TERM (CURRENT) USE OF ORAL HYPOGLYCEMIC DRUGS: ICD-10-CM

## 2022-05-23 DIAGNOSIS — Z79.82 LONG TERM (CURRENT) USE OF ASPIRIN: ICD-10-CM

## 2022-05-23 DIAGNOSIS — E78.5 HYPERLIPIDEMIA, UNSPECIFIED: ICD-10-CM

## 2022-05-23 DIAGNOSIS — I25.10 ATHEROSCLEROTIC HEART DISEASE OF NATIVE CORONARY ARTERY WITHOUT ANGINA PECTORIS: ICD-10-CM

## 2022-05-23 DIAGNOSIS — K91.89 OTHER POSTPROCEDURAL COMPLICATIONS AND DISORDERS OF DIGESTIVE SYSTEM: ICD-10-CM

## 2022-05-23 DIAGNOSIS — I12.9 HYPERTENSIVE CHRONIC KIDNEY DISEASE WITH STAGE 1 THROUGH STAGE 4 CHRONIC KIDNEY DISEASE, OR UNSPECIFIED CHRONIC KIDNEY DISEASE: ICD-10-CM
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
## 2022-05-23 DIAGNOSIS — Z99.89 DEPENDENCE ON OTHER ENABLING MACHINES AND DEVICES: ICD-10-CM

## 2022-05-23 DIAGNOSIS — K92.2 GASTROINTESTINAL HEMORRHAGE, UNSPECIFIED: ICD-10-CM

## 2022-05-23 DIAGNOSIS — M10.9 GOUT, UNSPECIFIED: ICD-10-CM

## 2022-05-23 DIAGNOSIS — K63.3 ULCER OF INTESTINE: ICD-10-CM

## 2022-05-23 DIAGNOSIS — K57.30 DIVERTICULOSIS OF LARGE INTESTINE WITHOUT PERFORATION OR ABSCESS WITHOUT BLEEDING: ICD-10-CM

## 2022-05-23 DIAGNOSIS — E11.22 TYPE 2 DIABETES MELLITUS WITH DIABETIC CHRONIC KIDNEY DISEASE: ICD-10-CM

## 2022-05-23 DIAGNOSIS — N18.30 CHRONIC KIDNEY DISEASE, STAGE 3 UNSPECIFIED: ICD-10-CM

## 2022-05-23 DIAGNOSIS — K63.5 POLYP OF COLON: ICD-10-CM

## 2022-05-23 DIAGNOSIS — Z95.5 PRESENCE OF CORONARY ANGIOPLASTY IMPLANT AND GRAFT: ICD-10-CM

## 2022-05-23 DIAGNOSIS — G47.33 OBSTRUCTIVE SLEEP APNEA (ADULT) (PEDIATRIC): ICD-10-CM

## 2022-05-23 DIAGNOSIS — K92.1 MELENA: ICD-10-CM

## 2022-06-23 ENCOUNTER — OUTPATIENT (OUTPATIENT)
Dept: OUTPATIENT SERVICES | Facility: HOSPITAL | Age: 82
LOS: 1 days | Discharge: HOME | End: 2022-06-23

## 2022-06-23 ENCOUNTER — APPOINTMENT (OUTPATIENT)
Dept: BURN CARE | Facility: CLINIC | Age: 82
End: 2022-06-23
Payer: MEDICARE

## 2022-06-23 DIAGNOSIS — Z95.5 PRESENCE OF CORONARY ANGIOPLASTY IMPLANT AND GRAFT: Chronic | ICD-10-CM

## 2022-06-23 PROCEDURE — 99212 OFFICE O/P EST SF 10 MIN: CPT

## 2022-06-27 ENCOUNTER — OUTPATIENT (OUTPATIENT)
Dept: OUTPATIENT SERVICES | Facility: HOSPITAL | Age: 82
LOS: 1 days | Discharge: HOME | End: 2022-06-27

## 2022-06-27 VITALS
TEMPERATURE: 98 F | SYSTOLIC BLOOD PRESSURE: 76 MMHG | HEART RATE: 46 BPM | WEIGHT: 240.08 LBS | HEIGHT: 72 IN | OXYGEN SATURATION: 99 % | RESPIRATION RATE: 176 BRPM

## 2022-06-27 VITALS
HEIGHT: 72 IN | TEMPERATURE: 98 F | SYSTOLIC BLOOD PRESSURE: 76 MMHG | OXYGEN SATURATION: 99 % | RESPIRATION RATE: 176 BRPM | WEIGHT: 240.08 LBS | HEART RATE: 46 BPM

## 2022-06-27 DIAGNOSIS — Z95.5 PRESENCE OF CORONARY ANGIOPLASTY IMPLANT AND GRAFT: Chronic | ICD-10-CM

## 2022-06-27 LAB — GLUCOSE BLDC GLUCOMTR-MCNC: 129 MG/DL — HIGH (ref 70–99)

## 2022-06-27 RX ORDER — FONDAPARINUX SODIUM 2.5 MG/.5ML
1 INJECTION, SOLUTION SUBCUTANEOUS
Qty: 0 | Refills: 0 | DISCHARGE

## 2022-06-27 RX ORDER — ASPIRIN/CALCIUM CARB/MAGNESIUM 324 MG
1 TABLET ORAL
Qty: 0 | Refills: 0 | DISCHARGE

## 2022-06-27 RX ORDER — ALLOPURINOL 300 MG
150 TABLET ORAL
Qty: 0 | Refills: 0 | DISCHARGE

## 2022-06-27 RX ORDER — PIOGLITAZONE HYDROCHLORIDE 15 MG/1
1 TABLET ORAL
Qty: 0 | Refills: 0 | DISCHARGE

## 2022-06-27 NOTE — CHART NOTE - NSCHARTNOTEFT_GEN_A_CORE
PACU ANESTHESIA ADMISSION NOTE      Procedure  Cataract extraction with IOL implant OS  Post op diagnosis: Cataract left eye     ____  Intubated  TV:______       Rate: ______      FiO2: ______    __x__  Patent Airway    __x__  Full return of protective reflexes    __x__  Full recovery from anesthesia / back to baseline status    Vitals:  T(C): 36.7 (06-27-22 @ 09:55), Max: 36.7 (06-27-22 @ 09:30)  HR: 46 (06-27-22 @ 09:55) (46 - 46)  BP: 76/- (06-27-22 @ 09:55) (76/- - 76/-)  RR: 176 (06-27-22 @ 09:55) (176 - 176)  SpO2: 99% (06-27-22 @ 09:55) (99% - 99%)    Mental Status:  __x__ Awake   __x___ Alert   _____ Drowsy   _____ Sedated    Nausea/Vomiting:  ____ NO  ___x___Yes,   See Post - Op Orders          Pain Scale (0-10):  _____    Treatment: ____ None    x____ See Post - Op/PCA Orders    Post - Operative Fluids:   ____ Oral   _x___ See Post - Op Orders    Plan: Discharge:   _x___Home       _____Floor     _____Critical Care    _____  Other:_________________    Comments: uneventful anesthesia course no complications. VItals stable. Pt transferred to PACU

## 2022-06-27 NOTE — ASU DISCHARGE PLAN (ADULT/PEDIATRIC) - NS MD DC FALL RISK RISK
For information on Fall & Injury Prevention, visit: [email or web address] OR  [email or web address] OR  https://www.cdc.gov/steadi/patient.html

## 2022-06-27 NOTE — ASU PATIENT PROFILE, ADULT - FALL HARM RISK - UNIVERSAL INTERVENTIONS
Bed in lowest position, wheels locked, appropriate side rails in place/Call bell, personal items and telephone in reach/Instruct patient to call for assistance before getting out of bed or chair/Non-slip footwear when patient is out of bed/[place] to call system/Physically safe environment - no spills, clutter or unnecessary equipment/Purposeful Proactive Rounding/Room/bathroom lighting operational, light cord in reach/Unable to comprehend

## 2022-06-30 DIAGNOSIS — I12.9 HYPERTENSIVE CHRONIC KIDNEY DISEASE WITH STAGE 1 THROUGH STAGE 4 CHRONIC KIDNEY DISEASE, OR UNSPECIFIED CHRONIC KIDNEY DISEASE: ICD-10-CM

## 2022-06-30 DIAGNOSIS — E78.00 PURE HYPERCHOLESTEROLEMIA, UNSPECIFIED: ICD-10-CM

## 2022-06-30 DIAGNOSIS — G47.33 OBSTRUCTIVE SLEEP APNEA (ADULT) (PEDIATRIC): ICD-10-CM

## 2022-06-30 DIAGNOSIS — I25.10 ATHEROSCLEROTIC HEART DISEASE OF NATIVE CORONARY ARTERY WITHOUT ANGINA PECTORIS: ICD-10-CM

## 2022-06-30 DIAGNOSIS — Z95.5 PRESENCE OF CORONARY ANGIOPLASTY IMPLANT AND GRAFT: ICD-10-CM

## 2022-06-30 DIAGNOSIS — Z99.89 DEPENDENCE ON OTHER ENABLING MACHINES AND DEVICES: ICD-10-CM

## 2022-06-30 DIAGNOSIS — H26.9 UNSPECIFIED CATARACT: ICD-10-CM

## 2022-06-30 DIAGNOSIS — E11.22 TYPE 2 DIABETES MELLITUS WITH DIABETIC CHRONIC KIDNEY DISEASE: ICD-10-CM

## 2022-06-30 DIAGNOSIS — I48.91 UNSPECIFIED ATRIAL FIBRILLATION: ICD-10-CM

## 2022-06-30 DIAGNOSIS — Z87.891 PERSONAL HISTORY OF NICOTINE DEPENDENCE: ICD-10-CM

## 2022-06-30 DIAGNOSIS — Z79.82 LONG TERM (CURRENT) USE OF ASPIRIN: ICD-10-CM

## 2022-06-30 DIAGNOSIS — N18.9 CHRONIC KIDNEY DISEASE, UNSPECIFIED: ICD-10-CM

## 2022-06-30 DIAGNOSIS — M10.9 GOUT, UNSPECIFIED: ICD-10-CM

## 2022-06-30 DIAGNOSIS — Z79.01 LONG TERM (CURRENT) USE OF ANTICOAGULANTS: ICD-10-CM

## 2022-06-30 DIAGNOSIS — E11.36 TYPE 2 DIABETES MELLITUS WITH DIABETIC CATARACT: ICD-10-CM

## 2022-06-30 DIAGNOSIS — Z79.84 LONG TERM (CURRENT) USE OF ORAL HYPOGLYCEMIC DRUGS: ICD-10-CM

## 2022-09-21 ENCOUNTER — NON-APPOINTMENT (OUTPATIENT)
Age: 82
End: 2022-09-21

## 2022-10-07 NOTE — OCCUPATIONAL THERAPY INITIAL EVALUATION ADULT - PRECAUTIONS/LIMITATIONS, REHAB EVAL
fall precautions Infliximab Counseling:  I discussed with the patient the risks of infliximab including but not limited to myelosuppression, immunosuppression, autoimmune hepatitis, demyelinating diseases, lymphoma, and serious infections.  The patient understands that monitoring is required including a PPD at baseline and must alert us or the primary physician if symptoms of infection or other concerning signs are noted.

## 2022-10-24 ENCOUNTER — NON-APPOINTMENT (OUTPATIENT)
Age: 82
End: 2022-10-24

## 2023-04-12 ENCOUNTER — EMERGENCY (EMERGENCY)
Facility: HOSPITAL | Age: 83
LOS: 0 days | Discharge: HOME | End: 2023-04-12
Attending: EMERGENCY MEDICINE
Payer: MEDICARE

## 2023-04-12 VITALS
RESPIRATION RATE: 18 BRPM | DIASTOLIC BLOOD PRESSURE: 79 MMHG | HEIGHT: 72 IN | HEART RATE: 69 BPM | SYSTOLIC BLOOD PRESSURE: 180 MMHG | OXYGEN SATURATION: 99 % | TEMPERATURE: 97 F | WEIGHT: 240.08 LBS

## 2023-04-12 DIAGNOSIS — Z95.5 PRESENCE OF CORONARY ANGIOPLASTY IMPLANT AND GRAFT: Chronic | ICD-10-CM

## 2023-04-12 DIAGNOSIS — Z79.01 LONG TERM (CURRENT) USE OF ANTICOAGULANTS: ICD-10-CM

## 2023-04-12 DIAGNOSIS — I48.91 UNSPECIFIED ATRIAL FIBRILLATION: ICD-10-CM

## 2023-04-12 DIAGNOSIS — G47.33 OBSTRUCTIVE SLEEP APNEA (ADULT) (PEDIATRIC): ICD-10-CM

## 2023-04-12 DIAGNOSIS — E11.9 TYPE 2 DIABETES MELLITUS WITHOUT COMPLICATIONS: ICD-10-CM

## 2023-04-12 DIAGNOSIS — M10.9 GOUT, UNSPECIFIED: ICD-10-CM

## 2023-04-12 DIAGNOSIS — I25.10 ATHEROSCLEROTIC HEART DISEASE OF NATIVE CORONARY ARTERY WITHOUT ANGINA PECTORIS: ICD-10-CM

## 2023-04-12 DIAGNOSIS — Z79.82 LONG TERM (CURRENT) USE OF ASPIRIN: ICD-10-CM

## 2023-04-12 DIAGNOSIS — I10 ESSENTIAL (PRIMARY) HYPERTENSION: ICD-10-CM

## 2023-04-12 DIAGNOSIS — R04.0 EPISTAXIS: ICD-10-CM

## 2023-04-12 DIAGNOSIS — Z99.89 DEPENDENCE ON OTHER ENABLING MACHINES AND DEVICES: ICD-10-CM

## 2023-04-12 DIAGNOSIS — Z95.5 PRESENCE OF CORONARY ANGIOPLASTY IMPLANT AND GRAFT: ICD-10-CM

## 2023-04-12 PROCEDURE — 99282 EMERGENCY DEPT VISIT SF MDM: CPT

## 2023-04-12 PROCEDURE — 99284 EMERGENCY DEPT VISIT MOD MDM: CPT

## 2023-04-12 NOTE — ED ADULT NURSE NOTE - TEMPLATE
This RN found a can of tobacco in pt room, pt educated on hospital tobacco policy. Tobacco can placed in pt drawer.      General

## 2023-04-12 NOTE — ED PROVIDER NOTE - PHYSICAL EXAMINATION
VITAL SIGNS: I have reviewed nursing notes and confirm.  CONSTITUTIONAL: Well-developed; well-nourished; in no acute distress.   SKIN: skin exam is warm and dry, no acute rash.    HEAD: Normocephalic; atraumatic.  EYES: conjunctiva and sclera clear.  ENT: right nare anterior oozing No nasal discharge; airway clear.  CARD: S1, S2 normal; no murmurs, gallops, or rubs. Regular rate and rhythm.   RESP: No wheezes, rales or rhonchi.  EXT: Normal ROM.  No clubbing, cyanosis or edema.   NEURO: Alert, oriented, grossly unremarkable

## 2023-04-12 NOTE — ED PROVIDER NOTE - CARE PROVIDER_API CALL
Efra Diaz)  Otolaryngology  [address] [place] [address], [address] [place] [address]  Phone: [phone]  Fax: [phone]  Follow Up Time: Routine

## 2023-04-12 NOTE — ED PROVIDER NOTE - ATTENDING APP SHARED VISIT CONTRIBUTION OF CARE
Patient on Xarelto coming in for oozing from right nose since 3 AM.  No trauma.    Exam: Slow ooze from anterior nare, normal oropharynx, no acute distress  Plan: Topical hemostatic agent with direct pressure, packing if inadequate hemostasis

## 2023-04-12 NOTE — ED PROVIDER NOTE - OBJECTIVE STATEMENT
Patient is an 82-year-old male past history of hypertension and A-fib on Xarelto here for evaluation of right nare epistaxis that began around 3 AM this morning and has been constant since.  Patient notes picking nose in the middle of the night.  Patient denies lightheadedness, weakness, numbness, fever, chills, headache, chest pain, palpitation

## 2023-05-10 NOTE — SWALLOW BEDSIDE ASSESSMENT ADULT - SWALLOW EVAL: DIAGNOSIS
+thick copious maroon colored mucus throughout oral cavity/extending into the pharynx, +oral care provided w/ deep pharyngeal suctioning w/ RN assistance
+ toleration for regular, soft solids, thin liquids without overt s/s of aspiration/penetration.
suspected pharyngeal dysphagia for thin liquids. + toleration observed without overt symptoms of penetration/aspiration for puree and nectar thick liquids
No (0)

## 2023-06-01 ENCOUNTER — INPATIENT (INPATIENT)
Facility: HOSPITAL | Age: 83
LOS: 1 days | Discharge: HOME | DRG: 554 | End: 2023-06-03
Attending: INTERNAL MEDICINE | Admitting: INTERNAL MEDICINE
Payer: MEDICARE

## 2023-06-01 VITALS
SYSTOLIC BLOOD PRESSURE: 190 MMHG | DIASTOLIC BLOOD PRESSURE: 85 MMHG | HEIGHT: 72 IN | TEMPERATURE: 98 F | OXYGEN SATURATION: 97 % | WEIGHT: 242.07 LBS | HEART RATE: 60 BPM | RESPIRATION RATE: 20 BRPM

## 2023-06-01 DIAGNOSIS — M79.606 PAIN IN LEG, UNSPECIFIED: ICD-10-CM

## 2023-06-01 DIAGNOSIS — Z95.5 PRESENCE OF CORONARY ANGIOPLASTY IMPLANT AND GRAFT: Chronic | ICD-10-CM

## 2023-06-01 LAB
ALBUMIN SERPL ELPH-MCNC: 4.2 G/DL — SIGNIFICANT CHANGE UP (ref 3.5–5.2)
ALP SERPL-CCNC: 76 U/L — SIGNIFICANT CHANGE UP (ref 30–115)
ALT FLD-CCNC: 14 U/L — SIGNIFICANT CHANGE UP (ref 0–41)
ANION GAP SERPL CALC-SCNC: 15 MMOL/L — HIGH (ref 7–14)
AST SERPL-CCNC: 31 U/L — SIGNIFICANT CHANGE UP (ref 0–41)
BASOPHILS # BLD AUTO: 0.04 K/UL — SIGNIFICANT CHANGE UP (ref 0–0.2)
BASOPHILS NFR BLD AUTO: 0.5 % — SIGNIFICANT CHANGE UP (ref 0–1)
BILIRUB SERPL-MCNC: 0.7 MG/DL — SIGNIFICANT CHANGE UP (ref 0.2–1.2)
BUN SERPL-MCNC: 31 MG/DL — HIGH (ref 10–20)
CALCIUM SERPL-MCNC: 9.1 MG/DL — SIGNIFICANT CHANGE UP (ref 8.4–10.5)
CHLORIDE SERPL-SCNC: 106 MMOL/L — SIGNIFICANT CHANGE UP (ref 98–110)
CO2 SERPL-SCNC: 19 MMOL/L — SIGNIFICANT CHANGE UP (ref 17–32)
CREAT SERPL-MCNC: 1.5 MG/DL — SIGNIFICANT CHANGE UP (ref 0.7–1.5)
EGFR: 46 ML/MIN/1.73M2 — LOW
EOSINOPHIL # BLD AUTO: 0.02 K/UL — SIGNIFICANT CHANGE UP (ref 0–0.7)
EOSINOPHIL NFR BLD AUTO: 0.2 % — SIGNIFICANT CHANGE UP (ref 0–8)
GLUCOSE SERPL-MCNC: 155 MG/DL — HIGH (ref 70–99)
HCT VFR BLD CALC: 35.6 % — LOW (ref 42–52)
HGB BLD-MCNC: 11.8 G/DL — LOW (ref 14–18)
IMM GRANULOCYTES NFR BLD AUTO: 0.5 % — HIGH (ref 0.1–0.3)
LYMPHOCYTES # BLD AUTO: 0.89 K/UL — LOW (ref 1.2–3.4)
LYMPHOCYTES # BLD AUTO: 10.6 % — LOW (ref 20.5–51.1)
MCHC RBC-ENTMCNC: 32.7 PG — HIGH (ref 27–31)
MCHC RBC-ENTMCNC: 33.1 G/DL — SIGNIFICANT CHANGE UP (ref 32–37)
MCV RBC AUTO: 98.6 FL — HIGH (ref 80–94)
MONOCYTES # BLD AUTO: 0.74 K/UL — HIGH (ref 0.1–0.6)
MONOCYTES NFR BLD AUTO: 8.8 % — SIGNIFICANT CHANGE UP (ref 1.7–9.3)
NEUTROPHILS # BLD AUTO: 6.66 K/UL — HIGH (ref 1.4–6.5)
NEUTROPHILS NFR BLD AUTO: 79.4 % — HIGH (ref 42.2–75.2)
NRBC # BLD: 0 /100 WBCS — SIGNIFICANT CHANGE UP (ref 0–0)
PLATELET # BLD AUTO: 201 K/UL — SIGNIFICANT CHANGE UP (ref 130–400)
PMV BLD: 10.3 FL — SIGNIFICANT CHANGE UP (ref 7.4–10.4)
POTASSIUM SERPL-MCNC: 4.4 MMOL/L — SIGNIFICANT CHANGE UP (ref 3.5–5)
POTASSIUM SERPL-SCNC: 4.4 MMOL/L — SIGNIFICANT CHANGE UP (ref 3.5–5)
PROT SERPL-MCNC: 7 G/DL — SIGNIFICANT CHANGE UP (ref 6–8)
RBC # BLD: 3.61 M/UL — LOW (ref 4.7–6.1)
RBC # FLD: 14.8 % — HIGH (ref 11.5–14.5)
SODIUM SERPL-SCNC: 140 MMOL/L — SIGNIFICANT CHANGE UP (ref 135–146)
WBC # BLD: 8.39 K/UL — SIGNIFICANT CHANGE UP (ref 4.8–10.8)
WBC # FLD AUTO: 8.39 K/UL — SIGNIFICANT CHANGE UP (ref 4.8–10.8)

## 2023-06-01 PROCEDURE — 86850 RBC ANTIBODY SCREEN: CPT

## 2023-06-01 PROCEDURE — 73590 X-RAY EXAM OF LOWER LEG: CPT | Mod: 26,LT

## 2023-06-01 PROCEDURE — 80053 COMPREHEN METABOLIC PANEL: CPT

## 2023-06-01 PROCEDURE — 99285 EMERGENCY DEPT VISIT HI MDM: CPT

## 2023-06-01 PROCEDURE — 84443 ASSAY THYROID STIM HORMONE: CPT

## 2023-06-01 PROCEDURE — 86900 BLOOD TYPING SEROLOGIC ABO: CPT

## 2023-06-01 PROCEDURE — 82962 GLUCOSE BLOOD TEST: CPT

## 2023-06-01 PROCEDURE — 73562 X-RAY EXAM OF KNEE 3: CPT | Mod: 26,LT

## 2023-06-01 PROCEDURE — 85025 COMPLETE CBC W/AUTO DIFF WBC: CPT

## 2023-06-01 PROCEDURE — 84100 ASSAY OF PHOSPHORUS: CPT

## 2023-06-01 PROCEDURE — 86901 BLOOD TYPING SEROLOGIC RH(D): CPT

## 2023-06-01 PROCEDURE — 73706 CT ANGIO LWR EXTR W/O&W/DYE: CPT | Mod: 26,LT,MA

## 2023-06-01 PROCEDURE — 97162 PT EVAL MOD COMPLEX 30 MIN: CPT | Mod: GP

## 2023-06-01 PROCEDURE — 73620 X-RAY EXAM OF FOOT: CPT | Mod: 26,LT

## 2023-06-01 PROCEDURE — 99222 1ST HOSP IP/OBS MODERATE 55: CPT | Mod: GC

## 2023-06-01 PROCEDURE — 97116 GAIT TRAINING THERAPY: CPT | Mod: GP

## 2023-06-01 PROCEDURE — 83735 ASSAY OF MAGNESIUM: CPT

## 2023-06-01 PROCEDURE — 36415 COLL VENOUS BLD VENIPUNCTURE: CPT

## 2023-06-01 PROCEDURE — 83036 HEMOGLOBIN GLYCOSYLATED A1C: CPT

## 2023-06-01 PROCEDURE — G0378: CPT

## 2023-06-01 PROCEDURE — 93970 EXTREMITY STUDY: CPT | Mod: 26

## 2023-06-01 PROCEDURE — 80061 LIPID PANEL: CPT

## 2023-06-01 PROCEDURE — 85027 COMPLETE CBC AUTOMATED: CPT

## 2023-06-01 RX ORDER — ACETAMINOPHEN 500 MG
650 TABLET ORAL EVERY 6 HOURS
Refills: 0 | Status: DISCONTINUED | OUTPATIENT
Start: 2023-06-01 | End: 2023-06-03

## 2023-06-01 RX ORDER — KETOROLAC TROMETHAMINE 30 MG/ML
15 SYRINGE (ML) INJECTION ONCE
Refills: 0 | Status: DISCONTINUED | OUTPATIENT
Start: 2023-06-01 | End: 2023-06-01

## 2023-06-01 RX ORDER — ASPIRIN/CALCIUM CARB/MAGNESIUM 324 MG
81 TABLET ORAL DAILY
Refills: 0 | Status: DISCONTINUED | OUTPATIENT
Start: 2023-06-01 | End: 2023-06-03

## 2023-06-01 RX ORDER — ATORVASTATIN CALCIUM 80 MG/1
40 TABLET, FILM COATED ORAL AT BEDTIME
Refills: 0 | Status: DISCONTINUED | OUTPATIENT
Start: 2023-06-01 | End: 2023-06-03

## 2023-06-01 RX ORDER — DEXTROSE 50 % IN WATER 50 %
25 SYRINGE (ML) INTRAVENOUS ONCE
Refills: 0 | Status: DISCONTINUED | OUTPATIENT
Start: 2023-06-01 | End: 2023-06-03

## 2023-06-01 RX ORDER — DEXTROSE 50 % IN WATER 50 %
12.5 SYRINGE (ML) INTRAVENOUS ONCE
Refills: 0 | Status: DISCONTINUED | OUTPATIENT
Start: 2023-06-01 | End: 2023-06-03

## 2023-06-01 RX ORDER — AMLODIPINE BESYLATE 2.5 MG/1
10 TABLET ORAL ONCE
Refills: 0 | Status: COMPLETED | OUTPATIENT
Start: 2023-06-01 | End: 2023-06-01

## 2023-06-01 RX ORDER — DEXTROSE 50 % IN WATER 50 %
15 SYRINGE (ML) INTRAVENOUS ONCE
Refills: 0 | Status: DISCONTINUED | OUTPATIENT
Start: 2023-06-01 | End: 2023-06-03

## 2023-06-01 RX ORDER — GLUCAGON INJECTION, SOLUTION 0.5 MG/.1ML
1 INJECTION, SOLUTION SUBCUTANEOUS ONCE
Refills: 0 | Status: DISCONTINUED | OUTPATIENT
Start: 2023-06-01 | End: 2023-06-03

## 2023-06-01 RX ORDER — SODIUM CHLORIDE 9 MG/ML
1000 INJECTION, SOLUTION INTRAVENOUS
Refills: 0 | Status: DISCONTINUED | OUTPATIENT
Start: 2023-06-01 | End: 2023-06-03

## 2023-06-01 RX ORDER — LISINOPRIL 2.5 MG/1
40 TABLET ORAL DAILY
Refills: 0 | Status: DISCONTINUED | OUTPATIENT
Start: 2023-06-01 | End: 2023-06-03

## 2023-06-01 RX ORDER — AMLODIPINE BESYLATE 2.5 MG/1
10 TABLET ORAL DAILY
Refills: 0 | Status: DISCONTINUED | OUTPATIENT
Start: 2023-06-01 | End: 2023-06-02

## 2023-06-01 RX ORDER — ISOSORBIDE MONONITRATE 60 MG/1
120 TABLET, EXTENDED RELEASE ORAL DAILY
Refills: 0 | Status: DISCONTINUED | OUTPATIENT
Start: 2023-06-01 | End: 2023-06-03

## 2023-06-01 RX ORDER — ALLOPURINOL 300 MG
150 TABLET ORAL DAILY
Refills: 0 | Status: DISCONTINUED | OUTPATIENT
Start: 2023-06-01 | End: 2023-06-02

## 2023-06-01 RX ORDER — HYDRALAZINE HCL 50 MG
25 TABLET ORAL
Refills: 0 | Status: DISCONTINUED | OUTPATIENT
Start: 2023-06-01 | End: 2023-06-03

## 2023-06-01 RX ORDER — RIVAROXABAN 15 MG-20MG
15 KIT ORAL
Refills: 0 | Status: DISCONTINUED | OUTPATIENT
Start: 2023-06-01 | End: 2023-06-03

## 2023-06-01 RX ORDER — INSULIN LISPRO 100/ML
VIAL (ML) SUBCUTANEOUS
Refills: 0 | Status: DISCONTINUED | OUTPATIENT
Start: 2023-06-01 | End: 2023-06-03

## 2023-06-01 RX ADMIN — AMLODIPINE BESYLATE 10 MILLIGRAM(S): 2.5 TABLET ORAL at 20:53

## 2023-06-01 RX ADMIN — Medication 15 MILLIGRAM(S): at 10:20

## 2023-06-01 RX ADMIN — ATORVASTATIN CALCIUM 40 MILLIGRAM(S): 80 TABLET, FILM COATED ORAL at 23:59

## 2023-06-01 NOTE — ED PROVIDER NOTE - ATTENDING CONTRIBUTION TO CARE
82-year-old male presenting today with leg pain.  Patient had imaging performed to rule out DVT.  CTA was performed to rule out vascular pathology.  Patient signed out pending CT results.

## 2023-06-01 NOTE — H&P ADULT - ASSESSMENT
HPI: 82-year-old male history of NIDDM, hypertension, hyperlipidemia, CAD, A-fib on Xarelto presents with left leg pain for a few days.  Patient admits for the last few days having pain with walking on the left leg. Yesterday was walking and heard a pop sensation on his left knee but denies any falls.  Has had vascular vein in the distant past for the left lower leg.  Denies any numbness tingling falls no other acute complaints. Accompanied by NATA Salgado.     In the ER:   VS: /85, HR 60, RR 20, T 36.8 oral, SpO2 97% on RA  EKG: Afib RBB  Sig Labs: Hgb 11.8, Cr 1.5 eGFR 46   Imagin) X-ray L Knee + Foot:: No acute osseous abnormality seen. Moderate knee joint effusion.   2) CTA LLE: High-grade stenosis of the proximal left superficial femoral artery secondary to large burden calcified plaque. Diffuse atherosclerosis. Patent plantar arch.    Interventions in the ER: Vascular consult    # LLE Pain in setting of chronic high grade stenosis of the proximal L SFA secondary to large burden calcified plaque  # MSK pain more likely than Vascular Claudication   - Patient clinically and hemodynamically stable on admission   - EKG Afib RBB  - Labs comparable to baseline   - On PE has palpable femoral and popliteal pulses, with dopplerable DP/PT signals to the b/l LE. No pain with ROM.   - X-ray L Knee + Foot:: No acute osseous abnormality seen. Moderate knee joint effusion  - CTA LLE: High-grade stenosis of the proximal left superficial femoral artery secondary to large burden calcified plaque. Diffuse atherosclerosis. Patent plantar arch.  - Vascular on board (No acute vascular surgical intervention at this time. The patient's L calf pain is likely musculoskeletal in nature based on HPI and PE. The CTA finding of L SFA occlusion is chronic)   - Continued on all home medications, including Xarelto + Aspirin   - Follow up Duplex LE  - Can follow-up with Dr. Chopra in clinic in one month  - Pain control with tylenol PRN  - PT consult     # Hypertension  # Hyperlipidemia  # CAD  # DM  - C/w home GDMT  - TSH, Lipid Profile, A1C in AM   - Insulin SS    # CKD IIIb   - Cr 1.5 eGFR 46   - Trend Cr     # DVT ppx: On Xarelto   # DVT ppx: Not indicated   # Activity: IA, PT consult  # Dispo: From Home  # Code: Full Code

## 2023-06-01 NOTE — ED PROVIDER NOTE - OBJECTIVE STATEMENT
82-year-old male history of diabetes, hypertension, hyperlipidemia, CAD, A-fib on Xarelto presents with left leg pain for a few days.  Patient admits for the last few days having pain with walking on the left leg.  Yesterday was walking and heard a pop sensation on his left knee but denies any falls.  Has had vascular vein in the distant past for the left lower leg.  Denies any numbness tingling falls no other acute complaints. Accompanied by wife

## 2023-06-01 NOTE — CONSULT NOTE ADULT - SUBJECTIVE AND OBJECTIVE BOX
VASCULAR SURGERY CONSULT NOTE      HPI: 82M with a PMH of diabetes, HTN, HLD, CAD s/p stent 2014, Afib on Xarelto, varicose veins L leg s/p stripping in the 70's who present to the ED complaining of 1 day of L calf pain. He states he has been limping on his L leg for about a week due to slight pain in his calf, and on 1 day ago he was at the [facility] walking in the parking lot and felt a popping sensation that his friend heard, and was not able to ambulate without assistance to his truck. He states he was able to drive home, and was able to ambulate with mild difficulty at home using a cane. Last night, he became progressively weaker with pain on ambulation to the L calf. He was not able to walk this morning, and came into the ED for evaluation. He states he fell 2 years ago and had a large laceration s/p 40 stitches to the L lateral calf, but recovered and has not had difficulty ambulating until yesterday.     Vacular consulted for the above HPI and CTA finding of high grade stenosis of the proximal L SFA secondary to large burden calcified plaque.       PAST MEDICAL & SURGICAL HISTORY:  Atrial fibrillation      Coronary artery disease      Hyperlipidemia      Hypertension      Gout      Diabetes mellitus      VINI on CPAP      History of heart artery stent        No Known Allergies    Home Medications:  allopurinol: 150 milligram(s) orally once a day (27 Jun 2022 10:04)  amLODIPine 10 mg oral tablet: 1 tab(s) orally once a day (27 Jun 2022 10:04)  Aspirin Enteric Coated 81 mg oral delayed release tablet: 1 tab(s) orally once a day (27 Jun 2022 10:04)  atorvastatin 40 mg oral tablet: 1 tab(s) orally once a day (at bedtime) (27 Jun 2022 10:04)  fosinopril 40 mg oral tablet: 1 tab(s) orally once a day (27 Jun 2022 10:04)  hydrALAZINE 25 mg oral tablet: 1 tab(s) orally 2 times a day (27 Jun 2022 10:04)  hydroCHLOROthiazide 12.5 mg oral capsule: 1 cap(s) orally once a day (27 Jun 2022 10:04)  isosorbide mononitrate 120 mg oral tablet, extended release: 1 tab(s) orally once a day (27 Jun 2022 10:04)  pioglitazone 30 mg oral tablet: 1 tab(s) orally once a day (27 Jun 2022 10:04)  Xarelto 15 mg oral tablet: 1 tab(s) orally once a day (in the evening) (27 Jun 2022 10:04)    No permtinent family history of PVD    REVIEW OF SYSTEMS:  GENERAL:                                         negative  SKIN:                                                 negative  OPTHALMOLOGIC:                          negative  ENMT:                                               negative  RESPIRATORY AND THORAX:        negative  CARDIOVASCULAR:                         negative  GASTROINTESTINAL:                       negative  NEPHROLOGY:                                  negative  MUSCULOSKELETAL:                       negative  NEUROLOGIC:                                   negative  PSYCHIATRIC:                                    negative  HEMATOLOGY/LYMPHATICS:         negative  ENDOCRINE:                                     negative  ALLERGIC/IMMUNOLOGIC:            negative    12 point ROS otherwise normal except as stated in HPI    PHYSICAL EXAM  Vital Signs Last 24 Hrs  T(C): 36.7 (01 Jun 2023 16:16), Max: 36.8 (01 Jun 2023 08:45)  T(F): 98 (01 Jun 2023 16:16), Max: 98.3 (01 Jun 2023 08:45)  HR: 65 (01 Jun 2023 16:16) (60 - 65)  BP: 183/79 (01 Jun 2023 16:16) (183/79 - 190/85)  BP(mean): --  RR: 18 (01 Jun 2023 16:16) (18 - 20)  SpO2: 96% (01 Jun 2023 16:16) (96% - 97%)    Parameters below as of 01 Jun 2023 08:45  Patient On (Oxygen Delivery Method): room air        Appearance: Normal	  HEENT:   Normal oral mucosa, PERRL, EOMI	  Neck: Supple  Cardiovascular: Normal S1 S2  Respiratory: Normal respiratory effort   Gastrointestinal:  Soft, obese, nondistended	  Skin: Stasis dermatitis to the L lateral calf with a healed incision  Extremities: No tenderness with ROM on plantar or dorsiflexion  Vascular: Peripheral pulses palpable 2+ bilaterally via doppler due to chronic edema  Neurologic: Non-focal  Psychiatry: A & O x 3, Mood & affect appropriate    PULSES:  Femoral: palpable  Popliteal: palpable  Dorsal Pedal: dopperable  Posterior Tibial: dopperable    MEDICATIONS:   MEDICATIONS  (STANDING):    MEDICATIONS  (PRN):      LAB/STUDIES:                        11.8   8.39  )-----------( 201      ( 01 Jun 2023 10:11 )             35.6     06-01    140  |  106  |  31<H>  ----------------------------<  155<H>  4.4   |  19  |  1.5    Ca    9.1      01 Jun 2023 10:11    TPro  7.0  /  Alb  4.2  /  TBili  0.7  /  DBili  x   /  AST  31  /  ALT  14  /  AlkPhos  76  06-01      LIVER FUNCTIONS - ( 01 Jun 2023 10:11 )  Alb: 4.2 g/dL / Pro: 7.0 g/dL / ALK PHOS: 76 U/L / ALT: 14 U/L / AST: 31 U/L / GGT: x           IMAGING:  < from: CT Angio Lower Extremity w/ IV Cont, Left (06.01.23 @ 14:37) >    High-grade stenosis of the proximal left superficial femoral artery   secondary to large burden calcified plaque.    Diffuse atherosclerosis.    Patent plantar arch.    < from: Xray Tibia + Fibula 2 Views, Left (06.01.23 @ 10:00) >  1.  No acute osseous abnormality seen.  2.  Moderate knee joint effusion.

## 2023-06-01 NOTE — ED ADULT NURSE NOTE - OBJECTIVE STATEMENT
patient reports walking yesterday when he heard a popping noise coming from left knee. patient continued to ambulate on leg but awoke this morning and was unable to bear weight. patient has full rom to left knee. left leg noted to have swelling which is normal for him but states the foot swelling has increased. + pedal pulse . patient denies patient .

## 2023-06-01 NOTE — ED PROVIDER NOTE - PHYSICAL EXAMINATION
CONSTITUTIONAL: NAD  SKIN: Warm dry  HEAD: NCAT  EYES: NL inspection  ENT: MMM  NECK: Supple; non tender.  ABD: S/NT no R/G  EXT: +LT lower lef diffusely swollen, mild erythema to LEFT lateral leg, ttp  BL feet warm, +2 cap refill   ROM limited 2/2 pain   NEURO: Grossly unremarkable  PSYCH: Cooperative, appropriate.

## 2023-06-01 NOTE — PATIENT PROFILE ADULT - FALL HARM RISK - HARM RISK INTERVENTIONS

## 2023-06-01 NOTE — H&P ADULT - ATTENDING COMMENTS
HPI:  HPI: 82-year-old male history of NIDDM, hypertension, hyperlipidemia, CAD, A-fib on Xarelto presents with left leg pain for a few days.  Patient admits for the last few days having pain with walking on the left leg. Yesterday was walking and heard a pop sensation on his left knee but denies any falls.  Has had vascular vein in the distant past for the left lower leg.  Denies any numbness tingling falls no other acute complaints. Accompanied by NATA Salgado.     In the ER:   VS: /85, HR 60, RR 20, T 36.8 oral, SpO2 97% on RA  EKG: Afib RBB  Sig Labs: Hgb 11.8, Cr 1.5 eGFR 46   Imagin) X-ray L Knee + Foot:: No acute osseous abnormality seen. Moderate knee joint effusion.   2) CTA LLE: High-grade stenosis of the proximal left superficial femoral artery secondary to large burden calcified plaque. Diffuse atherosclerosis. Patent plantar arch.    Interventions in the ER: Vascular consult   (2023 21:08)    REVIEW OF SYSTEMS: see cc/HPI   CONSTITUTIONAL: No weakness, fevers or chills  EYES/ENT: No visual changes;  No vertigo or throat pain   NECK: No pain or stiffness  RESPIRATORY: No cough, wheezing, hemoptysis; No shortness of breath  CARDIOVASCULAR: No chest pain or palpitations  GASTROINTESTINAL: No abdominal or epigastric pain. No nausea, vomiting, or hematemesis; No diarrhea or constipation. No melena or hematochezia.  GENITOURINARY: No dysuria, frequency or hematuria  NEUROLOGICAL: No numbness or weakness  SKIN: No itching, rashes  MSK: - LLE pain, L knee pain, inability to ambulate    Physical Exam:   General: WN/WD NAD  Neurology: A&Ox3, nonfocal, follows commands  Eyes: PERRLA/ EOMI  ENT/Neck: Neck supple, trachea midline, No JVD  Respiratory: CTA B/L, No wheezing, rales, rhonchi  CV: Normal rate regular rhythm, S1S2, no murmurs, rubs or gallops  Abdominal: Soft, NT, ND +BS,   Extremities: No edema, + peripheral pulses  Skin: No Rashes, Hematoma, Ecchymosis  Incisions:   Tubes: HPI:  HPI: 82-year-old male history of NIDDM, hypertension, hyperlipidemia, CAD, A-fib on Xarelto presents with left leg pain for a few days.  Patient admits for the last few days having pain with walking on the left leg. Yesterday was walking and heard a pop sensation on his left knee but denies any falls.  Has had vascular vein in the distant past for the left lower leg.  Denies any numbness tingling falls no other acute complaints. Accompanied by NATA Salgado.     In the ER:   VS: /85, HR 60, RR 20, T 36.8 oral, SpO2 97% on RA  EKG: Afib RBB  Sig Labs: Hgb 11.8, Cr 1.5 eGFR 46   Imagin) X-ray L Knee + Foot:: No acute osseous abnormality seen. Moderate knee joint effusion.   2) CTA LLE: High-grade stenosis of the proximal left superficial femoral artery secondary to large burden calcified plaque. Diffuse atherosclerosis. Patent plantar arch.    Interventions in the ER: Vascular consult   (2023 21:08)    REVIEW OF SYSTEMS: see cc/HPI   CONSTITUTIONAL: No weakness, fevers or chills  EYES/ENT: No visual changes;  No vertigo or throat pain   NECK: No pain or stiffness  RESPIRATORY: No cough, wheezing, hemoptysis; No shortness of breath  CARDIOVASCULAR: No chest pain or palpitations  GASTROINTESTINAL: No abdominal or epigastric pain. No nausea, vomiting, or hematemesis; No diarrhea or constipation. No melena or hematochezia.  GENITOURINARY: No dysuria, frequency or hematuria  NEUROLOGICAL: No numbness or weakness  SKIN: No itching, rashes  MSK: - LLE pain, L knee pain and swelling,  inability to ambulate  ENDO: (+) hyperglycemia (+) lipid disorder   PSYCHE : no mood disorder or psychosis     Physical Exam:   General: WN/WD NAD  Neurology: A&Ox3, nonfocal, follows commands  Eyes: PERRLA/ EOMI  ENT/Neck: Neck supple, trachea midline, No JVD  Respiratory: CTA B/L, No wheezing, rales, rhonchi  CV: Normal rate regular rhythm, S1S2, no murmurs, rubs or gallops  Abdominal: Soft, NT, ND +BS,   Extremities: No edema, + peripheral pulses, LLE - knee swelling, effusion present and tender to palp. Pain w/ active and passive flex/ext.  Skin: No Rashes, Hematoma, Ecchymosis    A/p  L knee pain / swelling w/ moderate effusion   Inability to ambulate 2/2 pain   -PRN NSAIDS  -Ortho eval - ?? arthrocentesis   -PT/ Rehab eval     LLE chronic high grade stenosis of prox SFA   -c/w Xarelto and ASA   -Vascular follow up as an outpatient     H/o CAD   HTN - stable   -c/w OP Rx    DM type II  Dyslipidemia   -c/w OP Rx   -F/s monitoring     PATIENT SEEN by ATTENDING 23 ( note revised ) .

## 2023-06-01 NOTE — ED ADULT TRIAGE NOTE - BP NONINVASIVE DIASTOLIC (MM HG)
85 Isotretinoin Pregnancy And Lactation Text: This medication is Pregnancy Category X and is considered extremely dangerous during pregnancy. It is unknown if it is excreted in breast milk.

## 2023-06-01 NOTE — ED ADULT NURSE NOTE - CHIEF COMPLAINT QUOTE
c/o left knee swelling and pain, patient states he heard a pop when he was walking home from [facility], +left leg swelling and unable to ambulate

## 2023-06-01 NOTE — ED PROVIDER NOTE - CLINICAL SUMMARY MEDICAL DECISION MAKING FREE TEXT BOX
Pt with leg pain, likely msk pain, unable to walk and will be admitted to med.  ct angio of leg shows high grade stenosis, vascular consulted.  Any ordered labs and EKG were reviewed.  Any imaging was ordered and reviewed by me.  Appropriate medications for patient's presenting complaints were ordered and effects were reassessed.  Patient's records (prior hospital, ED visit, and/or nursing home notes if available) were reviewed.  Additional history was obtained from EMS, family, and/or PCP (where available).  Escalation to admission/observation was considered.  Patient requires inpatient hospitalization - monitored setting.

## 2023-06-01 NOTE — ED PROVIDER NOTE - PROGRESS NOTE DETAILS
SS Ultrasound prelim read negative.  CTA with high-grade femoral artery stenosis.  Vascular surgery consulted and will evaluate.  Reviewed labs and imaging with patient.  Plan for admission for rehab and vascular consult.  Patient aware and agreeable to plan

## 2023-06-01 NOTE — ED ADULT NURSE NOTE - DRUG PRE-SCREENING (DAST -1)
[FreeTextEntry1] : He is a 27-year-old male with a 3 to 4-month history of recurrent  heartburn associated with abdominal bloating.  He denies abdominal pain.  He denies NSAID use.  He denies dysphagia or odynophagia.   He had been on Dexilant in the past for reflux disease but has been off it  for 4 years.  He presently is taking Tums which gives him very slight relief Statement Selected

## 2023-06-01 NOTE — CONSULT NOTE ADULT - ASSESSMENT
82M with a PMH of diabetes, HTN, HLD, CAD s/p stent 2014, Afib on Xarelto, varicose veins L leg s/p stripping in the 70's who present to the ED complaining of 1 day of L calf pain. He states he has been limping on his L leg for about a week due to slight pain in his calf, and on 1 day ago he was at the [facility] walking in the parking lot and felt a popping sensation that his friend heard, and was not able to ambulate without assistance to his truck. He states he was able to drive home, and was able to ambulate with mild difficulty at home using a cane. Last night, he became progressively weaker with pain on ambulation to the L calf. He was not able to walk this morning, and came into the ED for evaluation. He states he fell 2 years ago and had a large laceration s/p 40 stitches to the L lateral calf, but recovered and has not had difficulty ambulating until yesterday.     Vacular consulted for the above HPI and CTA finding of high grade stenosis of the proximal L SFA secondary to large burden calcified plaque. Vitals stable, and on PE he has palpable femoral and popliteal pulses, with dopplerable DP/PT signals to the b/l LE. No pain with ROM.     PLAN:   - No acute vascular surgical intervention at this time. The patient's L calf pain is likely musculoskeletal in nature based on HPI and PE. The CTA finding of L SFA occlusion is chronic. Disposition per ED.  - Can be continued on all home medications, including Xarelto  - Will read venous duplex and give recommendations as needed based on results  - Can follow-up with Dr. Chopra in clinic in one month  - Patient seen/examined or Plan Discussed with Fellow, Dr. Juares  - Plan to be discussed with Attending, Dr. Chopra    SPECTRA 0625 82M with a PMH of diabetes, HTN, HLD, CAD s/p stent 2014, Afib on Xarelto, varicose veins L leg s/p stripping in the 70's who present to the ED complaining of 1 day of L calf pain. He states he has been limping on his L leg for about a week due to slight pain in his calf, and on 1 day ago [facility] walking in the parking lot and felt a popping sensation that his friend heard, and was not able to ambulate without assistance to his truck. He states he was able to drive home, and was able to ambulate with mild difficulty at home using a cane. Last night, he became progressively weaker with pain on ambulation to the L calf. He was not able to walk this morning, and came into the ED for evaluation. He states he fell 2 years ago and had a large laceration s/p 40 stitches to the L lateral calf, but recovered and has not had difficulty ambulating until yesterday.     Vacular consulted for the above HPI and CTA finding of high grade stenosis of the proximal L SFA secondary to large burden calcified plaque. He denies claudication symptoms. Vitals stable, and on PE he has palpable femoral and popliteal pulses, with dopplerable DP/PT signals to the b/l LE. No pain with ROM.     PLAN:   - No acute vascular surgical intervention at this time. The patient's L calf pain is likely musculoskeletal in nature based on HPI and PE. The CTA finding of L SFA occlusion is chronic. Disposition per ED.  - Can be continued on all home medications, including Xarelto, ASA, and high dose atorvastatin  - Will read venous duplex and give recommendations as needed based on results  - Can follow-up with Dr. Chopra in clinic in one month  - Patient seen/examined or Plan Discussed with Fellow, Dr. Juares  - Plan to be discussed with Attending, Dr. Chopra    SPECTRA [id number]

## 2023-06-01 NOTE — ED ADULT TRIAGE NOTE - CHIEF COMPLAINT QUOTE
c/o left knee swelling and pain, patient states he heard a pop when he was walking home from [facility], +left leg swelling and unable to ambulate

## 2023-06-02 ENCOUNTER — TRANSCRIPTION ENCOUNTER (OUTPATIENT)
Age: 83
End: 2023-06-02

## 2023-06-02 LAB
A1C WITH ESTIMATED AVERAGE GLUCOSE RESULT: 6 % — HIGH (ref 4–5.6)
ALBUMIN SERPL ELPH-MCNC: 4 G/DL — SIGNIFICANT CHANGE UP (ref 3.5–5.2)
ALP SERPL-CCNC: 75 U/L — SIGNIFICANT CHANGE UP (ref 30–115)
ALT FLD-CCNC: 12 U/L — SIGNIFICANT CHANGE UP (ref 0–41)
ANION GAP SERPL CALC-SCNC: 14 MMOL/L — SIGNIFICANT CHANGE UP (ref 7–14)
AST SERPL-CCNC: 22 U/L — SIGNIFICANT CHANGE UP (ref 0–41)
BILIRUB SERPL-MCNC: 1.2 MG/DL — SIGNIFICANT CHANGE UP (ref 0.2–1.2)
BLD GP AB SCN SERPL QL: SIGNIFICANT CHANGE UP
BUN SERPL-MCNC: 20 MG/DL — SIGNIFICANT CHANGE UP (ref 10–20)
CALCIUM SERPL-MCNC: 9.2 MG/DL — SIGNIFICANT CHANGE UP (ref 8.4–10.5)
CHLORIDE SERPL-SCNC: 103 MMOL/L — SIGNIFICANT CHANGE UP (ref 98–110)
CHOLEST SERPL-MCNC: 141 MG/DL — SIGNIFICANT CHANGE UP
CO2 SERPL-SCNC: 22 MMOL/L — SIGNIFICANT CHANGE UP (ref 17–32)
CREAT SERPL-MCNC: 1.3 MG/DL — SIGNIFICANT CHANGE UP (ref 0.7–1.5)
EGFR: 55 ML/MIN/1.73M2 — LOW
ESTIMATED AVERAGE GLUCOSE: 126 MG/DL — HIGH (ref 68–114)
GLUCOSE BLDC GLUCOMTR-MCNC: 130 MG/DL — HIGH (ref 70–99)
GLUCOSE BLDC GLUCOMTR-MCNC: 162 MG/DL — HIGH (ref 70–99)
GLUCOSE BLDC GLUCOMTR-MCNC: 179 MG/DL — HIGH (ref 70–99)
GLUCOSE BLDC GLUCOMTR-MCNC: 197 MG/DL — HIGH (ref 70–99)
GLUCOSE SERPL-MCNC: 132 MG/DL — HIGH (ref 70–99)
HCT VFR BLD CALC: 38.6 % — LOW (ref 42–52)
HDLC SERPL-MCNC: 57 MG/DL — SIGNIFICANT CHANGE UP
HGB BLD-MCNC: 12.5 G/DL — LOW (ref 14–18)
LIPID PNL WITH DIRECT LDL SERPL: 70 MG/DL — SIGNIFICANT CHANGE UP
MAGNESIUM SERPL-MCNC: 1.8 MG/DL — SIGNIFICANT CHANGE UP (ref 1.8–2.4)
MCHC RBC-ENTMCNC: 31.9 PG — HIGH (ref 27–31)
MCHC RBC-ENTMCNC: 32.4 G/DL — SIGNIFICANT CHANGE UP (ref 32–37)
MCV RBC AUTO: 98.5 FL — HIGH (ref 80–94)
NON HDL CHOLESTEROL: 84 MG/DL — SIGNIFICANT CHANGE UP
NRBC # BLD: 0 /100 WBCS — SIGNIFICANT CHANGE UP (ref 0–0)
PLATELET # BLD AUTO: 215 K/UL — SIGNIFICANT CHANGE UP (ref 130–400)
PMV BLD: 10.3 FL — SIGNIFICANT CHANGE UP (ref 7.4–10.4)
POTASSIUM SERPL-MCNC: 4.2 MMOL/L — SIGNIFICANT CHANGE UP (ref 3.5–5)
POTASSIUM SERPL-SCNC: 4.2 MMOL/L — SIGNIFICANT CHANGE UP (ref 3.5–5)
PROT SERPL-MCNC: 6.6 G/DL — SIGNIFICANT CHANGE UP (ref 6–8)
RBC # BLD: 3.92 M/UL — LOW (ref 4.7–6.1)
RBC # FLD: 14.8 % — HIGH (ref 11.5–14.5)
SODIUM SERPL-SCNC: 139 MMOL/L — SIGNIFICANT CHANGE UP (ref 135–146)
TRIGL SERPL-MCNC: 72 MG/DL — SIGNIFICANT CHANGE UP
TSH SERPL-MCNC: 5.38 UIU/ML — HIGH (ref 0.27–4.2)
WBC # BLD: 8.25 K/UL — SIGNIFICANT CHANGE UP (ref 4.8–10.8)
WBC # FLD AUTO: 8.25 K/UL — SIGNIFICANT CHANGE UP (ref 4.8–10.8)

## 2023-06-02 PROCEDURE — 99232 SBSQ HOSP IP/OBS MODERATE 35: CPT

## 2023-06-02 RX ORDER — HEPARIN SODIUM 5000 [USP'U]/ML
5000 INJECTION INTRAVENOUS; SUBCUTANEOUS EVERY 12 HOURS
Refills: 0 | Status: DISCONTINUED | OUTPATIENT
Start: 2023-06-02 | End: 2023-06-02

## 2023-06-02 RX ORDER — ALLOPURINOL 300 MG
150 TABLET ORAL DAILY
Refills: 0 | Status: DISCONTINUED | OUTPATIENT
Start: 2023-06-02 | End: 2023-06-03

## 2023-06-02 RX ORDER — NIFEDIPINE 30 MG
60 TABLET, EXTENDED RELEASE 24 HR ORAL DAILY
Refills: 0 | Status: DISCONTINUED | OUTPATIENT
Start: 2023-06-03 | End: 2023-06-03

## 2023-06-02 RX ADMIN — ATORVASTATIN CALCIUM 40 MILLIGRAM(S): 80 TABLET, FILM COATED ORAL at 21:28

## 2023-06-02 RX ADMIN — LISINOPRIL 40 MILLIGRAM(S): 2.5 TABLET ORAL at 05:43

## 2023-06-02 RX ADMIN — AMLODIPINE BESYLATE 10 MILLIGRAM(S): 2.5 TABLET ORAL at 05:43

## 2023-06-02 RX ADMIN — RIVAROXABAN 15 MILLIGRAM(S): KIT at 18:36

## 2023-06-02 RX ADMIN — Medication 81 MILLIGRAM(S): at 11:34

## 2023-06-02 RX ADMIN — Medication 25 MILLIGRAM(S): at 05:43

## 2023-06-02 RX ADMIN — ISOSORBIDE MONONITRATE 120 MILLIGRAM(S): 60 TABLET, EXTENDED RELEASE ORAL at 11:32

## 2023-06-02 RX ADMIN — Medication 25 MILLIGRAM(S): at 18:36

## 2023-06-02 RX ADMIN — Medication 2: at 18:36

## 2023-06-02 RX ADMIN — Medication 2: at 12:01

## 2023-06-02 RX ADMIN — Medication 40 MILLIGRAM(S): at 18:40

## 2023-06-02 RX ADMIN — Medication 150 MILLIGRAM(S): at 11:32

## 2023-06-02 NOTE — PROGRESS NOTE ADULT - SUBJECTIVE AND OBJECTIVE BOX
MISTY BURLESON 82y Male  MRN#: [id number]     Hospital Day: 1d    SUMMARY: 81 y/o male with PMHx of NIDDM, hypertension, hyperlipidemia, CAD, A-fib on Xarelto, admitted for left leg pain and popping sensation on ambulation, left knee and foot XR showing moderate knee joint effusion but otherwise unremarkable, CTA LLE showing high-grade stenosis of the proximal left superficial femoral artery secondary to large burden calcified plaque and diffuse atherosclerosis. No acute intervention at this time, per vascular. Orthopedics consult pending for possible left knee arthrocentesis. Found to have hypertensive emergency on admission, resolved, currently on hydralazine, HCTZ and lisinopril. EKG on admission showing AFib with RBBB, consistent with previous EKGs, currently rate-controlled.    SUBJECTIVE  No reported acute overnight events. Resting in bed. Reports some pain with movement of left knee and chronic swelling of left knee/lower leg/foot but otherwise without complaints.                                             ----------------------------------------------------------  OBJECTIVE  PAST MEDICAL & SURGICAL HISTORY  Atrial fibrillation    Coronary artery disease    Hyperlipidemia    Hypertension    Gout    Diabetes mellitus    VINI on CPAP    History of heart artery stent                                              -----------------------------------------------------------  ALLERGIES:  No Known Allergies                                            ------------------------------------------------------------    HOME MEDICATIONS  Home Medications:  allopurinol: 150 milligram(s) orally once a day (27 Jun 2022 10:04)  amLODIPine 10 mg oral tablet: 1 tab(s) orally once a day (27 Jun 2022 10:04)  Aspirin Enteric Coated 81 mg oral delayed release tablet: 1 tab(s) orally once a day (27 Jun 2022 10:04)  atorvastatin 40 mg oral tablet: 1 tab(s) orally once a day (at bedtime) (27 Jun 2022 10:04)  fosinopril 40 mg oral tablet: 1 tab(s) orally once a day (27 Jun 2022 10:04)  hydrALAZINE 25 mg oral tablet: 1 tab(s) orally 2 times a day (27 Jun 2022 10:04)  hydroCHLOROthiazide 12.5 mg oral capsule: 1 cap(s) orally once a day (27 Jun 2022 10:04)  isosorbide mononitrate 120 mg oral tablet, extended release: 1 tab(s) orally once a day (27 Jun 2022 10:04)  pioglitazone 30 mg oral tablet: 1 tab(s) orally once a day (27 Jun 2022 10:04)  Xarelto 15 mg oral tablet: 1 tab(s) orally once a day (in the evening) (27 Jun 2022 10:04)                           MEDICATIONS:  STANDING MEDICATIONS  allopurinol 150 milliGRAM(s) Oral daily  amLODIPine   Tablet 10 milliGRAM(s) Oral daily  aspirin enteric coated 81 milliGRAM(s) Oral daily  atorvastatin 40 milliGRAM(s) Oral at bedtime  dextrose 5%. 1000 milliLiter(s) IV Continuous <Continuous>  dextrose 5%. 1000 milliLiter(s) IV Continuous <Continuous>  dextrose 50% Injectable 25 Gram(s) IV Push once  dextrose 50% Injectable 12.5 Gram(s) IV Push once  dextrose 50% Injectable 25 Gram(s) IV Push once  glucagon  Injectable 1 milliGRAM(s) IntraMuscular once  hydrALAZINE 25 milliGRAM(s) Oral two times a day  hydrochlorothiazide 12.5 milliGRAM(s) Oral daily  insulin lispro (ADMELOG) corrective regimen sliding scale   SubCutaneous three times a day before meals  isosorbide   mononitrate ER Tablet (IMDUR) 120 milliGRAM(s) Oral daily  lisinopril 40 milliGRAM(s) Oral daily  rivaroxaban 15 milliGRAM(s) Oral with dinner    PRN MEDICATIONS  acetaminophen     Tablet .. 650 milliGRAM(s) Oral every 6 hours PRN  dextrose Oral Gel 15 Gram(s) Oral once PRN                                            ------------------------------------------------------------  VITAL SIGNS: Last 24 Hours  T(C): 37.2 (02 Jun 2023 05:00), Max: 37.3 (01 Jun 2023 20:12)  T(F): 98.9 (02 Jun 2023 05:00), Max: 99.2 (01 Jun 2023 20:12)  HR: 55 (02 Jun 2023 09:46) (55 - 71)  BP: 160/72 (02 Jun 2023 09:46) (154/76 - 204/86)  BP(mean): --  RR: 18 (02 Jun 2023 05:00) (18 - 18)  SpO2: 96% (02 Jun 2023 05:00) (95% - 96%)      06-02-23 @ 07:01  -  06-02-23 @ 11:09  --------------------------------------------------------  IN: 0 mL / OUT: 400 mL / NET: -400 mL                                             --------------------------------------------------------------  LABS:                        12.5   8.25  )-----------( 215      ( 02 Jun 2023 07:44 )             38.6     06-02    139  |  103  |  20  ----------------------------<  132<H>  4.2   |  22  |  1.3    Ca    9.2      02 Jun 2023 07:44  Mg     1.8     06-02    TPro  6.6  /  Alb  4.0  /  TBili  1.2  /  DBili  x   /  AST  22  /  ALT  12  /  AlkPhos  75  06-02                                                              -------------------------------------------------------------  RADIOLOGY:                                            --------------------------------------------------------------    PHYSICAL EXAM:  General:   HEENT:  LUNGS:  HEART:  ABDOMEN:  EXT:  NEURO:  SKIN:                                           --------------------------------------------------------------       MISTY BURLESON 82y Male  MRN#: [id number]     Hospital Day: 1d    SUMMARY: 83 y/o male with PMHx of NIDDM, hypertension, hyperlipidemia, CAD, A-fib on Xarelto, admitted for left leg pain and popping sensation on ambulation, left knee and foot XR showing moderate knee joint effusion but otherwise unremarkable, CTA LLE showing high-grade stenosis of the proximal left superficial femoral artery secondary to large burden calcified plaque and diffuse atherosclerosis. No acute intervention at this time, per vascular. Orthopedics consult pending for possible left knee arthrocentesis. Found to have hypertensive emergency on admission, resolved, currently on hydralazine, HCTZ and lisinopril. EKG on admission showing AFib with RBBB, consistent with previous EKGs, currently rate-controlled.    SUBJECTIVE  No reported acute overnight events. Resting in bed. Reports some pain with movement of left knee and chronic swelling of left knee/lower leg/foot but otherwise without complaints.                                             ----------------------------------------------------------  OBJECTIVE  PAST MEDICAL & SURGICAL HISTORY  Atrial fibrillation    Coronary artery disease    Hyperlipidemia    Hypertension    Gout    Diabetes mellitus    VINI on CPAP    History of heart artery stent                                              -----------------------------------------------------------  ALLERGIES:  No Known Allergies                                            ------------------------------------------------------------    HOME MEDICATIONS  Home Medications:  allopurinol: 150 milligram(s) orally once a day (27 Jun 2022 10:04)  amLODIPine 10 mg oral tablet: 1 tab(s) orally once a day (27 Jun 2022 10:04)  Aspirin Enteric Coated 81 mg oral delayed release tablet: 1 tab(s) orally once a day (27 Jun 2022 10:04)  atorvastatin 40 mg oral tablet: 1 tab(s) orally once a day (at bedtime) (27 Jun 2022 10:04)  fosinopril 40 mg oral tablet: 1 tab(s) orally once a day (27 Jun 2022 10:04)  hydrALAZINE 25 mg oral tablet: 1 tab(s) orally 2 times a day (27 Jun 2022 10:04)  hydroCHLOROthiazide 12.5 mg oral capsule: 1 cap(s) orally once a day (27 Jun 2022 10:04)  isosorbide mononitrate 120 mg oral tablet, extended release: 1 tab(s) orally once a day (27 Jun 2022 10:04)  pioglitazone 30 mg oral tablet: 1 tab(s) orally once a day (27 Jun 2022 10:04)  Xarelto 15 mg oral tablet: 1 tab(s) orally once a day (in the evening) (27 Jun 2022 10:04)                           MEDICATIONS:  STANDING MEDICATIONS  allopurinol 150 milliGRAM(s) Oral daily  amLODIPine   Tablet 10 milliGRAM(s) Oral daily  aspirin enteric coated 81 milliGRAM(s) Oral daily  atorvastatin 40 milliGRAM(s) Oral at bedtime  dextrose 5%. 1000 milliLiter(s) IV Continuous <Continuous>  dextrose 5%. 1000 milliLiter(s) IV Continuous <Continuous>  dextrose 50% Injectable 25 Gram(s) IV Push once  dextrose 50% Injectable 12.5 Gram(s) IV Push once  dextrose 50% Injectable 25 Gram(s) IV Push once  glucagon  Injectable 1 milliGRAM(s) IntraMuscular once  hydrALAZINE 25 milliGRAM(s) Oral two times a day  hydrochlorothiazide 12.5 milliGRAM(s) Oral daily  insulin lispro (ADMELOG) corrective regimen sliding scale   SubCutaneous three times a day before meals  isosorbide   mononitrate ER Tablet (IMDUR) 120 milliGRAM(s) Oral daily  lisinopril 40 milliGRAM(s) Oral daily  rivaroxaban 15 milliGRAM(s) Oral with dinner    PRN MEDICATIONS  acetaminophen     Tablet .. 650 milliGRAM(s) Oral every 6 hours PRN  dextrose Oral Gel 15 Gram(s) Oral once PRN                                            ------------------------------------------------------------  VITAL SIGNS: Last 24 Hours  T(C): 37.2 (02 Jun 2023 05:00), Max: 37.3 (01 Jun 2023 20:12)  T(F): 98.9 (02 Jun 2023 05:00), Max: 99.2 (01 Jun 2023 20:12)  HR: 55 (02 Jun 2023 09:46) (55 - 71)  BP: 160/72 (02 Jun 2023 09:46) (154/76 - 204/86)  BP(mean): --  RR: 18 (02 Jun 2023 05:00) (18 - 18)  SpO2: 96% (02 Jun 2023 05:00) (95% - 96%)      06-02-23 @ 07:01  -  06-02-23 @ 11:09  --------------------------------------------------------  IN: 0 mL / OUT: 400 mL / NET: -400 mL                                             --------------------------------------------------------------  LABS:                        12.5   8.25  )-----------( 215      ( 02 Jun 2023 07:44 )             38.6     06-02    139  |  103  |  20  ----------------------------<  132<H>  4.2   |  22  |  1.3    Ca    9.2      02 Jun 2023 07:44  Mg     1.8     06-02    TPro  6.6  /  Alb  4.0  /  TBili  1.2  /  DBili  x   /  AST  22  /  ALT  12  /  AlkPhos  75  06-02                                                              -------------------------------------------------------------  RADIOLOGY:  ACC: [id number] EXAM: CT ANGIO LWR EXT (W)AW IC LT ORDERED BY: RAIN ISABEL  PROCEDURE DATE: 06/01/2023  IMPRESSION:  High-grade stenosis of the proximal left superficial femoral artery secondary to large burden calcified plaque.  Diffuse atherosclerosis.  Patent plantar arch.  BRADLEY STOKES MD; Resident Radiologist  This document has been electronically signed.  CALVIN OSEGUERA MD; Attending Interventional Radiologist  This document has been electronically signed. Jun 1 2023 3:35PM    ACC: [id number] EXAM: XR KNEE W PATELLA 3 VIEWS LT ORDERED BY: REMI MO  ACC: [id number] EXAM: XR FOOT 2 VIEWS LT ORDERED BY: REMI MO  ACC: [id number] EXAM: XR TIB FIB AP LAT 2 VIEWS LT ORDERED BY: REMI MO  PROCEDURE DATE: 06/01/2023  IMPRESSION:  1. No acute osseous abnormality seen.  2. Moderate knee joint effusion.  RACH ALEJANDRO MD; Attending Radiologist  This document has been electronically signed. Jun 1 2023 2:12PM    ******PRELIMINARY REPORT******  ******PRELIMINARY REPORT******  ACC: [id number] EXAM: DUPLEX SCAN EXT VEINS LOWER BI ORDERED BY: REMI MO  PROCEDURE DATE: 06/01/2023  ******PRELIMINARY REPORT******  ******PRELIMINARY REPORT******  Impression:  No evidence of deep venous thrombosis or superficial thrombophlebitis in the bilateral lower extremities.  ICD-10:M79.89  ******PRELIMINARY REPORT******  ******PRELIMINARY REPORT******  SANJUANA PIZANO MD; Vascular Fellow  This document is a PRELIMINARY interpretation and is pending final attending approval. Jun 2 2023 10:01AM                                          --------------------------------------------------------------    PHYSICAL EXAM:  General:   HEENT:  LUNGS:  HEART:  ABDOMEN:  EXT:  NEURO:  SKIN:                                           --------------------------------------------------------------       MISTY BURLESON 82y Male  MRN#: [id number]     Hospital Day: 1d    SUMMARY: 83 y/o male with PMHx of NIDDM, hypertension, hyperlipidemia, CAD, A-fib on Xarelto, admitted for left leg pain and popping sensation on ambulation, left knee and foot XR showing moderate knee joint effusion but otherwise unremarkable, CTA LLE showing high-grade stenosis of the proximal left superficial femoral artery secondary to large burden calcified plaque and diffuse atherosclerosis. No acute intervention at this time, per vascular. Orthopedics consult pending for possible left knee arthrocentesis. Found to have hypertensive emergency on admission, resolved, currently on hydralazine, HCTZ and lisinopril. EKG on admission showing AFib with RBBB, consistent with previous EKGs, currently rate-controlled.    SUBJECTIVE  No reported acute overnight events. Resting in bed. Reports some pain with movement of left knee and chronic swelling of left knee/lower leg/foot but otherwise without complaints.                                             ----------------------------------------------------------  OBJECTIVE  PAST MEDICAL & SURGICAL HISTORY  Atrial fibrillation    Coronary artery disease    Hyperlipidemia    Hypertension    Gout    Diabetes mellitus    VINI on CPAP    History of heart artery stent                                              -----------------------------------------------------------  ALLERGIES:  No Known Allergies                                            ------------------------------------------------------------    HOME MEDICATIONS  Home Medications:  allopurinol: 150 milligram(s) orally once a day (27 Jun 2022 10:04)  amLODIPine 10 mg oral tablet: 1 tab(s) orally once a day (27 Jun 2022 10:04)  Aspirin Enteric Coated 81 mg oral delayed release tablet: 1 tab(s) orally once a day (27 Jun 2022 10:04)  atorvastatin 40 mg oral tablet: 1 tab(s) orally once a day (at bedtime) (27 Jun 2022 10:04)  fosinopril 40 mg oral tablet: 1 tab(s) orally once a day (27 Jun 2022 10:04)  hydrALAZINE 25 mg oral tablet: 1 tab(s) orally 2 times a day (27 Jun 2022 10:04)  hydroCHLOROthiazide 12.5 mg oral capsule: 1 cap(s) orally once a day (27 Jun 2022 10:04)  isosorbide mononitrate 120 mg oral tablet, extended release: 1 tab(s) orally once a day (27 Jun 2022 10:04)  pioglitazone 30 mg oral tablet: 1 tab(s) orally once a day (27 Jun 2022 10:04)  Xarelto 15 mg oral tablet: 1 tab(s) orally once a day (in the evening) (27 Jun 2022 10:04)                           MEDICATIONS:  STANDING MEDICATIONS  allopurinol 150 milliGRAM(s) Oral daily  amLODIPine   Tablet 10 milliGRAM(s) Oral daily  aspirin enteric coated 81 milliGRAM(s) Oral daily  atorvastatin 40 milliGRAM(s) Oral at bedtime  dextrose 5%. 1000 milliLiter(s) IV Continuous <Continuous>  dextrose 5%. 1000 milliLiter(s) IV Continuous <Continuous>  dextrose 50% Injectable 25 Gram(s) IV Push once  dextrose 50% Injectable 12.5 Gram(s) IV Push once  dextrose 50% Injectable 25 Gram(s) IV Push once  glucagon  Injectable 1 milliGRAM(s) IntraMuscular once  hydrALAZINE 25 milliGRAM(s) Oral two times a day  hydrochlorothiazide 12.5 milliGRAM(s) Oral daily  insulin lispro (ADMELOG) corrective regimen sliding scale   SubCutaneous three times a day before meals  isosorbide   mononitrate ER Tablet (IMDUR) 120 milliGRAM(s) Oral daily  lisinopril 40 milliGRAM(s) Oral daily  rivaroxaban 15 milliGRAM(s) Oral with dinner    PRN MEDICATIONS  acetaminophen     Tablet .. 650 milliGRAM(s) Oral every 6 hours PRN  dextrose Oral Gel 15 Gram(s) Oral once PRN                                            ------------------------------------------------------------  VITAL SIGNS: Last 24 Hours  T(C): 37.2 (02 Jun 2023 05:00), Max: 37.3 (01 Jun 2023 20:12)  T(F): 98.9 (02 Jun 2023 05:00), Max: 99.2 (01 Jun 2023 20:12)  HR: 55 (02 Jun 2023 09:46) (55 - 71)  BP: 160/72 (02 Jun 2023 09:46) (154/76 - 204/86)  BP(mean): --  RR: 18 (02 Jun 2023 05:00) (18 - 18)  SpO2: 96% (02 Jun 2023 05:00) (95% - 96%)      06-02-23 @ 07:01  -  06-02-23 @ 11:09  --------------------------------------------------------  IN: 0 mL / OUT: 400 mL / NET: -400 mL                                             --------------------------------------------------------------  LABS:                        12.5   8.25  )-----------( 215      ( 02 Jun 2023 07:44 )             38.6     06-02    139  |  103  |  20  ----------------------------<  132<H>  4.2   |  22  |  1.3    Ca    9.2      02 Jun 2023 07:44  Mg     1.8     06-02    TPro  6.6  /  Alb  4.0  /  TBili  1.2  /  DBili  x   /  AST  22  /  ALT  12  /  AlkPhos  75  06-02                                                              -------------------------------------------------------------  RADIOLOGY:  ACC: [id number] EXAM: CT ANGIO LWR EXT (W)AW IC LT ORDERED BY: RAIN ISABEL  PROCEDURE DATE: 06/01/2023  IMPRESSION:  High-grade stenosis of the proximal left superficial femoral artery secondary to large burden calcified plaque.  Diffuse atherosclerosis.  Patent plantar arch.  BRADLEY STOKES MD; Resident Radiologist  This document has been electronically signed.  CALVIN OSEGUERA MD; Attending Interventional Radiologist  This document has been electronically signed. Jun 1 2023 3:35PM    ACC: [id number] EXAM: XR KNEE W PATELLA 3 VIEWS LT ORDERED BY: REMI MO  ACC: [id number] EXAM: XR FOOT 2 VIEWS LT ORDERED BY: REMI MO  ACC: [id number] EXAM: XR TIB FIB AP LAT 2 VIEWS LT ORDERED BY: REMI MO  PROCEDURE DATE: 06/01/2023  IMPRESSION:  1. No acute osseous abnormality seen.  2. Moderate knee joint effusion.  RACH ALEJANDRO MD; Attending Radiologist  This document has been electronically signed. Jun 1 2023 2:12PM    ******PRELIMINARY REPORT******  ******PRELIMINARY REPORT******  ACC: [id number] EXAM: DUPLEX SCAN EXT VEINS LOWER BI ORDERED BY: REMI MO  PROCEDURE DATE: 06/01/2023  ******PRELIMINARY REPORT******  ******PRELIMINARY REPORT******  Impression:  No evidence of deep venous thrombosis or superficial thrombophlebitis in the bilateral lower extremities.  ICD-10:M79.89  ******PRELIMINARY REPORT******  ******PRELIMINARY REPORT******  SANJUANA PIZANO MD; Vascular Fellow  This document is a PRELIMINARY interpretation and is pending final attending approval. Jun 2 2023 10:01AM                                          --------------------------------------------------------------    PHYSICAL EXAM:  CONSTITUTIONAL: In no apparent distress.    EYES: PERRLA and symmetric, EOMI, No conjunctival injection or pallor. Sclera anicteric.    ENMT: Moist mucous membranes. No external nasal lesions. No gross hearing impairment noted.  	NECK: Supple, symmetric and without tracheal deviation.     RESPIRATORY: No respiratory distress. No obvious use of accessory muscles. Lungs CTAB with no crackling, wheezing, rhonchi or rubs.    CARDIOVASCULAR: Regular rate and rhythm. Normal S1 and S2. No murmurs, rubs or gallops. Radial pulses 2+ bilaterally.    GASTROINTESTINAL: Soft, non-tender to palpation in all quadrants. No palpable masses. No appreciable hernias.    MUSCULOSKELETAL: Left knee, lower leg and foot diffusely swollen with 3+ pitting edema. Examination of all four extremities without obvious misalignment. Left knee pain with passive movement. Grossly normal muscle tone.    SKIN: No obvious rashes or ulcers.    NEUROLOGIC: CN II-XII intact. Sensation intact in upper and lower extremities b/l to light touch.    PSYCHIATRIC: Appropriate insight/judgment. A+O x 3. Mood and affect appropriate. Recent/remote memory intact.                                           --------------------------------------------------------------       MISTY BURLESON 82y Male  MRN#: [id number]     Hospital Day: 1d    SUMMARY: 83 y/o male with PMHx of NIDDM, hypertension, hyperlipidemia, CAD, A-fib on Xarelto, admitted for left leg pain and popping sensation on ambulation, left knee and foot XR showing moderate knee joint effusion but otherwise unremarkable, CTA LLE showing high-grade stenosis of the proximal left superficial femoral artery secondary to large burden calcified plaque and diffuse atherosclerosis. No acute intervention at this time, per vascular. Orthopedics consult pending for possible left knee arthrocentesis. Found to have hypertensive emergency on admission, resolved, currently on hydralazine, HCTZ and lisinopril. EKG on admission showing AFib with RBBB, consistent with previous EKGs, currently rate-controlled.    SUBJECTIVE  No reported acute overnight events. Resting in bed. Reports some pain with movement of left knee and chronic swelling of left knee/lower leg/foot but otherwise without complaints.                                             ----------------------------------------------------------  OBJECTIVE  PAST MEDICAL & SURGICAL HISTORY  Atrial fibrillation    Coronary artery disease    Hyperlipidemia    Hypertension    Gout    Diabetes mellitus    VINI on CPAP    History of heart artery stent                                              -----------------------------------------------------------  ALLERGIES:  No Known Allergies                                            ------------------------------------------------------------    HOME MEDICATIONS  Home Medications:  allopurinol: 150 milligram(s) orally once a day (27 Jun 2022 10:04)  amLODIPine 10 mg oral tablet: 1 tab(s) orally once a day (27 Jun 2022 10:04)  Aspirin Enteric Coated 81 mg oral delayed release tablet: 1 tab(s) orally once a day (27 Jun 2022 10:04)  atorvastatin 40 mg oral tablet: 1 tab(s) orally once a day (at bedtime) (27 Jun 2022 10:04)  fosinopril 40 mg oral tablet: 1 tab(s) orally once a day (27 Jun 2022 10:04)  hydrALAZINE 25 mg oral tablet: 1 tab(s) orally 2 times a day (27 Jun 2022 10:04)  hydroCHLOROthiazide 12.5 mg oral capsule: 1 cap(s) orally once a day (27 Jun 2022 10:04)  isosorbide mononitrate 120 mg oral tablet, extended release: 1 tab(s) orally once a day (27 Jun 2022 10:04)  pioglitazone 30 mg oral tablet: 1 tab(s) orally once a day (27 Jun 2022 10:04)  Xarelto 15 mg oral tablet: 1 tab(s) orally once a day (in the evening) (27 Jun 2022 10:04)                           MEDICATIONS:  STANDING MEDICATIONS  allopurinol 150 milliGRAM(s) Oral daily  amLODIPine   Tablet 10 milliGRAM(s) Oral daily  aspirin enteric coated 81 milliGRAM(s) Oral daily  atorvastatin 40 milliGRAM(s) Oral at bedtime  dextrose 5%. 1000 milliLiter(s) IV Continuous <Continuous>  dextrose 5%. 1000 milliLiter(s) IV Continuous <Continuous>  dextrose 50% Injectable 25 Gram(s) IV Push once  dextrose 50% Injectable 12.5 Gram(s) IV Push once  dextrose 50% Injectable 25 Gram(s) IV Push once  glucagon  Injectable 1 milliGRAM(s) IntraMuscular once  hydrALAZINE 25 milliGRAM(s) Oral two times a day  hydrochlorothiazide 12.5 milliGRAM(s) Oral daily  insulin lispro (ADMELOG) corrective regimen sliding scale   SubCutaneous three times a day before meals  isosorbide   mononitrate ER Tablet (IMDUR) 120 milliGRAM(s) Oral daily  lisinopril 40 milliGRAM(s) Oral daily  rivaroxaban 15 milliGRAM(s) Oral with dinner    PRN MEDICATIONS  acetaminophen     Tablet .. 650 milliGRAM(s) Oral every 6 hours PRN  dextrose Oral Gel 15 Gram(s) Oral once PRN                                            ------------------------------------------------------------  VITAL SIGNS: Last 24 Hours  T(C): 37.2 (02 Jun 2023 05:00), Max: 37.3 (01 Jun 2023 20:12)  T(F): 98.9 (02 Jun 2023 05:00), Max: 99.2 (01 Jun 2023 20:12)  HR: 55 (02 Jun 2023 09:46) (55 - 71)  BP: 160/72 (02 Jun 2023 09:46) (154/76 - 204/86)  BP(mean): --  RR: 18 (02 Jun 2023 05:00) (18 - 18)  SpO2: 96% (02 Jun 2023 05:00) (95% - 96%)      06-02-23 @ 07:01  -  06-02-23 @ 11:09  --------------------------------------------------------  IN: 0 mL / OUT: 400 mL / NET: -400 mL                                             --------------------------------------------------------------  LABS:                        12.5   8.25  )-----------( 215      ( 02 Jun 2023 07:44 )             38.6     06-02    139  |  103  |  20  ----------------------------<  132<H>  4.2   |  22  |  1.3    Ca    9.2      02 Jun 2023 07:44  Mg     1.8     06-02    TPro  6.6  /  Alb  4.0  /  TBili  1.2  /  DBili  x   /  AST  22  /  ALT  12  /  AlkPhos  75  06-02                                                    -------------------------------------------------------------  RADIOLOGY:  ACC: [id number] EXAM: CT ANGIO LWR EXT (W)AW IC LT ORDERED BY: RAIN ISABEL  PROCEDURE DATE: 06/01/2023  IMPRESSION:  High-grade stenosis of the proximal left superficial femoral artery secondary to large burden calcified plaque.  Diffuse atherosclerosis.  Patent plantar arch.  BRADLEY STOKES MD; Resident Radiologist  This document has been electronically signed.  CALVIN OSEGUERA MD; Attending Interventional Radiologist  This document has been electronically signed. Jun 1 2023 3:35PM    ACC: [id number] EXAM: XR KNEE W PATELLA 3 VIEWS LT ORDERED BY: REMI MO  ACC: [id number] EXAM: XR FOOT 2 VIEWS LT ORDERED BY: REIM MO  ACC: [id number] EXAM: XR TIB FIB AP LAT 2 VIEWS LT ORDERED BY: REMI MO  PROCEDURE DATE: 06/01/2023  IMPRESSION:  1. No acute osseous abnormality seen.  2. Moderate knee joint effusion.  RACH ALEJANDRO MD; Attending Radiologist  This document has been electronically signed. Jun 1 2023 2:12PM    ******PRELIMINARY REPORT******  ******PRELIMINARY REPORT******  ACC: [id number] EXAM: DUPLEX SCAN EXT VEINS LOWER BI ORDERED BY: REMI MO  PROCEDURE DATE: 06/01/2023  ******PRELIMINARY REPORT******  ******PRELIMINARY REPORT******  Impression:  No evidence of deep venous thrombosis or superficial thrombophlebitis in the bilateral lower extremities.  ICD-10:M79.89  ******PRELIMINARY REPORT******  ******PRELIMINARY REPORT******  SANJUANA PIZANO MD; Vascular Fellow  This document is a PRELIMINARY interpretation and is pending final attending approval. Jun 2 2023 10:01AM                                          --------------------------------------------------------------    PHYSICAL EXAM:  CONSTITUTIONAL: In no apparent distress.    EYES: PERRLA and symmetric, EOMI, No conjunctival injection or pallor. Sclera anicteric.    ENMT: Moist mucous membranes. No external nasal lesions. No gross hearing impairment noted.  	NECK: Supple, symmetric and without tracheal deviation.     RESPIRATORY: No respiratory distress. No obvious use of accessory muscles. Lungs CTAB with no crackling, wheezing, rhonchi or rubs.    CARDIOVASCULAR: Regular rate and rhythm. Normal S1 and S2. No murmurs, rubs or gallops. Radial pulses 2+ bilaterally.    GASTROINTESTINAL: Soft, non-tender to palpation in all quadrants. No palpable masses. No appreciable hernias.    MUSCULOSKELETAL: Left knee, lower leg and foot diffusely swollen with 3+ pitting edema. Examination of all four extremities without obvious misalignment. Left knee pain with passive movement. Grossly normal muscle tone.    SKIN: No obvious rashes or ulcers.    NEUROLOGIC: CN II-XII intact. Sensation intact in upper and lower extremities b/l to light touch.    PSYCHIATRIC: Appropriate insight/judgment. A+O x 3. Mood and affect appropriate. Recent/remote memory intact.                                           --------------------------------------------------------------       MISTY BURLESON 82y Male  MRN#: [id number]     Hospital Day: 1d    SUMMARY: 81 y/o male with PMHx of NIDDM, hypertension, hyperlipidemia, CAD, A-fib on Xarelto, admitted for left leg pain and popping sensation on ambulation, left knee and foot XR showing moderate knee joint effusion but otherwise unremarkable, CTA LLE showing high-grade stenosis of the proximal left superficial femoral artery secondary to large burden calcified plaque and diffuse atherosclerosis. No acute intervention at this time, per vascular. Per orthopedics, no indication for arthrocentesis of left knee. Started on solumedrol 40mg IV QD. Found to have hypertensive emergency on admission, resolved, currently on hydralazine, HCTZ and lisinopril. EKG on admission showing AFib with RBBB, consistent with previous EKGs, currently rate-controlled.    SUBJECTIVE  No reported acute overnight events. Resting in bed. Reports some pain with movement of left knee and chronic swelling of left knee/lower leg/foot but otherwise without complaints.                                             ----------------------------------------------------------  OBJECTIVE  PAST MEDICAL & SURGICAL HISTORY  Atrial fibrillation    Coronary artery disease    Hyperlipidemia    Hypertension    Gout    Diabetes mellitus    VINI on CPAP    History of heart artery stent                                              -----------------------------------------------------------  ALLERGIES:  No Known Allergies                                            ------------------------------------------------------------    HOME MEDICATIONS  Home Medications:  allopurinol: 150 milligram(s) orally once a day (27 Jun 2022 10:04)  amLODIPine 10 mg oral tablet: 1 tab(s) orally once a day (27 Jun 2022 10:04)  Aspirin Enteric Coated 81 mg oral delayed release tablet: 1 tab(s) orally once a day (27 Jun 2022 10:04)  atorvastatin 40 mg oral tablet: 1 tab(s) orally once a day (at bedtime) (27 Jun 2022 10:04)  fosinopril 40 mg oral tablet: 1 tab(s) orally once a day (27 Jun 2022 10:04)  hydrALAZINE 25 mg oral tablet: 1 tab(s) orally 2 times a day (27 Jun 2022 10:04)  hydroCHLOROthiazide 12.5 mg oral capsule: 1 cap(s) orally once a day (27 Jun 2022 10:04)  isosorbide mononitrate 120 mg oral tablet, extended release: 1 tab(s) orally once a day (27 Jun 2022 10:04)  pioglitazone 30 mg oral tablet: 1 tab(s) orally once a day (27 Jun 2022 10:04)  Xarelto 15 mg oral tablet: 1 tab(s) orally once a day (in the evening) (27 Jun 2022 10:04)                           MEDICATIONS:  STANDING MEDICATIONS  allopurinol 150 milliGRAM(s) Oral daily  amLODIPine   Tablet 10 milliGRAM(s) Oral daily  aspirin enteric coated 81 milliGRAM(s) Oral daily  atorvastatin 40 milliGRAM(s) Oral at bedtime  dextrose 5%. 1000 milliLiter(s) IV Continuous <Continuous>  dextrose 5%. 1000 milliLiter(s) IV Continuous <Continuous>  dextrose 50% Injectable 25 Gram(s) IV Push once  dextrose 50% Injectable 12.5 Gram(s) IV Push once  dextrose 50% Injectable 25 Gram(s) IV Push once  glucagon  Injectable 1 milliGRAM(s) IntraMuscular once  hydrALAZINE 25 milliGRAM(s) Oral two times a day  hydrochlorothiazide 12.5 milliGRAM(s) Oral daily  insulin lispro (ADMELOG) corrective regimen sliding scale   SubCutaneous three times a day before meals  isosorbide   mononitrate ER Tablet (IMDUR) 120 milliGRAM(s) Oral daily  lisinopril 40 milliGRAM(s) Oral daily  rivaroxaban 15 milliGRAM(s) Oral with dinner    PRN MEDICATIONS  acetaminophen     Tablet .. 650 milliGRAM(s) Oral every 6 hours PRN  dextrose Oral Gel 15 Gram(s) Oral once PRN                                            ------------------------------------------------------------  VITAL SIGNS: Last 24 Hours  T(C): 37.2 (02 Jun 2023 05:00), Max: 37.3 (01 Jun 2023 20:12)  T(F): 98.9 (02 Jun 2023 05:00), Max: 99.2 (01 Jun 2023 20:12)  HR: 55 (02 Jun 2023 09:46) (55 - 71)  BP: 160/72 (02 Jun 2023 09:46) (154/76 - 204/86)  BP(mean): --  RR: 18 (02 Jun 2023 05:00) (18 - 18)  SpO2: 96% (02 Jun 2023 05:00) (95% - 96%)      06-02-23 @ 07:01  -  06-02-23 @ 11:09  --------------------------------------------------------  IN: 0 mL / OUT: 400 mL / NET: -400 mL                                             --------------------------------------------------------------  LABS:                        12.5   8.25  )-----------( 215      ( 02 Jun 2023 07:44 )             38.6     06-02    139  |  103  |  20  ----------------------------<  132<H>  4.2   |  22  |  1.3    Ca    9.2      02 Jun 2023 07:44  Mg     1.8     06-02    TPro  6.6  /  Alb  4.0  /  TBili  1.2  /  DBili  x   /  AST  22  /  ALT  12  /  AlkPhos  75  06-02                                                    -------------------------------------------------------------  RADIOLOGY:  ACC: [id number] EXAM: CT ANGIO LWR EXT (W)AW IC LT ORDERED BY: RAIN ISABEL  PROCEDURE DATE: 06/01/2023  IMPRESSION:  High-grade stenosis of the proximal left superficial femoral artery secondary to large burden calcified plaque.  Diffuse atherosclerosis.  Patent plantar arch.  BRADLEY STOKES MD; Resident Radiologist  This document has been electronically signed.  CALVIN OSEGUERA MD; Attending Interventional Radiologist  This document has been electronically signed. Jun 1 2023 3:35PM    ACC: [id number] EXAM: XR KNEE W PATELLA 3 VIEWS LT ORDERED BY: REMI MO  ACC: [id number] EXAM: XR FOOT 2 VIEWS LT ORDERED BY: REMI MO  ACC: [id number] EXAM: XR TIB FIB AP LAT 2 VIEWS LT ORDERED BY: REMI MO  PROCEDURE DATE: 06/01/2023  IMPRESSION:  1. No acute osseous abnormality seen.  2. Moderate knee joint effusion.  RACH ALEJANDRO MD; Attending Radiologist  This document has been electronically signed. Jun 1 2023 2:12PM    ******PRELIMINARY REPORT******  ******PRELIMINARY REPORT******  ACC: [id number] EXAM: DUPLEX SCAN EXT VEINS LOWER BI ORDERED BY: REMI MO  PROCEDURE DATE: 06/01/2023  ******PRELIMINARY REPORT******  ******PRELIMINARY REPORT******  Impression:  No evidence of deep venous thrombosis or superficial thrombophlebitis in the bilateral lower extremities.  ICD-10:M79.89  ******PRELIMINARY REPORT******  ******PRELIMINARY REPORT******  SANJUANA PIZANO MD; Vascular Fellow  This document is a PRELIMINARY interpretation and is pending final attending approval. Jun 2 2023 10:01AM                                          --------------------------------------------------------------    PHYSICAL EXAM:  CONSTITUTIONAL: In no apparent distress.    EYES: PERRLA and symmetric, EOMI, No conjunctival injection or pallor. Sclera anicteric.    ENMT: Moist mucous membranes. No external nasal lesions. No gross hearing impairment noted.  	NECK: Supple, symmetric and without tracheal deviation.     RESPIRATORY: No respiratory distress. No obvious use of accessory muscles. Lungs CTAB with no crackling, wheezing, rhonchi or rubs.    CARDIOVASCULAR: Regular rate and rhythm. Normal S1 and S2. No murmurs, rubs or gallops. Radial pulses 2+ bilaterally.    GASTROINTESTINAL: Soft, non-tender to palpation in all quadrants. No palpable masses. No appreciable hernias.    MUSCULOSKELETAL: Left knee, lower leg and foot diffusely swollen with 3+ pitting edema. Examination of all four extremities without obvious misalignment. Left knee pain with passive movement. Grossly normal muscle tone.    SKIN: No obvious rashes or ulcers.    NEUROLOGIC: CN II-XII intact. Sensation intact in upper and lower extremities b/l to light touch.    PSYCHIATRIC: Appropriate insight/judgment. A+O x 3. Mood and affect appropriate. Recent/remote memory intact.                                           --------------------------------------------------------------       MISTY BURLESON 82y Male  MRN#: [id number]     Hospital Day: 1d    SUMMARY: 81 y/o male with PMHx of NIDDM, hypertension, hyperlipidemia, CAD, A-fib on Xarelto, admitted for left leg pain and popping sensation on ambulation, left knee and foot XR showing moderate knee joint effusion but otherwise unremarkable, CTA LLE showing high-grade stenosis of the proximal left superficial femoral artery secondary to large burden calcified plaque and diffuse atherosclerosis. No acute intervention at this time, per vascular. Per orthopedics, no indication for arthrocentesis of left knee. Started on solumedrol 40mg IV QD. Found to have hypertensive emergency on admission, resolved, currently on hydralazine, HCTZ and lisinopril. EKG on admission showing AFib with RBBB, consistent with previous EKGs, currently rate-controlled.    SUBJECTIVE  No reported acute overnight events. Resting in bed. Reports some pain with movement of left knee  otherwise without complaints.                                             ----------------------------------------------------------  OBJECTIVE  PAST MEDICAL & SURGICAL HISTORY  Atrial fibrillation    Coronary artery disease    Hyperlipidemia    Hypertension    Gout    Diabetes mellitus    VINI on CPAP    History of heart artery stent                                              -----------------------------------------------------------  ALLERGIES:  No Known Allergies                                            ------------------------------------------------------------    HOME MEDICATIONS  Home Medications:  allopurinol: 150 milligram(s) orally once a day (27 Jun 2022 10:04)  amLODIPine 10 mg oral tablet: 1 tab(s) orally once a day (27 Jun 2022 10:04)  Aspirin Enteric Coated 81 mg oral delayed release tablet: 1 tab(s) orally once a day (27 Jun 2022 10:04)  atorvastatin 40 mg oral tablet: 1 tab(s) orally once a day (at bedtime) (27 Jun 2022 10:04)  fosinopril 40 mg oral tablet: 1 tab(s) orally once a day (27 Jun 2022 10:04)  hydrALAZINE 25 mg oral tablet: 1 tab(s) orally 2 times a day (27 Jun 2022 10:04)  hydroCHLOROthiazide 12.5 mg oral capsule: 1 cap(s) orally once a day (27 Jun 2022 10:04)  isosorbide mononitrate 120 mg oral tablet, extended release: 1 tab(s) orally once a day (27 Jun 2022 10:04)  pioglitazone 30 mg oral tablet: 1 tab(s) orally once a day (27 Jun 2022 10:04)  Xarelto 15 mg oral tablet: 1 tab(s) orally once a day (in the evening) (27 Jun 2022 10:04)                           MEDICATIONS:  STANDING MEDICATIONS  allopurinol 150 milliGRAM(s) Oral daily  amLODIPine   Tablet 10 milliGRAM(s) Oral daily  aspirin enteric coated 81 milliGRAM(s) Oral daily  atorvastatin 40 milliGRAM(s) Oral at bedtime  dextrose 5%. 1000 milliLiter(s) IV Continuous <Continuous>  dextrose 5%. 1000 milliLiter(s) IV Continuous <Continuous>  dextrose 50% Injectable 25 Gram(s) IV Push once  dextrose 50% Injectable 12.5 Gram(s) IV Push once  dextrose 50% Injectable 25 Gram(s) IV Push once  glucagon  Injectable 1 milliGRAM(s) IntraMuscular once  hydrALAZINE 25 milliGRAM(s) Oral two times a day  hydrochlorothiazide 12.5 milliGRAM(s) Oral daily  insulin lispro (ADMELOG) corrective regimen sliding scale   SubCutaneous three times a day before meals  isosorbide   mononitrate ER Tablet (IMDUR) 120 milliGRAM(s) Oral daily  lisinopril 40 milliGRAM(s) Oral daily  rivaroxaban 15 milliGRAM(s) Oral with dinner    PRN MEDICATIONS  acetaminophen     Tablet .. 650 milliGRAM(s) Oral every 6 hours PRN  dextrose Oral Gel 15 Gram(s) Oral once PRN                                            ------------------------------------------------------------  VITAL SIGNS: Last 24 Hours  T(C): 37.2 (02 Jun 2023 05:00), Max: 37.3 (01 Jun 2023 20:12)  T(F): 98.9 (02 Jun 2023 05:00), Max: 99.2 (01 Jun 2023 20:12)  HR: 55 (02 Jun 2023 09:46) (55 - 71)  BP: 160/72 (02 Jun 2023 09:46) (154/76 - 204/86)  BP(mean): --  RR: 18 (02 Jun 2023 05:00) (18 - 18)  SpO2: 96% (02 Jun 2023 05:00) (95% - 96%)      06-02-23 @ 07:01  -  06-02-23 @ 11:09  --------------------------------------------------------  IN: 0 mL / OUT: 400 mL / NET: -400 mL                                             --------------------------------------------------------------  LABS:                        12.5   8.25  )-----------( 215      ( 02 Jun 2023 07:44 )             38.6     06-02    139  |  103  |  20  ----------------------------<  132<H>  4.2   |  22  |  1.3    Ca    9.2      02 Jun 2023 07:44  Mg     1.8     06-02    TPro  6.6  /  Alb  4.0  /  TBili  1.2  /  DBili  x   /  AST  22  /  ALT  12  /  AlkPhos  75  06-02                                                    -------------------------------------------------------------  RADIOLOGY:  ACC: [id number] EXAM: CT ANGIO LWR EXT (W)AW IC LT ORDERED BY: RAIN ISABEL  PROCEDURE DATE: 06/01/2023  IMPRESSION:  High-grade stenosis of the proximal left superficial femoral artery secondary to large burden calcified plaque.  Diffuse atherosclerosis.  Patent plantar arch.  BRADLYE STOKES MD; Resident Radiologist  This document has been electronically signed.  CALVIN OSEGUERA MD; Attending Interventional Radiologist  This document has been electronically signed. Jun 1 2023 3:35PM    ACC: [id number] EXAM: XR KNEE W PATELLA 3 VIEWS LT ORDERED BY: REMI MO  ACC: [id number] EXAM: XR FOOT 2 VIEWS LT ORDERED BY: REMI MO  ACC: [id number] EXAM: XR TIB FIB AP LAT 2 VIEWS LT ORDERED BY: REMI MO  PROCEDURE DATE: 06/01/2023  IMPRESSION:  1. No acute osseous abnormality seen.  2. Moderate knee joint effusion.  RACH ALEJANDRO MD; Attending Radiologist  This document has been electronically signed. Jun 1 2023 2:12PM    ******PRELIMINARY REPORT******  ******PRELIMINARY REPORT******  ACC: [id number] EXAM: DUPLEX SCAN EXT VEINS LOWER BI ORDERED BY: REMI MO  PROCEDURE DATE: 06/01/2023  ******PRELIMINARY REPORT******  ******PRELIMINARY REPORT******  Impression:  No evidence of deep venous thrombosis or superficial thrombophlebitis in the bilateral lower extremities.  ICD-10:M79.89  ******PRELIMINARY REPORT******  ******PRELIMINARY REPORT******  SANJUANA PIZANO MD; Vascular Fellow  This document is a PRELIMINARY interpretation and is pending final attending approval. Jun 2 2023 10:01AM                                          --------------------------------------------------------------    PHYSICAL EXAM:  CONSTITUTIONAL: In no apparent distress.    EYES: PERRLA and symmetric, EOMI, No conjunctival injection or pallor. Sclera anicteric.    ENMT: Moist mucous membranes. No external nasal lesions. No gross hearing impairment noted.  	NECK: Supple, symmetric and without tracheal deviation.     RESPIRATORY: No respiratory distress. No obvious use of accessory muscles. Lungs CTAB with no crackling, wheezing, rhonchi or rubs.    CARDIOVASCULAR: Regular rate and rhythm. Normal S1 and S2. No murmurs, rubs or gallops. Radial pulses 2+ bilaterally.    GASTROINTESTINAL: Soft, non-tender to palpation in all quadrants. No palpable masses. No appreciable hernias.    MUSCULOSKELETAL: Left knee swelling w tenderness, no limitation on ROM,  Examination of all four extremities without obvious misalignment.  Grossly normal muscle tone.    SKIN: No obvious rashes or ulcers.    NEUROLOGIC: CN II-XII intact. Sensation intact in upper and lower extremities b/l to light touch.    PSYCHIATRIC: Appropriate insight/judgment. A+O x 3. Mood and affect appropriate. Recent/remote memory intact.                                           --------------------------------------------------------------

## 2023-06-02 NOTE — PHYSICAL THERAPY INITIAL EVALUATION ADULT - RANGE OF MOTION EXAMINATION, REHAB EVAL
B UE's grossly WFL. R LE grossly WFL. L LE: + swelling knee and ankle noted, limted knee flex, grossly WFL ankle DF.

## 2023-06-02 NOTE — DISCHARGE NOTE NURSING/CASE MANAGEMENT/SOCIAL WORK - PATIENT PORTAL LINK FT
You can access the FollowMyHealth Patient Portal offered by [facility] by registering at the following website: [email or web address]. By joining [employer]’s FollowMyHealth portal, you will also be able to view your health information using other applications (apps) compatible with our system.

## 2023-06-02 NOTE — PHYSICAL THERAPY INITIAL EVALUATION ADULT - MANUAL MUSCLE TESTING RESULTS, REHAB EVAL
B UE's: no deficits noted . R LE at least 4/5. L LE: hip flex 3-/5 knee flex/ext 3-/5, ankle DF 3/5.

## 2023-06-02 NOTE — PHYSICAL THERAPY INITIAL EVALUATION ADULT - ADDITIONAL COMMENTS
Pt lives alone in [place] 4 steps to enter, 1 flight to bedroom and shower, has half bath on first floor. Pt reports he can stay on first floor, or go to his girlfriend's house who has 8 steps to enter and all rooms on one level. Pt was independent with amb and ADL's prior to admission. Reports he has a RW and SC at home in good condition.

## 2023-06-02 NOTE — PHYSICAL THERAPY INITIAL EVALUATION ADULT - PERTINENT HX OF CURRENT PROBLEM, REHAB EVAL
82-year-old male history of NIDDM, hypertension, hyperlipidemia, CAD, A-fib on Xarelto presents with left leg pain for a few days.  Patient admits for the last few days having pain with walking on the left leg. Yesterday was walking and heard a pop sensation on his left knee but denies any falls.  Has had vascular vein in the distant past for the left lower leg.  Denies any numbness tingling falls no other acute complaints. Accompanied by NATA Salgado.

## 2023-06-02 NOTE — PROGRESS NOTE ADULT - ASSESSMENT
ASSESSMENT: 83 y/o male with PMHx of NIDDM, hypertension, hyperlipidemia, CAD, A-fib on Xarelto, admitted for left leg pain and popping sensation on ambulation, left knee and foot XR showing moderate knee joint effusion but otherwise unremarkable, CTA LLE showing high-grade stenosis of the proximal left superficial femoral artery secondary to large burden calcified plaque and diffuse atherosclerosis. No acute intervention at this time, per vascular. Orthopedics consult pending for possible left knee arthrocentesis. Found to have hypertensive emergency on admission, resolved, currently on hydralazine, HCTZ and lisinopril. EKG on admission showing AFib with RBBB, consistent with previous EKGs, currently rate-controlled.    Plans: ASSESSMENT: 83 y/o male with PMHx of NIDDM, hypertension, hyperlipidemia, CAD, A-fib on Xarelto, admitted for left leg pain and popping sensation on ambulation, left knee and foot XR showing moderate knee joint effusion but otherwise unremarkable, CTA LLE showing high-grade stenosis of the proximal left superficial femoral artery secondary to large burden calcified plaque and diffuse atherosclerosis. No acute intervention at this time, per vascular. Orthopedics consult pending for possible left knee arthrocentesis. Found to have hypertensive emergency on admission, resolved, currently on hydralazine, HCTZ and lisinopril. EKG on admission showing AFib with RBBB, consistent with previous EKGs, currently rate-controlled.    Plans:  # Left knee pain on ambulation  # Left knee swelling - need to r/o gout flare vs. 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  - Femoral and popliteal pulses palpable and present on doppler on admission, as per H&P.  - Left knee/lower leg/foot swollen on exam with 3+ pitting edema; patient reports that this swelling is chronic.   - Left knee and foot XR showing moderate knee joint effusion but otherwise unremarkable.  - CTA LLE showing high-grade stenosis of the proximal left superficial femoral artery secondary to large burden calcified plaque and diffuse atherosclerosis.   - Continue home aspirin 81mg PO QD.  - Continue home Xarelto 15mg PO qPM.   - Continue home atorvastatin 40mg PO qHS.   - Continue home allopurinol 150mg PO QD.   - Continue ISS.   - Monitor FS closely, target 140-180.  - Preliminary read of LE venous duplex: No evidence of deep venous thrombosis or superficial thrombophlebitis in the bilateral lower extremities.  - Per vascular, no acute surgical intervention at this time, f/u with Dr. Chopra in 1 month.  - f/u orthopedics consult for possible arthrocentesis of left knee.     # Hypertensive emergency on admission - resolved  # HTN  # Chronic Afib with RBBB - currently rate-controlled  - /85 on admission, 160/72 in AM.  - EKG showing AFib with RBBB on admission, consistent with previous EKG from 5/2023.  - Continue amlodipine 10mg PO QD.  - Continue hydralazine 25mg PO BID.  - Continue HCTZ 12.5mg PO QD.     # CKD stage 3B  - Cr 1.5 -> 1.3.  - Continue to trend serum Cr.   - Avoid nephrotoxic medications.     # DVT PPX  - Heparin 5000U subQ q12h.    # GI PPX  - Pantoprazole 40mg PO qAM.    # Activity  - IAT.  - f/u PT consult.    # Code status  - FULL CODE.    # Dispo  - From home.    ASSESSMENT: 83 y/o male with PMHx of NIDDM, hypertension, hyperlipidemia, CAD, A-fib on Xarelto, admitted for left leg pain and popping sensation on ambulation, left knee and foot XR showing moderate knee joint effusion but otherwise unremarkable, CTA LLE showing high-grade stenosis of the proximal left superficial femoral artery secondary to large burden calcified plaque and diffuse atherosclerosis. No acute intervention at this time, per vascular. Per orthopedics, no indication for arthrocentesis of left knee. Started on solumedrol 40mg IV QD. Found to have hypertensive emergency on admission, resolved, currently on hydralazine, HCTZ and lisinopril. EKG on admission showing AFib with RBBB, consistent with previous EKGs, currently rate-controlled.    Plans:  # Left knee pain on ambulation  # Left knee swelling - need to r/o gout flare vs. 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  - Femoral and popliteal pulses palpable and present on doppler on admission, as per H&P.  - Left knee/lower leg/foot swollen on exam with 3+ pitting edema; patient reports that this swelling is chronic.   - Left knee and foot XR showing moderate knee joint effusion but otherwise unremarkable.  - CTA LLE showing high-grade stenosis of the proximal left superficial femoral artery secondary to large burden calcified plaque and diffuse atherosclerosis.   - Continue home aspirin 81mg PO QD.  - Continue home Xarelto 15mg PO qPM.   - Continue home atorvastatin 40mg PO qHS.   - Continue home allopurinol 150mg PO QD.   - Continue ISS.   - Monitor FS closely, target 140-180.  - Preliminary read of LE venous duplex: No evidence of deep venous thrombosis or superficial thrombophlebitis in the bilateral lower extremities.  - Per vascular, no acute surgical intervention at this time, f/u with Dr. Chopra in 1 month.  - Per orthopedics, no indication for arthrocentesis of left knee.   - Started on solumedrol 40mg IV QD.     # Hypertensive emergency on admission - resolved  # HTN  # Chronic Afib with RBBB - currently rate-controlled  - /85 on admission, 160/72 in AM.  - EKG showing AFib with RBBB on admission, consistent with previous EKG from 5/2023.  - Continue amlodipine 10mg PO QD.  - Continue hydralazine 25mg PO BID.  - Continue HCTZ 12.5mg PO QD.     # CKD stage 3B  - Cr 1.5 -> 1.3.  - Continue to trend serum Cr.   - Avoid nephrotoxic medications.     # DVT PPX  - Heparin 5000U subQ q12h.    # GI PPX  - Pantoprazole 40mg PO qAM.    # Activity  - IAT.  - f/u PT consult.    # Code status  - FULL CODE.    # Dispo  - From home.

## 2023-06-02 NOTE — DISCHARGE NOTE NURSING/CASE MANAGEMENT/SOCIAL WORK - NSDCPEFALRISK_GEN_ALL_CORE
For information on Fall & Injury Prevention, visit: [email or web address] OR  [email or web address] OR  https://www.cdc.gov/steadi/patient.html

## 2023-06-02 NOTE — CONSULT NOTE ADULT - SUBJECTIVE AND OBJECTIVE BOX
ORTHOPAEDIC SURGERY CONSULT NOTE    Reason for Consult: Left knee pain    HPI: 82y Male presents with pain in his left knee. Pain began spontaneously a few days ago, with no history of trauma. He notes that his girlfriend heard a pop when the pain began, however, pt does not remember hearing it himself. He rates the pain at a 5/10 and locates it to mainly behind the knee.  Pt normally ambulates without assistance and has been using a cane with difficulty since the pain began. Reports history of gout in the past. He has no other complaints at this time. Denies paresthesias. Denies nausea, vomiting, fever, chills.    PAST MEDICAL & SURGICAL HISTORY:  Atrial fibrillation      Coronary artery disease      Hyperlipidemia      Hypertension      Gout      Diabetes mellitus      VINI on CPAP      History of heart artery stent        Allergies: No Known Allergies    Medications: acetaminophen     Tablet .. 650 milliGRAM(s) Oral every 6 hours PRN  allopurinol 150 milliGRAM(s) Oral daily  amLODIPine   Tablet 10 milliGRAM(s) Oral daily  aspirin enteric coated 81 milliGRAM(s) Oral daily  atorvastatin 40 milliGRAM(s) Oral at bedtime  dextrose 5%. 1000 milliLiter(s) IV Continuous <Continuous>  dextrose 5%. 1000 milliLiter(s) IV Continuous <Continuous>  dextrose 50% Injectable 25 Gram(s) IV Push once  dextrose 50% Injectable 12.5 Gram(s) IV Push once  dextrose 50% Injectable 25 Gram(s) IV Push once  dextrose Oral Gel 15 Gram(s) Oral once PRN  glucagon  Injectable 1 milliGRAM(s) IntraMuscular once  heparin   Injectable 5000 Unit(s) SubCutaneous every 12 hours  hydrALAZINE 25 milliGRAM(s) Oral two times a day  hydrochlorothiazide 12.5 milliGRAM(s) Oral daily  insulin lispro (ADMELOG) corrective regimen sliding scale   SubCutaneous three times a day before meals  isosorbide   mononitrate ER Tablet (IMDUR) 120 milliGRAM(s) Oral daily  lisinopril 40 milliGRAM(s) Oral daily  rivaroxaban 15 milliGRAM(s) Oral with dinner      PHYSICAL EXAM:  Vital Signs Last 24 Hrs  T(C): 36.6 (02 Jun 2023 12:15), Max: 37.3 (01 Jun 2023 20:12)  T(F): 97.9 (02 Jun 2023 12:15), Max: 99.2 (01 Jun 2023 20:12)  HR: 59 (02 Jun 2023 12:15) (55 - 71)  BP: 153/70 (02 Jun 2023 12:15) (153/70 - 204/86)  BP(mean): --  RR: 18 (02 Jun 2023 12:15) (18 - 18)  SpO2: 97% (02 Jun 2023 12:15) (95% - 97%)    Physical Exam:  General: NAD. AAOx3.  Resp: NLB on RA.  On exam found sitting in chair with knee bent to 90 degrees  Able to bear weight on left leg    LLE:   No open skin or wounds  NTTP hip, thigh, leg, ankle or foot.   Minimal ttp to knee diffusely  Full baseline painless ROM at hip, ankle and toes   Knee ROM  with minimal pain  No pain with micromotion  No pain with log-roll or axial compression  Able to actively SLR.  SILT DP/SP/T/[place]/Sa.   EHL/FHL/TA/Gs motor intact.  Venous stasis changes  Foot warm and well perfused  Compartments soft and compressible. No pain on passive stretch.    Labs:                        12.5   8.25  )-----------( 215      ( 02 Jun 2023 07:44 )             38.6     06-02    139  |  103  |  20  ----------------------------<  132<H>  4.2   |  22  |  1.3    Ca    9.2      02 Jun 2023 07:44  Mg     1.8     06-02    TPro  6.6  /  Alb  4.0  /  TBili  1.2  /  DBili  x   /  AST  22  /  ALT  12  /  AlkPhos  75  06-02      Imaging XR:   Left Knee XR  - no osseous abnormality  - minimal effusion     A/P: 82y Male with left knee pain, most likely sprain versus gout. No signs of active infection. No concern for septic joint.     - No indication for knee aspiration  - Recommend rheumatology   - Pain control per primary  - Recommend ESR/CRP  - Activity: WBAT LLE  - DVT prophylaxis per primary  - PT/OT  - Extremity icing/elevation.

## 2023-06-02 NOTE — PROGRESS NOTE ADULT - ATTENDING COMMENTS
#left knee pain and swelling suspected acute gout  #HTN urgency  #chronic Afib  #Hx of CAD  #PAD  #DM    - ortho eval for tap  - low suspicious for septic arthritis   - if no tap can start steroid  - replace Norvasc w nifedipine   - PT eval  - from home

## 2023-06-02 NOTE — PHYSICAL THERAPY INITIAL EVALUATION ADULT - GAIT TRAINING, PT EVAL
Improve amb to 100' with supervision using RW by d/c. Pt to negotiate 1 flight of step using step to pattern and 2 hands on HR.

## 2023-06-03 ENCOUNTER — TRANSCRIPTION ENCOUNTER (OUTPATIENT)
Age: 83
End: 2023-06-03

## 2023-06-03 VITALS — TEMPERATURE: 97 F | DIASTOLIC BLOOD PRESSURE: 88 MMHG | HEART RATE: 85 BPM | SYSTOLIC BLOOD PRESSURE: 190 MMHG

## 2023-06-03 LAB
ALBUMIN SERPL ELPH-MCNC: 3.6 G/DL — SIGNIFICANT CHANGE UP (ref 3.5–5.2)
ALP SERPL-CCNC: 68 U/L — SIGNIFICANT CHANGE UP (ref 30–115)
ALT FLD-CCNC: 11 U/L — SIGNIFICANT CHANGE UP (ref 0–41)
ANION GAP SERPL CALC-SCNC: 17 MMOL/L — HIGH (ref 7–14)
AST SERPL-CCNC: 21 U/L — SIGNIFICANT CHANGE UP (ref 0–41)
BASOPHILS # BLD AUTO: 0 K/UL — SIGNIFICANT CHANGE UP (ref 0–0.2)
BASOPHILS NFR BLD AUTO: 0 % — SIGNIFICANT CHANGE UP (ref 0–1)
BILIRUB SERPL-MCNC: 1.1 MG/DL — SIGNIFICANT CHANGE UP (ref 0.2–1.2)
BUN SERPL-MCNC: 36 MG/DL — HIGH (ref 10–20)
CALCIUM SERPL-MCNC: 9.1 MG/DL — SIGNIFICANT CHANGE UP (ref 8.4–10.5)
CHLORIDE SERPL-SCNC: 102 MMOL/L — SIGNIFICANT CHANGE UP (ref 98–110)
CO2 SERPL-SCNC: 19 MMOL/L — SIGNIFICANT CHANGE UP (ref 17–32)
CREAT SERPL-MCNC: 1.7 MG/DL — HIGH (ref 0.7–1.5)
EGFR: 40 ML/MIN/1.73M2 — LOW
EOSINOPHIL # BLD AUTO: 0 K/UL — SIGNIFICANT CHANGE UP (ref 0–0.7)
EOSINOPHIL NFR BLD AUTO: 0 % — SIGNIFICANT CHANGE UP (ref 0–8)
GLUCOSE BLDC GLUCOMTR-MCNC: 210 MG/DL — HIGH (ref 70–99)
GLUCOSE BLDC GLUCOMTR-MCNC: 224 MG/DL — HIGH (ref 70–99)
GLUCOSE BLDC GLUCOMTR-MCNC: 319 MG/DL — HIGH (ref 70–99)
GLUCOSE SERPL-MCNC: 216 MG/DL — HIGH (ref 70–99)
HCT VFR BLD CALC: 36.6 % — LOW (ref 42–52)
HGB BLD-MCNC: 11.8 G/DL — LOW (ref 14–18)
IMM GRANULOCYTES NFR BLD AUTO: 0.3 % — SIGNIFICANT CHANGE UP (ref 0.1–0.3)
LYMPHOCYTES # BLD AUTO: 0.4 K/UL — LOW (ref 1.2–3.4)
LYMPHOCYTES # BLD AUTO: 4.6 % — LOW (ref 20.5–51.1)
MAGNESIUM SERPL-MCNC: 2 MG/DL — SIGNIFICANT CHANGE UP (ref 1.8–2.4)
MCHC RBC-ENTMCNC: 31.6 PG — HIGH (ref 27–31)
MCHC RBC-ENTMCNC: 32.2 G/DL — SIGNIFICANT CHANGE UP (ref 32–37)
MCV RBC AUTO: 97.9 FL — HIGH (ref 80–94)
MONOCYTES # BLD AUTO: 0.2 K/UL — SIGNIFICANT CHANGE UP (ref 0.1–0.6)
MONOCYTES NFR BLD AUTO: 2.3 % — SIGNIFICANT CHANGE UP (ref 1.7–9.3)
NEUTROPHILS # BLD AUTO: 8.11 K/UL — HIGH (ref 1.4–6.5)
NEUTROPHILS NFR BLD AUTO: 92.8 % — HIGH (ref 42.2–75.2)
NRBC # BLD: 0 /100 WBCS — SIGNIFICANT CHANGE UP (ref 0–0)
PHOSPHATE SERPL-MCNC: 4.1 MG/DL — SIGNIFICANT CHANGE UP (ref 2.1–4.9)
PLATELET # BLD AUTO: 204 K/UL — SIGNIFICANT CHANGE UP (ref 130–400)
PMV BLD: 10.8 FL — HIGH (ref 7.4–10.4)
POTASSIUM SERPL-MCNC: 4.5 MMOL/L — SIGNIFICANT CHANGE UP (ref 3.5–5)
POTASSIUM SERPL-SCNC: 4.5 MMOL/L — SIGNIFICANT CHANGE UP (ref 3.5–5)
PROT SERPL-MCNC: 6.4 G/DL — SIGNIFICANT CHANGE UP (ref 6–8)
RBC # BLD: 3.74 M/UL — LOW (ref 4.7–6.1)
RBC # FLD: 14.3 % — SIGNIFICANT CHANGE UP (ref 11.5–14.5)
SODIUM SERPL-SCNC: 138 MMOL/L — SIGNIFICANT CHANGE UP (ref 135–146)
WBC # BLD: 8.74 K/UL — SIGNIFICANT CHANGE UP (ref 4.8–10.8)
WBC # FLD AUTO: 8.74 K/UL — SIGNIFICANT CHANGE UP (ref 4.8–10.8)

## 2023-06-03 PROCEDURE — 99239 HOSP IP/OBS DSCHRG MGMT >30: CPT

## 2023-06-03 RX ORDER — NIFEDIPINE 30 MG
1 TABLET, EXTENDED RELEASE 24 HR ORAL
Qty: 30 | Refills: 0
Start: 2023-06-03 | End: 2023-07-02

## 2023-06-03 RX ORDER — FOSINOPRIL SODIUM 10 MG/1
1 TABLET ORAL
Qty: 0 | Refills: 0 | DISCHARGE

## 2023-06-03 RX ORDER — HYDROCHLOROTHIAZIDE 25 MG
1 TABLET ORAL
Qty: 0 | Refills: 0 | DISCHARGE

## 2023-06-03 RX ORDER — SODIUM CHLORIDE 9 MG/ML
1000 INJECTION INTRAMUSCULAR; INTRAVENOUS; SUBCUTANEOUS
Refills: 0 | Status: DISCONTINUED | OUTPATIENT
Start: 2023-06-03 | End: 2023-06-03

## 2023-06-03 RX ADMIN — Medication 150 MILLIGRAM(S): at 11:11

## 2023-06-03 RX ADMIN — LISINOPRIL 40 MILLIGRAM(S): 2.5 TABLET ORAL at 05:31

## 2023-06-03 RX ADMIN — Medication 25 MILLIGRAM(S): at 17:23

## 2023-06-03 RX ADMIN — Medication 4: at 17:13

## 2023-06-03 RX ADMIN — Medication 40 MILLIGRAM(S): at 05:32

## 2023-06-03 RX ADMIN — Medication 25 MILLIGRAM(S): at 05:31

## 2023-06-03 RX ADMIN — RIVAROXABAN 15 MILLIGRAM(S): KIT at 17:23

## 2023-06-03 RX ADMIN — Medication 4: at 08:38

## 2023-06-03 RX ADMIN — Medication 60 MILLIGRAM(S): at 05:31

## 2023-06-03 RX ADMIN — Medication 81 MILLIGRAM(S): at 11:11

## 2023-06-03 RX ADMIN — Medication 8: at 12:06

## 2023-06-03 RX ADMIN — SODIUM CHLORIDE 60 MILLILITER(S): 9 INJECTION INTRAMUSCULAR; INTRAVENOUS; SUBCUTANEOUS at 11:21

## 2023-06-03 RX ADMIN — ISOSORBIDE MONONITRATE 120 MILLIGRAM(S): 60 TABLET, EXTENDED RELEASE ORAL at 11:13

## 2023-06-03 NOTE — DISCHARGE NOTE PROVIDER - ATTENDING DISCHARGE PHYSICAL EXAMINATION:
CONSTITUTIONAL: No acute distress, well-developed, well-groomed, AAOx3  HEAD: Atraumatic, normocephalic  EYES: EOM intact, PERRLA, conjunctiva and sclera clear  ENT: Supple, no masses, no thyromegaly, no bruits, no JVD; moist mucous membranes  PULMONARY: Clear to auscultation bilaterally; no wheezes, rales, or rhonchi  CARDIOVASCULAR: Regular rate and rhythm; no murmurs, rubs, or gallops  GASTROINTESTINAL: Soft, non-tender, non-distended; bowel sounds present  MUSCULOSKELETAL: 2+ peripheral pulses; no clubbing, no cyanosis, no edema, resolved swelling of left Knee, intact ROM   NEUROLOGY: non-focal  SKIN: No rashes or lesions; warm and dry

## 2023-06-03 NOTE — DISCHARGE NOTE PROVIDER - NSDCCPCAREPLAN_GEN_ALL_CORE_FT
PRINCIPAL DISCHARGE DIAGNOSIS  Diagnosis: Acute gout  Assessment and Plan of Treatment: your leg pain and swelling was due to acute gout attacke, you are treated with steroid, need to follow up with your primary care and rheumatology clininc  get medical evaluation of swelling happen again or develop fever      SECONDARY DISCHARGE DIAGNOSES  Diagnosis: HTN (hypertension)  Assessment and Plan of Treatment: your blood pressure was not well controlled so your mediactions were adjusted as instructed     PRINCIPAL DISCHARGE DIAGNOSIS  Diagnosis: Acute gout  Assessment and Plan of Treatment: your leg pain and swelling was due to acute gout attacke, you are treated with steroid, need to follow up with your primary care and rheumatology clininc  get medical evaluation of swelling happen again or develop fever      SECONDARY DISCHARGE DIAGNOSES  Diagnosis: HTN (hypertension)  Assessment and Plan of Treatment: your blood pressure was not well controlled so your mediactions were adjusted as instructed    Diagnosis: Stage 3 chronic kidney disease  Assessment and Plan of Treatment: you underwent CT scan with agnio, need to repeat your kideny function in 1 week to make sure your kideny function not worsening from the contrast, hold the water Pills, Hydrochlorothizide, hold Fosinopril for 5 days

## 2023-06-03 NOTE — DISCHARGE NOTE PROVIDER - PROVIDER TOKENS
PROVIDER:[TOKEN:[[id number]],FOLLOWUP:[2 weeks]],PROVIDER:[TOKEN:[[id number]],FOLLOWUP:[2 weeks]]

## 2023-06-03 NOTE — DISCHARGE NOTE PROVIDER - DISCHARGE DATE
History and Physical


History and Physical: 





Admission history and physical dictated  #012998


51 y/o male s/p BCA earlier today with right scapular fx and right pneumothorax/

R AC separation


admitted for observation and possible closed tube thoracostomy.


S MD Michael, FACS
03-Jun-2023

## 2023-06-03 NOTE — DISCHARGE NOTE PROVIDER - HOSPITAL COURSE
83 y/o male with PMHx of NIDDM, hypertension, hyperlipidemia, CAD, A-fib on Xarelto, admitted for left leg pain and popping sensation on ambulation, left knee and foot XR showing moderate knee joint effusion but otherwise unremarkable, CTA LLE showing high-grade stenosis of the proximal left superficial femoral artery secondary to large burden calcified plaque and diffuse atherosclerosis. No acute intervention at this time, per vascular       #left knee pain and swelling suspected acute gout  #HTN urgency  #chronic Afib  #Hx of CAD  #PAD  #DM    - ortho eval for tap, not indicated   - low suspicious for septic arthritis   - responsed well to steroid  - outpt f/u w pcp and Rheum  - replace Norvasc w nifedipine   - PT eval  - from home .    81 y/o male with PMHx of NIDDM, hypertension, hyperlipidemia, CAD, A-fib on Xarelto, admitted for left leg pain and popping sensation on ambulation, left knee and foot XR showing moderate knee joint effusion but otherwise unremarkable, CTA LLE showing high-grade stenosis of the proximal left superficial femoral artery secondary to large burden calcified plaque and diffuse atherosclerosis. No acute intervention at this time, per vascular       #left knee pain and swelling suspected acute gout  #HTN urgency  #chronic Afib  #Hx of CAD  #ckd3  #PAD  #DM    - ortho eval for tap, not indicated   - low suspicious for septic arthritis   - responsed well to steroid  - outpt f/u w pcp and Rheum  - replace Norvasc w nifedipine   - pt might had mild DIETER on ckd3, He is s/p CT angio, will hold HCTZ on DC   - PT eval  - from home .

## 2023-06-03 NOTE — DISCHARGE NOTE PROVIDER - NSDCMRMEDTOKEN_GEN_ALL_CORE_FT
allopurinol: 150 milligram(s) orally once a day  Aspirin Enteric Coated 81 mg oral delayed release tablet: 1 tab(s) orally once a day  atorvastatin 40 mg oral tablet: 1 tab(s) orally once a day (at bedtime)  fosinopril 40 mg oral tablet: 1 tab(s) orally once a day  hydrALAZINE 25 mg oral tablet: 1 tab(s) orally 2 times a day  hydroCHLOROthiazide 12.5 mg oral capsule: 1 cap(s) orally once a day  isosorbide mononitrate 120 mg oral tablet, extended release: 1 tab(s) orally once a day  NIFEdipine 60 mg oral tablet, extended release: 1 tab(s) orally once a day  pioglitazone 30 mg oral tablet: 1 tab(s) orally once a day  predniSONE 20 mg oral tablet: 2 tab(s) orally once a day  Xarelto 15 mg oral tablet: 1 tab(s) orally once a day (in the evening)   allopurinol: 150 milligram(s) orally once a day  Aspirin Enteric Coated 81 mg oral delayed release tablet: 1 tab(s) orally once a day  atorvastatin 40 mg oral tablet: 1 tab(s) orally once a day (at bedtime)  fosinopril 40 mg oral tablet: 1 tab(s) orally once a day  hydrALAZINE 25 mg oral tablet: 1 tab(s) orally 2 times a day  isosorbide mononitrate 120 mg oral tablet, extended release: 1 tab(s) orally once a day  NIFEdipine 60 mg oral tablet, extended release: 1 tab(s) orally once a day  pioglitazone 30 mg oral tablet: 1 tab(s) orally once a day  predniSONE 20 mg oral tablet: 2 tab(s) orally once a day  Xarelto 15 mg oral tablet: 1 tab(s) orally once a day (in the evening)

## 2023-06-03 NOTE — DISCHARGE NOTE PROVIDER - CARE PROVIDER_API CALL
Sai Alonso  Internal Medicine  [address] [place] [address]  Phone: [phone]  Fax: [phone]  Follow Up Time: 2 weeks    Darcie Fairchild  Rheumatology  [address] [facility], [address]  [place] [address]  Phone: [phone]  Fax: [phone]  Follow Up Time: 2 weeks

## 2023-06-08 DIAGNOSIS — E11.51 TYPE 2 DIABETES MELLITUS WITH DIABETIC PERIPHERAL ANGIOPATHY WITHOUT GANGRENE: ICD-10-CM

## 2023-06-08 DIAGNOSIS — N17.9 ACUTE KIDNEY FAILURE, UNSPECIFIED: ICD-10-CM

## 2023-06-08 DIAGNOSIS — I48.20 CHRONIC ATRIAL FIBRILLATION, UNSPECIFIED: ICD-10-CM

## 2023-06-08 DIAGNOSIS — I25.10 ATHEROSCLEROTIC HEART DISEASE OF NATIVE CORONARY ARTERY WITHOUT ANGINA PECTORIS: ICD-10-CM

## 2023-06-08 DIAGNOSIS — N18.32 CHRONIC KIDNEY DISEASE, STAGE 3B: ICD-10-CM

## 2023-06-08 DIAGNOSIS — I70.202 UNSPECIFIED ATHEROSCLEROSIS OF NATIVE ARTERIES OF EXTREMITIES, LEFT LEG: ICD-10-CM

## 2023-06-08 DIAGNOSIS — E78.5 HYPERLIPIDEMIA, UNSPECIFIED: ICD-10-CM

## 2023-06-08 DIAGNOSIS — I12.9 HYPERTENSIVE CHRONIC KIDNEY DISEASE WITH STAGE 1 THROUGH STAGE 4 CHRONIC KIDNEY DISEASE, OR UNSPECIFIED CHRONIC KIDNEY DISEASE: ICD-10-CM

## 2023-06-08 DIAGNOSIS — I16.0 HYPERTENSIVE URGENCY: ICD-10-CM

## 2023-06-08 DIAGNOSIS — Z79.01 LONG TERM (CURRENT) USE OF ANTICOAGULANTS: ICD-10-CM

## 2023-06-08 DIAGNOSIS — I16.1 HYPERTENSIVE EMERGENCY: ICD-10-CM

## 2023-06-08 DIAGNOSIS — E11.22 TYPE 2 DIABETES MELLITUS WITH DIABETIC CHRONIC KIDNEY DISEASE: ICD-10-CM

## 2023-06-08 DIAGNOSIS — M10.9 GOUT, UNSPECIFIED: ICD-10-CM

## 2023-06-19 ENCOUNTER — APPOINTMENT (OUTPATIENT)
Dept: RHEUMATOLOGY | Facility: CLINIC | Age: 83
End: 2023-06-19

## 2023-08-08 NOTE — CHART NOTE - NSCHARTNOTESELECT_GEN_ALL_CORE
Transfer Note Medical Necessity Statement: Based on my medical judgement, Mohs surgery is medically necessary as it is the most appropriate treatment for this cancer compared to other treatments.

## 2023-08-11 NOTE — ASU PREOP CHECKLIST - HEIGHT IN INCHES
Pt. V/u lab results via my chart pt. Message.     Requested we send in RX for iron supplement.     RX sent to pharmacy.   0

## 2023-09-26 NOTE — PHYSICAL THERAPY INITIAL EVALUATION ADULT - DIAGNOSIS, PT EVAL
-- DO NOT REPLY / DO NOT REPLY ALL --  -- Message is from Engagement Center Operations (ECO) --    Offered Waitlist if Available for the Visit Type? No    Caller is requesting an appointment - at a sooner time than what was available.      Caller wants sooner appointment - offered other approved options  Reason for Visit: acute - cough and sore throat - bumps (hands , arms) some itch and some       Is the patient currently scheduled? No    Preferred time to be seen: today    Caller Information       Type Contact Phone/Fax    09/26/2023 11:18 AM CDT Phone (Incoming) Theresa Redmond (Emergency Contact) [phone] (M)          Alternative phone number: na    Can a detailed message be left? Yes    Message Turnaround:     [place]:    Please give this turnaround time to the caller:   \"This message will be sent to [state Provider's name]. The clinical team will fulfill your request as soon as they review your message.\"   Rehab of gait dysfunction s/p LLE debridement

## 2024-06-07 ENCOUNTER — NON-APPOINTMENT (OUTPATIENT)
Age: 84
End: 2024-06-07

## 2024-08-26 NOTE — DISCHARGE NOTE NURSING/CASE MANAGEMENT/SOCIAL WORK - NSCORESITESY/N_GEN_A_CORE_RD
"AMBULATORY CASE MANAGEMENT NOTE    Names and Relationships of Patient/Support Persons: Contact: Sharmaine Torres; Relationship: Self -     CCM Interim Update    I spoke with patient on the phone after not being able to reach her for an extended period of time and placing patient in monitoring status.     Patient rarely checks her BG. I explained to her the importance of doing this. Patient is agreeable to work on the goal of checking her blood glucose every morning. Patient placed in Engaged status again.    Medication list gone over with patient. Patient states she is compliant with all medications except lisinipril and lipitor. She doesn't understand why she is \"on blood pressure medication if I don't have high blood pressure\". I reviewed her charted BP readings and explained that they were slightly elevated therefore there is a need for this medication. Patient also in noncompliant with taking Liptor. I asked patient what was keeping her from taking these medications and she stated that she didn't know.    I discussed the patient's referral for a sleep study with her. She seemed unsure why she would need a sleep study. I educated her on what the sleep study is and why it was ordered. Patient does not want one at this time.    Patient had an appointment with the Diabetic Educator for diet education and she cancelled it.     Patient requested an appointment with PCP due to chest pain and shortness of air that has been going on for 3 weeks. Appointment made for patient for later this week.    Patient denies other needs at this time.      Education Documentation  Medication Management, taught by Magdalena Casiano RN at 8/26/2024  1:58 PM.  Learner: Patient  Readiness: Nonacceptance  Method: Explanation  Response: Needs Reinforcement    Blood Pressure Monitoring, taught by Magdalena Casiano RN at 8/26/2024  1:58 PM.  Learner: Patient  Readiness: Nonacceptance  Method: Explanation  Response: Needs Reinforcement    Risk " Factors, taught by Magdalena Casiano RN at 8/26/2024  1:58 PM.  Learner: Patient  Readiness: Nonacceptance  Method: Explanation  Response: Needs Reinforcement    Monitoring Carbohydrate Intake, taught by Magdalena Casiano RN at 8/26/2024  1:58 PM.  Learner: Patient  Readiness: Nonacceptance  Method: Explanation  Response: Needs Reinforcement    Healthy Food Choices, taught by Magdalena Casiano RN at 8/26/2024  1:58 PM.  Learner: Patient  Readiness: Nonacceptance  Method: Explanation  Response: Needs Reinforcement    Carbohydrate-Containing Foods, taught by Magdalena Casiano RN at 8/26/2024  1:58 PM.  Learner: Patient  Readiness: Nonacceptance  Method: Explanation  Response: Needs Reinforcement    Importance of Glycemic Management, taught by Magdalena Casiano RN at 8/26/2024  1:58 PM.  Learner: Patient  Readiness: Nonacceptance  Method: Explanation  Response: Needs Reinforcement    Blood Glucose Monitor Use, taught by Magdalena Casiano RN at 8/26/2024  1:58 PM.  Learner: Patient  Readiness: Nonacceptance  Method: Explanation  Response: Needs Reinforcement    Blood Glucose Monitoring, taught by Magdalena Casiano RN at 8/26/2024  1:58 PM.  Learner: Patient  Readiness: Nonacceptance  Method: Explanation  Response: Needs Reinforcement    Blood Glucose Goal, taught by Magdalena Casiano RN at 8/26/2024  1:58 PM.  Learner: Patient  Readiness: Nonacceptance  Method: Explanation  Response: Needs Reinforcement    Blood Glucose Monitoring, taught by Magdalena Casiano RN at 8/26/2024  1:58 PM.  Learner: Patient  Readiness: Nonacceptance  Method: Explanation  Response: Needs Reinforcement    A1C Definition, taught by Magdalena Casiano RN at 8/26/2024  1:58 PM.  Learner: Patient  Readiness: Nonacceptance  Method: Explanation  Response: Needs Reinforcement          Magdalena PETERS  Ambulatory Case Management    8/26/2024, 13:58 EDT   No

## 2024-12-03 ENCOUNTER — APPOINTMENT (OUTPATIENT)
Dept: OTOLARYNGOLOGY | Facility: CLINIC | Age: 84
End: 2024-12-03
Payer: MEDICARE

## 2024-12-03 VITALS — BODY MASS INDEX: 32.51 KG/M2 | HEIGHT: 72 IN | WEIGHT: 240 LBS

## 2024-12-03 DIAGNOSIS — R04.0 EPISTAXIS: ICD-10-CM

## 2024-12-03 DIAGNOSIS — H92.21 OTORRHAGIA, RIGHT EAR: ICD-10-CM

## 2024-12-03 DIAGNOSIS — H90.3 SENSORINEURAL HEARING LOSS, BILATERAL: ICD-10-CM

## 2024-12-03 PROCEDURE — 99203 OFFICE O/P NEW LOW 30 MIN: CPT

## 2024-12-03 PROCEDURE — 92550 TYMPANOMETRY & REFLEX THRESH: CPT | Mod: 52

## 2024-12-03 PROCEDURE — 92557 COMPREHENSIVE HEARING TEST: CPT

## 2025-01-10 ENCOUNTER — APPOINTMENT (OUTPATIENT)
Dept: OTOLARYNGOLOGY | Facility: CLINIC | Age: 85
End: 2025-01-10

## 2025-03-05 NOTE — ED PROVIDER NOTE - TOBACCO USE
65 yo male with  no known medical illnesses  who presented to the ED 3/4   with post obstructive uropathy with rising creat 12.3 , , nephrology consulted for SHORTY .     #Post-obstructive non-oliguric  SHORTY:   Ann cath placed by OP urologist draining ~2L of urine between time of placement and arrival to ED   Labs in ED c/f  , Creat 12.3  SHORTY at this time likely ISO obstruction, however extended period of post obstruction can lead to intrinsic injury  Given rapid decrease in creatinine (12.32>6.55>2.55) most likely post-obstructive  UA cloudy w/ RBCs and WBCs c/w post-obstructive SHORTY   UProtein/Cr ratio 0.3  3/5 renal US also c/w mild bilateral hydronephrosis     Plan  -Maintain ann cath  -Strict I/o chart  -Avoid nephrotoxic agents  -renally adjust meds for GFR <30  -No acute indication for HD    #Mild hypernatremia:   post obstructive diuresis can cause hypernatremia  Agree w/ D5% for fluid repletion given mild hypernatremia       Recommendations pending attending attestation. 63 yo male with  no known medical illnesses  who presented to the ED 3/4   with post obstructive uropathy with rising creat 12.3 , , nephrology consulted for SHORTY .     #Post-obstructive non-oliguric  SHORTY:   Ann cath placed by OP urologist draining ~2L of urine between time of placement and arrival to ED   Labs in ED c/f  , Creat 12.3  SHORTY at this time likely ISO obstruction, however extended period of obstruction can lead to intrinsic injury  Given rapid decrease in creatinine (12.32>6.55>2.55) most likely post-obstructive  UA cloudy w/ RBCs and WBCs c/w post-obstructive SHORTY   UProtein/Cr ratio 0.3  3/5 renal US also c/w mild bilateral hydronephrosis     Plan  -Maintain ann cath  -Strict I/o chart  -Avoid nephrotoxic agents  -renally adjust meds for GFR <30  -No acute indication for HD    #Mild hypernatremia:   post obstructive diuresis can cause hypernatremia  Agree w/ D5% for fluid repletion given mild hypernatremia       Recommendations pending attending attestation. Never smoker